# Patient Record
Sex: MALE | Race: WHITE
[De-identification: names, ages, dates, MRNs, and addresses within clinical notes are randomized per-mention and may not be internally consistent; named-entity substitution may affect disease eponyms.]

---

## 2017-07-30 NOTE — PD
Physical Exam


Exam Limitations:  Poor Historian


Narrative


GENERAL: Well-nourished, well-developed patient.


SKIN: Warm and dry.


HEAD: Normocephalic and atraumatic.


EYES: No injection or drainage. 


ENT: No nasal drainage noted. 


NECK: Supple, trachea midline.


CARDIOVASCULAR: Regular rate and rhythm 


RESPIRATORY: no increased effort. No accessory muscle use.


NEUROLOGICAL: Awake and alert. Motor and sensory grossly within normal limits. 

Normal speech.





Data


Data


Last Documented VS





Vital Signs








  Date Time  Temp Pulse Resp B/P Pulse Ox O2 Delivery O2 Flow Rate FiO2


 


7/30/17 15:50     98 Room Air  


 


7/30/17 14:38 98.1 75 20 159/78    








Orders





 Electrocardiogram (7/30/17 15:01)


Complete Blood Count With Diff (7/30/17 15:01)


Comprehensive Metabolic Panel (7/30/17 15:01)


Magnesium (Mg) (7/30/17 15:01)


Ckmb (Isoenzyme) Profile (7/30/17 15:01)


Troponin I (7/30/17 15:01)


Act Partial Throm Time (Ptt) (7/30/17 15:01)


Prothrombin Time / Inr (Pt) (7/30/17 15:01)


Urinalysis - C+S If Indicated (7/30/17 15:01)


Chest, Single Ap (7/30/17 15:01)


Ct Brain W/O Iv Contrast(Rout) (7/30/17 15:01)


Ecg Monitoring (7/30/17 15:01)


Iv Access Insert/Monitor (7/30/17 15:01)


Oximetry (7/30/17 15:01)


Sodium Chloride 0.9% Flush (Ns Flush) (7/30/17 15:15)





Labs





 Laboratory Tests








Test 7/30/17 7/30/17





 15:30 16:51


 


White Blood Count 7.0 TH/MM3 


 


Red Blood Count 4.30 MIL/MM3 


 


Hemoglobin 13.6 GM/DL 


 


Hematocrit 39.9 % 


 


Mean Corpuscular Volume 92.7 FL 


 


Mean Corpuscular Hemoglobin 31.6 PG 


 


Mean Corpuscular Hemoglobin 34.0 % 





Concent  


 


Red Cell Distribution Width 14.0 % 


 


Platelet Count 252 TH/MM3 


 


Mean Platelet Volume 8.4 FL 


 


Neutrophils (%) (Auto) 53.6 % 


 


Lymphocytes (%) (Auto) 35.1 % 


 


Monocytes (%) (Auto) 8.6 % 


 


Eosinophils (%) (Auto) 2.0 % 


 


Basophils (%) (Auto) 0.7 % 


 


Neutrophils # (Auto) 3.8 TH/MM3 


 


Lymphocytes # (Auto) 2.5 TH/MM3 


 


Monocytes # (Auto) 0.6 TH/MM3 


 


Eosinophils # (Auto) 0.1 TH/MM3 


 


Basophils # (Auto) 0.0 TH/MM3 


 


CBC Comment DIFF FINAL  


 


Differential Comment   


 


Prothrombin Time 10.8 SEC 


 


Prothromb Time International 1.0 RATIO 





Ratio  


 


Activated Partial 26.8 SEC 





Thromboplast Time  


 


Sodium Level 135 MEQ/L 


 


Potassium Level 4.2 MEQ/L 


 


Chloride Level 102 MEQ/L 


 


Carbon Dioxide Level 25.4 MEQ/L 


 


Anion Gap 8 MEQ/L 


 


Blood Urea Nitrogen 14 MG/DL 


 


Creatinine 1.56 MG/DL 


 


Estimat Glomerular Filtration 44 ML/MIN 





Rate  


 


Random Glucose 378 MG/DL 


 


Calcium Level 9.0 MG/DL 


 


Magnesium Level 1.8 MG/DL 


 


Total Bilirubin 0.8 MG/DL 


 


Aspartate Amino Transf 54 U/L 





(AST/SGOT)  


 


Alanine Aminotransferase 75 U/L 





(ALT/SGPT)  


 


Alkaline Phosphatase 110 U/L 


 


Total Creatine Kinase 95 U/L 


 


Troponin I LESS THAN 0.02 





 NG/ML 


 


Total Protein 6.9 GM/DL 


 


Albumin 3.2 GM/DL 


 


Urine Color  YELLOW 


 


Urine Turbidity  CLEAR 


 


Urine pH  5.5 


 


Urine Specific Gravity  1.031 


 


Urine Protein  NEG mg/dL


 


Urine Glucose (UA)  1000 mg/dL


 


Urine Ketones  NEG mg/dL


 


Urine Occult Blood  NEG 


 


Urine Nitrite  NEG 


 


Urine Bilirubin  NEG 


 


Urine Urobilinogen  LESS THAN 2.0





  MG/DL


 


Urine Leukocyte Esterase  NEG 


 


Urine RBC  1 /hpf


 


Urine WBC  LESS THAN 1





  /hpf


 


Microscopic Urinalysis Comment  CULT NOT





  INDICATED











[facility]


Supervised Visit with ROBYN:  Yes


Interpretation(s)


CBC & BMP Diagram


7/30/17 15:30











Last 24 hours Impressions








Head CT 7/30/17 1501 Signed





Impressions: 





 Service Date/Time:  Sunday, July 30, 2017 15:58 - CONCLUSION:  1. Mild 





 periventricular white matter small vessel ischemic changes bilaterally.  2. No 





 acute infarct, acute hemorrhage, mass effect or extra-axial fluid collections.

   





         Wilfrido Storey MD 


 


Chest X-Ray 7/30/17 1501 Signed





Impressions: 





 Service Date/Time:  Sunday, July 30, 2017 15:00 - CONCLUSION:   1.  Mild 





 cardiomegaly.  2.  No acute focal pulmonary infiltrate or pulmonary vascular 





 congestion.  3.  Degenerative changes throughout the thoracic spine.    Wilfrido Storey MD 








Narrative Course


I, Dr. jay, have reviewed the advance practice practitioner's documentation 

and am in agreement, met with the patient face to face, made the diagnosis, and 

the medical decision making was done by me.  





*My assessment and Findings: 72-year-old male with no onset diabetes had a near 

syncopal event about 3 weeks ago per his daughter who is now in town and wanted 

him to get checked out.  Patient states he never lost consciousness and had no 

chest pain or shortness of breath.  Labs show elevated glucose without DKA.  

Lengthy discussion with patient and daughter and offered observation but not 

wanting to stay.  His daughter states that she will help him with his diabetes 

education and set up close follow-up


Diagnosis





 Primary Impression:  


 Hyperglycemia


 Additional Impression:  


 Near syncope


Patient Instructions:  General Instructions





***Additional Instruction:


return as needed, follow with primary this week, limit sugar intake


***Med/Other Pt SpecificInfo:  No Change to Meds


Disposition:  01 DISCHARGE HOME


Condition:  Stable








Lexi Jay MD Jul 30, 2017 18:04

## 2017-07-30 NOTE — PD
HPI


Chief Complaint:  Diabetic


Time Seen by Provider:  15:00


Travel History


International Travel<30 days:  No


Contact w/Intl Traveler<30days:  No


Traveled to known affect area:  No





History of Present Illness


HPI


73 YO M with PMH of HTN, bipolar presents to the ED for evaluation 2 week 

history of intermittent nausea and vomiting. He can identify no exacerbating or 

alleviating factors. Asymptomatic on presentation. He states that he had a near 

syncopal episode 2 weeks ago in [facility].  EMS was called to the scene and 

rehydrated the patient with Gatorade. Patient refused transport to the 

hospital. He also complains of intermittent dizziness over the same 2 weeks. 

Denies fevers, chills, chest pain, palpitations, shortness of breath, abdominal 

pain, melena, hematochezia, hematemesis, dysuria, back pain, neuro deficits.  

His BGL was 434 at [facility] today. He is followed by Dr. Rocha, last visit~ 

6 months ago.





PFSH


Past Medical History


Hx Anticoagulant Therapy:  Yes (ASA 2X81)


Arthritis:  Yes (BOTH KNEE)


Asthma:  No


Bipolar Disorder:  Yes


Anxiety:  No


Depression:  Yes


Heart Rhythm Problems:  No


Cancer:  No


Cardiovascular Problems:  Yes (HTN)


High Cholesterol:  No


Chest Pain:  No


Congestive Heart Failure:  No


COPD:  No


Diminished Hearing:  No


Endocrine:  No


GERD:  Yes


Genitourinary:  Yes


Hiatal Hernia:  No


Hypertension:  Yes


Immune Disorder:  No


Kidney Stones:  No


Musculoskeletal:  Yes


Neurologic:  No


Psychiatric:  Yes


Respiratory:  Yes (ASTHMA)


Renal Failure:  No


Sleep Apnea:  No


Ulcer:  No





Past Surgical History


Abdominal Surgery:  No


Cardiac Surgery:  No


Ear Surgery:  No


Endocrine Surgery:  No


Eye Surgery:  No


Genitourinary Surgery:  No


Gynecologic Surgery:  No


Oral Surgery:  No


Pacemaker:  No


Thoracic Surgery:  No





Social History


Alcohol Use:  No


Tobacco Use:  No


Substance Use:  No





Allergies-Medications


(Allergen,Severity, Reaction):  


Coded Allergies:  


     Codeine (Verified  Allergy, Severe, MUSCULOSKELETAL ISSUES, 7/30/17)


Reported Meds & Prescriptions





Reported Meds & Active Scripts


Active


Reported


Carvedilol 25 Mg Tab 25 Mg PO HS


Flovent Hfa 12 GM Inh (Fluticasone Propionate) Unknown Strength Inh Unknown 

Dose INH DAILY PRN


Atorvastatin (Atorvastatin Calcium) 20 Mg Tab 20 Mg PO HS


Fluoxetine (Fluoxetine HCl) 40 Mg Cap 40 Cap PO HS


Pantoprazole (Pantoprazole Sodium) 40 Mg Tab 40 Mg PO HS


Flomax (Tamsulosin HCl) 0.4 Mg Cap 0.4 Mg PO HS


Lithium Carbonate 600 Mg Cap 600 Mg PO HS


Verapamil ER (Verapamil HCl) 120 Mg Tab 120 Mg PO HS


Aspir-81 (Aspirin) 81 Mg Tabdr 162 Mg PO HS








Review of Systems


Except as stated in HPI:  all other systems reviewed are Neg





Physical Exam


Narrative


GENERAL: Well-nourished, well-developed male in NAD.


SKIN: Focused skin assessment warm/dry.


HEAD: Normocephalic.


EYES: No scleral icterus. No injection or drainage. 


NECK: Supple, trachea midline. No JVD or lymphadenopathy.


CARDIOVASCULAR: Regular rate and rhythm without murmurs, gallops, or rubs. 


RESPIRATORY: Breath sounds clear and equal bilaterally. No accessory muscle use.


GASTROINTESTINAL: Abdomen protuberant, soft, non-tender, nondistended. Active 

bowel sounds.


MUSCULOSKELETAL: No cyanosis, or edema. 


NEUROLOGICAL: Awake and alert. Cranial nerves II through XII intact.  Motor and 

sensory grossly within normal limits. 5/5 muscle strength in all muscle groups.

  Normal speech.


BACK: Nontender without obvious deformity. No CVA tenderness.





Data


Data


Last Documented VS





Vital Signs








  Date Time  Temp Pulse Resp B/P Pulse Ox O2 Delivery O2 Flow Rate FiO2


 


7/30/17 15:50     98 Room Air  


 


7/30/17 14:38 98.1 75 20 159/78    








Orders





 Electrocardiogram (7/30/17 15:01)


Complete Blood Count With Diff (7/30/17 15:01)


Comprehensive Metabolic Panel (7/30/17 15:01)


Magnesium (Mg) (7/30/17 15:01)


Ckmb (Isoenzyme) Profile (7/30/17 15:01)


Troponin I (7/30/17 15:01)


Act Partial Throm Time (Ptt) (7/30/17 15:01)


Prothrombin Time / Inr (Pt) (7/30/17 15:01)


Urinalysis - C+S If Indicated (7/30/17 15:01)


Chest, Single Ap (7/30/17 15:01)


Ct Brain W/O Iv Contrast(Rout) (7/30/17 15:01)


Ecg Monitoring (7/30/17 15:01)


Iv Access Insert/Monitor (7/30/17 15:01)


Oximetry (7/30/17 15:01)


Sodium Chloride 0.9% Flush (Ns Flush) (7/30/17 15:15)





Labs





 Laboratory Tests








Test 7/30/17 7/30/17





 15:30 16:51


 


White Blood Count 7.0 TH/MM3 


 


Red Blood Count 4.30 MIL/MM3 


 


Hemoglobin 13.6 GM/DL 


 


Hematocrit 39.9 % 


 


Mean Corpuscular Volume 92.7 FL 


 


Mean Corpuscular Hemoglobin 31.6 PG 


 


Mean Corpuscular Hemoglobin 34.0 % 





Concent  


 


Red Cell Distribution Width 14.0 % 


 


Platelet Count 252 TH/MM3 


 


Mean Platelet Volume 8.4 FL 


 


Neutrophils (%) (Auto) 53.6 % 


 


Lymphocytes (%) (Auto) 35.1 % 


 


Monocytes (%) (Auto) 8.6 % 


 


Eosinophils (%) (Auto) 2.0 % 


 


Basophils (%) (Auto) 0.7 % 


 


Neutrophils # (Auto) 3.8 TH/MM3 


 


Lymphocytes # (Auto) 2.5 TH/MM3 


 


Monocytes # (Auto) 0.6 TH/MM3 


 


Eosinophils # (Auto) 0.1 TH/MM3 


 


Basophils # (Auto) 0.0 TH/MM3 


 


CBC Comment DIFF FINAL  


 


Differential Comment   


 


Prothrombin Time 10.8 SEC 


 


Prothromb Time International 1.0 RATIO 





Ratio  


 


Activated Partial 26.8 SEC 





Thromboplast Time  


 


Sodium Level 135 MEQ/L 


 


Potassium Level 4.2 MEQ/L 


 


Chloride Level 102 MEQ/L 


 


Carbon Dioxide Level 25.4 MEQ/L 


 


Anion Gap 8 MEQ/L 


 


Blood Urea Nitrogen 14 MG/DL 


 


Creatinine 1.56 MG/DL 


 


Estimat Glomerular Filtration 44 ML/MIN 





Rate  


 


Random Glucose 378 MG/DL 


 


Calcium Level 9.0 MG/DL 


 


Magnesium Level 1.8 MG/DL 


 


Total Bilirubin 0.8 MG/DL 


 


Aspartate Amino Transf 54 U/L 





(AST/SGOT)  


 


Alanine Aminotransferase 75 U/L 





(ALT/SGPT)  


 


Alkaline Phosphatase 110 U/L 


 


Total Creatine Kinase 95 U/L 


 


Troponin I LESS THAN 0.02 





 NG/ML 


 


Total Protein 6.9 GM/DL 


 


Albumin 3.2 GM/DL 


 


Urine Color  YELLOW 


 


Urine Turbidity  CLEAR 


 


Urine pH  5.5 


 


Urine Specific Gravity  1.031 


 


Urine Protein  NEG mg/dL


 


Urine Glucose (UA)  1000 mg/dL


 


Urine Ketones  NEG mg/dL


 


Urine Occult Blood  NEG 


 


Urine Nitrite  NEG 


 


Urine Bilirubin  NEG 


 


Urine Urobilinogen  LESS THAN 2.0





  MG/DL


 


Urine Leukocyte Esterase  NEG 


 


Urine RBC  1 /hpf


 


Urine WBC  LESS THAN 1





  /hpf


 


Microscopic Urinalysis Comment  CULT NOT





  INDICATED











MDM


Medical Decision Making


Medical Screen Exam Complete:  Yes


Emergency Medical Condition:  Yes


Differential Diagnosis


hyperglycemia versus DKA versus TIA versus CVA versus ACS versus anemia versus 

electrolyte abnormality versus other


Narrative Course


73 YO M with PMH of HTN, bipolar presents to the ED for evaluation 2 week 

history of intermittent nausea and vomiting. He also complains of intermittent 

dizziness over the same 2 weeks.  He can identify no exacerbating or 

alleviating factors. Asymptomatic on presentation. He states that he had a near 

syncopal episode 2 weeks ago in [facility].  EMS was called to the scene, refused 

transport to the hospital.  His BGL was 434 at [facility] today. He is followed 

by Dr. Rocha, last visit~ 6 months ago. Vitals reviewed.  Physical exam 

reveals a well-appearing male in no acute distress.  Chest CTAB, abdomen soft, 

nontender.  No CVA tenderness.  No lower extremity edema.  No focal neuro 

deficits.





EKG rate 67, sinus rhythm, first-degree AV block, [place] interval 231, , QTc 

460.  Left bundle branch block.  Changes.  Reviewed by Dr. Gerardo.


CXR: Mild cardiomegaly, no focal infiltrates, thoracic spinal changes.


Cardiac enzymes negative 1.


CMP: WBC 7.0, hemoglobin 13.6.


INR 1.0.


CMP: BUN 14, creatinine 1.56, glucose 378


UA: Glucose 1000


CT brain: No acute infarct, hemorrhage, mass effect or extra-axial fluid 

collections per radiology read.





I discussed the results of the workup at length with the patient.  He does not 

want to be admitted to the hospital.  Patient's daughter is at bedside, states 

that she is a nurse practitioner, will help her father to set up follow-up with 

the primary care for evaluation of hyperglycemia. He is stable and discharged 

home.





Diagnosis





 Primary Impression:  


 Near syncope


 Additional Impression:  


 Hyperglycemia


Referrals:  


Primary Care Physician


Patient Instructions:  Diabetic Hyperglycemia (ED), General Instructions, Near 

Syncope (ED)





***Additional Instructions:


Rest, hydrate.


Reduce your sweets intake.


Follow up with your primary care provider for further evaluation and Hemoglobin 

A1c check this week.


Return to the ED for any urgent or emergent medical condition.


Disposition:  01 DISCHARGE HOME


Condition:  Stable








Ny Bernal Jul 30, 2017 15:00

## 2017-07-30 NOTE — RADRPT
EXAM DATE/TIME:  07/30/2017 15:00 

 

[facility] COMPARISON:     

CHEST SINGLE AP, September 09, 2014, 17:20.

 

                     

INDICATIONS :     

Syncope. Lightheaded.

                     

 

MEDICAL HISTORY :     

Hypertension.       Hyperglycemic.   

 

SURGICAL HISTORY :     

None.   

 

ENCOUNTER:     

Initial                                        

 

ACUITY:     

2 weeks      

 

PAIN SCORE:     

0/10

 

LOCATION:     

Bilateral chest 

 

FINDINGS:     

The heart is mildly enlarged.  The pulmonary vascular pattern is normal.  The lungs are clear.  Degen
erative changes are noted throughout the thoracic spine. 

 

CONCLUSION:     

 

1.  Mild cardiomegaly. 

2.  No acute focal pulmonary infiltrate or pulmonary vascular congestion. 

3.  Degenerative changes throughout the thoracic spine. 

 

 Wilfrido Storey MD on July 30, 2017 at 15:50                

Board Certified Radiologist.

 This report was verified electronically.

## 2017-07-30 NOTE — RADRPT
EXAM DATE/TIME:  07/30/2017 15:58 

 

[facility] COMPARISON:     

No previous studies available for comparison.

 

 

INDICATIONS :     

Syncopal episode, dizziness, nausea vomiting

                      

 

RADIATION DOSE:     

41.68 CTDIvol (mGy) 

 

 

 

MEDICAL HISTORY :     

Cardiovascular disease. Hypertension. 

 

SURGICAL HISTORY :      

None. 

 

ENCOUNTER:     

Initial

 

ACUITY:     

3 days

 

PAIN SCALE:     

0/10

 

LOCATION:      

Cranial 

 

TECHNIQUE:     

Multiple contiguous axial images were obtained of the head.  Using automated exposure control and adj
ustment of the mA and/or kV according to patient size, radiation dose was kept as low as reasonably a
chievable to obtain optimal diagnostic quality images.   DICOM format image data is available electro
nically for review and comparison.  

 

FINDINGS:     

Mild periventricular white matter small vessel ischemic changes are noted bilaterally.

There is no acute infarct, acute hemorrhage, mass effect or extra-axial fluid collections.  The ventr
icles, sulci and

cisterns are normal for the patient's age. 

 

CONCLUSION:

1. Mild periventricular white matter small vessel ischemic changes bilaterally. 

2. No acute infarct, acute hemorrhage, mass effect or extra-axial fluid collections.       

 

 

 Wilfrido Storey MD on July 30, 2017 at 16:06                

Board Certified Radiologist.

 This report was verified electronically.

## 2017-07-31 NOTE — EKG
Date Performed: 07/30/2017       Time Performed: 16:22:43

 

PTAGE:      72 years

 

EKG:      Sinus rhythm 

 

 WITH FIRST DEGREE AV BLOCK LEFT BUNDLE BRANCH BLOCK ABNORMAL ECG Compared to prior tracing no signif
icant change 

 

 PREVIOUS TRACING            : 09/09/2014 17.08

 

DOCTOR:   Lb Greenwood  Interpretating Date/Time  07/31/2017 14:12:34

## 2018-01-18 ENCOUNTER — HOSPITAL ENCOUNTER (INPATIENT)
Dept: HOSPITAL 17 - NEPC | Age: 73
LOS: 1 days | Discharge: HOME | DRG: 312 | End: 2018-01-19
Attending: HOSPITALIST | Admitting: HOSPITALIST
Payer: COMMERCIAL

## 2018-01-18 VITALS
SYSTOLIC BLOOD PRESSURE: 127 MMHG | TEMPERATURE: 97.6 F | OXYGEN SATURATION: 92 % | HEART RATE: 76 BPM | RESPIRATION RATE: 18 BRPM | DIASTOLIC BLOOD PRESSURE: 80 MMHG

## 2018-01-18 VITALS — HEIGHT: 67 IN | BODY MASS INDEX: 34.22 KG/M2 | WEIGHT: 218.04 LBS

## 2018-01-18 VITALS
SYSTOLIC BLOOD PRESSURE: 133 MMHG | OXYGEN SATURATION: 94 % | DIASTOLIC BLOOD PRESSURE: 69 MMHG | HEART RATE: 70 BPM | TEMPERATURE: 97.8 F | RESPIRATION RATE: 20 BRPM

## 2018-01-18 VITALS
SYSTOLIC BLOOD PRESSURE: 116 MMHG | DIASTOLIC BLOOD PRESSURE: 72 MMHG | RESPIRATION RATE: 14 BRPM | OXYGEN SATURATION: 96 % | HEART RATE: 55 BPM

## 2018-01-18 VITALS
SYSTOLIC BLOOD PRESSURE: 133 MMHG | TEMPERATURE: 98.2 F | DIASTOLIC BLOOD PRESSURE: 81 MMHG | RESPIRATION RATE: 18 BRPM | OXYGEN SATURATION: 92 % | HEART RATE: 74 BPM

## 2018-01-18 VITALS
SYSTOLIC BLOOD PRESSURE: 109 MMHG | HEART RATE: 71 BPM | OXYGEN SATURATION: 91 % | TEMPERATURE: 97.8 F | RESPIRATION RATE: 18 BRPM | DIASTOLIC BLOOD PRESSURE: 58 MMHG

## 2018-01-18 VITALS
TEMPERATURE: 97 F | SYSTOLIC BLOOD PRESSURE: 135 MMHG | RESPIRATION RATE: 18 BRPM | DIASTOLIC BLOOD PRESSURE: 78 MMHG | OXYGEN SATURATION: 92 % | HEART RATE: 77 BPM

## 2018-01-18 VITALS
OXYGEN SATURATION: 91 % | HEART RATE: 76 BPM | SYSTOLIC BLOOD PRESSURE: 131 MMHG | RESPIRATION RATE: 18 BRPM | DIASTOLIC BLOOD PRESSURE: 69 MMHG | TEMPERATURE: 97.1 F

## 2018-01-18 VITALS — TEMPERATURE: 97.6 F

## 2018-01-18 VITALS — RESPIRATION RATE: 14 BRPM | OXYGEN SATURATION: 95 %

## 2018-01-18 DIAGNOSIS — I12.9: ICD-10-CM

## 2018-01-18 DIAGNOSIS — N18.9: ICD-10-CM

## 2018-01-18 DIAGNOSIS — E11.22: ICD-10-CM

## 2018-01-18 DIAGNOSIS — Z79.84: ICD-10-CM

## 2018-01-18 DIAGNOSIS — R00.1: ICD-10-CM

## 2018-01-18 DIAGNOSIS — J45.909: ICD-10-CM

## 2018-01-18 DIAGNOSIS — R55: Primary | ICD-10-CM

## 2018-01-18 DIAGNOSIS — N40.0: ICD-10-CM

## 2018-01-18 DIAGNOSIS — Z23: ICD-10-CM

## 2018-01-18 LAB
ALBUMIN SERPL-MCNC: 3.2 GM/DL (ref 3.4–5)
ALP SERPL-CCNC: 82 U/L (ref 45–117)
ALT SERPL-CCNC: 46 U/L (ref 12–78)
AST SERPL-CCNC: 38 U/L (ref 15–37)
BACTERIA #/AREA URNS HPF: (no result) /HPF
BASOPHILS # BLD AUTO: 0.1 TH/MM3 (ref 0–0.2)
BASOPHILS NFR BLD: 0.9 % (ref 0–2)
BILIRUB SERPL-MCNC: 0.7 MG/DL (ref 0.2–1)
BUN SERPL-MCNC: 19 MG/DL (ref 7–18)
CALCIUM SERPL-MCNC: 9.5 MG/DL (ref 8.5–10.1)
CHLORIDE SERPL-SCNC: 106 MEQ/L (ref 98–107)
COLOR UR: YELLOW
CREAT SERPL-MCNC: 1.51 MG/DL (ref 0.6–1.3)
EOSINOPHIL # BLD: 0.3 TH/MM3 (ref 0–0.4)
EOSINOPHIL NFR BLD: 2.9 % (ref 0–4)
ERYTHROCYTE [DISTWIDTH] IN BLOOD BY AUTOMATED COUNT: 13.8 % (ref 11.6–17.2)
GFR SERPLBLD BASED ON 1.73 SQ M-ARVRAT: 46 ML/MIN (ref 89–?)
GLUCOSE SERPL-MCNC: 218 MG/DL (ref 74–106)
GLUCOSE UR STRIP-MCNC: (no result) MG/DL
HCO3 BLD-SCNC: 24.4 MEQ/L (ref 21–32)
HCT VFR BLD CALC: 44.2 % (ref 39–51)
HGB BLD-MCNC: 15.1 GM/DL (ref 13–17)
HGB UR QL STRIP: (no result)
HYALINE CASTS #/AREA URNS LPF: 32 /LPF
INR PPP: 1.1 RATIO
KETONES UR STRIP-MCNC: (no result) MG/DL
LYMPHOCYTES # BLD AUTO: 3.8 TH/MM3 (ref 1–4.8)
LYMPHOCYTES NFR BLD AUTO: 33.6 % (ref 9–44)
MAGNESIUM SERPL-MCNC: 2.1 MG/DL (ref 1.5–2.5)
MCH RBC QN AUTO: 31.9 PG (ref 27–34)
MCHC RBC AUTO-ENTMCNC: 34.1 % (ref 32–36)
MCV RBC AUTO: 93.5 FL (ref 80–100)
MONOCYTE #: 1.2 TH/MM3 (ref 0–0.9)
MONOCYTES NFR BLD: 10.3 % (ref 0–8)
MUCOUS THREADS #/AREA URNS LPF: (no result) /LPF
NEUTROPHILS # BLD AUTO: 6 TH/MM3 (ref 1.8–7.7)
NEUTROPHILS NFR BLD AUTO: 52.3 % (ref 16–70)
NITRITE UR QL STRIP: (no result)
PLATELET # BLD: 356 TH/MM3 (ref 150–450)
PMV BLD AUTO: 8.2 FL (ref 7–11)
PROT SERPL-MCNC: 7.7 GM/DL (ref 6.4–8.2)
PROTHROMBIN TIME: 10.7 SEC (ref 9.8–11.6)
RBC # BLD AUTO: 4.73 MIL/MM3 (ref 4.5–5.9)
SODIUM SERPL-SCNC: 140 MEQ/L (ref 136–145)
SP GR UR STRIP: 1.03 (ref 1–1.03)
SQUAMOUS #/AREA URNS HPF: <1 /HPF (ref 0–5)
TROPONIN I SERPL-MCNC: 0.03 NG/ML (ref 0.02–0.05)
URINE LEUKOCYTE ESTERASE: (no result)
WBC # BLD AUTO: 11.4 TH/MM3 (ref 4–11)

## 2018-01-18 PROCEDURE — 82552 ASSAY OF CPK IN BLOOD: CPT

## 2018-01-18 PROCEDURE — 80053 COMPREHEN METABOLIC PANEL: CPT

## 2018-01-18 PROCEDURE — 96374 THER/PROPH/DIAG INJ IV PUSH: CPT

## 2018-01-18 PROCEDURE — 84484 ASSAY OF TROPONIN QUANT: CPT

## 2018-01-18 PROCEDURE — 85610 PROTHROMBIN TIME: CPT

## 2018-01-18 PROCEDURE — 82550 ASSAY OF CK (CPK): CPT

## 2018-01-18 PROCEDURE — 93880 EXTRACRANIAL BILAT STUDY: CPT

## 2018-01-18 PROCEDURE — 70450 CT HEAD/BRAIN W/O DYE: CPT

## 2018-01-18 PROCEDURE — 71045 X-RAY EXAM CHEST 1 VIEW: CPT

## 2018-01-18 PROCEDURE — 80048 BASIC METABOLIC PNL TOTAL CA: CPT

## 2018-01-18 PROCEDURE — 87086 URINE CULTURE/COLONY COUNT: CPT

## 2018-01-18 PROCEDURE — 85025 COMPLETE CBC W/AUTO DIFF WBC: CPT

## 2018-01-18 PROCEDURE — 93005 ELECTROCARDIOGRAM TRACING: CPT

## 2018-01-18 PROCEDURE — 93306 TTE W/DOPPLER COMPLETE: CPT

## 2018-01-18 PROCEDURE — 85730 THROMBOPLASTIN TIME PARTIAL: CPT

## 2018-01-18 PROCEDURE — 90732 PPSV23 VACC 2 YRS+ SUBQ/IM: CPT

## 2018-01-18 PROCEDURE — 81001 URINALYSIS AUTO W/SCOPE: CPT

## 2018-01-18 PROCEDURE — 83735 ASSAY OF MAGNESIUM: CPT

## 2018-01-18 RX ADMIN — Medication PRN ML: at 20:51

## 2018-01-18 RX ADMIN — LITHIUM CARBONATE SCH MG: 300 CAPSULE, GELATIN COATED ORAL at 21:00

## 2018-01-18 RX ADMIN — Medication PRN ML: at 08:21

## 2018-01-18 RX ADMIN — INSULIN ASPART SCH: 100 INJECTION, SOLUTION INTRAVENOUS; SUBCUTANEOUS at 17:00

## 2018-01-18 RX ADMIN — INSULIN ASPART SCH: 100 INJECTION, SOLUTION INTRAVENOUS; SUBCUTANEOUS at 20:53

## 2018-01-18 RX ADMIN — INSULIN ASPART SCH: 100 INJECTION, SOLUTION INTRAVENOUS; SUBCUTANEOUS at 12:20

## 2018-01-18 NOTE — RADRPT
EXAM DATE/TIME:  01/18/2018 08:13 

 

[facility] COMPARISON:     

No previous studies available for comparison.

 

                     

INDICATIONS :     

Syncope

                     

 

MEDICAL HISTORY :     

None.          

 

SURGICAL HISTORY :     

None.   

 

ENCOUNTER:     

Initial                                        

 

ACUITY:     

1 day      

 

PAIN SCORE:     

0/10

 

LOCATION:     

Bilateral chest 

 

FINDINGS:     

A single view of the chest demonstrates the lungs to be symmetrically aerated without evidence of mas
s, infiltrate or effusion.  The cardiomediastinal contours are unremarkable.  Osseous structures are 
intact.

 

CONCLUSION:     Normal examination.  

 

 

 

 Brendan Weldon MD on January 18, 2018 at 8:58           

Board Certified Radiologist.

 This report was verified electronically.

## 2018-01-18 NOTE — RADRPT
EXAM DATE/TIME:  01/18/2018 14:36 

 

[facility] COMPARISON:     

No previous studies available for comparison.

        

 

 

INDICATIONS :     

Syncope. 

                     

 

MEDICAL HISTORY :     

Benign prostatic hyperplasia, (BPH) Hypertension.   Dilopia. Blurred vision. Tinnitis. Diabetes.

 

SURGICAL HISTORY :     

None.     

 

ENCOUNTER:     

Initial

 

ACUITY:     

1 day

 

PAIN SCORE:     

0/10

 

LOCATION:     

Bilateral neck 

                     

PEAK SYSTOLIC VELOCITIES (cm/sec):

 

ICA/CCA RATIO:                    

Right: 0.9     Left: 0.9

 

ICA:                          

Right: 73.6     Left: 68.3

 

CCA:                          

Right: 84.0     Left: 74.9

 

ECA:                           

Right: 89.2     Left: 45.3

 

VERTEBRAL:           

Right: 43.5 antegrade     Left: 56.3 antegrade

             

Elevated flow velocities and ICA/CCA ratios have been found to correlate with increased degrees of

vessel stenosis, calculated as percentage of diameter relative to a normal segment of distal ICA/CCA

 

FINDINGS:     

 

RIGHT CAROTID:     

There is moderate calcified and noncalcified plaque within the mid common carotid artery and in the c
arotid bulb and proximal internal cord artery.  The waveforms are within normal limits.

 

LEFT CAROTID:     

There is a mild to moderate calcified and noncalcified plaque in the mid to distal common carotid art
kike and carotid bulb.  The waveforms are within normal limits.

 

VERTEBRAL ARTERIES:  

Antegrade flow is seen in both vertebral arteries.

 

MISCELLANEOUS:     

None.

 

CONCLUSION:     

1. Mild to moderate atherosclerotic disease within the common carotid arteries and internal carotid a
rteries bilaterally. However, velocity measurements indicate less than 50% stenosis.

2. There is antegrade flow within both vertebral arteries.

 

 

 

 Ministerio Garrison MD on January 18, 2018 at 15:00           

Board Certified Radiologist.

 This report was verified electronically.

## 2018-01-18 NOTE — PD
HPI


Chief Complaint:  Syncope/Near-Syncope


Time Seen by Provider:  07:58


Travel History


International Travel<30 days:  No


Contact w/Intl Traveler<30days:  No


Traveled to known affect area:  No





History of Present Illness


HPI


72-year-old male arrives by EMS.  He was reported to have fallen to the ground 

at about 7 this morning.  He was unresponsive in his daughter who is an NP 

initiated CPR.  The patient promptly woke up thereafter. EMS arrived on scene 

to a patient who was awaken alert with one run of bradycardia to the 30s.  The 

patient takes Coreg and Cardizem.  He reports this morning he woke up at 4 AM 

and took his carvedilol.  He states he does so in order to eat breakfast.  The 

patient states he actually remembers his daughter performing chest 

compressions.  In the ER he denies chest pain and shortness of breath.  No 

nausea or vomiting.  He denies any change in his baseline state of health over 

the past few days.





On license of [facility]


Social History


Tobacco Use:  No





Allergies-Medications


(Allergen,Severity, Reaction):  


Coded Allergies:  


     codeine (Verified  Allergy, Intermediate, 1/18/18)


Reported Meds & Prescriptions





Reported Meds & Active Scripts


Active


Reported


Tamsulosin (Tamsulosin HCl) 0.4 Mg Cap 0.4 Mg PO HS


Prozac (Fluoxetine HCl) 20 Mg Cap 20 Mg PO DAILY


Lithium Carbonate 300 Mg Cap 300 Mg PO BID


Verapamil (Verapamil HCl) 120 Mg Tab 120 Mg PO DAILY


Carvedilol 25 Mg Tab 25 Mg PO DAILY


Metformin (Metformin HCl) 500 Mg Tab 500 Mg PO BIDPC


Aspirin 81 Mg Chew 81 Mg CHEW DAILY





Review of Systems


Except as stated in HPI:  all other systems reviewed are Neg


General / Constitutional:  No: Fever





Physical Exam


Narrative


GENERAL: 72-year-old male well-nourished well-developed


SKIN: Warm and dry.


HEAD: Atraumatic. Normocephalic. 


EYES: Pupils equal and round. No scleral icterus. No injection or drainage. 


ENT: No nasal bleeding or discharge.  Mucous membranes pink and moist.


NECK: Trachea midline. No JVD. 


CARDIOVASCULAR: Heart rate is about 50-60.  The rhythm is regular.


RESPIRATORY: No accessory muscle use. Clear to auscultation. Breath sounds 

equal bilaterally. 


GASTROINTESTINAL: Soft.  No focus of tenderness.  No distention.


MUSCULOSKELETAL: Extremities without clubbing, cyanosis, or edema. No obvious 

deformities. 


NEUROLOGICAL: Awake and alert. No obvious cranial nerve deficits.  Motor 

grossly within normal limits. Five out of 5 muscle strength in the arms and 

legs.  Normal speech.


PSYCHIATRIC: Appropriate mood and affect; insight and judgment normal.





Data


Data


Last Documented VS





Vital Signs








  Date Time  Temp Pulse Resp B/P (MAP) Pulse Ox O2 Delivery O2 Flow Rate FiO2


 


1/18/18 08:02   14  95 Nasal Cannula 3.00 


 


1/18/18 07:56  55  116/72 (87)    











Vital Signs








  Date Time  Temp Pulse Resp B/P (MAP) Pulse Ox O2 Delivery O2 Flow Rate FiO2


 


1/18/18 08:02   14  95 Nasal Cannula 3.00 


 


1/18/18 07:56  55 14 116/72 (87) 96   








Orders





 Orders


Electrocardiogram (1/18/18 07:58)


Complete Blood Count With Diff (1/18/18 07:58)


Comprehensive Metabolic Panel (1/18/18 07:58)


Magnesium (Mg) (1/18/18 07:58)


Ckmb (Isoenzyme) Profile (1/18/18 07:58)


Troponin I (1/18/18 07:58)


Act Partial Throm Time (Ptt) (1/18/18 07:58)


Prothrombin Time / Inr (Pt) (1/18/18 07:58)


Urinalysis - C+S If Indicated (1/18/18 07:58)


Chest, Single Ap (1/18/18 07:58)


Ct Brain W/O Iv Contrast(Rout) (1/18/18 07:58)


Blood Glucose (1/18/18 07:58)


Ecg Monitoring (1/18/18 07:58)


Iv Access Insert/Monitor (1/18/18 07:58)


Oximetry (1/18/18 07:58)


Ondansetron Inj (Zofran Inj) (1/18/18 08:00)


Sodium Chloride 0.9% Flush (Ns Flush) (1/18/18 08:00)


CKMB (1/18/18 08:00)


CKMB% (1/18/18 08:00)


Vital Signs (Adult) ALBERTO.Q4H (1/18/18 10:23)


Blood Glucose Goal (Criteria) (1/18/18 10:23)


Hypoglycemia 70 Mg/Dl Or < (1/18/18 10:23)


Notify Dr: Other (1/18/18 10:23)


Dextrose 50% In Elliot (Vial) Inj (D50w (Vi (1/18/18 10:30)


Glucagon Inj (Glucagon Inj) (1/18/18 10:30)


Insulin Aspart Supplemtl Scale (Novolog (1/18/18 12:00)


Cardiac Monitor / Telemetry ALBERTO.Q8H (1/18/18 10:23)


Orthostatic Blood Pressure (1/18/18 10:23)


Basic Metabolic Panel (Bmp) (1/19/18 06:00)


Admit Order (Ed Use Only) (1/18/18 )


Cardiac Monitor / Telemetry ALBERTO.Q8H (1/18/18 10:26)


Vital Signs (Adult) Q4H (1/18/18 10:26)


Diet Npo (1/18/18 Lunch)


Activity Bed Rest (1/18/18 10:26)





Labs





Laboratory Tests








Test


  1/18/18


08:00


 


White Blood Count 11.4 TH/MM3 


 


Red Blood Count 4.73 MIL/MM3 


 


Hemoglobin 15.1 GM/DL 


 


Hematocrit 44.2 % 


 


Mean Corpuscular Volume 93.5 FL 


 


Mean Corpuscular Hemoglobin 31.9 PG 


 


Mean Corpuscular Hemoglobin


Concent 34.1 % 


 


 


Red Cell Distribution Width 13.8 % 


 


Platelet Count 356 TH/MM3 


 


Mean Platelet Volume 8.2 FL 


 


Neutrophils (%) (Auto) 52.3 % 


 


Lymphocytes (%) (Auto) 33.6 % 


 


Monocytes (%) (Auto) 10.3 % 


 


Eosinophils (%) (Auto) 2.9 % 


 


Basophils (%) (Auto) 0.9 % 


 


Neutrophils # (Auto) 6.0 TH/MM3 


 


Lymphocytes # (Auto) 3.8 TH/MM3 


 


Monocytes # (Auto) 1.2 TH/MM3 


 


Eosinophils # (Auto) 0.3 TH/MM3 


 


Basophils # (Auto) 0.1 TH/MM3 


 


CBC Comment DIFF FINAL 


 


Differential Comment  


 


Prothrombin Time 10.7 SEC 


 


Prothromb Time International


Ratio 1.1 RATIO 


 


 


Activated Partial


Thromboplast Time 25.2 SEC 


 


 


Blood Urea Nitrogen 19 MG/DL 


 


Creatinine 1.51 MG/DL 


 


Random Glucose 218 MG/DL 


 


Total Protein 7.7 GM/DL 


 


Albumin 3.2 GM/DL 


 


Calcium Level 9.5 MG/DL 


 


Magnesium Level 2.1 MG/DL 


 


Alkaline Phosphatase 82 U/L 


 


Aspartate Amino Transf


(AST/SGOT) 38 U/L 


 


 


Alanine Aminotransferase


(ALT/SGPT) 46 U/L 


 


 


Total Bilirubin 0.7 MG/DL 


 


Sodium Level 140 MEQ/L 


 


Potassium Level 4.1 MEQ/L 


 


Chloride Level 106 MEQ/L 


 


Carbon Dioxide Level 24.4 MEQ/L 


 


Anion Gap 10 MEQ/L 


 


Estimat Glomerular Filtration


Rate 46 ML/MIN 


 


 


Total Creatine Kinase 112 U/L 


 


Creatine Kinase MB 0.8 NG/ML 


 


Troponin I 0.03 NG/ML 











MDM


Medical Decision Making


Medical Screen Exam Complete:  Yes


Emergency Medical Condition:  Yes


Differential Diagnosis


CBC & BMP Diagram


1/18/18 08:00








Total Protein 7.7, Albumin 3.2 L, Calcium Level 9.5, Magnesium Level 2.1, 

Alkaline Phosphatase 82, Aspartate Amino Transf (AST/SGOT) 38 H, Alanine 

Aminotransferase (ALT/SGPT) 46, Total Bilirubin 0.7





Tn 0.03


Albumin 3.2


EKG: sinus, rate 57,LBBB pattern (old)





Last Impressions








Head CT 1/18/18 0758 Signed





Impressions: 





 Service Date/Time:  Thursday, January 18, 2018 08:35 - CONCLUSION:  Normal 





 examination.       Brendan Weldon MD 


 


Chest X-Ray 1/18/18 0758 Signed





Impressions: 





 Service Date/Time:  Thursday, January 18, 2018 08:13 - CONCLUSION: Normal 





 examination.       Brendan Weldon MD 





Patient had a syncope episode apparently with unconsciousness for long enough 

for his daughter who is an NP to do CPR.  EMS reports on scene the heart rate 

was in the 30s.  He has normalized here and at the time of reassessment just 

prior to admission, 11:05 AM the patient reports feeling "fine."  Heart rate is 

about 80 and the blood pressure was 140/70.  There is concern that potentially 

the Coreg with or without the diltiazem is causing the bradycardia.


Case d/w Dr Carrillo


Narrative Course


Please see above


DDX includes pharmacy, metabolic disarray, anemia, intracranial hemorrhage





Diagnosis





 Primary Impression:  


 Syncope and collapse


 Additional Impression:  


 Bradycardia





Admitting Information


Admitting Physician Requests:  Observation











Lalo Pringle MD Jan 18, 2018 10:09

## 2018-01-18 NOTE — RADRPT
EXAM DATE/TIME:  01/18/2018 08:35 

 

[facility] COMPARISON:     

No previous studies available for comparison.

 

 

INDICATIONS :     

Syncopal episode, dizziness.

                      

 

RADIATION DOSE:     

69.15 CTDIvol (mGy) 

 

 

 

MEDICAL HISTORY :     

None  

 

SURGICAL HISTORY :      

None. 

 

ENCOUNTER:      

Initial

 

ACUITY:      

1 day

 

PAIN SCALE:      

1/10

 

LOCATION:        

cranial 

 

TECHNIQUE:     

Multiple contiguous axial images were obtained of the head.  Using automated exposure control and adj
ustment of the mA and/or kV according to patient size, radiation dose was kept as low as reasonably a
chievable to obtain optimal diagnostic quality images.   DICOM format image data is available electro
nically for review and comparison.  

 

FINDINGS:     

 

CEREBRUM:     

The ventricles are normal for age.  No evidence of midline shift, mass lesion, hemorrhage or acute in
farction.  No extra-axial fluid collections are seen.

 

POSTERIOR FOSSA:     

The cerebellum and brainstem are intact.  The 4th ventricle is midline.  The cerebellopontine angle i
s unremarkable.

 

EXTRACRANIAL:     

The visualized portion of the orbits is intact.

 

SKULL:     

The calvaria is intact.  No evidence of skull fracture.

 

CONCLUSION:     

Normal examination.  

 

 

 

 Brendan Weldon MD on January 18, 2018 at 9:02           

Board Certified Radiologist.

 This report was verified electronically.

## 2018-01-18 NOTE — EKG
Date Performed: 01/18/2018       Time Performed: 07:56:48

 

PTAGE:      72 years

 

EKG:      SINUS BRADYCARDIA WITH FIRST DEGREE AV BLOCK MARKED LEFT AXIS DEVIATION LEFT BUNDLE BRANCH 
BLOCK ABNORMAL ECG 

 

NO PREVIOUS TRACING            

 

DOCTOR:   Rao Florence  Interpretating Date/Time  01/18/2018 21:52:03

## 2018-01-19 VITALS
SYSTOLIC BLOOD PRESSURE: 135 MMHG | TEMPERATURE: 97.5 F | DIASTOLIC BLOOD PRESSURE: 66 MMHG | HEART RATE: 74 BPM | OXYGEN SATURATION: 90 % | RESPIRATION RATE: 20 BRPM

## 2018-01-19 VITALS — SYSTOLIC BLOOD PRESSURE: 130 MMHG | DIASTOLIC BLOOD PRESSURE: 74 MMHG | HEART RATE: 89 BPM

## 2018-01-19 VITALS — DIASTOLIC BLOOD PRESSURE: 68 MMHG | HEART RATE: 82 BPM | SYSTOLIC BLOOD PRESSURE: 130 MMHG

## 2018-01-19 VITALS
SYSTOLIC BLOOD PRESSURE: 153 MMHG | DIASTOLIC BLOOD PRESSURE: 75 MMHG | OXYGEN SATURATION: 93 % | TEMPERATURE: 97.8 F | HEART RATE: 92 BPM | RESPIRATION RATE: 20 BRPM

## 2018-01-19 VITALS — HEART RATE: 71 BPM

## 2018-01-19 VITALS
OXYGEN SATURATION: 91 % | TEMPERATURE: 97.5 F | HEART RATE: 76 BPM | RESPIRATION RATE: 18 BRPM | DIASTOLIC BLOOD PRESSURE: 77 MMHG | SYSTOLIC BLOOD PRESSURE: 115 MMHG

## 2018-01-19 VITALS — HEART RATE: 88 BPM

## 2018-01-19 VITALS — HEART RATE: 68 BPM | SYSTOLIC BLOOD PRESSURE: 157 MMHG | DIASTOLIC BLOOD PRESSURE: 74 MMHG

## 2018-01-19 LAB
BUN SERPL-MCNC: 25 MG/DL (ref 7–18)
CALCIUM SERPL-MCNC: 9.5 MG/DL (ref 8.5–10.1)
CHLORIDE SERPL-SCNC: 106 MEQ/L (ref 98–107)
CREAT SERPL-MCNC: 1.47 MG/DL (ref 0.6–1.3)
GFR SERPLBLD BASED ON 1.73 SQ M-ARVRAT: 47 ML/MIN (ref 89–?)
GLUCOSE SERPL-MCNC: 140 MG/DL (ref 74–106)
HCO3 BLD-SCNC: 26.2 MEQ/L (ref 21–32)
SODIUM SERPL-SCNC: 140 MEQ/L (ref 136–145)

## 2018-01-19 RX ADMIN — LITHIUM CARBONATE SCH MG: 300 CAPSULE, GELATIN COATED ORAL at 10:30

## 2018-01-19 RX ADMIN — INSULIN ASPART SCH: 100 INJECTION, SOLUTION INTRAVENOUS; SUBCUTANEOUS at 11:28

## 2018-01-19 NOTE — HHI.PR
Addendum To [facility] Progress Not


Reason for addendum:  Additonal documentation (the patient wants to go home and 

have a f/u on his echo as outpatient. patient will be discharged home with f/u 

by his pcp.)











Tasha Carrillo MD Jan 19, 2018 16:46

## 2018-01-19 NOTE — ECHRPT
Indication:   SOB

 

 CONCLUSIONS

 Normal left ventricular size. Mild concentric left ventricular hypertrophy. 

 The left ventricular systolic function is normal with an estimated ejection fraction in the range of
 55-60%.  

 No definite regional wall motion abnormalities.

 

 Trace aortic valve regurgitation. 

 

 BP:  116   / 72      HR: 55                       Rhythm:           Other

 

 MEASUREMENTS  (Male / Female) Normal Values       Technical Quality:Good

 2D ECHO

 LV Diastolic Diameter PLAX        4.8 cm                4.2 - 5.9 / 3.9 - 5.3 cm

 LV Systolic Diameter PLAX         3.9 cm                

 IVS Diastolic Thickness           1.4 cm                0.6 - 1.0 / 0.6 - 0.9 cm

 LVPW Diastolic Thickness          1.1 cm                0.6 - 1.0 / 0.6 - 0.9 cm

 LV Relative Wall Thickness        0.5                   

 LA Systolic Diameter LX           4.1 cm                3.0 - 4.0 / 2.7 - 3.8 cm

 

 DOPPLER

 Mitral E Point Velocity           85.9 cm/s             

 Mitral A Point Velocity           117.0 cm/s            

 Mitral E to A Ratio               0.7                   

 TR Peak Velocity                  233.0 cm/s            

 TR Peak Gradient                  21.7 mmHg             

 Right Atrial Pressure             10.0 mmHg             

 Pulmonary Artery Systolic Pressu  31.7 mmHg             

 Right Ventricular Systolic Press  31.7 mmHg             

 

 

 FINDINGS

 

 LEFT VENTRICLE

 Normal left ventricular size. Mild concentric left ventricular hypertrophy. 

 The left ventricular systolic function is normal with an estimated ejection fraction in the range of
 55-60%.  

 No definite regional wall motion abnormalities.

 

 

 RIGHT VENTRICLE

 Normal right ventricular size and systolic function.  

 

 LEFT ATRIUM

 The left atrial size is normal.  

 

 RIGHT ATRIUM

 The right atrial size is normal.  

 

 ATRIAL SEPTUM

 Normal atrial septal thickness without atrial level shunting by limited color doppler interrogation.
  

 

 AORTA

 The aortic root and proximal ascending aorta are normal in size on limited imaging.  

 

 MITRAL VALVE

 Structurally normal mitral valve. No mitral valve stenosis or regurgitation.  

 

 AORTIC VALVE

 Trace aortic valve regurgitation. 

 

 TRICUSPID VALVE

 Structurally normal tricuspid valve. No tricuspid valve stenosis or regurgitation.  

 

 PULMONARY VALVE

 No pulmonary valve regurgitation or stenosis.  

 

 VESSELS

 The inferior vena cava is normal in size.  

 

 PERICARDIUM

 No pericardial effusion.  

 

 

 

 

  Daniel Ellis MD

  (Electronically Signed)

  Final Date:19 January 2018 19:53

## 2018-01-19 NOTE — HHI.PR
Subjective


Remarks


in no acute distress.


no chest pain or sob.


no dizziness today.





Objective


Vitals





Vital Signs








  Date Time  Temp Pulse Resp B/P (MAP) Pulse Ox O2 Delivery O2 Flow Rate FiO2


 


1/19/18 04:12 97.5 76 18 115/77 (90) 91   


 


1/19/18 04:00  88      


 


1/19/18 00:00  71      


 


1/18/18 23:42 97.8 71 18 109/58 (75) 91   


 


1/18/18 22:22 97.0 77 18 135/78 (97) 92   


 


1/18/18 21:22 97.6 76 18 127/80 (96) 92   


 


1/18/18 20:22 97.1 76 18 131/69 (89) 91   


 


1/18/18 19:22 98.2 74 18 133/81 (98) 92   


 


1/18/18 17:00 97.8 70 20 133/69 (90) 94   


 


1/18/18 11:05 97.6       














I/O      


 


 1/18/18 1/18/18 1/18/18 1/19/18 1/19/18 1/19/18





 07:00 15:00 23:00 07:00 15:00 23:00


 


Intake Total    0 ml  


 


Output Total    0 ml  


 


Balance    0 ml  


 


      


 


Intake Oral    0 ml  


 


Output Urine Total    0 ml  


 


# Voids    3  


 


# Bowel Movements    1  








Result Diagram:  


1/18/18 0800                                                                   

             1/18/18 0800





Imaging





Last Impressions








Head CT 1/18/18 0758 Signed





Impressions: 





 Service Date/Time:  Thursday, January 18, 2018 08:35 - CONCLUSION:  Normal 





 examination.       Brendan Weldon MD 


 


Chest X-Ray 1/18/18 0758 Signed





Impressions: 





 Service Date/Time:  Thursday, January 18, 2018 08:13 - CONCLUSION: Normal 





 examination.       Brendan Weldon MD 


 


Carotid Artery Ultrasound 1/18/18 0000 Signed





Impressions: 





 Service Date/Time:  Thursday, January 18, 2018 14:36 - CONCLUSION:  1. Mild to 





 moderate atherosclerotic disease within the common carotid arteries and 

internal 





 carotid arteries bilaterally. However, velocity measurements indicate less 

than 





 50%% stenosis. 2. There is antegrade flow within both vertebral arteries.     





 Ministerio Garrison MD 








Objective Remarks


GENERAL: This is a well-nourished, well-developed patient, in no apparent 

distress.


CARDIOVASCULAR: Regular rate and regular rhythm without murmurs, gallops, or 

rubs. 


RESPIRATORY: Clear to auscultation. Breath sounds equal bilaterally. No wheezes

, rales, or rhonchi.  


GASTROINTESTINAL: Abdomen soft, non-tender, nondistended. 


Normal, active bowel sounds


MUSCULOSKELETAL: Extremities without clubbing, cyanosis, or edema.


NEURO:  Alert & Oriented x4 to person, place, time, situation.  Moves all ext x4


Medications and IVs





Inpatient Medications


Aspirin (Aspirin Chew) 81 mg DAILY CHEW ;  Start 1/19/18 at 09:00


Dextrose (D50w (Vial) Inj) 50 ml UNSCH  PRN IV PUSH HYPOGLYCEMIA-SEE COMMENTS;  

Start 1/18/18 at 10:30


Fluoxetine HCl (PROzac) 20 mg DAILY PO ;  Start 1/19/18 at 09:00


Glucagon (Glucagon Inj) 1 mg UNSCH  PRN OTHER HYPOGLYCEMIA-SEE COMMENTS;  Start 

1/18/18 at 10:30


Insulin Aspart (NovoLOG SUPPLEMENTAL SCALE) 1 ACHS SLIDING  SCALE SQ  Last 

administered on 1/18/18at 17:00;  Start 1/18/18 at 12:00


Lithium Carbonate (Lithium Carbonate) 300 mg BID PO ;  Start 1/18/18 at 21:00


Ondansetron HCl (Zofran Inj) 4 mg ONCE  ONCE IVP  Last administered on 1/18/ 18at 08:21;  Start 1/18/18 at 08:00;  Stop 1/18/18 at 08:02;  Status DC


Pneumococcal Polyvalent Vaccine (Pneumovax-23 Inj) 25 mcg ONCE ONCE IM ;  Start 

1/19/18 at 10:00;  Stop 1/19/18 at 10:01


Sodium Chloride 500 ml @  50 mls/hr Q10H ONCE IV  Last administered on 1/18/ 18at 14:00;  Start 1/18/18 at 14:00;  Stop 1/18/18 at 23:59;  Status DC


Sodium Chloride (NS Flush) 2 ml UNSCH  PRN IVF FLUSH AFTER USING IV ACCESS Last 

administered on 1/18/18at 20:51;  Start 1/18/18 at 08:00


Tamsulosin HCl (Flomax) 0.4 mg HS PO  Last administered on 1/18/18at 20:51;  

Start 1/18/18 at 21:00





A/P


Problem List:  


(1) Syncope


ICD Code:  R55 - Syncope and collapse


Assessment and Plan


- syncope with reported bradycardia


 CT head with no acute abnormality- carotid doppler with less than 50% stenosis

- echo is pending.


 remained on sinus rhythm on telemetry-- hold Verapamil -


 start on coreg at a lower dose upon discharge.


-hypertension- check orthostatic BP- hold home meds as noted above- vasotec prn.


-diabetes mellitus; accu-check with SSI


-chronic renal insufficiency; seems to be at his baseline- will monitor


-DVT prophylaxis with subq Lovenox


-PT consulted.


Discharge Planning


dc home later today when echo is done.





Problem Qualifiers





(1) Syncope:  


Qualified Codes:  R55 - Syncope and collapse








Tasha Carrillo MD Jan 19, 2018 08:58

## 2018-02-23 ENCOUNTER — HOSPITAL ENCOUNTER (OUTPATIENT)
Dept: HOSPITAL 17 - NEPE | Age: 73
Setting detail: OBSERVATION
LOS: 3 days | Discharge: HOME | End: 2018-02-26
Attending: HOSPITALIST | Admitting: HOSPITALIST
Payer: MEDICARE

## 2018-02-23 VITALS — WEIGHT: 212.75 LBS | BODY MASS INDEX: 33.39 KG/M2 | HEIGHT: 67 IN

## 2018-02-23 DIAGNOSIS — N40.0: ICD-10-CM

## 2018-02-23 DIAGNOSIS — M25.569: ICD-10-CM

## 2018-02-23 DIAGNOSIS — N28.9: ICD-10-CM

## 2018-02-23 DIAGNOSIS — F31.9: ICD-10-CM

## 2018-02-23 DIAGNOSIS — D72.829: ICD-10-CM

## 2018-02-23 DIAGNOSIS — Z79.4: ICD-10-CM

## 2018-02-23 DIAGNOSIS — G89.29: ICD-10-CM

## 2018-02-23 DIAGNOSIS — E11.9: ICD-10-CM

## 2018-02-23 DIAGNOSIS — D64.9: ICD-10-CM

## 2018-02-23 DIAGNOSIS — K25.4: Primary | ICD-10-CM

## 2018-02-23 DIAGNOSIS — J45.909: ICD-10-CM

## 2018-02-23 DIAGNOSIS — Z79.899: ICD-10-CM

## 2018-02-23 DIAGNOSIS — I10: ICD-10-CM

## 2018-02-23 PROCEDURE — G0378 HOSPITAL OBSERVATION PER HR: HCPCS

## 2018-02-23 PROCEDURE — 80053 COMPREHEN METABOLIC PANEL: CPT

## 2018-02-23 PROCEDURE — 85025 COMPLETE CBC W/AUTO DIFF WBC: CPT

## 2018-02-23 PROCEDURE — 85610 PROTHROMBIN TIME: CPT

## 2018-02-23 PROCEDURE — 99285 EMERGENCY DEPT VISIT HI MDM: CPT

## 2018-02-23 PROCEDURE — 86901 BLOOD TYPING SEROLOGIC RH(D): CPT

## 2018-02-23 PROCEDURE — 00731 ANES UPR GI NDSC PX NOS: CPT

## 2018-02-23 PROCEDURE — 96366 THER/PROPH/DIAG IV INF ADDON: CPT

## 2018-02-23 PROCEDURE — 71045 X-RAY EXAM CHEST 1 VIEW: CPT

## 2018-02-23 PROCEDURE — 82948 REAGENT STRIP/BLOOD GLUCOSE: CPT

## 2018-02-23 PROCEDURE — 88305 TISSUE EXAM BY PATHOLOGIST: CPT

## 2018-02-23 PROCEDURE — 96361 HYDRATE IV INFUSION ADD-ON: CPT

## 2018-02-23 PROCEDURE — 96375 TX/PRO/DX INJ NEW DRUG ADDON: CPT

## 2018-02-23 PROCEDURE — 96365 THER/PROPH/DIAG IV INF INIT: CPT

## 2018-02-23 PROCEDURE — 88312 SPECIAL STAINS GROUP 1: CPT

## 2018-02-23 PROCEDURE — 85018 HEMOGLOBIN: CPT

## 2018-02-23 PROCEDURE — 83690 ASSAY OF LIPASE: CPT

## 2018-02-23 PROCEDURE — 96376 TX/PRO/DX INJ SAME DRUG ADON: CPT

## 2018-02-23 PROCEDURE — 74019 RADEX ABDOMEN 2 VIEWS: CPT

## 2018-02-23 PROCEDURE — 43239 EGD BIOPSY SINGLE/MULTIPLE: CPT

## 2018-02-23 PROCEDURE — 86900 BLOOD TYPING SEROLOGIC ABO: CPT

## 2018-02-23 PROCEDURE — 85014 HEMATOCRIT: CPT

## 2018-02-23 PROCEDURE — C9113 INJ PANTOPRAZOLE SODIUM, VIA: HCPCS

## 2018-02-23 PROCEDURE — 86850 RBC ANTIBODY SCREEN: CPT

## 2018-02-24 VITALS
HEART RATE: 101 BPM | OXYGEN SATURATION: 97 % | RESPIRATION RATE: 18 BRPM | DIASTOLIC BLOOD PRESSURE: 90 MMHG | SYSTOLIC BLOOD PRESSURE: 149 MMHG | TEMPERATURE: 97.5 F

## 2018-02-24 VITALS
OXYGEN SATURATION: 98 % | HEART RATE: 104 BPM | SYSTOLIC BLOOD PRESSURE: 152 MMHG | DIASTOLIC BLOOD PRESSURE: 101 MMHG | TEMPERATURE: 97.4 F | RESPIRATION RATE: 16 BRPM

## 2018-02-24 VITALS
DIASTOLIC BLOOD PRESSURE: 78 MMHG | OXYGEN SATURATION: 96 % | HEART RATE: 99 BPM | RESPIRATION RATE: 20 BRPM | SYSTOLIC BLOOD PRESSURE: 153 MMHG | TEMPERATURE: 98.2 F

## 2018-02-24 VITALS
OXYGEN SATURATION: 95 % | DIASTOLIC BLOOD PRESSURE: 88 MMHG | RESPIRATION RATE: 20 BRPM | SYSTOLIC BLOOD PRESSURE: 149 MMHG | TEMPERATURE: 97.6 F | HEART RATE: 82 BPM

## 2018-02-24 VITALS — SYSTOLIC BLOOD PRESSURE: 163 MMHG | DIASTOLIC BLOOD PRESSURE: 69 MMHG

## 2018-02-24 VITALS
RESPIRATION RATE: 20 BRPM | HEART RATE: 103 BPM | SYSTOLIC BLOOD PRESSURE: 146 MMHG | DIASTOLIC BLOOD PRESSURE: 80 MMHG | TEMPERATURE: 98.4 F | OXYGEN SATURATION: 97 %

## 2018-02-24 VITALS
DIASTOLIC BLOOD PRESSURE: 87 MMHG | OXYGEN SATURATION: 96 % | HEART RATE: 97 BPM | RESPIRATION RATE: 18 BRPM | TEMPERATURE: 98.2 F | SYSTOLIC BLOOD PRESSURE: 151 MMHG

## 2018-02-24 LAB
ALBUMIN SERPL-MCNC: 2.9 GM/DL (ref 3.4–5)
ALP SERPL-CCNC: 63 U/L (ref 45–117)
ALT SERPL-CCNC: 31 U/L (ref 12–78)
AST SERPL-CCNC: 25 U/L (ref 15–37)
BASOPHILS # BLD AUTO: 0.1 TH/MM3 (ref 0–0.2)
BASOPHILS NFR BLD: 0.7 % (ref 0–2)
BILIRUB SERPL-MCNC: 1.1 MG/DL (ref 0.2–1)
BUN SERPL-MCNC: 25 MG/DL (ref 7–18)
CALCIUM SERPL-MCNC: 9.3 MG/DL (ref 8.5–10.1)
CHLORIDE SERPL-SCNC: 106 MEQ/L (ref 98–107)
CREAT SERPL-MCNC: 1.31 MG/DL (ref 0.6–1.3)
EOSINOPHIL # BLD: 0.2 TH/MM3 (ref 0–0.4)
EOSINOPHIL NFR BLD: 1.2 % (ref 0–4)
ERYTHROCYTE [DISTWIDTH] IN BLOOD BY AUTOMATED COUNT: 13.8 % (ref 11.6–17.2)
GFR SERPLBLD BASED ON 1.73 SQ M-ARVRAT: 54 ML/MIN (ref 89–?)
GLUCOSE SERPL-MCNC: 182 MG/DL (ref 74–106)
HCO3 BLD-SCNC: 27.7 MEQ/L (ref 21–32)
HCT VFR BLD CALC: 30.4 % (ref 39–51)
HCT VFR BLD CALC: 37.4 % (ref 39–51)
HGB BLD-MCNC: 10.6 GM/DL (ref 13–17)
HGB BLD-MCNC: 12.9 GM/DL (ref 13–17)
INR PPP: 1.1 RATIO
LYMPHOCYTES # BLD AUTO: 3.2 TH/MM3 (ref 1–4.8)
LYMPHOCYTES NFR BLD AUTO: 20.7 % (ref 9–44)
MCH RBC QN AUTO: 31.9 PG (ref 27–34)
MCHC RBC AUTO-ENTMCNC: 34.4 % (ref 32–36)
MCV RBC AUTO: 92.7 FL (ref 80–100)
MONOCYTE #: 0.9 TH/MM3 (ref 0–0.9)
MONOCYTES NFR BLD: 5.5 % (ref 0–8)
NEUTROPHILS # BLD AUTO: 11.2 TH/MM3 (ref 1.8–7.7)
NEUTROPHILS NFR BLD AUTO: 71.9 % (ref 16–70)
PLATELET # BLD: 364 TH/MM3 (ref 150–450)
PMV BLD AUTO: 7.9 FL (ref 7–11)
PROT SERPL-MCNC: 6.9 GM/DL (ref 6.4–8.2)
PROTHROMBIN TIME: 10.9 SEC (ref 9.8–11.6)
RBC # BLD AUTO: 4.03 MIL/MM3 (ref 4.5–5.9)
SODIUM SERPL-SCNC: 140 MEQ/L (ref 136–145)
WBC # BLD AUTO: 15.6 TH/MM3 (ref 4–11)

## 2018-02-24 RX ADMIN — INSULIN ASPART SCH: 100 INJECTION, SOLUTION INTRAVENOUS; SUBCUTANEOUS at 12:00

## 2018-02-24 RX ADMIN — INSULIN ASPART SCH: 100 INJECTION, SOLUTION INTRAVENOUS; SUBCUTANEOUS at 21:00

## 2018-02-24 RX ADMIN — STANDARDIZED SENNA CONCENTRATE AND DOCUSATE SODIUM SCH TAB: 8.6; 5 TABLET, FILM COATED ORAL at 09:19

## 2018-02-24 RX ADMIN — STANDARDIZED SENNA CONCENTRATE AND DOCUSATE SODIUM SCH TAB: 8.6; 5 TABLET, FILM COATED ORAL at 21:49

## 2018-02-24 RX ADMIN — INSULIN ASPART SCH: 100 INJECTION, SOLUTION INTRAVENOUS; SUBCUTANEOUS at 17:00

## 2018-02-24 RX ADMIN — PHENYTOIN SODIUM SCH MLS/HR: 50 INJECTION INTRAMUSCULAR; INTRAVENOUS at 03:31

## 2018-02-24 RX ADMIN — PHENYTOIN SODIUM SCH MLS/HR: 50 INJECTION INTRAMUSCULAR; INTRAVENOUS at 22:51

## 2018-02-24 RX ADMIN — PANTOPRAZOLE SCH MG: 40 TABLET, DELAYED RELEASE ORAL at 21:49

## 2018-02-24 RX ADMIN — LITHIUM CARBONATE SCH MG: 300 CAPSULE, GELATIN COATED ORAL at 21:49

## 2018-02-24 RX ADMIN — Medication SCH ML: at 09:00

## 2018-02-24 RX ADMIN — PHENYTOIN SODIUM SCH MLS/HR: 50 INJECTION INTRAMUSCULAR; INTRAVENOUS at 14:08

## 2018-02-24 RX ADMIN — CARVEDILOL SCH MG: 12.5 TABLET, FILM COATED ORAL at 09:19

## 2018-02-24 RX ADMIN — Medication SCH ML: at 21:48

## 2018-02-24 RX ADMIN — TAMSULOSIN HYDROCHLORIDE SCH MG: 0.4 CAPSULE ORAL at 21:49

## 2018-02-24 RX ADMIN — INSULIN ASPART SCH: 100 INJECTION, SOLUTION INTRAVENOUS; SUBCUTANEOUS at 08:00

## 2018-02-24 NOTE — RADRPT
EXAM DATE/TIME:  02/24/2018 01:02 

 

[facility] COMPARISON:     

CHEST SINGLE AP, January 18, 2018, 8:13.

 

                     

INDICATIONS :     

Cough and hematemesis

                     

 

MEDICAL HISTORY :     

Diabetes mellitus type II.  Hypertension        

 

SURGICAL HISTORY :     

None.   

 

ENCOUNTER:     

Initial                                        

 

ACUITY:     

1 day      

 

PAIN SCORE:     

0/10

 

LOCATION:     

Bilateral chest 

 

FINDINGS:     

A single view of the chest demonstrates the lungs to be symmetrically aerated without evidence of mas
s, infiltrate or effusion.  The cardiomediastinal contours are unremarkable.  Osseous structures are 
intact.

 

CONCLUSION:     

No acute disease demonstrated.

 

 

 

 Ministerio Martin MD on February 24, 2018 at 1:34           

Board Certified Radiologist.

 This report was verified electronically.

## 2018-02-24 NOTE — PD
HPI


Chief Complaint:  GI Complaint


Time Seen by Provider:  23:54


Travel History


International Travel<30 days:  No


Contact w/Intl Traveler<30days:  No


Traveled to known affect area:  No





History of Present Illness


HPI


Patient is a 72-year-old male who has a history of GI bleed from a bleeding 

ulcer year ago.  He had endoscopy and treatment.  That was from too much NSAIDs 

back then.  Now he said a year with no problems however tonight he reports 

going out eating a cheese steak and then afterwards feeling nauseous and 

vomiting up but she steak and then bright red blood.  He denies epigastric pain 

denies any trauma denies any NSAID use since a year ago he is in the ER he has 

obvious red blood on his beard from vomiting he is not actively bleeding here 

in the ER.  His vitals are within normal limits he is mildly hypertensive not 

hypotensive and he will be treated with Protonix IV and drip and admitted for 

GI consult in the a.m. with endoscopy.  Patient denies black stool.  Denies 

abdominal pain





PFSH


Past Medical History


Hx Anticoagulant Therapy:  Yes (ASA 2X81)


Arthritis:  Yes


Asthma:  Yes (stress induced)


Bipolar Disorder:  Yes


Anxiety:  No


Depression:  Yes


Heart Rhythm Problems:  No


Cancer:  No


Cardiovascular Problems:  Yes


High Cholesterol:  No


Chest Pain:  No


Congestive Heart Failure:  No


COPD:  No


Diabetes:  Yes


Patient Takes Glucophage:  Yes


Diminished Hearing:  No


Endocrine:  Yes


Gastrointestinal Disorders:  Yes


GERD:  No


Genitourinary:  No


Hiatal Hernia:  No


Hypertension:  Yes


Immune Disorder:  No


Implanted Vascular Access Dvce:  No


Kidney Stones:  No


Musculoskeletal:  Yes


Neurologic:  No


Psychiatric:  No


Reproductive:  No


Respiratory:  Yes


Renal Failure:  No


Sleep Apnea:  No


Thyroid Disease:  No


Ulcer:  Yes


Influenza Vaccination:  Yes





Past Surgical History


Abdominal Surgery:  No


Cardiac Surgery:  No


Ear Surgery:  No


Endocrine Surgery:  No


Eye Surgery:  No


Genitourinary Surgery:  No


Gynecologic Surgery:  No


Neurologic Surgery:  No


Oral Surgery:  No


Pacemaker:  No


Thoracic Surgery:  No


Other Surgery:  Yes





Social History


Alcohol Use:  No


Tobacco Use:  No


Substance Use:  No





Allergies-Medications


(Allergen,Severity, Reaction):  


Coded Allergies:  


     codeine (Unverified  Allergy, Severe, MUSCULOSKELETAL ISSUES, 2/23/18)


Reported Meds & Prescriptions





Reported Meds & Active Scripts


Active


Reported


Coreg (Carvedilol) 12.5 Mg Tab 12.5 Mg PO DAILY


Metformin (Metformin HCl) 500 Mg Tab 500 Mg PO BIDPC


Flovent Hfa 12 GM Inh (Fluticasone Propionate) Unknown Strength Inh Unknown 

Dose INH DAILY PRN


Fluoxetine (Fluoxetine HCl) 40 Mg Cap 40 Cap PO HS


Flomax (Tamsulosin HCl) 0.4 Mg Cap 0.4 Mg PO HS


Lithium Carbonate 600 Mg Cap 600 Mg PO HS








Review of Systems


Except as stated in HPI:  all other systems reviewed are Neg


Gastrointestinal:  Positive: Hematemesis





Physical Exam


Narrative


GENERAL: Patient has obvious red blood down his chin and lower lip


SKIN: Warm and dry.


HEAD: Atraumatic. Normocephalic. 


EYES: Pupils equal and round. No scleral icterus. No injection or drainage. 


ENT: No nasal bleeding or discharge.  Mucous membranes pink and moist.  Patient 

has bright red blood on his lower lip and chin from vomiting blood


NECK: Trachea midline. No JVD. 


CARDIOVASCULAR: Regular rate and rhythm.  


RESPIRATORY: No accessory muscle use. Clear to auscultation. Breath sounds 

equal bilaterally. 


GASTROINTESTINAL: Abdomen patient denies abdominal pain with percussion and 

palpation of all quadrants , non-tender, nondistended. Hepatic and splenic 

margins not palpable. 


MUSCULOSKELETAL: Extremities without clubbing, cyanosis, or edema. No obvious 

deformities. 


NEUROLOGICAL: Awake and alert. No obvious cranial nerve deficits.  Motor 

grossly within normal limits. Five out of 5 muscle strength in the arms and 

legs.  Normal speech.


PSYCHIATRIC: Appropriate mood and affect; insight and judgment normal.





Data


Data


Last Documented VS





Vital Signs








  Date Time  Temp Pulse Resp B/P (MAP) Pulse Ox O2 Delivery O2 Flow Rate FiO2


 


2/24/18 00:00 97.4 104 16 152/101 (118) 98 Room Air  








Orders





 Orders


Pantoprazole Inj (Protonix Inj) (2/24/18 00:15)


Pantoprazole Inj (Protonix Inj) (2/24/18 00:15)


Complete Blood Count With Diff (2/24/18 00:16)


Type And Screen (2/24/18 00:16)


Comprehensive Metabolic Panel (2/24/18 00:16)


Prothrombin Time / Inr (Pt) (2/24/18 00:16)


Lipase (2/24/18 00:16)


Sodium Chlor 0.9% 1000 Ml Inj (Ns 1000 M (2/24/18 00:30)


Abdomen, Flat & Upright (2/24/18 )


Chest, Single Ap (2/24/18 )


Consult Gastroenterology (2/24/18 )


Bedside Glucose ALBERTO.CSUGAR (2/24/18 02:51)


Blood Glucose Goal (Criteria) (2/24/18 02:51)


Hypoglycemia 70 Mg/Dl Or < (2/24/18 02:51)


Notify Dr: Other (2/24/18 02:51)


Dextrose 50% In Elliot (Vial) Inj (D50w (Vi (2/24/18 03:00)


Place In Observation (2/24/18 )


Vital Signs (Adult) Q4H (2/24/18 02:51)


Activity Oob Ad Aimee (2/24/18 02:51)


Intake + Output ALBERTO.QSHIFT (2/24/18 02:51)


Diet Clear Liquid (2/24/18 Breakfast)


Sodium Chlor 0.9% 1000 Ml Inj (Ns 1000 M (2/24/18 02:51)


Sodium Chloride 0.9% Flush (Ns Flush) (2/24/18 03:00)


Sodium Chloride 0.9% Flush (Ns Flush) (2/24/18 09:00)


Ondansetron Inj (Zofran Inj) (2/24/18 03:00)


Comprehensive Metabolic Panel (2/25/18 06:00)


Complete Blood Count With Diff (2/25/18 06:00)


Pharmacologic Contraindication (2/24/18 02:51)


Acetaminophen (Tylenol) (2/24/18 03:00)


Morphine Inj (Morphine Inj) (2/24/18 03:00)


Docusate Sodium-Senna (Sherrie-Colace) (2/24/18 09:00)


Magnesium Hydroxide Liq (Milk Of Magnesi (2/24/18 03:00)


Sennosides (Senokot) (2/24/18 03:00)


Bisacodyl Supp (Dulcolax Supp) (2/24/18 03:00)


Lactulose Liq (Lactulose Liq) (2/24/18 03:00)


Hgb & Hct (2/24/18 10:00)


Carvedilol (Coreg) (2/24/18 09:00)


Fluoxetine (Prozac) (2/24/18 21:00)


Lithium Carbonate (Lithium Carbonate) (2/24/18 21:00)


Tamsulosin (Flomax) (2/24/18 21:00)


Glucagon Inj (Glucagon Inj) (2/24/18 03:00)


Insulin Aspart Supplemtl Scale (Novolog (2/24/18 08:00)


Admit Order (Ed Use Only) (2/24/18 02:57)





Labs





Laboratory Tests








Test


  2/24/18


00:25


 


White Blood Count 15.6 TH/MM3 


 


Red Blood Count 4.03 MIL/MM3 


 


Hemoglobin 12.9 GM/DL 


 


Hematocrit 37.4 % 


 


Mean Corpuscular Volume 92.7 FL 


 


Mean Corpuscular Hemoglobin 31.9 PG 


 


Mean Corpuscular Hemoglobin


Concent 34.4 % 


 


 


Red Cell Distribution Width 13.8 % 


 


Platelet Count 364 TH/MM3 


 


Mean Platelet Volume 7.9 FL 


 


Neutrophils (%) (Auto) 71.9 % 


 


Lymphocytes (%) (Auto) 20.7 % 


 


Monocytes (%) (Auto) 5.5 % 


 


Eosinophils (%) (Auto) 1.2 % 


 


Basophils (%) (Auto) 0.7 % 


 


Neutrophils # (Auto) 11.2 TH/MM3 


 


Lymphocytes # (Auto) 3.2 TH/MM3 


 


Monocytes # (Auto) 0.9 TH/MM3 


 


Eosinophils # (Auto) 0.2 TH/MM3 


 


Basophils # (Auto) 0.1 TH/MM3 


 


CBC Comment DIFF FINAL 


 


Differential Comment  


 


Prothrombin Time 10.9 SEC 


 


Prothromb Time International


Ratio 1.1 RATIO 


 


 


Blood Urea Nitrogen 25 MG/DL 


 


Creatinine 1.31 MG/DL 


 


Random Glucose 182 MG/DL 


 


Total Protein 6.9 GM/DL 


 


Albumin 2.9 GM/DL 


 


Calcium Level 9.3 MG/DL 


 


Alkaline Phosphatase 63 U/L 


 


Aspartate Amino Transf


(AST/SGOT) 25 U/L 


 


 


Alanine Aminotransferase


(ALT/SGPT) 31 U/L 


 


 


Total Bilirubin 1.1 MG/DL 


 


Sodium Level 140 MEQ/L 


 


Potassium Level 4.0 MEQ/L 


 


Chloride Level 106 MEQ/L 


 


Carbon Dioxide Level 27.7 MEQ/L 


 


Anion Gap 6 MEQ/L 


 


Estimat Glomerular Filtration


Rate 54 ML/MIN 


 


 


Lipase 114 U/L 











[facility]


Medical Decision Making


Medical Screen Exam Complete:  Yes


Emergency Medical Condition:  Yes


Differential Diagnosis


Differential diagnosis is bleeding ulcer versus vomiting up red colored food 

versus esophageal bleed with varices versus portal hypertension and his varices


Narrative Course


Patient has Protonix drip 8 mg an hour and Protonix push 80 mg pushed and he is 

admitted to liters of fluid type and screen ordered and GI consult admitted to 

[facility]





Diagnosis





 Primary Impression:  


 GI hemorrhage


 Qualified Codes:  K92.2 - Gastrointestinal hemorrhage, unspecified





Admitting Information


Admitting Physician Requests:  Admit


Scripts


Pantoprazole (Protonix) 40 Mg Tab


40 MG PO DAILY for Ulcer Prevention, #90 TAB 3 Refills


   Prov: Dustin Solis DO         2/26/18











Carlos Enrique Magallanes MD Feb 24, 2018 02:32

## 2018-02-24 NOTE — PD.CONS
HPI


History of Present Illness


This is a 72 year old obese male who was in his usual state of health up to 02/ 23/18.  He was sitting on his couch eating dinner which consisted of cristal 

cheesesteak sandwich and had sudden onset of nausea vomiting and hematemesis.  

He states he had a similar episode back in 2016 but has never had any upper 

endoscopy done.  Last colonoscopy was 5 years ago in [place] with Dr. Noriega 

which patient states was normal.  He's currently on IV fluids at 100 cc an hour

, initially had Protonix drip in the ER which has been DC'd.  Patient is alert 

oriented in a fairly good historian.  He currently denies any further nausea 

vomiting, diarrhea or constipation.  He denies any bloody stools states his 

last BM was brown and normal consistency.  She denies any fevers, no dysphasia.

  According to the record patient has been taken ibuprofen for chronic knee 

pain.


 (Beti Null)





PFSH


Past Medical History


Bipolar Disorder, 


HTN, 


DM 


h/o GI Bleed 2016


Past Surgical History


Colonoscopy 5 years ago in [place], Patient  states normal exam without 

complications


 (Beti Null)


Coded Allergies:  


     codeine (Unverified  Allergy, Severe, MUSCULOSKELETAL ISSUES, 2/23/18)


Medications





Last Impressions








Chest X-Ray 2/24/18 0000 Signed





Impressions: 





 Service Date/Time:  Saturday, February 24, 2018 01:02 - CONCLUSION:  No acute 





 disease demonstrated.     Ministerio Martin MD 


 


Abdomen X-Ray 2/24/18 0000 Signed





Impressions: 





 Service Date/Time:  Saturday, February 24, 2018 01:04 - CONCLUSION:  





 Benign-appearing abdomen.     Ministerio Martin MD 








Family History


PAST FAMILY HISTORY:  Reviewed.  No h/o DM or CAD


Social History


PAST SOCIAL HISTORY:  Negative for alcohol, tobacco or drugs.  Quit drinking 

and smoking in 1964


 since 2012 and lives alone


 (Beti Null)





Review of Systems


Gastrointestinal:  COMPLAINS OF: Vomiting, Hematemesis (on admission now 

resolved) (Beti Null)





GI Exam


Vitals I&O





Vital Signs








  Date Time  Temp Pulse Resp B/P (MAP) Pulse Ox O2 Delivery O2 Flow Rate FiO2


 


2/24/18 08:45      Room Air  


 


2/24/18 08:00 98.2 99 20 153/78 (103) 96   


 


2/24/18 04:00 97.5 101 18 149/90 (109) 97   


 


2/24/18 03:38  100 18 163/69 (100) 97   


 


2/24/18 00:00 97.4 104 16 152/101 (118) 98 Room Air  














I/O      


 


 2/23/18 2/23/18 2/23/18 2/24/18 2/24/18 2/24/18





 07:00 15:00 23:00 07:00 15:00 23:00


 


Intake Total    200 ml  


 


Output Total    0 ml  


 


Balance    200 ml  


 


      


 


Intake Oral    200 ml  


 


Output Urine Total    0 ml  








Imaging





Last Impressions








Chest X-Ray 2/24/18 0000 Signed





Impressions: 





 Service Date/Time:  Saturday, February 24, 2018 01:02 - CONCLUSION:  No acute 





 disease demonstrated.     Ministerio Martin MD 


 


Abdomen X-Ray 2/24/18 0000 Signed





Impressions: 





 Service Date/Time:  Saturday, February 24, 2018 01:04 - CONCLUSION:  





 Benign-appearing abdomen.     Ministerio Martin MD 








Laboratory











Test


  2/24/18


00:25


 


White Blood Count 15.6 TH/MM3 


 


Red Blood Count 4.03 MIL/MM3 


 


Hemoglobin 12.9 GM/DL 


 


Hematocrit 37.4 % 


 


Mean Corpuscular Volume 92.7 FL 


 


Mean Corpuscular Hemoglobin 31.9 PG 


 


Mean Corpuscular Hemoglobin


Concent 34.4 % 


 


 


Red Cell Distribution Width 13.8 % 


 


Platelet Count 364 TH/MM3 


 


Mean Platelet Volume 7.9 FL 


 


Neutrophils (%) (Auto) 71.9 % 


 


Lymphocytes (%) (Auto) 20.7 % 


 


Monocytes (%) (Auto) 5.5 % 


 


Eosinophils (%) (Auto) 1.2 % 


 


Basophils (%) (Auto) 0.7 % 


 


Neutrophils # (Auto) 11.2 TH/MM3 


 


Lymphocytes # (Auto) 3.2 TH/MM3 


 


Monocytes # (Auto) 0.9 TH/MM3 


 


Eosinophils # (Auto) 0.2 TH/MM3 


 


Basophils # (Auto) 0.1 TH/MM3 


 


CBC Comment DIFF FINAL 


 


Differential Comment  


 


Prothrombin Time 10.9 SEC 


 


Prothromb Time International


Ratio 1.1 RATIO 


 


 


Blood Urea Nitrogen 25 MG/DL 


 


Creatinine 1.31 MG/DL 


 


Random Glucose 182 MG/DL 


 


Total Protein 6.9 GM/DL 


 


Albumin 2.9 GM/DL 


 


Calcium Level 9.3 MG/DL 


 


Alkaline Phosphatase 63 U/L 


 


Aspartate Amino Transf


(AST/SGOT) 25 U/L 


 


 


Alanine Aminotransferase


(ALT/SGPT) 31 U/L 


 


 


Total Bilirubin 1.1 MG/DL 


 


Sodium Level 140 MEQ/L 


 


Potassium Level 4.0 MEQ/L 


 


Chloride Level 106 MEQ/L 


 


Carbon Dioxide Level 27.7 MEQ/L 


 


Anion Gap 6 MEQ/L 


 


Estimat Glomerular Filtration


Rate 54 ML/MIN 


 


 


Lipase 114 U/L 








Physical Examination


HEENT: Pupils round and reactive to light; normocephalic; atraumatic; no 

jaundice, oral cavity clean


NECK: Neck is supple, obese, no JVD, no lymphadenopathy.


CHEST:  Chest is clear to auscultation and percussion.


CARDIAC:  Regular rate and rhythm


ABDOMEN: Large, round, Soft, nondistended, nontender; no hepatosplenomegaly; 

bowel sounds are present in all four quadrants.


EXTREMITIES: No clubbing, cyanosis, or edema.


SKIN:  Normal; no rash; no jaundice.


CNS:  No focal deficits; alert and oriented times three.


 (Beti Null)





Assessment and Plan


Plan


Hematemesis, one episode before admission on 02/23/18 after eating Cristal 

cheese steak sandwich.  Current hemoglobin is stable at 12.9.  Had one similar 

episode in 2016, but has never had EGD


Last colonoscopy 5 years ago in [place] with Dr. Noriega.  States his test 

results were normal.  She denies any diarrhea or constipation.  No obvious 

rectal bleeding


Ginger patient denies any nausea or abdominal pain





Anemia, current hemoglobin 12.9.  No further obvious bleeding noted


Medical comorbidities such as bipolar, hypertension, managed as per attending





Plan


EGD Monday a.m., consent


Nothing by mouth at midnight Sunday night 02/26/18


Procedure explained to patient, he is nervous over sedation, supportive care 

and explanation of procedure given.


Monitor labs


PPI by mouth twice a day for now


Bowel regimen as needed


All for any acute bleeding episodes


Supportive care





This patient was seen by myself and , written on his behalf


 (Beti Null)


Plan


patient was seen and examined, agree with above-noted, plan on doing upper 

endoscopy tomorrow, meanwhile continue supportive care and monitoring H&H with 

packed RBC if needed


 (Arti Sloan MD)











Beti Null Feb 24, 2018 11:12


Arti Sloan MD Feb 24, 2018 15:34

## 2018-02-24 NOTE — RADRPT
EXAM DATE/TIME:  02/24/2018 01:04 

 

[facility] COMPARISON:     

No previous studies available for comparison.

 

                     

INDICATIONS :     

Hematemesis and blood loss.

                     

 

MEDICAL HISTORY :     

Diabetes mellitus type II.  Hypertension        

 

SURGICAL HISTORY :     

None.   

 

ENCOUNTER:     

Initial                                        

 

ACUITY:     

1 day      

 

PAIN SCORE:     

0/10

 

LOCATION:       

abdomen

 

FINDINGS:     

Supine and upright views of the abdomen were performed.  The abdominal bowel gas pattern is normal.  
No air fluid levels are seen.  No abnormal masses, calcifications, or organomegaly is seen.  The visu
alized lower lungs are clear.  No evidence of free intraperitoneal gas.  The osseous structures are u
nremarkable.

 

CONCLUSION:     

Benign-appearing abdomen.

 

 

 

 Ministerio Martin MD on February 24, 2018 at 1:35           

Board Certified Radiologist.

 This report was verified electronically.

## 2018-02-24 NOTE — HHI.PR
Subjective


Remarks


Follow-up for GI bleed.  Patient is currently doing well.  No acute concerns.  

No fever or chills.  GI evaluated him today.





Objective


Vitals





Vital Signs








  Date Time  Temp Pulse Resp B/P (MAP) Pulse Ox O2 Delivery O2 Flow Rate FiO2


 


2/24/18 08:00 98.2 99 20 153/78 (103) 96   


 


2/24/18 04:00 97.5 101 18 149/90 (109) 97   


 


2/24/18 03:38  100 18 163/69 (100) 97   


 


2/24/18 00:00 97.4 104 16 152/101 (118) 98 Room Air  














I/O      


 


 2/23/18 2/23/18 2/23/18 2/24/18 2/24/18 2/24/18





 07:00 15:00 23:00 07:00 15:00 23:00


 


Intake Total    200 ml  


 


Output Total    0 ml  


 


Balance    200 ml  


 


      


 


Intake Oral    200 ml  


 


Output Urine Total    0 ml  








Result Diagram:  


2/24/18 0025                                                                   

             2/24/18 0025





Imaging





Last Impressions








Chest X-Ray 2/24/18 0000 Signed





Impressions: 





 Service Date/Time:  Saturday, February 24, 2018 01:02 - CONCLUSION:  No acute 





 disease demonstrated.     Ministerio Martin MD 


 


Abdomen X-Ray 2/24/18 0000 Signed





Impressions: 





 Service Date/Time:  Saturday, February 24, 2018 01:04 - CONCLUSION:  





 Benign-appearing abdomen.     Ministerio Martin MD 








Objective Remarks


GENERAL: Alert, oriented 3, NAD.


SKIN: Warm and dry.


HEAD: Normocephalic.


EYES: No scleral icterus. No injection or drainage. 


NECK: Supple, trachea midline. No JVD or lymphadenopathy.


CARDIOVASCULAR: Regular rate and rhythm without murmurs, gallops, or rubs. 


RESPIRATORY: Breath sounds equal bilaterally. No accessory muscle use.


GASTROINTESTINAL: Abdomen soft, non-tender, nondistended. 


MUSCULOSKELETAL: No cyanosis, or edema. 


BACK: Nontender without obvious deformity. No CVA tenderness.





Procedures


None





A/P


Problem List:  


(1) GI bleed


ICD Code:  K92.2 - Gastrointestinal hemorrhage, unspecified


(2) Renal insufficiency


ICD Code:  N28.9 - Disorder of kidney and ureter, unspecified


(3) Leukocytosis


ICD Code:  D72.829 - Elevated white blood cell count, unspecified


(4) DM (diabetes mellitus)


ICD Code:  E11.9 - Type 2 diabetes mellitus without complications


Assessment and Plan


This is a 72-year-old male with a PMH of Bipolar Disorder, HTN, DM and h/o GI 

Bleed who presented to the ER w/ episode of hematemesis.  On arrival, /101

, , O2 sat 98% on RA, Afebrile.  WBC 15.6.  Hemoglobin 12.9, previously 

15.1 on 1/18/18.  Creatinine 1.31, previously 1.4 [id number].  INR 1.1.  CXR with 

no acute findings.  Abdominal X-ray benign.  Started on Protonix gtt in ER, no 

further episodes of hematemesis.





- Acute GI bleed 


   - Acute onset of nausea and hematemesis. No further episodes.


   - History of NSAID induced GI bleed in the past. 


   - Hgb 12.9 on admission --> 10.6 today. Previously, Hgb 15.6. 


   - GI to evaluate patient. He will likely need endoscopic studies. 


   - will continue to monitor H&H. 


   - Continue Protonix 40 mg by mouth every 12 hours.





- Diabetes mellitus


   - Takes metformin at home.  We'll hold metformin for now.  Continue sliding 

scale insulin.  Blood glucose is well controlled in the hospital.





- Bipolar disorder


- Depression


   - Continue lithium carbonate 600 mg by mouth daily at bedtime, fluoxetine 40 

mg by mouth daily at bedtime.





- BPH - continue tamsulosin 0.4 mg by mouth daily at bedtime.





Full code. SCDs.  Pharmacological DVT prophylaxis not given due to GI bleed.











Dustin Solis DO Feb 24, 2018 10:38 am

## 2018-02-24 NOTE — HHI.HP
__________________________________________________





HPI


Service


[facility]


Primary Care Physician


Ministerio Rocha MD


Admission Diagnosis





GI  hemorrhage


Diagnoses:  


(1) GI bleed


Diagnosis:  Principal





(2) Renal insufficiency


Diagnosis:  Principal





(3) Leukocytosis


Diagnosis:  Principal





(4) DM (diabetes mellitus)


Diagnosis:  Principal





Travel History


International Travel<30 Days:  No


Contact w/Intl Traveler <30 Da:  No


Traveled to Known Affected Are:  No


History of Present Illness


This is a 72-year-old male with a PMH of Bipolar Disorder, HTN, DM and h/o GI 

Bleed who presented to the ER w/ episode of hematemesis.  States he had a 

cheese steak sandwich for dinner, was watching TV and had sudden onset of 

nausea followed by episode of hematemesis.  Denies abdominal pain, fever, 

chills or diarrhea.  Reports similar episode in 2016 however states he was told 

he had an ulcer from taking too much Ibuprofen for knee pain, no EGD or 

intervention at that time per pt.  On arrival, /101, , O2 sat 98% 

on RA, Afebrile.  WBC 15.6.  Hemoglobin 12.9, previously 15.1 on 18.  

Creatinine 1.31, previously 1.4 701-1918.  INR 1.1.  CXR with no acute 

findings.  Abdominal X-ray benign.  Started on Protonix gtt in ER, no further 

episodes of hematemesis.





Review of Systems


Except as stated in HPI:  all other systems reviewed are Neg


ROS: 14 point review of systems otherwise negative.





Past Family Social History


Past Medical History


PMH:  Bipolar Disorder, HTN, DM and h/o GI Bleed


Past Surgical History


PAST SURGICAL HISTORY:  None


Allergies:  


Coded Allergies:  


     codeine (Unverified  Allergy, Severe, MUSCULOSKELETAL ISSUES, 18)


Family History


PAST FAMILY HISTORY:  Reviewed.  No h/o DM or CAD


Social History


PAST SOCIAL HISTORY:  Negative for alcohol, tobacco or drugs.





Physical Exam


Vital Signs





Vital Signs








  Date Time  Temp Pulse Resp B/P (MAP) Pulse Ox O2 Delivery O2 Flow Rate FiO2


 


18 00:00 97.4 104 16 152/101 (118) 98 Room Air  








Physical Exam


PE:


GENERAL: Elderly male in no acute distress.


HEENT: PERRLA, EOMI. No scleral icterus or conjunctival pallor. No lid lag or 

facial droop. Blood stained beard


CARDIOVASCULAR: Regular rate and rhythm.  No obvious murmurs to auscultation. 

No chest tenderness to palpation. 


RESPIRATORY: No obvious rhonchi or wheezing. Clear to auscultation. Breath 

sounds equal bilaterally. 


GASTROINTESTINAL: Abdomen soft, non-tender, nondistended. BS normal. 


MUSCULOSKELETAL: Extremities without clubbing, cyanosis, or edema. No obvious 

deformities. 


NEUROLOGICAL: Awake, alert and oriented x4. No focal neurologic deficits. 

Moving both upper and lower extremities spontaneously.


Laboratory





Laboratory Tests








Test


  18


00:25


 


White Blood Count 15.6 


 


Red Blood Count 4.03 


 


Hemoglobin 12.9 


 


Hematocrit 37.4 


 


Mean Corpuscular Volume 92.7 


 


Mean Corpuscular Hemoglobin 31.9 


 


Mean Corpuscular Hemoglobin


Concent 34.4 


 


 


Red Cell Distribution Width 13.8 


 


Platelet Count 364 


 


Mean Platelet Volume 7.9 


 


Neutrophils (%) (Auto) 71.9 


 


Lymphocytes (%) (Auto) 20.7 


 


Monocytes (%) (Auto) 5.5 


 


Eosinophils (%) (Auto) 1.2 


 


Basophils (%) (Auto) 0.7 


 


Neutrophils # (Auto) 11.2 


 


Lymphocytes # (Auto) 3.2 


 


Monocytes # (Auto) 0.9 


 


Eosinophils # (Auto) 0.2 


 


Basophils # (Auto) 0.1 


 


CBC Comment DIFF FINAL 


 


Differential Comment  


 


Prothrombin Time 10.9 


 


Prothromb Time International


Ratio 1.1 


 


 


Blood Urea Nitrogen 25 


 


Creatinine 1.31 


 


Random Glucose 182 


 


Total Protein 6.9 


 


Albumin 2.9 


 


Calcium Level 9.3 


 


Alkaline Phosphatase 63 


 


Aspartate Amino Transf


(AST/SGOT) 25 


 


 


Alanine Aminotransferase


(ALT/SGPT) 31 


 


 


Total Bilirubin 1.1 


 


Sodium Level 140 


 


Potassium Level 4.0 


 


Chloride Level 106 


 


Carbon Dioxide Level 27.7 


 


Anion Gap 6 


 


Estimat Glomerular Filtration


Rate 54 


 


 


Lipase 114 








Result Diagram:  


18








Caprini VTE Risk Assessment


Caprini VTE Risk Assessment:  No/Low Risk (score <= 1)


Caprini Risk Assessment Model











 Point Value = 1          Point Value = 2  Point Value = 3  Point Value = 5


 


Age 41-60


Minor surgery


BMI > 25 kg/m2


Swollen legs


Varicose veins


Pregnancy or postpartum


History of unexplained or recurrent


   spontaneous 


Oral contraceptives or hormone


   replacement


Sepsis (< 1 month)


Serious lung disease, including


   pneumonia (< 1 month)


Abnormal pulmonary function


Acute myocardial infarction


Congestive heart failure (< 1 month)


History of inflammatory bowel disease


Medical patient at bed rest Age 61-74


Arthroscopic surgery


Major open surgery (> 45 min)


Laparoscopic surgery (> 45 min)


Malignancy


Confined to bed (> 72 hours)


Immobilizing plaster cast


Central venous access Age >= 75


History of VTE


Family history of VTE


Factor V Leiden


Prothrombin 19187K


Lupus anticoagulant


Anticardiolipin antibodies


Elevated serum homocysteine


Heparin-induced thrombocytopenia


Other congenital or acquired


   thrombophilia Stroke (< 1 month)


Elective arthroplasty


Hip, pelvis, or leg fracture


Acute spinal cord injury (< 1 month)








Prophylaxis Regimen











   Total Risk


Factor Score Risk Level Prophylaxis Regimen


 


0-1      Low Early ambulation


 


2 Moderate Order ONE of the following:


*Sequential Compression Device (SCD)


*Heparin 5000 units SQ BID


 


3-4 Higher Order ONE of the following medications:


*Heparin 5000 units SQ TID


*Enoxaparin/Lovenox 40 mg SQ daily (WT < 150 kg, CrCl > 30 mL/min)


*Enoxaparin/Lovenox 30 mg SQ daily (WT < 150 kg, CrCl > 10-29 mL/min)


*Enoxaparin/Lovenox 30 mg SQ BID (WT < 150 kg, CrCl > 30 mL/min)


AND/OR


*Sequential Compression Device (SCD)


 


5 or more Highest Order ONE of the following medications:


*Heparin 5000 units SQ TID (Preferred with Epidurals)


*Enoxaparin/Lovenox 40 mg SQ daily (WT < 150 kg, CrCl > 30 mL/min)


*Enoxaparin/Lovenox 30 mg SQ daily (WT < 150 kg, CrCl > 10-29 mL/min)


*Enoxaparin/Lovenox 30 mg SQ BID (WT < 150 kg, CrCl > 30 mL/min)


AND


*Sequential Compression Device (SCD)











Assessment and Plan


Problem List:  


(1) GI bleed


ICD Code:  K92.2 - Gastrointestinal hemorrhage, unspecified


(2) Renal insufficiency


ICD Code:  N28.9 - Disorder of kidney and ureter, unspecified


(3) Leukocytosis


ICD Code:  D72.829 - Elevated white blood cell count, unspecified


(4) DM (diabetes mellitus)


ICD Code:  E11.9 - Type 2 diabetes mellitus without complications


Assessment and Plan


A/P:


1.  GI Bleed:  acute onset of nausea w/ hematemesis, h/o NSAID-Induced GI Bleed 

in the past, denies NSAID use at this time.  Vitals stable.  Hgb 12.9, 

previously 15.1 on 18.  Monitor Hgb/Hct closely, will repeat labs in am 

for trend.  Type & Screen, transfuse as needed for Hgb <8 or if hemodynamically 

unstable.  Consult GI for further eval/intervention.  Hold NSAIDs.  Continue 

Protonix gtt


2.  Leukocytosis:  WBC 15.6, likely reactive.  CXR w/ no acute findings, Abd X-

ray benign, images reviewed by me.  Repeat labs in am for trend. 


3.  Renal Insufficiency:  Creatinine 1.31, previously 1.47 on 18.  IVF for 

hydration, repeat labs in a.m.


4.  DM:  Sliding scale with Accu-Cheks.  Hold Metformin for now.


5.  DVT Prophylaxis:  Pharmacologic contraindications secondary to acute GI 

bleed.


6.  Social work for DC planning as needed. 


7.  Discussed at length with ER physician, lap/records/imaging reviewed by me.











Shirley Zurita MD 2018 03:28

## 2018-02-25 VITALS
HEART RATE: 84 BPM | DIASTOLIC BLOOD PRESSURE: 77 MMHG | SYSTOLIC BLOOD PRESSURE: 159 MMHG | OXYGEN SATURATION: 95 % | RESPIRATION RATE: 19 BRPM | TEMPERATURE: 98.2 F

## 2018-02-25 VITALS
SYSTOLIC BLOOD PRESSURE: 140 MMHG | OXYGEN SATURATION: 95 % | TEMPERATURE: 97.6 F | RESPIRATION RATE: 20 BRPM | DIASTOLIC BLOOD PRESSURE: 92 MMHG | HEART RATE: 84 BPM

## 2018-02-25 VITALS
TEMPERATURE: 97.4 F | HEART RATE: 98 BPM | RESPIRATION RATE: 18 BRPM | DIASTOLIC BLOOD PRESSURE: 91 MMHG | SYSTOLIC BLOOD PRESSURE: 126 MMHG | OXYGEN SATURATION: 97 %

## 2018-02-25 VITALS
HEART RATE: 90 BPM | OXYGEN SATURATION: 98 % | DIASTOLIC BLOOD PRESSURE: 93 MMHG | TEMPERATURE: 97.7 F | RESPIRATION RATE: 16 BRPM | SYSTOLIC BLOOD PRESSURE: 159 MMHG

## 2018-02-25 VITALS
OXYGEN SATURATION: 97 % | DIASTOLIC BLOOD PRESSURE: 72 MMHG | HEART RATE: 77 BPM | SYSTOLIC BLOOD PRESSURE: 117 MMHG | TEMPERATURE: 98.2 F | RESPIRATION RATE: 20 BRPM

## 2018-02-25 VITALS
OXYGEN SATURATION: 98 % | RESPIRATION RATE: 19 BRPM | DIASTOLIC BLOOD PRESSURE: 84 MMHG | HEART RATE: 77 BPM | TEMPERATURE: 97.7 F | SYSTOLIC BLOOD PRESSURE: 139 MMHG

## 2018-02-25 LAB
ALBUMIN SERPL-MCNC: 2.8 GM/DL (ref 3.4–5)
ALP SERPL-CCNC: 53 U/L (ref 45–117)
ALT SERPL-CCNC: 23 U/L (ref 12–78)
AST SERPL-CCNC: 17 U/L (ref 15–37)
BASOPHILS # BLD AUTO: 0.1 TH/MM3 (ref 0–0.2)
BASOPHILS NFR BLD: 0.6 % (ref 0–2)
BILIRUB SERPL-MCNC: 1 MG/DL (ref 0.2–1)
BUN SERPL-MCNC: 17 MG/DL (ref 7–18)
CALCIUM SERPL-MCNC: 9.3 MG/DL (ref 8.5–10.1)
CHLORIDE SERPL-SCNC: 110 MEQ/L (ref 98–107)
CREAT SERPL-MCNC: 1.31 MG/DL (ref 0.6–1.3)
EOSINOPHIL # BLD: 0.2 TH/MM3 (ref 0–0.4)
EOSINOPHIL NFR BLD: 2.3 % (ref 0–4)
ERYTHROCYTE [DISTWIDTH] IN BLOOD BY AUTOMATED COUNT: 13.8 % (ref 11.6–17.2)
GFR SERPLBLD BASED ON 1.73 SQ M-ARVRAT: 54 ML/MIN (ref 89–?)
GLUCOSE SERPL-MCNC: 104 MG/DL (ref 74–106)
HCO3 BLD-SCNC: 28.4 MEQ/L (ref 21–32)
HCT VFR BLD CALC: 28.3 % (ref 39–51)
HGB BLD-MCNC: 9.8 GM/DL (ref 13–17)
LYMPHOCYTES # BLD AUTO: 3.4 TH/MM3 (ref 1–4.8)
LYMPHOCYTES NFR BLD AUTO: 36.8 % (ref 9–44)
MCH RBC QN AUTO: 32.6 PG (ref 27–34)
MCHC RBC AUTO-ENTMCNC: 34.8 % (ref 32–36)
MCV RBC AUTO: 93.5 FL (ref 80–100)
MONOCYTE #: 0.8 TH/MM3 (ref 0–0.9)
MONOCYTES NFR BLD: 8.3 % (ref 0–8)
NEUTROPHILS # BLD AUTO: 4.8 TH/MM3 (ref 1.8–7.7)
NEUTROPHILS NFR BLD AUTO: 52 % (ref 16–70)
PLATELET # BLD: 287 TH/MM3 (ref 150–450)
PMV BLD AUTO: 7.5 FL (ref 7–11)
PROT SERPL-MCNC: 5.9 GM/DL (ref 6.4–8.2)
RBC # BLD AUTO: 3.02 MIL/MM3 (ref 4.5–5.9)
SODIUM SERPL-SCNC: 143 MEQ/L (ref 136–145)
WBC # BLD AUTO: 9.2 TH/MM3 (ref 4–11)

## 2018-02-25 RX ADMIN — Medication SCH ML: at 08:58

## 2018-02-25 RX ADMIN — LITHIUM CARBONATE SCH MG: 300 CAPSULE, GELATIN COATED ORAL at 20:56

## 2018-02-25 RX ADMIN — STANDARDIZED SENNA CONCENTRATE AND DOCUSATE SODIUM SCH TAB: 8.6; 5 TABLET, FILM COATED ORAL at 20:57

## 2018-02-25 RX ADMIN — Medication SCH ML: at 20:55

## 2018-02-25 RX ADMIN — PANTOPRAZOLE SCH MG: 40 TABLET, DELAYED RELEASE ORAL at 08:59

## 2018-02-25 RX ADMIN — STANDARDIZED SENNA CONCENTRATE AND DOCUSATE SODIUM SCH TAB: 8.6; 5 TABLET, FILM COATED ORAL at 08:58

## 2018-02-25 RX ADMIN — INSULIN ASPART SCH: 100 INJECTION, SOLUTION INTRAVENOUS; SUBCUTANEOUS at 07:58

## 2018-02-25 RX ADMIN — PANTOPRAZOLE SCH MG: 40 TABLET, DELAYED RELEASE ORAL at 20:56

## 2018-02-25 RX ADMIN — INSULIN ASPART SCH: 100 INJECTION, SOLUTION INTRAVENOUS; SUBCUTANEOUS at 16:44

## 2018-02-25 RX ADMIN — CARVEDILOL SCH MG: 12.5 TABLET, FILM COATED ORAL at 08:59

## 2018-02-25 RX ADMIN — TAMSULOSIN HYDROCHLORIDE SCH MG: 0.4 CAPSULE ORAL at 20:56

## 2018-02-25 RX ADMIN — INSULIN ASPART SCH: 100 INJECTION, SOLUTION INTRAVENOUS; SUBCUTANEOUS at 12:00

## 2018-02-25 RX ADMIN — ONDANSETRON PRN MG: 2 INJECTION, SOLUTION INTRAMUSCULAR; INTRAVENOUS at 22:22

## 2018-02-25 RX ADMIN — INSULIN ASPART SCH: 100 INJECTION, SOLUTION INTRAVENOUS; SUBCUTANEOUS at 20:57

## 2018-02-25 NOTE — HHI.GIFU
Subjective


Remarks


Patient was sitting in bed, no complain, no more active bleeding apparently he 

ate today so we'll not able to have an endoscopy





Objective


Vitals I&O





Vital Signs








  Date Time  Temp Pulse Resp B/P (MAP) Pulse Ox O2 Delivery O2 Flow Rate FiO2


 


2/25/18 16:00 97.7 77 19 139/84 (102) 98   


 


2/25/18 08:00 97.6 84 20 140/92 (108) 95   


 


2/25/18 04:00 97.4 98 18 126/91 (103) 97   


 


2/25/18 04:00      Room Air  


 


2/25/18 00:17      Room Air  


 


2/25/18 00:00 97.7 90 16 159/93 (115) 98   


 


2/24/18 20:00 98.2 97 18 151/87 (108) 96   


 


2/24/18 20:00      Room Air  














I/O      


 


 2/24/18 2/24/18 2/24/18 2/25/18 2/25/18 2/25/18





 07:00 15:00 23:00 07:00 15:00 23:00


 


Intake Total 200 ml 1100 ml 840 ml 1181 ml  


 


Output Total 0 ml  575 ml   


 


Balance 200 ml 1100 ml 265 ml 1181 ml  


 


      


 


Intake Oral 200 ml  840 ml 480 ml  


 


IV Total  1100 ml  701 ml  


 


Output Urine Total 0 ml  575 ml   


 


# Voids    1  


 


# Bowel Movements   0 0  








Laboratory





Laboratory Tests








Test


  2/25/18


06:35


 


White Blood Count 9.2 


 


Red Blood Count 3.02 


 


Hemoglobin 9.8 


 


Hematocrit 28.3 


 


Mean Corpuscular Volume 93.5 


 


Mean Corpuscular Hemoglobin 32.6 


 


Mean Corpuscular Hemoglobin


Concent 34.8 


 


 


Red Cell Distribution Width 13.8 


 


Platelet Count 287 


 


Mean Platelet Volume 7.5 


 


Neutrophils (%) (Auto) 52.0 


 


Lymphocytes (%) (Auto) 36.8 


 


Monocytes (%) (Auto) 8.3 


 


Eosinophils (%) (Auto) 2.3 


 


Basophils (%) (Auto) 0.6 


 


Neutrophils # (Auto) 4.8 


 


Lymphocytes # (Auto) 3.4 


 


Monocytes # (Auto) 0.8 


 


Eosinophils # (Auto) 0.2 


 


Basophils # (Auto) 0.1 


 


CBC Comment DIFF FINAL 


 


Differential Comment  


 


Blood Urea Nitrogen 17 


 


Creatinine 1.31 


 


Random Glucose 104 


 


Total Protein 5.9 


 


Albumin 2.8 


 


Calcium Level 9.3 


 


Alkaline Phosphatase 53 


 


Aspartate Amino Transf


(AST/SGOT) 17 


 


 


Alanine Aminotransferase


(ALT/SGPT) 23 


 


 


Total Bilirubin 1.0 


 


Sodium Level 143 


 


Potassium Level 4.0 


 


Chloride Level 110 


 


Carbon Dioxide Level 28.4 


 


Anion Gap 5 


 


Estimat Glomerular Filtration


Rate 54 


 








Physical Exam


HEENT: Pupils round and reactive to light; normocephalic; atraumatic; no 

jaundice.  Throat is clear.


NECK: Neck is supple, no JVD, no lymphadenopathy.


CHEST:  Chest is clear to auscultation and percussion.


CARDIAC:  Regular rate and rhythm with no murmur gallop or rubs.


ABDOMEN:  Soft, nondistended, nontender; no hepatosplenomegaly; bowel sounds 

are present in all four quadrants.


EXTREMITIES: No clubbing, cyanosis, or edema.


SKIN:  Normal; no rash; no jaundice.


CNS:  No focal deficits; alert and oriented times three.





Assessment and Plan


Plan


Patient presented with upper GI bleed, he will need upper endoscopy we'll plan 

on doing that tomorrow


Hemoglobin stable at this time we'll continue monitoring











Arti Sloan MD Feb 25, 2018 17:55

## 2018-02-25 NOTE — HHI.PR
Subjective


Remarks


Follow-up for GI bleed. Patient is doing well. no acute concerns. EGD in the AM.





Objective


Vitals





Vital Signs








  Date Time  Temp Pulse Resp B/P (MAP) Pulse Ox O2 Delivery O2 Flow Rate FiO2


 


2/25/18 08:00 97.6 84 20 140/92 (108) 95   


 


2/25/18 04:00 97.4 98 18 126/91 (103) 97   


 


2/25/18 04:00      Room Air  


 


2/25/18 00:17      Room Air  


 


2/25/18 00:00 97.7 90 16 159/93 (115) 98   


 


2/24/18 20:00 98.2 97 18 151/87 (108) 96   


 


2/24/18 20:00      Room Air  


 


2/24/18 16:00 98.4 103 20 146/80 (102) 97   














I/O      


 


 2/24/18 2/24/18 2/24/18 2/25/18 2/25/18 2/25/18





 07:00 15:00 23:00 07:00 15:00 23:00


 


Intake Total 200 ml 1100 ml 840 ml 1181 ml  


 


Output Total 0 ml  575 ml   


 


Balance 200 ml 1100 ml 265 ml 1181 ml  


 


      


 


Intake Oral 200 ml  840 ml 480 ml  


 


IV Total  1100 ml  701 ml  


 


Output Urine Total 0 ml  575 ml   


 


# Voids    1  


 


# Bowel Movements   0 0  








Result Diagram:  


2/25/18 0635                                                                   

             2/25/18 0635





Objective Remarks


GENERAL: Alert, oriented 3, NAD.


SKIN: Warm and dry.


HEAD: Normocephalic.


EYES: No scleral icterus. No injection or drainage. 


NECK: Supple, trachea midline. No JVD or lymphadenopathy.


CARDIOVASCULAR: Regular rate and rhythm without murmurs, gallops, or rubs. 


RESPIRATORY: Breath sounds equal bilaterally. No accessory muscle use.


GASTROINTESTINAL: Abdomen soft, non-tender, nondistended. 


MUSCULOSKELETAL: No cyanosis, or edema. 


BACK: Nontender without obvious deformity. No CVA tenderness.





Procedures


None





A/P


Problem List:  


(1) GI bleed


ICD Code:  K92.2 - Gastrointestinal hemorrhage, unspecified


(2) Renal insufficiency


ICD Code:  N28.9 - Disorder of kidney and ureter, unspecified


(3) Leukocytosis


ICD Code:  D72.829 - Elevated white blood cell count, unspecified


(4) DM (diabetes mellitus)


ICD Code:  E11.9 - Type 2 diabetes mellitus without complications


Assessment and Plan


This is a 72-year-old male with a PMH of Bipolar Disorder, HTN, DM and h/o GI 

Bleed who presented to the ER w/ episode of hematemesis.  On arrival, /101

, , O2 sat 98% on RA, Afebrile.  WBC 15.6.  Hemoglobin 12.9, previously 

15.1 on 1/18/18.  Creatinine 1.31, previously 1.4 701-1918.  INR 1.1.  CXR with 

no acute findings.  Abdominal X-ray benign.  Started on Protonix gtt in ER, no 

further episodes of hematemesis.





- Acute GI bleed 


   - Acute onset of nausea and hematemesis. No further episodes.


   - History of NSAID induced GI bleed in the past. 


   - Hgb 12.9 on admission --> 10.6 --> 9.8 today. Previously, Hgb 15.6. 


   - GI to evaluate patient. He will likely need endoscopic studies. EGD is 

planned for tomorrow AM. 


   - NPO midnight. 


   - Continue Protonix 40 mg by mouth every 12 hours.





- Diabetes mellitus


   - Takes metformin at home.  We'll hold metformin for now.  Continue sliding 

scale insulin.  Blood glucose is well controlled in the hospital.





- Bipolar disorder


- Depression


   - Continue lithium carbonate 600 mg by mouth daily at bedtime, fluoxetine 40 

mg by mouth daily at bedtime.





- BPH - continue tamsulosin 0.4 mg by mouth daily at bedtime.





Full code. SCDs.  Pharmacological DVT prophylaxis not given due to GI bleed.











Dustin Solis DO Feb 25, 2018 14:16

## 2018-02-26 VITALS
DIASTOLIC BLOOD PRESSURE: 65 MMHG | SYSTOLIC BLOOD PRESSURE: 114 MMHG | HEART RATE: 84 BPM | OXYGEN SATURATION: 94 % | RESPIRATION RATE: 17 BRPM | TEMPERATURE: 98.1 F

## 2018-02-26 VITALS
DIASTOLIC BLOOD PRESSURE: 74 MMHG | HEART RATE: 72 BPM | RESPIRATION RATE: 16 BRPM | TEMPERATURE: 98.7 F | SYSTOLIC BLOOD PRESSURE: 136 MMHG | OXYGEN SATURATION: 98 %

## 2018-02-26 VITALS
HEART RATE: 80 BPM | RESPIRATION RATE: 16 BRPM | OXYGEN SATURATION: 95 % | SYSTOLIC BLOOD PRESSURE: 144 MMHG | TEMPERATURE: 98 F | DIASTOLIC BLOOD PRESSURE: 76 MMHG

## 2018-02-26 VITALS
OXYGEN SATURATION: 96 % | HEART RATE: 83 BPM | TEMPERATURE: 98.5 F | RESPIRATION RATE: 19 BRPM | SYSTOLIC BLOOD PRESSURE: 133 MMHG | DIASTOLIC BLOOD PRESSURE: 66 MMHG

## 2018-02-26 LAB
HCT VFR BLD CALC: 28.2 % (ref 39–51)
HGB BLD-MCNC: 9.8 GM/DL (ref 13–17)

## 2018-02-26 RX ADMIN — INSULIN ASPART SCH: 100 INJECTION, SOLUTION INTRAVENOUS; SUBCUTANEOUS at 08:00

## 2018-02-26 RX ADMIN — ONDANSETRON PRN MG: 2 INJECTION, SOLUTION INTRAMUSCULAR; INTRAVENOUS at 06:24

## 2018-02-26 RX ADMIN — PANTOPRAZOLE SCH MG: 40 TABLET, DELAYED RELEASE ORAL at 08:07

## 2018-02-26 RX ADMIN — INSULIN ASPART SCH: 100 INJECTION, SOLUTION INTRAVENOUS; SUBCUTANEOUS at 11:50

## 2018-02-26 RX ADMIN — Medication SCH ML: at 08:10

## 2018-02-26 RX ADMIN — PHENYTOIN SODIUM SCH MLS/HR: 50 INJECTION INTRAMUSCULAR; INTRAVENOUS at 06:05

## 2018-02-26 RX ADMIN — CARVEDILOL SCH MG: 12.5 TABLET, FILM COATED ORAL at 08:08

## 2018-02-26 RX ADMIN — STANDARDIZED SENNA CONCENTRATE AND DOCUSATE SODIUM SCH TAB: 8.6; 5 TABLET, FILM COATED ORAL at 08:08

## 2018-02-26 NOTE — HHI.GIFU
Subjective


Remarks


Patient laying in bed comfortable, no sign of active bleeding, slight drop of 

his hemoglobin had upper endoscopy today





Objective


Vitals I&O





Vital Signs








  Date Time  Temp Pulse Resp B/P (MAP) Pulse Ox O2 Delivery O2 Flow Rate FiO2


 


2/26/18 10:21 97.7 63 16 137/82 (100) 100 Room Air  


 


2/26/18 10:08 97.7 69 16 136/79 (98) 100 Room Air  


 


2/26/18 08:07 98.7 72 16 136/74 (94) 98   


 


2/26/18 08:00      Room Air  


 


2/26/18 04:00 98.1 84 17 114/65 (81) 94   


 


2/26/18 00:08 98.5 83 19 133/66 (88) 96   


 


2/25/18 20:00      Room Air  


 


2/25/18 20:00 98.2 84 19 159/77 (104) 95   


 


2/25/18 16:00 97.7 77 19 139/84 (102) 98   


 


2/25/18 12:00 98.2 77 20 117/72 (87) 97   














I/O      


 


 2/25/18 2/25/18 2/25/18 2/26/18 2/26/18 2/26/18





 07:00 15:00 23:00 07:00 15:00 23:00


 


Intake Total 1181 ml  900 ml 300 ml 100 ml 


 


Output Total     150 ml 


 


Balance 1181 ml  900 ml 300 ml -50 ml 


 


      


 


Intake Oral 480 ml  900 ml 300 ml  


 


IV Total 701 ml    0 ml 


 


Other     100 ml 


 


Output Urine Total     150 ml 


 


# Voids 1  4 5  


 


# Bowel Movements 0     








Physical Exam


HEENT: Pupils round and reactive to light; normocephalic; atraumatic; no 

jaundice.  Throat is clear.  Poor inpatient


NECK: Neck is supple, no JVD, no lymphadenopathy.


CHEST:  Chest is clear to auscultation and percussion.


CARDIAC:  Regular rate and rhythm with no murmur gallop or rubs.


ABDOMEN:  Soft, nondistended, nontender; no hepatosplenomegaly; bowel sounds 

are present in all four quadrants.


EXTREMITIES: No clubbing, cyanosis, or edema.


SKIN:  Normal; no rash; no jaundice.


CNS:  No focal deficits; alert and oriented times three.





Assessment and Plan


Plan


Patient presented with upper GI bleed, upper endoscopy was done today





IMPRESSION:


Gastric ulcer in the antrum most likely the source of bleeding biopsy was done





PLAN:


No NSAIDs


Protonix 40 mg daily


Follow-up biopsy


Okay to feed patient diabetic diet


If hemoglobin stable patient can go home with follow-up in 2-3 weeks


EGD in two-month


If H. pylori positive patient will need treatment











Arti Sloan MD Feb 26, 2018 10:54

## 2018-02-26 NOTE — PD.PROCEDR
GI Procedure





PROCEDURE PERFORMED


Upper endoscopy with biopsy





INDICATION FOR PROCEDURE


Anemia coffee-ground emesis





PROCEDURE:


The procedure, risks and benefits were discussed with Mr. Luevano and informed


consent was obtained.  Anesthesia sedated him with Diprivan.  He was placed in 

the left lateral decubitus position.





EGD:


The Pentax videoscope was introduced through the oropharynx and advanced to the 

second portion of the duodenum under direct visualization. Retroflexion was 

performed in the stomach.  Biopsy from the antrum from a large ulcer








ESTIMATED BLOOD LOSS:


None





SPECIMENS REMOVED:


Antrum from an ulcer





COMPLICATIONS:


None





IMPRESSION:


Gastric ulcer in the antrum most likely the source of bleeding biopsy was done





PLAN:


No NSAIDs


Protonix 40 mg daily


Follow-up biopsy


Okay to feed patient diabetic diet


If hemoglobin stable patient can go home with follow-up in 2-3 weeks


EGD in two-month


If H. pylori positive patient will need treatment











Arti Sloan MD Feb 26, 2018 10:53

## 2018-03-05 ENCOUNTER — HOSPITAL ENCOUNTER (EMERGENCY)
Dept: HOSPITAL 17 - NEPD | Age: 73
Discharge: HOME | End: 2018-03-05
Payer: COMMERCIAL

## 2018-03-05 VITALS
DIASTOLIC BLOOD PRESSURE: 85 MMHG | TEMPERATURE: 97.3 F | OXYGEN SATURATION: 98 % | RESPIRATION RATE: 17 BRPM | HEART RATE: 94 BPM | SYSTOLIC BLOOD PRESSURE: 143 MMHG

## 2018-03-05 VITALS — BODY MASS INDEX: 33.91 KG/M2 | WEIGHT: 216.05 LBS | HEIGHT: 67 IN

## 2018-03-05 DIAGNOSIS — J45.909: ICD-10-CM
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
## 2018-03-05 DIAGNOSIS — Z79.899: ICD-10-CM

## 2018-03-05 DIAGNOSIS — Z79.84: ICD-10-CM

## 2018-03-05 DIAGNOSIS — Z23: ICD-10-CM

## 2018-03-05 DIAGNOSIS — I10: ICD-10-CM

## 2018-03-05 DIAGNOSIS — S80.212A: Primary | ICD-10-CM

## 2018-03-05 DIAGNOSIS — M25.512: ICD-10-CM
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
## 2018-03-05 DIAGNOSIS — Z88.5: ICD-10-CM

## 2018-03-05 DIAGNOSIS — E11.9: ICD-10-CM
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
## 2018-03-05 DIAGNOSIS — F31.9: ICD-10-CM

## 2018-03-05 PROCEDURE — 96372 THER/PROPH/DIAG INJ SC/IM: CPT

## 2018-03-05 PROCEDURE — 90714 TD VACC NO PRESV 7 YRS+ IM: CPT

## 2018-03-05 PROCEDURE — 73564 X-RAY EXAM KNEE 4 OR MORE: CPT

## 2018-03-05 PROCEDURE — 90471 IMMUNIZATION ADMIN: CPT

## 2018-03-05 NOTE — RADRPT
EXAM DATE/TIME:  03/05/2018 08:37 

 

[facility] COMPARISON:     

No previous studies available for comparison.

 

                     

INDICATIONS :     

Patient states  fell this morning, left knee abrasion.

                     

 

MEDICAL HISTORY :     

None.          

 

SURGICAL HISTORY :     

None.   

 

ENCOUNTER:     

Initial                                        

 

ACUITY:     

1 day      

 

PAIN SCORE:     

7/10

 

LOCATION:     

Left  Knee

 

FINDINGS:     

Four view examination of the left knee demonstrates no evidence of fracture or dislocation.  Degenera
tive changes are evident with loss of articular cartilage in the medial compartment.  There is no roldan
nt effusion.

 

Moderate vascular calcifications are evident..  

CONCLUSION:     

Degenerative changes, negative for fracture

 

 

 

 Rodrigo Monique MD FACR on March 05, 2018 at 9:08           

Board Certified Radiologist.

 This report was verified electronically.

## 2018-03-05 NOTE — PD
HPI


Chief Complaint:  Fall


Time Seen by Provider:  08:04


Travel History


International Travel<30 days:  No


Contact w/Intl Traveler<30days:  No


Traveled to known affect area:  No





History of Present Illness


HPI


72-year-old male presents to the emergency department with complaint of left 

knee pain and abrasion and left shoulder pain after his dog pulled him down 

today.  He reports "smacking the left side of his face" into the dirt.  Denies 

loss of consciousness, neck pain, back pain.  Has been ambulatory since after 

the fall.  Denies lightheadedness, dizziness, headache, nausea, vomiting.  

Denies chest pain, shortness of breath, abdominal pain.  Denies paresthesias, 

loss of sensation, decreased range of motion, decreased strength all 

extremities.  Denies anticoagulant therapy.  Does take baby aspirin daily.  

Unknown tetanus status.  Has not taken any medications or try any treatments to 

alleviate his symptoms.  Rates pain knee 3/10.  Describes it as an aching 

sensation.  Pain is aggravated with ambulation.  Denies shoulder pain at this 

time.  No known aggravating or relieving factors of the shoulder pain.  Primary 

care provider is Dr. Rocha.  Allergies to codeine.  History of hypertension, 

diabetes mellitus, gastric ulcer.  Has no other medical complaints.  No other 

modifying factors or associated signs and symptoms.





PFSH


Past Medical History


Hx Anticoagulant Therapy:  Yes (ASA 2X81)


Arthritis:  Yes


Asthma:  Yes (stress induced)


Bipolar Disorder:  Yes


Anxiety:  No


Depression:  Yes


Heart Rhythm Problems:  No


Cancer:  No


Cardiovascular Problems:  Yes


High Cholesterol:  No


Chest Pain:  No


Congestive Heart Failure:  No


COPD:  No


Diabetes:  Yes


Diminished Hearing:  No


Endocrine:  Yes (DM II)


Gastrointestinal Disorders:  Yes


GERD:  Yes


Genitourinary:  No


Hiatal Hernia:  No


Hypertension:  Yes


Immune Disorder:  No


Implanted Vascular Access Dvce:  No


Kidney Stones:  No


Musculoskeletal:  Yes


Neurologic:  No


Psychiatric:  Yes (BIPOLAR)


Reproductive:  No


Respiratory:  Yes


Renal Failure:  No


Sleep Apnea:  No


Thyroid Disease:  No


Ulcer:  Yes





Past Surgical History


Abdominal Surgery:  No


Cardiac Surgery:  No


Ear Surgery:  No


Endocrine Surgery:  No


Eye Surgery:  No


Genitourinary Surgery:  No


Gynecologic Surgery:  No


Neurologic Surgery:  No


Oral Surgery:  No


Pacemaker:  No


Thoracic Surgery:  No


Other Surgery:  Yes





Social History


Alcohol Use:  No


Tobacco Use:  No


Substance Use:  No





Allergies-Medications


(Allergen,Severity, Reaction):  


Coded Allergies:  


     codeine (Unverified  Allergy, Severe, MUSCULOSKELETAL ISSUES, 3/5/18)


Reported Meds & Prescriptions





Reported Meds & Active Scripts


Active


Protonix (Pantoprazole Sodium) 40 Mg Tab 40 Mg PO DAILY


Reported


Coreg (Carvedilol) 12.5 Mg Tab 12.5 Mg PO DAILY


Metformin (Metformin HCl) 500 Mg Tab 500 Mg PO BIDPC


Fluoxetine (Fluoxetine HCl) 40 Mg Cap 40 Cap PO HS


Flomax (Tamsulosin HCl) 0.4 Mg Cap 0.4 Mg PO HS


Lithium Carbonate 600 Mg Cap 600 Mg PO HS








Review of Systems


Except as stated in HPI:  all other systems reviewed are Neg





Physical Exam


Narrative


GENERAL: Well-nourished, well-developed patient, in no acute distress


SKIN: Warm and dry.  Abrasion noted to left knee just over the patellar; 

without erythema, edema, drainage.  No signs of infection.


HEAD: Atraumatic. Normocephalic.  No facial droop noted.  Tongue midline.  

Shoulder shrug equal.  Finger to nose test normal.


EYES: Pupils equal and round at 3 mm with brisk reaction. No scleral icterus. 

No injection or drainage.  PERRLA.  EOMI.


ENT: Mucosa pink and moist.  Airway patent.


NECK: No midline tenderness on palpation of the cervical spine.  Active 

rotation greater than 45 of the neck to the left and right.  Trachea midline.  

No lymphadenopathy.  


CARDIOVASCULAR: Regular rate and rhythm.  No murmur appreciated. 


RESPIRATORY: No accessory muscle use. Clear to auscultation. Breath sounds 

equal bilaterally. 


GASTROINTESTINAL: Abdomen soft, non-tender, nondistended. Hepatic and splenic 

margins not palpable.  Bowel sounds are active 4 quadrants.  


MUSCULOSKELETAL: Left knee with full range of motion and greater than 90 

flexion; without erythema, edema, ecchymosis; abrasion noted to the patellar 

aspect; with tenderness on palpation to the patellar aspect; joint stable with 

negative drawer test.  Left shoulder with full range of motion and greater than 

90 abduction; without erythema, edema, ecchymosis; without tenderness on 

palpation; joint stable.  Left upper and lower extremity with 2+ pulses and 

sensory intact without erythema or edema.  No obvious deformities. No clubbing.

  No cyanosis.  No edema. 


NEUROLOGICAL: Awake and alert.  Oriented 4.  No obvious cranial nerve 

deficits.  Motor grossly within normal limits. Normal speech. No ataxia.  No mid

-line drift.  No upper or lower extremity drift.  Moves all extremities.  5/5 

strength to all extremities.


PSYCHIATRIC: Appropriate mood and affect; insight and judgment normal.





Data


Data


Last Documented VS





Vital Signs








  Date Time  Temp Pulse Resp B/P (MAP) Pulse Ox O2 Delivery O2 Flow Rate FiO2


 


3/5/18 07:47 97.3 94 17 143/85 (104) 98   








Orders





 Orders


Knee, Complete (4vws) (3/5/18 08:24)


Acetaminophen (Tylenol) (3/5/18 08:30)


Tetanus/Diphtheria Tox Adult (Tetanus/Di (3/5/18 08:30)


Ed Discharge Order (3/5/18 09:28)








[facility]


Medical Decision Making


Medical Screen Exam Complete:  Yes


Emergency Medical Condition:  Yes


Medical Record Reviewed:  Yes


Differential Diagnosis


Fall, patellar fracture, knee abrasion, shoulder strain, shoulder pain, 

shoulder injury


Narrative Course


72-year-old male with left shoulder pain and left knee pain after mechanical 

fall.  He says he hit the side of his left face in door.  Denies loss of 

consciousness.  Denies anticoagulant therapy.  The patient admits to hitting 

their head, but denies loss of consciousness.  Denies nausea, vomiting.  On 

physical exam the patient is without raccoon eyes, hammond signs, rhinorrhea, or 

hemotympanum.  I do not suspect open or depressed skull fracture, and the 

patient has no signs of basilar skull fracture.  Industry CT Head Injury Rule 

suggests a head CT is not necessary for this patient and clears the patient for 

head injury without imaging.  Denies neck pain.  Industry C-Spine Rule suggests 

the C-Spine can be cleared clinically of fracture, and imaging is not required.

  There is no midline point tenderness on palpation of the cervical spine.  The 

patient is able to actively rotate the neck 45 left and right.  The patient is 

sitting up in bed at 90.  The patient is ambulatory.    Patient denies 

shoulder pain at this time and his shoulder exam is unremarkable.  I do not 

suspect fracture, dislocation, joint separation and feel that imaging is not 

necessary at this time and the patient agrees.  Tetanus updated in the ER.  

Left knee x-ray and Tylenol ordered.


Left knee x-ray concludes:  











Knee X-Ray 3/5/18 0824 Signed





Impressions: 





 Service Date/Time:  Monday, March 5, 2018 08:37 - CONCLUSION:  Degenerative 





 changes, negative for fracture     Rodrigo Monique MD  FACR





X-ray findings discussed with the patient.  I offered the patient crutches/

walker for support and he declined.    He says he can walk just fine and has no 

difficulty ambulating.  Patient ambulated out of the emergency department on 

his own.  Instructed patient to follow up with primary care provider.  Patient 

verbalizes understanding and agreement with treatment plan.  Patient is 

medically cleared and stable for discharge.  Discussed reasons to return to the 

emergency department.  Patient agrees with treatment plan.  The patients vital 

signs are stable and the patient is stable for outpatient follow-up and 

treatment.  Patient discharged home, stable and in no acute distress.





Diagnosis





 Primary Impression:  


 Fall


 Qualified Codes:  W19.XXXA - Unspecified fall, initial encounter


 Additional Impressions:  


 Abrasion of left knee


 Qualified Codes:  S80.212A - Abrasion, left knee, initial encounter


 Left shoulder pain


 Qualified Codes:  M25.512 - Pain in left shoulder


Referrals:  


Primary Care Physician


Patient Instructions:  Abrasion (ED), Contusion in Adults (ED), Fall Prevention 

(ED), General Instructions, Knee Pain (ED), Shoulder Sprain (ED)





***Additional Instructions:  


Tylenol as directed and as needed for pain


Ice to affected areas to decrease pain inflammation


Avoid aggravating activity; increase activity as tolerated


Walker's need for support


Over-the-counter antibiotic ointment to abrasion as directed and as needed for 

wound care


Follow up with primary care provider


Return to the emergency department immediately with worsening of symptoms


***Med/Other Pt SpecificInfo:  No Change to Meds, No Meds Exist/No RX given


Disposition:  01 DISCHARGE HOME


Condition:  Stable











Ansley Cameron Mar 5, 2018 08:24

## 2018-03-07 ENCOUNTER — HOSPITAL ENCOUNTER (OUTPATIENT)
Dept: HOSPITAL 17 - NEPC | Age: 73
Setting detail: OBSERVATION
LOS: 5 days | Discharge: NURSING, REHAB OR LONG-TERM CARE | End: 2018-03-12
Attending: HOSPITALIST | Admitting: HOSPITALIST
Payer: COMMERCIAL

## 2018-03-07 VITALS — DIASTOLIC BLOOD PRESSURE: 96 MMHG | SYSTOLIC BLOOD PRESSURE: 172 MMHG

## 2018-03-07 VITALS
HEART RATE: 78 BPM | OXYGEN SATURATION: 98 % | RESPIRATION RATE: 16 BRPM | DIASTOLIC BLOOD PRESSURE: 102 MMHG | SYSTOLIC BLOOD PRESSURE: 159 MMHG

## 2018-03-07 VITALS
RESPIRATION RATE: 16 BRPM | SYSTOLIC BLOOD PRESSURE: 176 MMHG | OXYGEN SATURATION: 97 % | DIASTOLIC BLOOD PRESSURE: 110 MMHG | HEART RATE: 82 BPM

## 2018-03-07 VITALS — BODY MASS INDEX: 33.74 KG/M2 | WEIGHT: 214.95 LBS | HEIGHT: 67 IN

## 2018-03-07 VITALS
HEART RATE: 100 BPM | RESPIRATION RATE: 22 BRPM | OXYGEN SATURATION: 96 % | TEMPERATURE: 98.8 F | SYSTOLIC BLOOD PRESSURE: 208 MMHG | DIASTOLIC BLOOD PRESSURE: 76 MMHG

## 2018-03-07 VITALS — OXYGEN SATURATION: 94 %

## 2018-03-07 VITALS
RESPIRATION RATE: 18 BRPM | DIASTOLIC BLOOD PRESSURE: 123 MMHG | SYSTOLIC BLOOD PRESSURE: 201 MMHG | TEMPERATURE: 97.9 F | HEART RATE: 85 BPM | OXYGEN SATURATION: 97 %

## 2018-03-07 VITALS
SYSTOLIC BLOOD PRESSURE: 179 MMHG | HEART RATE: 95 BPM | DIASTOLIC BLOOD PRESSURE: 87 MMHG | TEMPERATURE: 98 F | RESPIRATION RATE: 20 BRPM | OXYGEN SATURATION: 95 %

## 2018-03-07 VITALS — RESPIRATION RATE: 16 BRPM | OXYGEN SATURATION: 99 %

## 2018-03-07 DIAGNOSIS — D64.9: ICD-10-CM

## 2018-03-07 DIAGNOSIS — I63.521: Primary | ICD-10-CM

## 2018-03-07 DIAGNOSIS — R32: ICD-10-CM

## 2018-03-07 DIAGNOSIS — Z79.02: ICD-10-CM

## 2018-03-07 DIAGNOSIS — I10: ICD-10-CM

## 2018-03-07 DIAGNOSIS — E11.9: ICD-10-CM

## 2018-03-07 DIAGNOSIS — Z79.899: ICD-10-CM

## 2018-03-07 DIAGNOSIS — J45.909: ICD-10-CM

## 2018-03-07 DIAGNOSIS — M19.90: ICD-10-CM

## 2018-03-07 DIAGNOSIS — F31.9: ICD-10-CM

## 2018-03-07 DIAGNOSIS — R06.2: ICD-10-CM

## 2018-03-07 DIAGNOSIS — R06.02: ICD-10-CM

## 2018-03-07 DIAGNOSIS — R94.31: ICD-10-CM

## 2018-03-07 DIAGNOSIS — K21.9: ICD-10-CM

## 2018-03-07 DIAGNOSIS — I44.7: ICD-10-CM

## 2018-03-07 DIAGNOSIS — Z79.84: ICD-10-CM

## 2018-03-07 LAB
ALBUMIN SERPL-MCNC: 3.5 GM/DL (ref 3.4–5)
ALP SERPL-CCNC: 73 U/L (ref 45–117)
ALT SERPL-CCNC: 28 U/L (ref 12–78)
AST SERPL-CCNC: 21 U/L (ref 15–37)
BASOPHILS # BLD AUTO: 0.1 TH/MM3 (ref 0–0.2)
BASOPHILS NFR BLD: 0.6 % (ref 0–2)
BILIRUB SERPL-MCNC: 0.6 MG/DL (ref 0.2–1)
BUN SERPL-MCNC: 8 MG/DL (ref 7–18)
CALCIUM SERPL-MCNC: 9.1 MG/DL (ref 8.5–10.1)
CHLORIDE SERPL-SCNC: 108 MEQ/L (ref 98–107)
COLOR UR: YELLOW
CREAT SERPL-MCNC: 1.41 MG/DL (ref 0.6–1.3)
EOSINOPHIL # BLD: 0.2 TH/MM3 (ref 0–0.4)
EOSINOPHIL NFR BLD: 2.1 % (ref 0–4)
ERYTHROCYTE [DISTWIDTH] IN BLOOD BY AUTOMATED COUNT: 14.4 % (ref 11.6–17.2)
GFR SERPLBLD BASED ON 1.73 SQ M-ARVRAT: 49 ML/MIN (ref 89–?)
GLUCOSE SERPL-MCNC: 145 MG/DL (ref 74–106)
GLUCOSE UR STRIP-MCNC: (no result) MG/DL
HCO3 BLD-SCNC: 27.2 MEQ/L (ref 21–32)
HCT VFR BLD CALC: 31.3 % (ref 39–51)
HGB BLD-MCNC: 10.8 GM/DL (ref 13–17)
HGB UR QL STRIP: (no result)
HYALINE CASTS #/AREA URNS LPF: 6 /LPF
INR PPP: 1 RATIO
KETONES UR STRIP-MCNC: (no result) MG/DL
LYMPHOCYTES # BLD AUTO: 3 TH/MM3 (ref 1–4.8)
LYMPHOCYTES NFR BLD AUTO: 32.2 % (ref 9–44)
MAGNESIUM SERPL-MCNC: 2 MG/DL (ref 1.5–2.5)
MCH RBC QN AUTO: 32.3 PG (ref 27–34)
MCHC RBC AUTO-ENTMCNC: 34.5 % (ref 32–36)
MCV RBC AUTO: 93.7 FL (ref 80–100)
MONOCYTE #: 0.7 TH/MM3 (ref 0–0.9)
MONOCYTES NFR BLD: 7.4 % (ref 0–8)
MUCOUS THREADS #/AREA URNS LPF: (no result) /LPF
NEUTROPHILS # BLD AUTO: 5.4 TH/MM3 (ref 1.8–7.7)
NEUTROPHILS NFR BLD AUTO: 57.7 % (ref 16–70)
NITRITE UR QL STRIP: (no result)
PLATELET # BLD: 441 TH/MM3 (ref 150–450)
PMV BLD AUTO: 6.7 FL (ref 7–11)
PROT SERPL-MCNC: 7.6 GM/DL (ref 6.4–8.2)
PROTHROMBIN TIME: 10.2 SEC (ref 9.8–11.6)
RBC # BLD AUTO: 3.35 MIL/MM3 (ref 4.5–5.9)
SODIUM SERPL-SCNC: 142 MEQ/L (ref 136–145)
SP GR UR STRIP: 1.02 (ref 1–1.03)
TROPONIN I SERPL-MCNC: (no result) NG/ML (ref 0.02–0.05)
URINE LEUKOCYTE ESTERASE: (no result)
WBC # BLD AUTO: 9.4 TH/MM3 (ref 4–11)

## 2018-03-07 PROCEDURE — 83036 HEMOGLOBIN GLYCOSYLATED A1C: CPT

## 2018-03-07 PROCEDURE — 83735 ASSAY OF MAGNESIUM: CPT

## 2018-03-07 PROCEDURE — 80061 LIPID PANEL: CPT

## 2018-03-07 PROCEDURE — 85730 THROMBOPLASTIN TIME PARTIAL: CPT

## 2018-03-07 PROCEDURE — 93005 ELECTROCARDIOGRAM TRACING: CPT

## 2018-03-07 PROCEDURE — 97162 PT EVAL MOD COMPLEX 30 MIN: CPT

## 2018-03-07 PROCEDURE — 80048 BASIC METABOLIC PNL TOTAL CA: CPT

## 2018-03-07 PROCEDURE — 70551 MRI BRAIN STEM W/O DYE: CPT

## 2018-03-07 PROCEDURE — 82140 ASSAY OF AMMONIA: CPT

## 2018-03-07 PROCEDURE — 80053 COMPREHEN METABOLIC PANEL: CPT

## 2018-03-07 PROCEDURE — 99285 EMERGENCY DEPT VISIT HI MDM: CPT

## 2018-03-07 PROCEDURE — 85025 COMPLETE CBC W/AUTO DIFF WBC: CPT

## 2018-03-07 PROCEDURE — G0378 HOSPITAL OBSERVATION PER HR: HCPCS

## 2018-03-07 PROCEDURE — 82550 ASSAY OF CK (CPK): CPT

## 2018-03-07 PROCEDURE — 80178 ASSAY OF LITHIUM: CPT

## 2018-03-07 PROCEDURE — 70450 CT HEAD/BRAIN W/O DYE: CPT

## 2018-03-07 PROCEDURE — 71045 X-RAY EXAM CHEST 1 VIEW: CPT

## 2018-03-07 PROCEDURE — 82948 REAGENT STRIP/BLOOD GLUCOSE: CPT

## 2018-03-07 PROCEDURE — 85610 PROTHROMBIN TIME: CPT

## 2018-03-07 PROCEDURE — 94664 DEMO&/EVAL PT USE INHALER: CPT

## 2018-03-07 PROCEDURE — 81001 URINALYSIS AUTO W/SCOPE: CPT

## 2018-03-07 PROCEDURE — 84443 ASSAY THYROID STIM HORMONE: CPT

## 2018-03-07 PROCEDURE — 93308 TTE F-UP OR LMTD: CPT

## 2018-03-07 PROCEDURE — 84484 ASSAY OF TROPONIN QUANT: CPT

## 2018-03-07 RX ADMIN — ATORVASTATIN CALCIUM SCH MG: 40 TABLET, FILM COATED ORAL at 20:56

## 2018-03-07 RX ADMIN — LITHIUM CARBONATE SCH MG: 300 CAPSULE, GELATIN COATED ORAL at 22:23

## 2018-03-07 RX ADMIN — TAMSULOSIN HYDROCHLORIDE SCH MG: 0.4 CAPSULE ORAL at 20:56

## 2018-03-07 RX ADMIN — INSULIN ASPART SCH: 100 INJECTION, SOLUTION INTRAVENOUS; SUBCUTANEOUS at 17:00

## 2018-03-07 RX ADMIN — Medication SCH ML: at 20:55

## 2018-03-07 RX ADMIN — INSULIN ASPART SCH: 100 INJECTION, SOLUTION INTRAVENOUS; SUBCUTANEOUS at 21:00

## 2018-03-07 NOTE — PD
HPI


Chief Complaint:  Altered Mental Status


Time Seen by Provider:  11:31


Travel History


International Travel<30 days:  No


Contact w/Intl Traveler<30days:  No


Traveled to known affect area:  No





History of Present Illness


HPI


72 year old male presents to the emergency department with his daughter at 

bedside for evaluation of increased confusion, weakness, incontinence.  Patient 

has a PMH of Bipolar Disorder, HTN, DM and h/o GI Bleed.  Patient was admitted 

December 24, 2018 for GI bleed.  His daughter bedside states his stools are 

back to normal, no hematemesis.  Daughter states he has had multiple close 

falls the past 2 days, either catching himself on a walker she is able to catch 

him.  He did have a fall on Monday where he did have a head injury.  Patient is 

alert and oriented to person, place, time, has difficulty other questions like 

his past medical history which daughter states is unusual for him.  Patient 

denies any headache.  He denies any chest pain.  He states that he has been 

wheezing recently complaints of shortness of breath.  He denies any abdominal 

pain.  No nausea, vomiting, diarrhea.  He has no pain at this time.


Past exacerbating or alleviating factors.  Moderate severity.





PFSH


Past Medical History


Hx Anticoagulant Therapy:  Yes (ASA 2X81)


Arthritis:  Yes


Asthma:  Yes (stress induced)


Bipolar Disorder:  Yes


Anxiety:  No


Depression:  Yes


Heart Rhythm Problems:  No


Cancer:  No


Cardiovascular Problems:  Yes


High Cholesterol:  No


Chest Pain:  No


Congestive Heart Failure:  No


COPD:  No


Diabetes:  Yes


Patient Takes Glucophage:  No


Diminished Hearing:  No


Endocrine:  Yes (DM II)


Gastrointestinal Disorders:  Yes


GERD:  Yes


Genitourinary:  No


Hiatal Hernia:  No


Hypertension:  Yes


Immune Disorder:  No


Implanted Vascular Access Dvce:  No


Kidney Stones:  No


Musculoskeletal:  Yes


Neurologic:  No


Psychiatric:  Yes (BIPOLAR)


Reproductive:  No


Respiratory:  Yes


Renal Failure:  No


Sleep Apnea:  No


Thyroid Disease:  No


Ulcer:  Yes


Tetanus Vaccination:  < 5 Years


Influenza Vaccination:  Yes





Past Surgical History


Abdominal Surgery:  No


Cardiac Surgery:  No


Ear Surgery:  No


Endocrine Surgery:  No


Eye Surgery:  No


Genitourinary Surgery:  No


Gynecologic Surgery:  No


Neurologic Surgery:  No


Oral Surgery:  No


Pacemaker:  No


Thoracic Surgery:  No


Other Surgery:  Yes





Social History


Alcohol Use:  No


Tobacco Use:  No


Substance Use:  No





Allergies-Medications


(Allergen,Severity, Reaction):  


Coded Allergies:  


     codeine (Unverified  Allergy, Severe, MUSCULOSKELETAL ISSUES, 3/7/18)


Reported Meds & Prescriptions





Reported Meds & Active Scripts


Active


Protonix (Pantoprazole Sodium) 40 Mg Tab 40 Mg PO DAILY


Reported


Proair Hfa 8.5 GM Inh (Albuterol Sulfate) 90 Mcg/Act Aer 2 Puff INH Q4-6H PRN


     108 mcg/actuation


Carvedilol 3.125 Mg Tab 3.125 Mg PO DAILY


Metformin (Metformin HCl) 500 Mg Tab 500 Mg PO BIDPC


Fluoxetine (Fluoxetine HCl) 40 Mg Cap 40 Cap PO HS


Flomax (Tamsulosin HCl) 0.4 Mg Cap 0.4 Mg PO HS


Lithium Carbonate 600 Mg Cap 600 Mg PO HS








Review of Systems


Except as stated in HPI:  all other systems reviewed are Neg





Physical Exam


Narrative


GENERAL: Well-nourished, well-developed male patient, afebrile.


SKIN: Focused skin assessment warm/dry.


HEAD: Normocephalic.  Atraumatic.


EYES: No scleral icterus. No injection or drainage. 


ENT: Mucosa pink and moist. No erythema or exudates. No uvular edema. No uvular

, palatal, or tonsillar deviation. Airway patent. Nasal turbinates appear 

normal without nasal blood, purulent drainage or septal hematoma.  Bilateral 

tympanic membranes are clear without erythema or perforation.


NECK: Supple, trachea midline. No JVD or lymphadenopathy.


CARDIOVASCULAR: Regular rate and rhythm without murmurs, gallops, or rubs. 


RESPIRATORY: Breath sounds equal bilaterally. No accessory muscle use.  Lungs 

sounds are clear to auscultation.


GASTROINTESTINAL: Abdomen soft, non-tender, nondistended. 


MUSCULOSKELETAL: No cyanosis, or edema.  Bilateral upper and lower extremity 

strength 5/5.  All extremities are neurovascularly intact.


BACK: Nontender without obvious deformity. No CVA tenderness. 


NEUROLOGICAL: Awake and alert. Cranial nerves II through XII intact. Motor and 

sensory grossly within normal limits. Five out of 5 muscle strength in all 

muscle groups. Normal speech.  Finger to nose is normal bilaterally.  Heel-to-

shin is normal bilaterally.





Data


Data


Last Documented VS





Vital Signs








  Date Time  Temp Pulse Resp B/P (MAP) Pulse Ox O2 Delivery O2 Flow Rate FiO2


 


3/7/18 13:00  78 16 159/102 (121) 98 Room Air  


 


3/7/18 08:50 98.8       








Orders





 Orders


Blood Glucose (3/7/18 08:56)


Oximetry (3/7/18 08:56)


Iv Access Insert/Monitor (3/7/18 08:56)


Ecg Monitoring (3/7/18 08:56)


Oxygen Administration (3/7/18 08:56)


Complete Blood Count With Diff (3/7/18 08:59)


Comprehensive Metabolic Panel (3/7/18 08:59)


Urinalysis - C+S If Indicated (3/7/18 08:59)


Coag Profile (3/7/18 08:59)


Ammonia (3/7/18 11:44)


Creatine Kinase (Cpk) (3/7/18 11:44)


Troponin I (3/7/18 11:44)


Thyroid Stimulating Hormone (3/7/18 11:44)


Chest, Single Ap (3/7/18 11:44)


Ct Brain W/O Iv Contrast(Rout) (3/7/18 11:44)


Sodium Chloride 0.9% Flush (Ns Flush) (3/7/18 11:45)


Lithium (Li) (3/7/18 11:44)


Magnesium (Mg) (3/7/18 11:44)


Mri Brain W/O Contrast (3/7/18 )





Labs





Laboratory Tests








Test


  3/7/18


09:10 3/7/18


10:57 3/7/18


12:10


 


White Blood Count 9.4 TH/MM3   


 


Red Blood Count 3.35 MIL/MM3   


 


Hemoglobin 10.8 GM/DL   


 


Hematocrit 31.3 %   


 


Mean Corpuscular Volume 93.7 FL   


 


Mean Corpuscular Hemoglobin 32.3 PG   


 


Mean Corpuscular Hemoglobin


Concent 34.5 % 


  


  


 


 


Red Cell Distribution Width 14.4 %   


 


Platelet Count 441 TH/MM3   


 


Mean Platelet Volume 6.7 FL   


 


Neutrophils (%) (Auto) 57.7 %   


 


Lymphocytes (%) (Auto) 32.2 %   


 


Monocytes (%) (Auto) 7.4 %   


 


Eosinophils (%) (Auto) 2.1 %   


 


Basophils (%) (Auto) 0.6 %   


 


Neutrophils # (Auto) 5.4 TH/MM3   


 


Lymphocytes # (Auto) 3.0 TH/MM3   


 


Monocytes # (Auto) 0.7 TH/MM3   


 


Eosinophils # (Auto) 0.2 TH/MM3   


 


Basophils # (Auto) 0.1 TH/MM3   


 


CBC Comment DIFF FINAL   


 


Differential Comment    


 


Prothrombin Time 10.2 SEC   


 


Prothromb Time International


Ratio 1.0 RATIO 


  


  


 


 


Activated Partial


Thromboplast Time 24.8 SEC 


  


  


 


 


Blood Urea Nitrogen 8 MG/DL   


 


Creatinine 1.41 MG/DL   


 


Random Glucose 145 MG/DL   


 


Total Protein 7.6 GM/DL   


 


Albumin 3.5 GM/DL   


 


Calcium Level 9.1 MG/DL   


 


Alkaline Phosphatase 73 U/L   


 


Aspartate Amino Transf


(AST/SGOT) 21 U/L 


  


  


 


 


Alanine Aminotransferase


(ALT/SGPT) 28 U/L 


  


  


 


 


Total Bilirubin 0.6 MG/DL   


 


Sodium Level 142 MEQ/L   


 


Potassium Level 3.9 MEQ/L   


 


Chloride Level 108 MEQ/L   


 


Carbon Dioxide Level 27.2 MEQ/L   


 


Anion Gap 7 MEQ/L   


 


Estimat Glomerular Filtration


Rate 49 ML/MIN 


  


  


 


 


Urine Color  YELLOW  


 


Urine Turbidity  CLEAR  


 


Urine pH  6.0  


 


Urine Specific Gravity  1.021  


 


Urine Protein  30 mg/dL  


 


Urine Glucose (UA)  NEG mg/dL  


 


Urine Ketones  NEG mg/dL  


 


Urine Occult Blood  NEG  


 


Urine Nitrite  NEG  


 


Urine Bilirubin  NEG  


 


Urine Urobilinogen


  


  LESS THAN 2.0


MG/DL 


 


 


Urine Leukocyte Esterase  TRACE  


 


Urine RBC


  


  LESS THAN 1


/hpf 


 


 


Urine WBC  2 /hpf  


 


Urine Hyaline Casts  6 /lpf  


 


Urine Mucus  FEW /lpf  


 


Microscopic Urinalysis Comment


  


  CULT NOT


INDICATED 


 


 


Magnesium Level   2.0 MG/DL 


 


Ammonia


  


  


  LESS THAN 10


MCMOL/L


 


Total Creatine Kinase   53 U/L 


 


Troponin I


  


  


  LESS THAN 0.02


NG/ML


 


Thyroid Stimulating Hormone


3rd Gen 


  


  1.470 uIU/ML 


 


 


Lithium Level   0.5 MEQ/L 











[facility]


Medical Decision Making


Medical Screen Exam Complete:  Yes


Emergency Medical Condition:  Yes


Medical Record Reviewed:  Yes


Interpretation(s)


chest x-ray - CONCLUSION:     


1. No acute cardiopulmonary findings.





CT brain - CONCLUSION:     


Right-sided lacunar infarcts. MRI may be warranted to evaluate acuity.


Differential Diagnosis


Electrolyte abnormality versus ACS versus thyroid disorder versus UTI versus 

dehydration versus intracranial abnormality versus pneumonia


Narrative Course


78-year-old male presents to the emergency department his daughter bedside for 

evaluation of increased weakness, confusion, incontinence of urine for the past 

2 days.  Patient is alert and oriented to person, place, time, but has 

difficulty with other questions including his past medical history.  EKG shows 

sinus rhythm, heart rate 86, left bundle branch block.  CBC, CMP, magnesium 

level, CK, troponin, TSH, ammonia level, PTT, PT/INR, UA are ordered and 

pending.  Chest x-ray and CT of the brain are ordered and pending.





CBC shows slight anemia hemoglobin 10.8, hematocrit 31.3.  CMP shows creatinine 

of 1.41.  Glucose is 145.  CK 63.  Troponin is less than 0.02.  Magnesium is 

2.0.  TSH is 1.470.  Ammonia is less than 10.  Coags are unremarkable.  UA is 

negative for acute infection.  Chest x-ray shows no acute cardiopulmonary 

findings.  CT of the brain shows right-sided lacunar infarcts. MRI may be 

warranted to evaluate acuity. 





Hospitalist was paged for admission.  Patient denies any history of CVA.





Dr. Solis accepted admission.





Diagnosis





 Primary Impression:  


 Altered mental status, unspecified


 Qualified Codes:  R41.82 - Altered mental status, unspecified


 Additional Impression:  


 Generalized weakness





Admitting Information


Admitting Physician Requests:  Observation











Paris Fontaine Mar 7, 2018 11:52

## 2018-03-07 NOTE — RADRPT
EXAM DATE/TIME:  03/07/2018 14:51 

 

[facility] COMPARISON:     

No previous studies available for comparison.

       

 

 

INDICATIONS :     

Confusion. Weakness.  CVA.  

                     

 

MEDICAL HISTORY :     

Hypertension. Diabetes mellitus type 2. Gastroesophageal reflux disease. 

 

SURGICAL HISTORY :           

Hand surgery.

 

ENCOUNTER:     

Subsequent

 

ACUITY:     

1 day

 

PAIN SCORE:     

0/10

 

LOCATION:         

head.

 

TECHNIQUE:     

Multiplanar, multisequence MRI of the brain was performed without contrast.

 

FINDINGS:     

The diffusion restriction images demonstrate a band of abnormal signal extending along the brain pare
nchyma just superior to the right lateral ventricle suggesting an anterior cerebral artery infarct on
 the right. There is involvement of both the anterior and posterior horn of the corpus callosum. Ther
e is no evidence of hemorrhage within this.

 

The ventricles are normal in size and configuration. No abnormal extra-axial fluid collections are se
en. No mass lesion is identified.

 

There are scattered areas of increased T2 signal in the white matter most consistent with moderate mi
crovascular ischemic demyelinative change.

 

The appearance of the posterior fossa is unremarkable.

 

The visualized portion of orbit and sinus are intact.

 

CONCLUSION:     

1. Abnormal restricted diffusion signal involving the right anterior cerebral distribution. This is s
een involving both the anterior and posterior horn of the corpus callosum as well as the pericallosal
 white matter. Findings would be consistent with acute cortical infarction.

2. Scattered areas of increased T2 signal in the white matter most consistent with moderate microvasc
ular ischemic demyelinative change.

 

 

 

 Lalo Monique MD on March 07, 2018 at 15:31           

Board Certified Radiologist.

 This report was verified electronically.

## 2018-03-07 NOTE — RADRPT
EXAM DATE/TIME:  03/07/2018 11:55 

 

[facility] COMPARISON:     

KNEE LEFT COMPLETE (4VWS), March 05, 2018, 8:37.

 

                     

INDICATIONS :     

Shortness of breath.

                     

 

MEDICAL HISTORY :            

Diabetes mellitus type II. Hypertension   

 

SURGICAL HISTORY :     

None.   

 

ENCOUNTER:     

Initial                                        

 

ACUITY:     

1 day      

 

PAIN SCORE:     

0/10

 

LOCATION:     

Bilateral chest 

 

FINDINGS:     

A single view of the chest demonstrates the lungs to be symmetrically aerated without evidence of mas
s, infiltrate or effusion.  The cardiomediastinal contours are unremarkable.  Osseous structures are 
intact.

 

CONCLUSION:     

1. No acute cardiopulmonary findings.

 

 

 

 Lalo Monique MD on March 07, 2018 at 12:00           

Board Certified Radiologist.

 This report was verified electronically.

## 2018-03-07 NOTE — HHI.HP
__________________________________________________





HPI


Service


[facility]


Primary Care Physician


Ministerio Rocha MD


Admission Diagnosis





generalized weakness, confusion


Diagnoses:  


Chief Complaint:  


Increased confusion, weakness.


Travel History


International Travel<30 Days:  No


Contact w/Intl Traveler <30 Da:  No


Traveled to Known Affected Are:  No


History of Present Illness


Mr. Luevano is a 72-year-old male with a history of bipolar disorder, 

hypertension, diabetes mellitus and recent GI bleed who presents to the 

emergency department today 3/7/2018 due to increased confusion, weakness and 

incontinence.  Patient was recently admitted to the hospital for GI bleed.  He 

underwent EGD study during previous hospitalization.  At the time of this 

interview, patient is resting well in bed.  He reports that he has been 

experiencing balance issues for the last 2 weeks especially when he gets out of 

chair.  He fell or almost fell frequently.  He denies any chest pain, shortness 

of breath, fever or chills.  He denies any speech difficulty or unilateral 

upper or lower extremity weakness.  Denies any changes in bowel or bladder 

habits.  ED workup indicated CT brain evidence of lacunar infarcts on the right 

side.





Review of Systems


Except as stated in HPI:  all other systems reviewed are Neg





Past Family Social History


Past Medical History


Bipolar disorder, diabetes mellitus, hypertension


Past Surgical History


Left hand crush injury related surgery.


Reported Medications


Protonix (Pantoprazole Sodium) 40 Mg Tab 40 Mg PO DAILY


Reported


Proair Hfa 8.5 GM Inh (Albuterol Sulfate) 90 Mcg/Act Aer 2 Puff INH Q4-6H PRN


     108 mcg/actuation


Carvedilol 3.125 Mg Tab 3.125 Mg PO DAILY


Metformin (Metformin HCl) 500 Mg Tab 500 Mg PO BIDPC


Fluoxetine (Fluoxetine HCl) 40 Mg Cap 40 Cap PO HS


Flomax (Tamsulosin HCl) 0.4 Mg Cap 0.4 Mg PO HS


Lithium Carbonate 600 Mg Cap 600 Mg PO HS


Allergies:  


Coded Allergies:  


     codeine (Unverified  Allergy, Severe, MUSCULOSKELETAL ISSUES, 3/7/18)


Family History


No family history of Alzheimer's or Parkinson's. Mother had esophageal cancer.


Social History


Denies using tobacco, alcohol, illicit drugs.





Physical Exam


Vital Signs





Vital Signs








  Date Time  Temp Pulse Resp B/P (MAP) Pulse Ox O2 Delivery O2 Flow Rate FiO2


 


3/7/18 13:00  78 16 159/102 (121) 98 Room Air  


 


3/7/18 12:00  82 16 176/110 (132) 97 Room Air  


 


3/7/18 11:40   16  99 Room Air  


 


3/7/18 08:50 98.8 100 22 208/76 (120) 96   








Physical Exam


GENERAL: This is a well-nourished, well-developed patient, in no apparent 

distress.  Right-handed.


SKIN: No rashes, ecchymoses or lesions. Warm and dry.


HEAD: Atraumatic. Normocephalic. No temporal or scalp tenderness.


EYES: Pupils equal round and reactive. No injection or drainage. 


ENT: Nose without bleeding, purulent drainage or septal hematoma. Airway patent.


NECK: Trachea midline. No lymphadenopathy. Supple, nontender, no meningeal 

signs.


CARDIOVASCULAR: Regular rate and rhythm without murmurs, gallops, or rubs. No 

JVD. 


RESPIRATORY: Clear to auscultation. Breath sounds equal bilaterally. No wheezes

, rales, or rhonchi.  


GASTROINTESTINAL: Abdomen soft, non-tender, nondistended. No guarding.


MUSCULOSKELETAL: Extremities without clubbing, cyanosis, or edema. 


NEUROLOGICAL: Awake and alert. Cranial nerves II through XII intact. No focal 

neurological deficits.  I could not appreciate any strength asymmetry.  Normal 

speech similar to his speech pattern in the previous hospitalization.


Laboratory





Laboratory Tests








Test


  3/7/18


09:10 3/7/18


10:57 3/7/18


12:10


 


White Blood Count 9.4   


 


Red Blood Count 3.35   


 


Hemoglobin 10.8   


 


Hematocrit 31.3   


 


Mean Corpuscular Volume 93.7   


 


Mean Corpuscular Hemoglobin 32.3   


 


Mean Corpuscular Hemoglobin


Concent 34.5 


  


  


 


 


Red Cell Distribution Width 14.4   


 


Platelet Count 441   


 


Mean Platelet Volume 6.7   


 


Neutrophils (%) (Auto) 57.7   


 


Lymphocytes (%) (Auto) 32.2   


 


Monocytes (%) (Auto) 7.4   


 


Eosinophils (%) (Auto) 2.1   


 


Basophils (%) (Auto) 0.6   


 


Neutrophils # (Auto) 5.4   


 


Lymphocytes # (Auto) 3.0   


 


Monocytes # (Auto) 0.7   


 


Eosinophils # (Auto) 0.2   


 


Basophils # (Auto) 0.1   


 


CBC Comment DIFF FINAL   


 


Differential Comment    


 


Prothrombin Time 10.2   


 


Prothromb Time International


Ratio 1.0 


  


  


 


 


Activated Partial


Thromboplast Time 24.8 


  


  


 


 


Blood Urea Nitrogen 8   


 


Creatinine 1.41   


 


Random Glucose 145   


 


Total Protein 7.6   


 


Albumin 3.5   


 


Calcium Level 9.1   


 


Alkaline Phosphatase 73   


 


Aspartate Amino Transf


(AST/SGOT) 21 


  


  


 


 


Alanine Aminotransferase


(ALT/SGPT) 28 


  


  


 


 


Total Bilirubin 0.6   


 


Sodium Level 142   


 


Potassium Level 3.9   


 


Chloride Level 108   


 


Carbon Dioxide Level 27.2   


 


Anion Gap 7   


 


Estimat Glomerular Filtration


Rate 49 


  


  


 


 


Urine Color  YELLOW  


 


Urine Turbidity  CLEAR  


 


Urine pH  6.0  


 


Urine Specific Gravity  1.021  


 


Urine Protein  30  


 


Urine Glucose (UA)  NEG  


 


Urine Ketones  NEG  


 


Urine Occult Blood  NEG  


 


Urine Nitrite  NEG  


 


Urine Bilirubin  NEG  


 


Urine Urobilinogen  LESS THAN 2.0  


 


Urine Leukocyte Esterase  TRACE  


 


Urine RBC  LESS THAN 1  


 


Urine WBC  2  


 


Urine Hyaline Casts  6  


 


Urine Mucus  FEW  


 


Microscopic Urinalysis Comment


  


  CULT NOT


INDICATED 


 


 


Magnesium Level   2.0 


 


Ammonia   LESS THAN 10 


 


Total Creatine Kinase   53 


 


Troponin I   LESS THAN 0.02 


 


Thyroid Stimulating Hormone


3rd Gen 


  


  1.470 


 


 


Lithium Level   0.5 








Result Diagram:  


3/7/18 0910                                                                    

            3/7/18 0910





Imaging





Last Impressions








Head CT 3/7/18 1144 Signed





Impressions: 





 Service Date/Time:  2018 12:00 - CONCLUSION:  Right-sided 





 lacunar infarcts. MRI may be warranted to evaluate acuity.     Jose Angel Cano MD 


 


Chest X-Ray 3/7/18 1144 Signed





Impressions: 





 Service Date/Time:  2018 11:55 - CONCLUSION:  1. No acute 





 cardiopulmonary findings.     Lalo Monique MD 











Caprini VTE Risk Assessment


Caprini VTE Risk Assessment:  Mod/High Risk (score >= 2)


Caprini Risk Assessment Model











 Point Value = 1          Point Value = 2  Point Value = 3  Point Value = 5


 


Age 41-60


Minor surgery


BMI > 25 kg/m2


Swollen legs


Varicose veins


Pregnancy or postpartum


History of unexplained or recurrent


   spontaneous 


Oral contraceptives or hormone


   replacement


Sepsis (< 1 month)


Serious lung disease, including


   pneumonia (< 1 month)


Abnormal pulmonary function


Acute myocardial infarction


Congestive heart failure (< 1 month)


History of inflammatory bowel disease


Medical patient at bed rest Age 61-74


Arthroscopic surgery


Major open surgery (> 45 min)


Laparoscopic surgery (> 45 min)


Malignancy


Confined to bed (> 72 hours)


Immobilizing plaster cast


Central venous access Age >= 75


History of VTE


Family history of VTE


Factor V Leiden


Prothrombin 88287Y


Lupus anticoagulant


Anticardiolipin antibodies


Elevated serum homocysteine


Heparin-induced thrombocytopenia


Other congenital or acquired


   thrombophilia Stroke (< 1 month)


Elective arthroplasty


Hip, pelvis, or leg fracture


Acute spinal cord injury (< 1 month)








Prophylaxis Regimen











   Total Risk


Factor Score Risk Level Prophylaxis Regimen


 


0-1      Low Early ambulation


 


2 Moderate Order ONE of the following:


*Sequential Compression Device (SCD)


*Heparin 5000 units SQ BID


 


3-4 Higher Order ONE of the following medications:


*Heparin 5000 units SQ TID


*Enoxaparin/Lovenox 40 mg SQ daily (WT < 150 kg, CrCl > 30 mL/min)


*Enoxaparin/Lovenox 30 mg SQ daily (WT < 150 kg, CrCl > 10-29 mL/min)


*Enoxaparin/Lovenox 30 mg SQ BID (WT < 150 kg, CrCl > 30 mL/min)


AND/OR


*Sequential Compression Device (SCD)


 


5 or more Highest Order ONE of the following medications:


*Heparin 5000 units SQ TID (Preferred with Epidurals)


*Enoxaparin/Lovenox 40 mg SQ daily (WT < 150 kg, CrCl > 30 mL/min)


*Enoxaparin/Lovenox 30 mg SQ daily (WT < 150 kg, CrCl > 10-29 mL/min)


*Enoxaparin/Lovenox 30 mg SQ BID (WT < 150 kg, CrCl > 30 mL/min)


AND


*Sequential Compression Device (SCD)











Assessment and Plan


Problem List:  


(1) Lacunar infarct, acute


ICD Code:  I63.9 - Cerebral infarction, unspecified


(2) DM (diabetes mellitus)


ICD Code:  E11.9 - Type 2 diabetes mellitus without complications


Assessment and Plan


Mr. Luevano is a 72-year-old male with a history of hypertension, diabetes 

mellitus, bipolar disorder who presents to the emergency department due to 

increased confusion and weakness.  CT head indicates lacunar infarction.  

Patient's symptoms started 2 days prior to this admission apparently her family 

members.  However patient reports 2 week duration of balance issues.





-Acute lacunar infarction


   -CT indicates acute lacunar infarcts.  Images reviewed by me.  No hemorrhage.


   -We will obtain MRI of the brain.


   -Patient underwent carotid ultrasound on 2018 -shows no hemodynamically 

significant carotid artery disease.


   -Start aspirin 81 mg daily as well as Lipitor 40mg QHS. 


   -We will request a neurology evaluation.


   


- Hypertension


   - Patient's symptoms started two days ago.  Since his symptoms started at 

least 2 days ago possibly 2 weeks ago, we will initiate blood pressure control 

to keep blood pressure below 160/90.


   - Start nifedipine 30 mg daily.





- Diabetes mellitus


   - Takes metformin at home. Hold for now


   - Sliding scale insulin. If needed, we will consider Levemir. 





-Gastroesophageal ulcer -H pylori negative on biopsy.  Continue Protonix.





Full code. SCDs.











Dustin Solis DO Mar 7, 2018 1:33 pm

## 2018-03-07 NOTE — MB
cc:

Syed Jarrell MD, PhD

****

 

 

DATE OF CONSULT:

 

REASON FOR CONSULTATION:

Balance difficulty.

 

HISTORY OF PRESENT ILLNESS:

Mr. Luevano is a 72-year-old man who has developed over the past 

several days, increasing confusion and difficulty with his balance.  

His significant other states he has done inappropriate things as well.

 Denies any focal weakness or headaches.

 

PAST MEDICAL HISTORY:

History of GI bleed.  He had a recent GI bleed a week ago due to 

peptic ulcer disease but no recent GI bleed.  History of bipolar 

disorder, hypertension, diabetes.

 

MEDICATIONS:

Currently are aspirin 81 mg daily, Protonix 40 mg daily, Coreg 3.125 

mg daily, nifedipine XL 30 mg daily, Prozac 40 mg daily, lithium 600 

mg daily, Flomax 0.4 mg at bedtime, Lipitor 40 mg daily.

 

NEUROLOGICAL EXAMINATION:

VITAL SIGNS:  Blood pressure 179/87, pulse 95, respiratory rate is 20,

temperature 98 degrees.

HIGH ____ FUNCTION:  Alert oriented x 3, speech is slightly 

dysarthric.  He follows commands well.  Cranial nerves:  Pupils equal 

and reactive.  Extraocular movements intact.  There is a mild left 

facial weakness.  On motor exam he has 5/5 strength in all major 

groups in the upper extremities.  He does appear weak in the left leg 

at 4/5. He has a pronator drift left hand and mild decreased fine 

skills left hand.  Reflexes are symmetric.

 

MRI of the brain shows restricted diffusion abnormality in the right 

anterior cerebral artery distribution.  No hemorrhage is identified.  

CT brain old lacunar infarctions on the right.

 

LABORATORY DATA:

White count 9400, hemoglobin 10.8, hematocrit 31%, platelet count 

441,000.  PT 10.6, INR 1, APPT 24.8.  Sodium is 142, potassium 3.9, 

chloride 108, CO2 27.2, BUN is 8, creatinine 1.41, GFR is 49, glucose 

145.  AST 21, ALT is 28.  Tox screen lithium level 0.5.  Urinalysis 

the pH is 6, specific gravity 1.021, protein 30.

 

EKG sinus rhythm, left bundle branch block.

 

IMPRESSION:

Right anterior cerebral artery distribution stroke.

 

RECOMMENDATIONS:

Continue low dose aspirin.  Will obtain an echocardiogram, carotid 

ultrasound, physical therapy consult.  Also recommend lipid panel.  

Monitor cardiac telemetry, rule out atrial fibrillation.

 

 

__________________________________

Syed Jarrell MD, PhD

 

 

KEVEN/

D: 03/07/2018, 09:36 PM

T: 03/07/2018, 10:48 PM

Visit #: [id number]

Job #: [id number]

## 2018-03-07 NOTE — RADRPT
EXAM DATE/TIME:  03/07/2018 12:00 

 

[facility] COMPARISON:     

CT BRAIN W/O CONTRAST, January 18, 2018, 8:35.

 

 

INDICATIONS :     

Weakness, altered mental status

                      

 

RADIATION DOSE:     

56.35 CTDIvol (mGy) 

 

 

 

MEDICAL HISTORY :     

Cardiovascular disease. Hypertension. Diabetes mellitus type 2.

 

SURGICAL HISTORY :      

None. 

 

ENCOUNTER:      

Initial

 

ACUITY:      

1 day

 

PAIN SCALE:      

0/10

 

LOCATION:        

cranial 

 

TECHNIQUE:     

Multiple contiguous axial images were obtained of the head.  Using automated exposure control and adj
ustment of the mA and/or kV according to patient size, radiation dose was kept as low as reasonably a
chievable to obtain optimal diagnostic quality images.   DICOM format image data is available electro
nically for review and comparison.  

 

FINDINGS:     

 

CEREBRUM:     

Lacunar infarct right basal ganglia. There is low-density in the periventricular white matter frontal
 lobe on the right. There is low-density throughout the white matter. The ventricles are normal for a
ge.  No evidence of midline shift, mass lesion, or hemorrhage.  No extra-axial fluid collections are 
seen.

 

POSTERIOR FOSSA:     

The cerebellum and brainstem are intact.  The 4th ventricle is midline.  The cerebellopontine angle i
s unremarkable.

 

EXTRACRANIAL:     

The visualized portion of the orbits is intact.

 

SKULL:     

The calvaria is intact.  No evidence of skull fracture.

 

CONCLUSION:     

Right-sided lacunar infarcts. MRI may be warranted to evaluate acuity. 

 

 

 Jose Angel Cano MD on March 07, 2018 at 12:12           

Board Certified Radiologist.

 This report was verified electronically.

## 2018-03-08 VITALS
SYSTOLIC BLOOD PRESSURE: 128 MMHG | TEMPERATURE: 97.5 F | DIASTOLIC BLOOD PRESSURE: 81 MMHG | OXYGEN SATURATION: 99 % | RESPIRATION RATE: 18 BRPM | HEART RATE: 84 BPM

## 2018-03-08 VITALS
TEMPERATURE: 97.6 F | OXYGEN SATURATION: 96 % | SYSTOLIC BLOOD PRESSURE: 138 MMHG | HEART RATE: 98 BPM | RESPIRATION RATE: 16 BRPM | DIASTOLIC BLOOD PRESSURE: 84 MMHG

## 2018-03-08 VITALS
DIASTOLIC BLOOD PRESSURE: 88 MMHG | RESPIRATION RATE: 18 BRPM | HEART RATE: 72 BPM | OXYGEN SATURATION: 97 % | SYSTOLIC BLOOD PRESSURE: 182 MMHG | TEMPERATURE: 97.8 F

## 2018-03-08 VITALS
DIASTOLIC BLOOD PRESSURE: 79 MMHG | RESPIRATION RATE: 16 BRPM | SYSTOLIC BLOOD PRESSURE: 165 MMHG | OXYGEN SATURATION: 96 % | TEMPERATURE: 98 F | HEART RATE: 74 BPM

## 2018-03-08 VITALS
DIASTOLIC BLOOD PRESSURE: 77 MMHG | HEART RATE: 92 BPM | RESPIRATION RATE: 18 BRPM | TEMPERATURE: 98.2 F | SYSTOLIC BLOOD PRESSURE: 137 MMHG | OXYGEN SATURATION: 96 %

## 2018-03-08 VITALS
TEMPERATURE: 97.4 F | DIASTOLIC BLOOD PRESSURE: 80 MMHG | SYSTOLIC BLOOD PRESSURE: 148 MMHG | OXYGEN SATURATION: 95 % | RESPIRATION RATE: 20 BRPM | HEART RATE: 71 BPM

## 2018-03-08 VITALS — OXYGEN SATURATION: 96 %

## 2018-03-08 VITALS — HEART RATE: 82 BPM

## 2018-03-08 VITALS — HEART RATE: 80 BPM

## 2018-03-08 VITALS — HEART RATE: 71 BPM

## 2018-03-08 LAB
BASOPHILS # BLD AUTO: 0.1 TH/MM3 (ref 0–0.2)
BASOPHILS NFR BLD: 0.7 % (ref 0–2)
BUN SERPL-MCNC: 9 MG/DL (ref 7–18)
CALCIUM SERPL-MCNC: 9.4 MG/DL (ref 8.5–10.1)
CHLORIDE SERPL-SCNC: 106 MEQ/L (ref 98–107)
CHOLEST SERPL-MCNC: 164 MG/DL (ref 120–200)
CHOLESTEROL/ HDL RATIO: 4.38 RATIO
CREAT SERPL-MCNC: 1.29 MG/DL (ref 0.6–1.3)
EOSINOPHIL # BLD: 0.2 TH/MM3 (ref 0–0.4)
EOSINOPHIL NFR BLD: 2.3 % (ref 0–4)
ERYTHROCYTE [DISTWIDTH] IN BLOOD BY AUTOMATED COUNT: 14.8 % (ref 11.6–17.2)
GFR SERPLBLD BASED ON 1.73 SQ M-ARVRAT: 55 ML/MIN (ref 89–?)
GLUCOSE SERPL-MCNC: 108 MG/DL (ref 74–106)
HBA1C MFR BLD: 6 % (ref 4.3–6)
HCO3 BLD-SCNC: 27.9 MEQ/L (ref 21–32)
HCT VFR BLD CALC: 29.7 % (ref 39–51)
HDLC SERPL-MCNC: 37.4 MG/DL (ref 40–60)
HGB BLD-MCNC: 10.2 GM/DL (ref 13–17)
LDLC SERPL-MCNC: 102 MG/DL (ref 0–99)
LYMPHOCYTES # BLD AUTO: 2.8 TH/MM3 (ref 1–4.8)
LYMPHOCYTES NFR BLD AUTO: 29.1 % (ref 9–44)
MCH RBC QN AUTO: 32 PG (ref 27–34)
MCHC RBC AUTO-ENTMCNC: 34.3 % (ref 32–36)
MCV RBC AUTO: 93.5 FL (ref 80–100)
MONOCYTE #: 0.8 TH/MM3 (ref 0–0.9)
MONOCYTES NFR BLD: 8.3 % (ref 0–8)
NEUTROPHILS # BLD AUTO: 5.7 TH/MM3 (ref 1.8–7.7)
NEUTROPHILS NFR BLD AUTO: 59.6 % (ref 16–70)
PLATELET # BLD: 390 TH/MM3 (ref 150–450)
PMV BLD AUTO: 7.1 FL (ref 7–11)
RBC # BLD AUTO: 3.17 MIL/MM3 (ref 4.5–5.9)
SODIUM SERPL-SCNC: 141 MEQ/L (ref 136–145)
TRIGL SERPL-MCNC: 122 MG/DL (ref 42–150)
WBC # BLD AUTO: 9.6 TH/MM3 (ref 4–11)

## 2018-03-08 RX ADMIN — INSULIN ASPART SCH: 100 INJECTION, SOLUTION INTRAVENOUS; SUBCUTANEOUS at 21:00

## 2018-03-08 RX ADMIN — CARVEDILOL SCH MG: 3.12 TABLET, FILM COATED ORAL at 08:50

## 2018-03-08 RX ADMIN — Medication SCH ML: at 21:20

## 2018-03-08 RX ADMIN — LITHIUM CARBONATE SCH MG: 300 CAPSULE, GELATIN COATED ORAL at 21:20

## 2018-03-08 RX ADMIN — INSULIN ASPART SCH: 100 INJECTION, SOLUTION INTRAVENOUS; SUBCUTANEOUS at 17:00

## 2018-03-08 RX ADMIN — PANTOPRAZOLE SCH MG: 40 TABLET, DELAYED RELEASE ORAL at 08:49

## 2018-03-08 RX ADMIN — ATORVASTATIN CALCIUM SCH MG: 40 TABLET, FILM COATED ORAL at 21:21

## 2018-03-08 RX ADMIN — NIFEDIPINE SCH MG: 30 TABLET, FILM COATED, EXTENDED RELEASE ORAL at 08:49

## 2018-03-08 RX ADMIN — TAMSULOSIN HYDROCHLORIDE SCH MG: 0.4 CAPSULE ORAL at 21:21

## 2018-03-08 RX ADMIN — INSULIN ASPART SCH: 100 INJECTION, SOLUTION INTRAVENOUS; SUBCUTANEOUS at 08:00

## 2018-03-08 RX ADMIN — INSULIN ASPART SCH: 100 INJECTION, SOLUTION INTRAVENOUS; SUBCUTANEOUS at 12:00

## 2018-03-08 RX ADMIN — Medication SCH ML: at 08:52

## 2018-03-08 NOTE — HHI.PR
Review/Management


Diagnosis


right DENNY stroke


Plan


continue plavix


cardiology consult regarding abnormal echo---? need for anticoagulation if is 

risk of cardioembolic source. Consider long term cardiac monitor after 

discharge to r/o afib.


Diagnosis/Plan:  





Subjective


Subjective Comments


No acute events reported


Left hand clumsiness and left leg weakness stable


Active Medications





Current Medications








 Medications


  (Trade)  Dose


 Ordered  Sig/Carolin


 Route  Start Time


 Stop Time Status Last Admin


 


  (NS Flush)  2 ml  UNSCH  PRN


 IV FLUSH  3/7/18 13:30


     


 


 


  (NS Flush)  2 ml  BID


 IV FLUSH  3/7/18 21:00


    3/8/18 08:52


 


 


  (Tylenol)  650 mg  Q4H  PRN


 PO  3/7/18 13:30


     


 


 


  (Zofran Inj)  4 mg  Q6H  PRN


 IVP  3/7/18 13:30


     


 


 


  (Narcan Inj)  0.4 mg  UNSCH  PRN


 IV PUSH  3/7/18 13:30


     


 


 


  (Milk Of


 Magnesia Liq)  30 ml  Q12H  PRN


 PO  3/7/18 13:30


     


 


 


  (Senokot)  17.2 mg  Q12H  PRN


 PO  3/7/18 13:30


     


 


 


  (Dulcolax Supp)  10 mg  DAILY  PRN


 RECTAL  3/7/18 13:30


     


 


 


  (Lactulose Liq)  30 ml  DAILY  PRN


 PO  3/7/18 13:30


     


 


 


  (Coreg)  3.125 mg  DAILY


 PO  3/8/18 09:00


    3/8/18 08:50


 


 


  (PROzac)  40 mg  HS


 PO  3/7/18 21:00


    3/7/18 20:56


 


 


  (Lithium


 Carbonate)  600 mg  HS


 PO  3/7/18 21:00


    3/7/18 22:23


 


 


  (Protonix)  40 mg  DAILY


 PO  3/8/18 09:00


    3/8/18 08:49


 


 


  (Flomax)  0.4 mg  HS


 PO  3/7/18 21:00


    3/7/18 20:56


 


 


  (Procardia Xl)  30 mg  DAILY


 PO  3/8/18 09:00


    3/8/18 08:49


 


 


  (Lipitor)  40 mg  HS


 PO  3/7/18 21:00


    3/7/18 20:56


 


 


  (NovoLOG


 SUPPLEMENTAL


 SCALE)  1  ACHS


 SQ  3/8/18 08:00


     


 


 


  (D50w (Vial) Inj)  50 ml  UNSCH  PRN


 IV PUSH  3/7/18 21:45


     


 


 


  (Glucagon Inj)  1 mg  UNSCH  PRN


 OTHER  3/7/18 21:45


     


 


 


  (Albuterol Neb)  2.5 mg  Q4HR NEB  PRN


 NEB  3/7/18 21:45


    3/7/18 22:25


 


 


  (Plavix)  75 mg  DAILY


 PO  3/9/18 09:00


     


 








Allergies





Allergies


Coded Allergies


  codeine (Unverified Allergy, Severe, MUSCULOSKELETAL ISSUES, 3/7/18)


  insulin,pork (Verified Allergy, Severe, Anaphylaxis, 3/8/18)





Exam


I&O / VS











 3/8/18 3/8/18 3/9/18





 15:00 23:00 07:00


 


Intake Total 275 ml 825 ml 


 


Balance 275 ml 825 ml 


 


   


 


Intake Oral 275 ml 825 ml 


 


# Voids 2 6 








Vital Signs








  Date Time  Temp Pulse Resp B/P (MAP) Pulse Ox O2 Delivery O2 Flow Rate FiO2


 


3/8/18 15:14 97.5 84 18 128/81 (97) 99   


 


3/8/18 12:01 97.4 71 20 148/80 (102) 95   


 


3/8/18 08:00  71      


 


3/8/18 07:43 97.8 72 18 182/88 (119) 97   


 


3/8/18 03:56 98.0 74 16 165/79 (107) 96   


 


3/8/18 00:12 97.6 98 16 138/84 (102) 96   


 


3/7/18 22:28     94   21


 


3/7/18 20:47 98.0 95 20 179/87 (117) 95   





    Manual Cuff/Auscultation    








Exam Comments


alert, speech normal


CN intact


 MOTOR  5/5 RUE , 4+/5 LUE


       5/5 RLE, 4/5 LLE





Objective


Micro and Labs





Laboratory Tests








Test


  3/8/18


06:05 3/8/18


06:25


 


White Blood Count 9.6  


 


Red Blood Count 3.17  


 


Hemoglobin 10.2  


 


Hematocrit 29.7  


 


Mean Corpuscular Volume 93.5  


 


Mean Corpuscular Hemoglobin 32.0  


 


Mean Corpuscular Hemoglobin


Concent 34.3 


  


 


 


Red Cell Distribution Width 14.8  


 


Platelet Count 390  


 


Mean Platelet Volume 7.1  


 


Neutrophils (%) (Auto) 59.6  


 


Lymphocytes (%) (Auto) 29.1  


 


Monocytes (%) (Auto) 8.3  


 


Eosinophils (%) (Auto) 2.3  


 


Basophils (%) (Auto) 0.7  


 


Neutrophils # (Auto) 5.7  


 


Lymphocytes # (Auto) 2.8  


 


Monocytes # (Auto) 0.8  


 


Eosinophils # (Auto) 0.2  


 


Basophils # (Auto) 0.1  


 


CBC Comment DIFF FINAL  


 


Differential Comment   


 


Blood Urea Nitrogen  9 


 


Creatinine  1.29 


 


Random Glucose  108 


 


Calcium Level  9.4 


 


Sodium Level  141 


 


Potassium Level  4.2 


 


Chloride Level  106 


 


Carbon Dioxide Level  27.9 


 


Anion Gap  7 


 


Estimat Glomerular Filtration


Rate 


  55 


 


 


Hemoglobin A1c  6.0 


 


Triglycerides Level  122 


 


Cholesterol Level  164 


 


LDL Cholesterol  102 


 


HDL Cholesterol  37.4 


 


Cholesterol/HDL Ratio  4.38 








Diagnostic Tests


ECHO--dilated LV. EF 35-40%











Syed Jarrell MD PhD Mar 8, 2018 20:37

## 2018-03-08 NOTE — ECHRPT
Indication:   ef assessment

 

 CONCLUSIONS

 Moderately dilated left ventricle. 

 Mild concentric left ventricular hypertrophy. 

 The left ventricular systolic function is moderate-to-severly reduced with an estimated ejection fra
ction in 

 the range of 35-40%. 

 There is global left ventricular dysfunction. 

 There is abnormal septal motion consistent with an intraventricular conduction delay. 

 

 The left atrial size is mild-to-moderately dilated. 

 The pulmonary valve is not well visualized.  

 

 BP:  182   / 88      HR: 72                       Rhythm:           Sinus

 

 MEASUREMENTS  (Male / Female) Normal Values       Technical Quality:Fair

 2D ECHO

 LV Diastolic Diameter PLAX        5.6 cm                4.2 - 5.9 / 3.9 - 5.3 cm

 LV Systolic Diameter PLAX         4.0 cm                

 IVS Diastolic Thickness           1.2 cm                0.6 - 1.0 / 0.6 - 0.9 cm

 LVPW Diastolic Thickness          1.2 cm                0.6 - 1.0 / 0.6 - 0.9 cm

 LV Relative Wall Thickness        0.4                   

 LA Systolic Diameter LX           4.0 cm                3.0 - 4.0 / 2.7 - 3.8 cm

 LV Ejection Fraction MOD 4C       61.9 %                

 LV Cardiac Index MOD 4C           4728.4 cm/minm     

 LV Ejection Fraction 4C AL        62.9 %                

 LV Cardiac Index 4C AL            5073.6 cm/minm     

 

 M-MODE

 LV Diastolic Diameter MM          5.4 cm                4.2 - 5.9 / 3.9 - 5.3 cm

 LV Systolic Diameter MM           3.9 cm                

 LV Ejection Fraction MM Teich     55.0 %                

 LV Cardiac Index MM Teich         2613.2 cm/minm     

 IVS Diastolic Thickness MM        1.2 cm                0.6 - 1.0 / 0.6 - 0.9 cm

 LVPW Diastolic Thickness MM       1.2 cm                0.6 - 1.0 / 0.6 - 0.9 cm

 LV Relative Wall Thickness MM     0.4                   0.24 - 0.42 / 0.22 - 0.42

 LV Mass Index MM                  126.4 g/m            49 - 115 / 43 - 95 g/m

 

 

 FINDINGS

 

 LEFT VENTRICLE

 Moderately dilated left ventricle. 

 Mild concentric left ventricular hypertrophy. 

 The left ventricular systolic function is moderate-to-severly reduced with an estimated ejection fra
ction in 

 the range of 35-40%. 

 There is global left ventricular dysfunction. 

 There is abnormal septal motion consistent with an intraventricular conduction delay. 

 

 

 RIGHT VENTRICLE

 Normal right ventricular size and systolic function.  

 

 LEFT ATRIUM

 The left atrial size is mild-to-moderately dilated. 

 

 RIGHT ATRIUM

 The right atrial size is normal.  

 

 ATRIAL SEPTUM

 Normal atrial septal thickness without atrial level shunting by limited color doppler interrogation.
  

 

 AORTA

 The aortic root and proximal ascending aorta are normal in size on limited imaging.  

 

 MITRAL VALVE

 Structurally normal mitral valve. No mitral valve stenosis or regurgitation.  

 

 AORTIC VALVE

 Trileaflet aortic valve. No aortic valve stenosis or regurgitation.  

 

 TRICUSPID VALVE

 Structurally normal tricuspid valve. No tricuspid valve stenosis or regurgitation.  

 

 PULMONARY VALVE

 The pulmonary valve is not well visualized.  

 

 VESSELS

 The inferior vena cava is normal in size.  

 

 PERICARDIUM

 No pericardial effusion.  

 

 

 

 

  Brendan Hooks MD, FACC

  (Electronically Signed)

  Final Date:08 March 2018 12:05

## 2018-03-08 NOTE — HHI.PR
Subjective


Remarks


Patient was seen around 10:00 this morning.  Follow-up for acute stroke.  

Patient is currently doing well.  No acute concerns.  No fever or chills.





Objective


Vitals





Vital Signs








  Date Time  Temp Pulse Resp B/P (MAP) Pulse Ox O2 Delivery O2 Flow Rate FiO2


 


3/8/18 22:13     96   


 


3/8/18 20:56 98.2 92 18 137/77 (97) 96   


 


3/8/18 15:14 97.5 84 18 128/81 (97) 99   


 


3/8/18 12:01 97.4 71 20 148/80 (102) 95   


 


3/8/18 08:00  71      


 


3/8/18 07:43 97.8 72 18 182/88 (119) 97   


 


3/8/18 03:56 98.0 74 16 165/79 (107) 96   


 


3/8/18 00:12 97.6 98 16 138/84 (102) 96   














I/O      


 


 3/7/18 3/7/18 3/7/18 3/8/18 3/8/18 3/8/18





 07:00 15:00 23:00 07:00 15:00 23:00


 


Intake Total     275 ml 825 ml


 


Balance     275 ml 825 ml


 


      


 


Intake Oral     275 ml 825 ml


 


# Voids   2  2 6








Result Diagram:  


3/8/18 0605                                                                    

            3/8/18 0625





Imaging





Last Impressions








Head CT 3/7/18 1144 Signed





Impressions: 





 Service Date/Time:  Wednesday, March 7, 2018 12:00 - CONCLUSION:  Right-sided 





 lacunar infarcts. MRI may be warranted to evaluate acuity.     Jose Angel Cano MD 


 


Chest X-Ray 3/7/18 1144 Signed





Impressions: 





 Service Date/Time:  Wednesday, March 7, 2018 11:55 - CONCLUSION:  1. No acute 





 cardiopulmonary findings.     Lalo Monique MD 


 


Brain MRI 3/7/18 0000 Signed





Impressions: 





 Service Date/Time:  Wednesday, March 7, 2018 14:51 - CONCLUSION:  1. Abnormal 





 restricted diffusion signal involving the right anterior cerebral 

distribution. 





 This is seen involving both the anterior and posterior horn of the corpus 





 callosum as well as the pericallosal white matter. Findings would be 

consistent 





 with acute cortical infarction. 2. Scattered areas of increased T2 signal in 

the 





 white matter most consistent with moderate microvascular ischemic 

demyelinative 





 change.     Lalo Monique MD 








Objective Remarks


GENERAL: Alert, oriented 3, NAD.


SKIN: Warm and dry.


HEAD: Normocephalic.


EYES: No scleral icterus. No injection or drainage. 


NECK: Supple, trachea midline. No JVD or lymphadenopathy.


CARDIOVASCULAR: Regular rate and rhythm without murmurs, gallops, or rubs. 


RESPIRATORY: Breath sounds equal bilaterally. No accessory muscle use.


GASTROINTESTINAL: Abdomen soft, non-tender, nondistended. 


MUSCULOSKELETAL: No cyanosis, or edema. 


BACK: Nontender without obvious deformity. No CVA tenderness.





Procedures


Echo 3/8/2018


 Moderately dilated left ventricle. 


 Mild concentric left ventricular hypertrophy. 


 The left ventricular systolic function is moderate-to-severly reduced with an 

estimated ejection fraction in 


 the range of 35-40%. 


 There is global left ventricular dysfunction. 


 There is abnormal septal motion consistent with an intraventricular conduction 

delay. 


 


 The left atrial size is mild-to-moderately dilated. 


 The pulmonary valve is not well visualized.





A/P


Problem List:  


(1) Lacunar infarct, acute


ICD Code:  I63.9 - Cerebral infarction, unspecified


(2) DM (diabetes mellitus)


ICD Code:  E11.9 - Type 2 diabetes mellitus without complications


Assessment and Plan


Mr. Luevano is a 72-year-old male with a history of hypertension, diabetes 

mellitus, bipolar disorder who presents to the emergency department due to 

increased confusion and weakness.  CT head indicates lacunar infarction.  

Patient's symptoms started 2 days prior to this admission apparently her family 

members.  However patient reports 2 week duration of balance issues.





-Acute lacunar infarction


   -CT indicates acute lacunar infarcts.  Images reviewed by me.  No hemorrhage.


   -MRI of the brain confirms acute stroke.


   -Patient underwent carotid ultrasound on 1/18/2018 -shows no hemodynamically 

significant carotid artery disease.


   -After discussing with neurology, we stopped aspirin and started patient on 

Plavix 75 mg daily.


   -Continue Lipitor 40 mg nightly


   -May need a loop recorder to detect atrial fibrillation





-Cardiomyopathy


   -Echo from 3/8/2018 shows ejection fraction 35-40%


   -Given this episode of acute stroke, patient may benefit from 

anticoagulation.


   -Neurology requested a cardiology consult.  Will wait for further cardiology 

input


   -IF we start apixaban, will DC Plavix


   


- Hypertension


   -Continue nifedipine 30 mg daily.





- Diabetes mellitus


   - Takes metformin at home. Hold for now


   - Sliding scale insulin. If needed, we will consider Levemir. 





-Gastroesophageal ulcer -H pylori negative on biopsy.  Continue Protonix.





Full code. SCDs.











Dustin Solis DO Mar 8, 2018 22:38

## 2018-03-08 NOTE — EKG
Date Performed: 03/07/2018       Time Performed: 10:07:54

 

PTAGE:      72 years

 

EKG:      Sinus rhythm 

 

 LEFT BUNDLE BRANCH BLOCK ABNORMAL ECG 

 

NO PREVIOUS TRACING            

 

DOCTOR:   Daniel Ellis  Interpretating Date/Time  03/08/2018 21:51:45

## 2018-03-09 VITALS
DIASTOLIC BLOOD PRESSURE: 70 MMHG | SYSTOLIC BLOOD PRESSURE: 152 MMHG | TEMPERATURE: 98.2 F | HEART RATE: 68 BPM | RESPIRATION RATE: 20 BRPM | OXYGEN SATURATION: 96 %

## 2018-03-09 VITALS
DIASTOLIC BLOOD PRESSURE: 84 MMHG | HEART RATE: 84 BPM | SYSTOLIC BLOOD PRESSURE: 157 MMHG | OXYGEN SATURATION: 97 % | TEMPERATURE: 98.7 F | RESPIRATION RATE: 18 BRPM

## 2018-03-09 VITALS
OXYGEN SATURATION: 98 % | TEMPERATURE: 97.9 F | DIASTOLIC BLOOD PRESSURE: 86 MMHG | RESPIRATION RATE: 18 BRPM | HEART RATE: 70 BPM | SYSTOLIC BLOOD PRESSURE: 175 MMHG

## 2018-03-09 VITALS — HEART RATE: 70 BPM

## 2018-03-09 VITALS
HEART RATE: 92 BPM | SYSTOLIC BLOOD PRESSURE: 136 MMHG | OXYGEN SATURATION: 96 % | RESPIRATION RATE: 18 BRPM | TEMPERATURE: 98 F | DIASTOLIC BLOOD PRESSURE: 80 MMHG

## 2018-03-09 VITALS
OXYGEN SATURATION: 95 % | SYSTOLIC BLOOD PRESSURE: 141 MMHG | DIASTOLIC BLOOD PRESSURE: 82 MMHG | RESPIRATION RATE: 18 BRPM | HEART RATE: 97 BPM | TEMPERATURE: 99 F

## 2018-03-09 VITALS — HEART RATE: 66 BPM

## 2018-03-09 VITALS — HEART RATE: 76 BPM

## 2018-03-09 VITALS — HEART RATE: 91 BPM

## 2018-03-09 VITALS — HEART RATE: 90 BPM

## 2018-03-09 VITALS
OXYGEN SATURATION: 98 % | TEMPERATURE: 98.3 F | DIASTOLIC BLOOD PRESSURE: 76 MMHG | SYSTOLIC BLOOD PRESSURE: 141 MMHG | RESPIRATION RATE: 18 BRPM | HEART RATE: 74 BPM

## 2018-03-09 RX ADMIN — CLOPIDOGREL BISULFATE SCH MG: 75 TABLET, FILM COATED ORAL at 08:13

## 2018-03-09 RX ADMIN — INSULIN ASPART SCH: 100 INJECTION, SOLUTION INTRAVENOUS; SUBCUTANEOUS at 21:00

## 2018-03-09 RX ADMIN — ATORVASTATIN CALCIUM SCH MG: 40 TABLET, FILM COATED ORAL at 21:36

## 2018-03-09 RX ADMIN — Medication SCH ML: at 21:37

## 2018-03-09 RX ADMIN — CARVEDILOL SCH MG: 3.12 TABLET, FILM COATED ORAL at 08:13

## 2018-03-09 RX ADMIN — Medication SCH ML: at 08:13

## 2018-03-09 RX ADMIN — NIFEDIPINE SCH MG: 30 TABLET, FILM COATED, EXTENDED RELEASE ORAL at 08:13

## 2018-03-09 RX ADMIN — TAMSULOSIN HYDROCHLORIDE SCH MG: 0.4 CAPSULE ORAL at 21:36

## 2018-03-09 RX ADMIN — INSULIN ASPART SCH: 100 INJECTION, SOLUTION INTRAVENOUS; SUBCUTANEOUS at 08:00

## 2018-03-09 RX ADMIN — PANTOPRAZOLE SCH MG: 40 TABLET, DELAYED RELEASE ORAL at 08:13

## 2018-03-09 RX ADMIN — LITHIUM CARBONATE SCH MG: 300 CAPSULE, GELATIN COATED ORAL at 21:37

## 2018-03-09 RX ADMIN — INSULIN ASPART SCH: 100 INJECTION, SOLUTION INTRAVENOUS; SUBCUTANEOUS at 17:09

## 2018-03-09 NOTE — HHI.PR
Review/Management


Diagnosis


right DENNY stroke


Plan


continue plavix


I agree with Dr Ramirez recommendation


He had carotid US recently and it showed no significant stenosis


Ok to dc from neuro standpoint and follow up with me in office in 3 weeks


Diagnosis/Plan:  





Subjective


Subjective Comments


No acute events reported


I reviewed notes and discussion and recommendation of Dr Ramirez to continue 

plavix at this time and arrange outpatient follow up to consider loop recorder


Active Medications





Current Medications








 Medications


  (Trade)  Dose


 Ordered  Sig/Carolin


 Route  Start Time


 Stop Time Status Last Admin


 


  (NS Flush)  2 ml  UNSCH  PRN


 IV FLUSH  3/7/18 13:30


     


 


 


  (NS Flush)  2 ml  BID


 IV FLUSH  3/7/18 21:00


    3/9/18 08:13


 


 


  (Tylenol)  650 mg  Q4H  PRN


 PO  3/7/18 13:30


     


 


 


  (Zofran Inj)  4 mg  Q6H  PRN


 IVP  3/7/18 13:30


     


 


 


  (Narcan Inj)  0.4 mg  UNSCH  PRN


 IV PUSH  3/7/18 13:30


     


 


 


  (Milk Of


 Magnesia Liq)  30 ml  Q12H  PRN


 PO  3/7/18 13:30


     


 


 


  (Senokot)  17.2 mg  Q12H  PRN


 PO  3/7/18 13:30


     


 


 


  (Dulcolax Supp)  10 mg  DAILY  PRN


 RECTAL  3/7/18 13:30


     


 


 


  (Lactulose Liq)  30 ml  DAILY  PRN


 PO  3/7/18 13:30


     


 


 


  (Coreg)  3.125 mg  DAILY


 PO  3/8/18 09:00


    3/9/18 08:13


 


 


  (PROzac)  40 mg  HS


 PO  3/7/18 21:00


    3/8/18 21:24


 


 


  (Lithium


 Carbonate)  600 mg  HS


 PO  3/7/18 21:00


    3/8/18 21:20


 


 


  (Protonix)  40 mg  DAILY


 PO  3/8/18 09:00


    3/9/18 08:13


 


 


  (Flomax)  0.4 mg  HS


 PO  3/7/18 21:00


    3/8/18 21:21


 


 


  (Lipitor)  40 mg  HS


 PO  3/7/18 21:00


    3/8/18 21:21


 


 


  (NovoLOG


 SUPPLEMENTAL


 SCALE)  1  ACHS


 SQ  3/8/18 08:00


     


 


 


  (D50w (Vial) Inj)  50 ml  UNSCH  PRN


 IV PUSH  3/7/18 21:45


     


 


 


  (Glucagon Inj)  1 mg  UNSCH  PRN


 OTHER  3/7/18 21:45


     


 


 


  (Albuterol Neb)  2.5 mg  Q4HR NEB  PRN


 NEB  3/7/18 21:45


    3/7/18 22:25


 


 


  (Plavix)  75 mg  DAILY


 PO  3/9/18 09:00


    3/9/18 08:13


 


 


  (Procardia Xl)  60 mg  DAILY


 PO  3/9/18 09:00


     


 


 


  (Prinivil)  10 mg  DAILY


 PO  3/9/18 09:00


     


 








Allergies





Allergies


Coded Allergies


  codeine (Unverified Allergy, Severe, MUSCULOSKELETAL ISSUES, 3/7/18)


  insulin,pork (Verified Allergy, Severe, Anaphylaxis, 3/8/18)





Exam


I&O / VS





Vital Signs








  Date Time  Temp Pulse Resp B/P (MAP) Pulse Ox O2 Delivery O2 Flow Rate FiO2


 


3/9/18 08:17 97.9 70 18 175/86 (115) 98   


 


3/9/18 06:05 98.3 74 18 141/76 (97) 98   


 


3/9/18 05:10  66      


 


3/9/18 00:41 98.7 84 18 157/84 (108) 97   


 


3/9/18 00:30  90      


 


3/8/18 22:13     96   


 


3/8/18 20:56 98.2 92 18 137/77 (97) 96   


 


3/8/18 18:35  80      


 


3/8/18 15:14 97.5 84 18 128/81 (97) 99   








Exam Comments


alert, speech normal


CN intact


 MOTOR  5/5 RUE , 4+/5 LUE


       5/5 RLE, 4/5 LLE











Syed Jarrell MD PhD Mar 9, 2018 13:07

## 2018-03-09 NOTE — HHI.PR
Subjective


Remarks


 Follow-up for acute stroke.  Patient is currently doing well. No acute 

concerns. No chest pain, SOB, fever, chills.





Objective


Vitals





Vital Signs








  Date Time  Temp Pulse Resp B/P (MAP) Pulse Ox O2 Delivery O2 Flow Rate FiO2


 


3/9/18 08:17 97.9 70 18 175/86 (115) 98   


 


3/9/18 06:05 98.3 74 18 141/76 (97) 98   


 


3/9/18 05:10  66      


 


3/9/18 00:41 98.7 84 18 157/84 (108) 97   


 


3/9/18 00:30  90      


 


3/8/18 22:13     96   


 


3/8/18 20:56 98.2 92 18 137/77 (97) 96   


 


3/8/18 18:35  80      


 


3/8/18 15:14 97.5 84 18 128/81 (97) 99   


 


3/8/18 12:01 97.4 71 20 148/80 (102) 95   














I/O      


 


 3/8/18 3/8/18 3/8/18 3/9/18 3/9/18 3/9/18





 07:00 15:00 23:00 07:00 15:00 23:00


 


Intake Total  275 ml 825 ml   


 


Balance  275 ml 825 ml   


 


      


 


Intake Oral  275 ml 825 ml   


 


# Voids  2 6   








Result Diagram:  


3/8/18 0605                                                                    

            3/8/18 0625





Imaging





Last Impressions








Head CT 3/7/18 1144 Signed





Impressions: 





 Service Date/Time:  Wednesday, March 7, 2018 12:00 - CONCLUSION:  Right-sided 





 lacunar infarcts. MRI may be warranted to evaluate acuity.     Jose Angel Cano MD 


 


Chest X-Ray 3/7/18 1144 Signed





Impressions: 





 Service Date/Time:  Wednesday, March 7, 2018 11:55 - CONCLUSION:  1. No acute 





 cardiopulmonary findings.     Lalo Monique MD 


 


Brain MRI 3/7/18 0000 Signed





Impressions: 





 Service Date/Time:  Wednesday, March 7, 2018 14:51 - CONCLUSION:  1. Abnormal 





 restricted diffusion signal involving the right anterior cerebral 

distribution. 





 This is seen involving both the anterior and posterior horn of the corpus 





 callosum as well as the pericallosal white matter. Findings would be 

consistent 





 with acute cortical infarction. 2. Scattered areas of increased T2 signal in 

the 





 white matter most consistent with moderate microvascular ischemic 

demyelinative 





 change.     Lalo Monique MD 








Objective Remarks


GENERAL: Alert, oriented 3, NAD.


SKIN: Warm and dry.


HEAD: Normocephalic.


EYES: No scleral icterus. No injection or drainage. 


NECK: Supple, trachea midline. No JVD or lymphadenopathy.


CARDIOVASCULAR: Regular rate and rhythm without murmurs, gallops, or rubs. 


RESPIRATORY: Breath sounds equal bilaterally. No accessory muscle use.


GASTROINTESTINAL: Abdomen soft, non-tender, nondistended. 


MUSCULOSKELETAL: No cyanosis, or edema. 


BACK: Nontender without obvious deformity. No CVA tenderness.





Procedures


Echo 3/8/2018


 Moderately dilated left ventricle. 


 Mild concentric left ventricular hypertrophy. 


 The left ventricular systolic function is moderate-to-severly reduced with an 

estimated ejection fraction in 


 the range of 35-40%. 


 There is global left ventricular dysfunction. 


 There is abnormal septal motion consistent with an intraventricular conduction 

delay. 


 


 The left atrial size is mild-to-moderately dilated. 


 The pulmonary valve is not well visualized.





A/P


Problem List:  


(1) Lacunar infarct, acute


ICD Code:  I63.9 - Cerebral infarction, unspecified


(2) DM (diabetes mellitus)


ICD Code:  E11.9 - Type 2 diabetes mellitus without complications


Assessment and Plan


Mr. Luevano is a 72-year-old male with a history of hypertension, diabetes 

mellitus, bipolar disorder who presents to the emergency department due to 

increased confusion and weakness.  CT head indicates lacunar infarction.  

Patient's symptoms started 2 days prior to this admission apparently her family 

members.  However patient reports 2 week duration of balance issues.





-Acute lacunar infarction


   -CT indicates acute lacunar infarcts.  Images reviewed by me.  No hemorrhage.


   -MRI of the brain confirms acute stroke.


   -Patient underwent carotid ultrasound on 1/18/2018 -shows no hemodynamically 

significant carotid artery disease.


   -After discussing with neurology, we stopped aspirin and started patient on 

Plavix 75 mg daily.


   -Continue Lipitor 40 mg nightly


   -May need a loop recorder to detect atrial fibrillation





-Cardiomyopathy


   -Echo from 3/8/2018 shows ejection fraction 35-40%


   -Neurology requested a Cardiology consult. I discussed with Dr. Ramirez (

Cardiology). Dr. Ramirez recommended that we continue Plavix for now. 


   -Dr. Ramirez will follow this patient in the office for future need for anti-

coagulation and also for loop recorder.


   


- Hypertension


   -Continue nifedipine 30 mg daily.





- Diabetes mellitus


   - Takes metformin at home. Hold for now


   - Sliding scale insulin. If needed, we will consider Levemir. 





-Gastroesophageal ulcer -H pylori negative on biopsy.  Continue Protonix.





Full code. SCDs.





Will discharge patient today.











Dustin Solis DO Mar 9, 2018 9:53 am

## 2018-03-10 VITALS
SYSTOLIC BLOOD PRESSURE: 124 MMHG | OXYGEN SATURATION: 97 % | RESPIRATION RATE: 18 BRPM | TEMPERATURE: 97.8 F | DIASTOLIC BLOOD PRESSURE: 84 MMHG | HEART RATE: 103 BPM

## 2018-03-10 VITALS — HEART RATE: 84 BPM

## 2018-03-10 VITALS
OXYGEN SATURATION: 93 % | TEMPERATURE: 98.1 F | HEART RATE: 87 BPM | DIASTOLIC BLOOD PRESSURE: 71 MMHG | SYSTOLIC BLOOD PRESSURE: 154 MMHG | RESPIRATION RATE: 18 BRPM

## 2018-03-10 VITALS
OXYGEN SATURATION: 91 % | TEMPERATURE: 97.3 F | DIASTOLIC BLOOD PRESSURE: 66 MMHG | RESPIRATION RATE: 20 BRPM | HEART RATE: 89 BPM | SYSTOLIC BLOOD PRESSURE: 128 MMHG

## 2018-03-10 VITALS — HEART RATE: 96 BPM

## 2018-03-10 VITALS
SYSTOLIC BLOOD PRESSURE: 139 MMHG | TEMPERATURE: 98.6 F | OXYGEN SATURATION: 98 % | RESPIRATION RATE: 18 BRPM | DIASTOLIC BLOOD PRESSURE: 82 MMHG | HEART RATE: 72 BPM

## 2018-03-10 VITALS
RESPIRATION RATE: 18 BRPM | DIASTOLIC BLOOD PRESSURE: 64 MMHG | SYSTOLIC BLOOD PRESSURE: 138 MMHG | TEMPERATURE: 98 F | OXYGEN SATURATION: 90 % | HEART RATE: 85 BPM

## 2018-03-10 VITALS — HEART RATE: 90 BPM

## 2018-03-10 RX ADMIN — Medication SCH ML: at 08:56

## 2018-03-10 RX ADMIN — LISINOPRIL SCH MG: 10 TABLET ORAL at 08:56

## 2018-03-10 RX ADMIN — NIFEDIPINE SCH MG: 30 TABLET, FILM COATED, EXTENDED RELEASE ORAL at 09:00

## 2018-03-10 RX ADMIN — CLOPIDOGREL BISULFATE SCH MG: 75 TABLET, FILM COATED ORAL at 08:56

## 2018-03-10 RX ADMIN — INSULIN ASPART SCH: 100 INJECTION, SOLUTION INTRAVENOUS; SUBCUTANEOUS at 12:00

## 2018-03-10 RX ADMIN — INSULIN ASPART SCH: 100 INJECTION, SOLUTION INTRAVENOUS; SUBCUTANEOUS at 21:00

## 2018-03-10 RX ADMIN — ATORVASTATIN CALCIUM SCH MG: 40 TABLET, FILM COATED ORAL at 21:17

## 2018-03-10 RX ADMIN — TAMSULOSIN HYDROCHLORIDE SCH MG: 0.4 CAPSULE ORAL at 21:17

## 2018-03-10 RX ADMIN — LITHIUM CARBONATE SCH MG: 300 CAPSULE, GELATIN COATED ORAL at 21:18

## 2018-03-10 RX ADMIN — INSULIN ASPART SCH: 100 INJECTION, SOLUTION INTRAVENOUS; SUBCUTANEOUS at 08:00

## 2018-03-10 RX ADMIN — Medication SCH ML: at 21:19

## 2018-03-10 RX ADMIN — PANTOPRAZOLE SCH MG: 40 TABLET, DELAYED RELEASE ORAL at 08:56

## 2018-03-10 RX ADMIN — CARVEDILOL SCH MG: 3.12 TABLET, FILM COATED ORAL at 08:56

## 2018-03-10 RX ADMIN — INSULIN ASPART SCH: 100 INJECTION, SOLUTION INTRAVENOUS; SUBCUTANEOUS at 17:00

## 2018-03-10 RX ADMIN — NIFEDIPINE SCH MG: 30 TABLET, FILM COATED, EXTENDED RELEASE ORAL at 08:56

## 2018-03-10 RX ADMIN — LISINOPRIL SCH MG: 10 TABLET ORAL at 09:00

## 2018-03-10 NOTE — HHI.PR
Subjective


Remarks


F/U CVA and cardiomyopathy.  Patient has no complaints awaiting placement 

discussed with nursing and case management.





Objective


Vitals





Vital Signs








  Date Time  Temp Pulse Resp B/P (MAP) Pulse Ox O2 Delivery O2 Flow Rate FiO2


 


3/10/18 03:01 98.4 87 18 135/82 (99) 94   


 


3/10/18 00:00  84      


 


3/9/18 23:55 98.0 92 18 136/80 (98) 96   


 


3/9/18 20:44 99.0 97 18 141/82 (101) 95   


 


3/9/18 16:56  91      


 


3/9/18 16:30 98.2 68 20 152/70 (97) 96   


 


3/9/18 12:00  76      


 


3/9/18 08:17 97.9 70 18 175/86 (115) 98   


 


3/9/18 08:15  70      














I/O      


 


 3/9/18 3/9/18 3/9/18 3/10/18 3/10/18 3/10/18





 07:00 15:00 23:00 07:00 15:00 23:00


 


Intake Total    960 ml  


 


Output Total    300 ml  


 


Balance    660 ml  


 


      


 


Intake Oral    960 ml  


 


Output Urine Total    300 ml  


 


# Voids    2  








Result Diagram:  


3/8/18 0605                                                                    

            3/8/18 0625





Imaging





Last Impressions








Head CT 3/7/18 1144 Signed





Impressions: 





 Service Date/Time:  Wednesday, March 7, 2018 12:00 - CONCLUSION:  Right-sided 





 lacunar infarcts. MRI may be warranted to evaluate acuity.     Jose Angel Cano MD 


 


Chest X-Ray 3/7/18 1144 Signed





Impressions: 





 Service Date/Time:  Wednesday, March 7, 2018 11:55 - CONCLUSION:  1. No acute 





 cardiopulmonary findings.     Lalo Monique MD 


 


Brain MRI 3/7/18 0000 Signed





Impressions: 





 Service Date/Time:  Wednesday, March 7, 2018 14:51 - CONCLUSION:  1. Abnormal 





 restricted diffusion signal involving the right anterior cerebral 

distribution. 





 This is seen involving both the anterior and posterior horn of the corpus 





 callosum as well as the pericallosal white matter. Findings would be 

consistent 





 with acute cortical infarction. 2. Scattered areas of increased T2 signal in 

the 





 white matter most consistent with moderate microvascular ischemic 

demyelinative 





 change.     Lalo Monique MD 








Objective Remarks


GENERAL: Alert, oriented 3, NAD.


SKIN: Warm and dry.


CARDIOVASCULAR: Regular rate and rhythm without murmurs, gallops, or rubs. 


RESPIRATORY: Breath sounds equal bilaterally. No accessory muscle use.


GASTROINTESTINAL: Abdomen soft, non-tender, nondistended. 


MUSCULOSKELETAL: No cyanosis, or edema. 


BACK: Nontender without obvious deformity. No CVA tenderness.


Procedures


Echo 3/8/2018


 Moderately dilated left ventricle. 


 Mild concentric left ventricular hypertrophy. 


 The left ventricular systolic function is moderate-to-severly reduced with an 

estimated ejection fraction in 


 the range of 35-40%. 


 There is global left ventricular dysfunction. 


 There is abnormal septal motion consistent with an intraventricular conduction 

delay. 


 


 The left atrial size is mild-to-moderately dilated. 


 The pulmonary valve is not well visualized.





A/P


Problem List:  


(1) Lacunar infarct, acute


ICD Code:  I63.9 - Cerebral infarction, unspecified


(2) DM (diabetes mellitus)


ICD Code:  E11.9 - Type 2 diabetes mellitus without complications


Assessment and Plan


Mr. Luevano is a 72-year-old male with a history of hypertension, diabetes 

mellitus, bipolar disorder who presents to the emergency department due to 

increased confusion and weakness.  CT head indicates lacunar infarction.  

Patient's symptoms started 2 days prior to this admission apparently her family 

members.  However patient reports 2 week duration of balance issues.





-Acute lacunar infarction


   -CT indicates acute lacunar infarcts.  Images reviewed by me.  No hemorrhage.


   -MRI of the brain confirms acute stroke.


   -Patient underwent carotid ultrasound on 1/18/2018 -shows no hemodynamically 

significant carotid artery disease.


   -After discussing with neurology, we stopped aspirin and started patient on 

Plavix 75 mg daily.


   -Continue Lipitor 40 mg nightly


   -May need a loop recorder to detect atrial fibrillation





-Cardiomyopathy


   -Echo from 3/8/2018 shows ejection fraction 35-40%. Uptitrate ACE and BB if 

tolerated


   -Neurology requested a Cardiology consult. I discussed with Dr. Ramirez (

Cardiology). Dr. Ramirez recommended that we continue Plavix for now. 


   -Dr. Ramirez will follow this patient in the office for future need for anti-

coagulation and also for loop recorder. May need AICD


   


- Hypertension


   -Continue nifedipine 60 mg daily.





- Diabetes mellitus


   - Takes metformin at home. Hold for now


   - Sliding scale insulin. If needed, we will consider Levemir. 





- Gastroesophageal ulcer -H pylori negative on biopsy.  Continue Protonix.





- Chronic anemia. Op F/u





Full code. SCDs.


Discharge Planning


Discharge when SNF arranged











Abdoul Polk MD Mar 10, 2018 07:54

## 2018-03-11 VITALS
DIASTOLIC BLOOD PRESSURE: 69 MMHG | OXYGEN SATURATION: 91 % | SYSTOLIC BLOOD PRESSURE: 124 MMHG | RESPIRATION RATE: 18 BRPM | TEMPERATURE: 97.7 F | HEART RATE: 79 BPM

## 2018-03-11 VITALS
HEART RATE: 87 BPM | DIASTOLIC BLOOD PRESSURE: 95 MMHG | SYSTOLIC BLOOD PRESSURE: 123 MMHG | TEMPERATURE: 97.3 F | RESPIRATION RATE: 18 BRPM | OXYGEN SATURATION: 92 %

## 2018-03-11 VITALS
HEART RATE: 101 BPM | RESPIRATION RATE: 18 BRPM | DIASTOLIC BLOOD PRESSURE: 65 MMHG | OXYGEN SATURATION: 95 % | TEMPERATURE: 96.9 F | SYSTOLIC BLOOD PRESSURE: 90 MMHG

## 2018-03-11 VITALS
RESPIRATION RATE: 16 BRPM | OXYGEN SATURATION: 93 % | TEMPERATURE: 97.8 F | HEART RATE: 102 BPM | SYSTOLIC BLOOD PRESSURE: 134 MMHG | DIASTOLIC BLOOD PRESSURE: 74 MMHG

## 2018-03-11 VITALS
SYSTOLIC BLOOD PRESSURE: 138 MMHG | TEMPERATURE: 98 F | HEART RATE: 95 BPM | DIASTOLIC BLOOD PRESSURE: 73 MMHG | RESPIRATION RATE: 18 BRPM | OXYGEN SATURATION: 97 %

## 2018-03-11 VITALS
RESPIRATION RATE: 18 BRPM | TEMPERATURE: 96.1 F | DIASTOLIC BLOOD PRESSURE: 69 MMHG | OXYGEN SATURATION: 92 % | SYSTOLIC BLOOD PRESSURE: 115 MMHG | HEART RATE: 78 BPM

## 2018-03-11 VITALS — HEART RATE: 86 BPM

## 2018-03-11 RX ADMIN — Medication SCH ML: at 08:29

## 2018-03-11 RX ADMIN — TAMSULOSIN HYDROCHLORIDE SCH MG: 0.4 CAPSULE ORAL at 20:37

## 2018-03-11 RX ADMIN — INSULIN ASPART SCH: 100 INJECTION, SOLUTION INTRAVENOUS; SUBCUTANEOUS at 07:55

## 2018-03-11 RX ADMIN — INSULIN ASPART SCH: 100 INJECTION, SOLUTION INTRAVENOUS; SUBCUTANEOUS at 21:29

## 2018-03-11 RX ADMIN — CLOPIDOGREL BISULFATE SCH MG: 75 TABLET, FILM COATED ORAL at 09:00

## 2018-03-11 RX ADMIN — ATORVASTATIN CALCIUM SCH MG: 40 TABLET, FILM COATED ORAL at 20:37

## 2018-03-11 RX ADMIN — Medication SCH ML: at 20:38

## 2018-03-11 RX ADMIN — INSULIN ASPART SCH: 100 INJECTION, SOLUTION INTRAVENOUS; SUBCUTANEOUS at 11:48

## 2018-03-11 RX ADMIN — LITHIUM CARBONATE SCH MG: 300 CAPSULE, GELATIN COATED ORAL at 20:37

## 2018-03-11 RX ADMIN — PANTOPRAZOLE SCH MG: 40 TABLET, DELAYED RELEASE ORAL at 09:00

## 2018-03-11 RX ADMIN — LISINOPRIL SCH MG: 10 TABLET ORAL at 09:00

## 2018-03-11 RX ADMIN — CARVEDILOL SCH MG: 3.12 TABLET, FILM COATED ORAL at 09:00

## 2018-03-11 RX ADMIN — INSULIN ASPART SCH: 100 INJECTION, SOLUTION INTRAVENOUS; SUBCUTANEOUS at 17:00

## 2018-03-11 NOTE — HHI.PR
Subjective


Remarks


Follow-up CVA and cardiomyopathy.  Patient has no complaints.  Single episode 

of low BP asymptomatic.  Discussed with nursing





Objective


Vitals





Vital Signs








  Date Time  Temp Pulse Resp B/P (MAP) Pulse Ox O2 Delivery O2 Flow Rate FiO2


 


3/11/18 04:17 96.1 78 18 115/69 (84) 92   


 


3/11/18 04:00  86      


 


3/11/18 00:13 96.9 101 18 90/65 (73) 95   


 


3/10/18 20:01 97.8 103 18 124/84 (97) 97   


 


3/10/18 16:30 97.3 89 20 128/66 (86) 91   


 


3/10/18 16:16  90      


 


3/10/18 11:00 98.0 85 18 138/64 (88) 90   


 


3/10/18 08:31 98.1 87 18 154/71 (98) 93   














I/O      


 


 3/10/18 3/10/18 3/10/18 3/11/18 3/11/18 3/11/18





 07:00 15:00 23:00 07:00 15:00 23:00


 


Intake Total 960 ml     


 


Output Total 300 ml   800 ml  


 


Balance 660 ml   -800 ml  


 


      


 


Intake Oral 960 ml     


 


Output Urine Total 300 ml   800 ml  


 


# Voids 2     


 


# Bowel Movements    0  








Result Diagram:  


3/8/18 0605                                                                    

            3/8/18 0625





Imaging





Last Impressions








Head CT 3/7/18 1144 Signed





Impressions: 





 Service Date/Time:  Wednesday, March 7, 2018 12:00 - CONCLUSION:  Right-sided 





 lacunar infarcts. MRI may be warranted to evaluate acuity.     Jose Angel Cano MD 


 


Chest X-Ray 3/7/18 1144 Signed





Impressions: 





 Service Date/Time:  Wednesday, March 7, 2018 11:55 - CONCLUSION:  1. No acute 





 cardiopulmonary findings.     Lalo Monique MD 


 


Brain MRI 3/7/18 0000 Signed





Impressions: 





 Service Date/Time:  Wednesday, March 7, 2018 14:51 - CONCLUSION:  1. Abnormal 





 restricted diffusion signal involving the right anterior cerebral 

distribution. 





 This is seen involving both the anterior and posterior horn of the corpus 





 callosum as well as the pericallosal white matter. Findings would be 

consistent 





 with acute cortical infarction. 2. Scattered areas of increased T2 signal in 

the 





 white matter most consistent with moderate microvascular ischemic 

demyelinative 





 change.     Lalo Monique MD 








Objective Remarks


GENERAL: Alert, oriented 3, NAD.


SKIN: Warm and dry.


CARDIOVASCULAR: Regular rate and rhythm without murmurs, gallops, or rubs. 


RESPIRATORY: Breath sounds equal bilaterally. No accessory muscle use.


GASTROINTESTINAL: Abdomen soft, non-tender, nondistended. 


MUSCULOSKELETAL: No cyanosis, or edema. 


BACK: Nontender without obvious deformity. No CVA tenderness.


Procedures


Echo 3/8/2018


 Moderately dilated left ventricle. 


 Mild concentric left ventricular hypertrophy. 


 The left ventricular systolic function is moderate-to-severly reduced with an 

estimated ejection fraction in 


 the range of 35-40%. 


 There is global left ventricular dysfunction. 


 There is abnormal septal motion consistent with an intraventricular conduction 

delay. 


 


 The left atrial size is mild-to-moderately dilated. 


 The pulmonary valve is not well visualized.





A/P


Problem List:  


(1) Lacunar infarct, acute


ICD Code:  I63.9 - Cerebral infarction, unspecified


(2) DM (diabetes mellitus)


ICD Code:  E11.9 - Type 2 diabetes mellitus without complications


Assessment and Plan


Mr. Luevano is a 72-year-old male with a history of hypertension, diabetes 

mellitus, bipolar disorder who presents to the emergency department due to 

increased confusion and weakness.  CT head indicates lacunar infarction.  

Patient's symptoms started 2 days prior to this admission apparently her family 

members.  However patient reports 2 week duration of balance issues.





-Acute lacunar infarction


   -CT indicates acute lacunar infarcts.  Images reviewed by me.  No hemorrhage.


   -MRI of the brain confirms acute stroke.


   -Patient underwent carotid ultrasound on 1/18/2018 -shows no hemodynamically 

significant carotid artery disease.


   -After discussing with neurology, we stopped aspirin and started patient on 

Plavix 75 mg daily.


   -Continue Lipitor 40 mg nightly


   -May need a loop recorder to detect atrial fibrillation





-Cardiomyopathy


   -Echo from 3/8/2018 shows ejection fraction 35-40%. Uptitrate ACE and BB if 

tolerated with hold parameter


   -Neurology requested a Cardiology consult. I discussed with Dr. Ramirez (

Cardiology). Dr. Ramriez recommended that we continue Plavix for now. 


   -Dr. Ramirez will follow this patient in the office for future need for anti-

coagulation and also for loop recorder. May need AICD


   


- Hypertension


   -BP was low currently improved discontinue nifedipine.  Patient started on 

ACE and beta-blocker





- Diabetes mellitus


   - Takes metformin at home. Hold for now.  A1c 6


   - Sliding scale insulin. 





- Gastroesophageal ulcer -H pylori negative on biopsy.  Continue Protonix.





- Chronic anemia. Op F/u





Full code. SCDs.


Discharge Planning


Discharge when SNF arranged.  Several SNF declined secondary to psychiatry 

history











Abdoul Polk MD Mar 11, 2018 07:22

## 2018-03-12 VITALS
OXYGEN SATURATION: 94 % | SYSTOLIC BLOOD PRESSURE: 120 MMHG | HEART RATE: 81 BPM | TEMPERATURE: 97.9 F | RESPIRATION RATE: 15 BRPM | DIASTOLIC BLOOD PRESSURE: 73 MMHG

## 2018-03-12 VITALS
RESPIRATION RATE: 16 BRPM | OXYGEN SATURATION: 95 % | HEART RATE: 85 BPM | SYSTOLIC BLOOD PRESSURE: 127 MMHG | TEMPERATURE: 97.7 F | DIASTOLIC BLOOD PRESSURE: 69 MMHG

## 2018-03-12 VITALS
RESPIRATION RATE: 17 BRPM | OXYGEN SATURATION: 93 % | HEART RATE: 93 BPM | SYSTOLIC BLOOD PRESSURE: 108 MMHG | DIASTOLIC BLOOD PRESSURE: 61 MMHG | TEMPERATURE: 98.1 F

## 2018-03-12 VITALS
SYSTOLIC BLOOD PRESSURE: 137 MMHG | DIASTOLIC BLOOD PRESSURE: 83 MMHG | TEMPERATURE: 98.5 F | RESPIRATION RATE: 16 BRPM | HEART RATE: 90 BPM | OXYGEN SATURATION: 94 %

## 2018-03-12 VITALS
TEMPERATURE: 98.1 F | SYSTOLIC BLOOD PRESSURE: 125 MMHG | OXYGEN SATURATION: 95 % | DIASTOLIC BLOOD PRESSURE: 67 MMHG | RESPIRATION RATE: 24 BRPM | HEART RATE: 85 BPM

## 2018-03-12 VITALS — HEART RATE: 77 BPM

## 2018-03-12 RX ADMIN — INSULIN ASPART SCH: 100 INJECTION, SOLUTION INTRAVENOUS; SUBCUTANEOUS at 12:36

## 2018-03-12 RX ADMIN — Medication SCH ML: at 09:16

## 2018-03-12 RX ADMIN — CLOPIDOGREL BISULFATE SCH MG: 75 TABLET, FILM COATED ORAL at 09:16

## 2018-03-12 RX ADMIN — CARVEDILOL SCH MG: 3.12 TABLET, FILM COATED ORAL at 09:16

## 2018-03-12 RX ADMIN — PANTOPRAZOLE SCH MG: 40 TABLET, DELAYED RELEASE ORAL at 09:16

## 2018-03-12 RX ADMIN — LISINOPRIL SCH MG: 10 TABLET ORAL at 09:17

## 2018-03-12 RX ADMIN — INSULIN ASPART SCH: 100 INJECTION, SOLUTION INTRAVENOUS; SUBCUTANEOUS at 08:00

## 2018-03-12 NOTE — HHI.DS
__________________________________________________





Discharge Summary


Admission Date


Mar 7, 2018 at 13:21


Discharge Date:  Mar 12, 2018


Admitting Diagnosis





generalized weakness, confusion





(1) Lacunar infarct, acute


ICD Code:  I63.9 - Cerebral infarction, unspecified


(2) DM (diabetes mellitus)


ICD Code:  E11.9 - Type 2 diabetes mellitus without complications


Procedures


Echo 3/8/2018


 Moderately dilated left ventricle. 


 Mild concentric left ventricular hypertrophy. 


 The left ventricular systolic function is moderate-to-severly reduced with an 

estimated ejection fraction in 


 the range of 35-40%. 


 There is global left ventricular dysfunction. 


 There is abnormal septal motion consistent with an intraventricular conduction 

delay. 


 


 The left atrial size is mild-to-moderately dilated. 


 The pulmonary valve is not well visualized.


Brief History - From Admission


Mr. Luevano is a 72-year-old male with a history of bipolar disorder, 

hypertension, diabetes mellitus and recent GI bleed who presents to the 

emergency department today 3/7/2018 due to increased confusion, weakness and 

incontinence.  Patient was recently admitted to the hospital for GI bleed.  He 

underwent EGD study during previous hospitalization.  At the time of this 

interview, patient is resting well in bed.  He reports that he has been 

experiencing balance issues for the last 2 weeks especially when he gets out of 

chair.  He fell or almost fell frequently.  He denies any chest pain, shortness 

of breath, fever or chills.  He denies any speech difficulty or unilateral 

upper or lower extremity weakness.  Denies any changes in bowel or bladder 

habits.  ED workup indicated CT brain evidence of lacunar infarcts on the right 

side.


CBC/BMP:  


3/8/18 0605                                                                    

            3/8/18 0625





Imaging





Last Impressions








Head CT 3/7/18 1144 Signed





Impressions: 





 Service Date/Time:  Wednesday, March 7, 2018 12:00 - CONCLUSION:  Right-sided 





 lacunar infarcts. MRI may be warranted to evaluate acuity.     Jose Angel Cano MD 


 


Chest X-Ray 3/7/18 1144 Signed





Impressions: 





 Service Date/Time:  Wednesday, March 7, 2018 11:55 - CONCLUSION:  1. No acute 





 cardiopulmonary findings.     Lalo Monique MD 


 


Brain MRI 3/7/18 0000 Signed





Impressions: 





 Service Date/Time:  Wednesday, March 7, 2018 14:51 - CONCLUSION:  1. Abnormal 





 restricted diffusion signal involving the right anterior cerebral 

distribution. 





 This is seen involving both the anterior and posterior horn of the corpus 





 callosum as well as the pericallosal white matter. Findings would be 

consistent 





 with acute cortical infarction. 2. Scattered areas of increased T2 signal in 

the 





 white matter most consistent with moderate microvascular ischemic 

demyelinative 





 change.     Lalo Monique MD 








PE at Discharge


GENERAL: Alert, oriented 3, NAD.


SKIN: Warm and dry.


CARDIOVASCULAR: Regular rate and rhythm without murmurs, gallops, or rubs. 


RESPIRATORY: Breath sounds equal bilaterally. No accessory muscle use.


GASTROINTESTINAL: Abdomen soft, non-tender, nondistended. 


MUSCULOSKELETAL: No cyanosis, or edema. 


BACK: Nontender without obvious deformity. No CVA tenderness.


Pt update on day of discharge


In bed.  Does not appear in distress.  No new motor deficit.  No chest pain or 

shortness of breath nausea or vomiting.  No complaints at this time.


Hospital Course


Mr. Luevano is a 72-year-old male with a history of hypertension, diabetes 

mellitus, bipolar disorder who presents to the emergency department due to 

increased confusion and weakness.  CT head indicates lacunar infarction.  

Patient's symptoms started 2 days prior to this admission apparently her family 

members.  However patient reports 2 week duration of balance issues.





-Acute lacunar infarction


   -CT indicates acute lacunar infarcts.  Images reviewed by me.  No hemorrhage.


   -MRI of the brain confirms acute stroke.


   -Patient underwent carotid ultrasound on 1/18/2018 -shows no hemodynamically 

significant carotid artery disease.


   -After discussing with neurology, we stopped aspirin and started patient on 

Plavix 75 mg daily.


   -Continue Lipitor 40 mg nightly


   -May need a loop recorder to detect atrial fibrillation





-Cardiomyopathy


   -Echo from 3/8/2018 shows ejection fraction 35-40%. Uptitrate ACE and BB if 

tolerated with hold parameter


   -Neurology requested a Cardiology consult. I discussed with Dr. Ramirez (

Cardiology). Dr. Ramirez recommended that we continue Plavix for now. 


   -Dr. Ramirez will follow this patient in the office for future need for anti-

coagulation and also for loop recorder. May need AICD


   


- Hypertension


   -BP was low currently improved discontinue nifedipine.  Patient started on 

ACE and beta-blocker





- Diabetes mellitus


   - Takes metformin at home. Hold for now.  A1c 6


   - Sliding scale insulin. 





- Gastroesophageal ulcer -H pylori negative on biopsy.  Continue Protonix.





- Chronic anemia. Op F/u





Full code. SCDs.


Discharge Planning


Discharge when SNF arranged.  Several SNF declined secondary to psychiatry 

history


Pt Condition on Discharge:  Good


Discharge Disposition:  Discharge to SNF


Discharge Time:  > 30 minutes


Discharge Instructions


DIET: Follow Instructions for:  Diabetic Diet


Activities you can perform:  Regular-No Restrictions


Follow up Referrals:  


Cardiology - 1 Week with Estephanie Ramirez MD


Neurology - 3 Weeks with Syed Jarrell MD PhD


PCP Follow-up - 2 Weeks





New Medications:  


Clopidogrel (Plavix) 75 Mg Tab


75 MG PO DAILY for Blood Clot Prevention, #30 TAB 11 Refills





Atorvastatin (Atorvastatin) 40 Mg Tab


40 MG PO HS for Cholesterol Management, #90 TAB 3 Refills





 


Continued Medications:  


Albuterol 8.5 GM Inh (Proair Hfa 8.5 GM Inh) 90 Mcg/Act Aer


2 PUFF INH Q4-6H PRN for SHORTNESS OF BREATH, #1 INHALER 0 Refills


108 mcg/actuation


Carvedilol (Carvedilol) 3.125 Mg Tab


3.125 MG PO DAILY, #60 TAB 0 Refills





Fluoxetine (Fluoxetine) 40 Mg Cap


40 CAP PO HS, #30 CAP 0 Refills





Lithium Carbonate (Lithium Carbonate) 600 Mg Cap


600 MG PO HS, CAP 0 Refills





Metformin (Metformin) 500 Mg Tab


500 MG PO BIDPC for Blood Sugar Management, #60 TAB 0 Refills





Pantoprazole (Protonix) 40 Mg Tab


40 MG PO DAILY for Ulcer Prevention, #90 TAB 3 Refills





Tamsulosin (Flomax) 0.4 Mg Cap


0.4 MG PO HS for Manage Prostate Problems, #30 CAP 0 Refills

















Celsa Pulliam MD Mar 12, 2018 11:17

## 2018-03-16 ENCOUNTER — HOSPITAL ENCOUNTER (INPATIENT)
Dept: HOSPITAL 17 - NEPC | Age: 73
LOS: 9 days | Discharge: NURSING, REHAB OR LONG-TERM CARE | DRG: 871 | End: 2018-03-25
Attending: FAMILY MEDICINE | Admitting: FAMILY MEDICINE
Payer: COMMERCIAL

## 2018-03-16 VITALS
TEMPERATURE: 97.7 F | DIASTOLIC BLOOD PRESSURE: 79 MMHG | HEART RATE: 106 BPM | OXYGEN SATURATION: 91 % | RESPIRATION RATE: 18 BRPM | SYSTOLIC BLOOD PRESSURE: 121 MMHG

## 2018-03-16 VITALS
OXYGEN SATURATION: 99 % | RESPIRATION RATE: 18 BRPM | DIASTOLIC BLOOD PRESSURE: 80 MMHG | SYSTOLIC BLOOD PRESSURE: 144 MMHG | HEART RATE: 100 BPM

## 2018-03-16 VITALS
OXYGEN SATURATION: 95 % | DIASTOLIC BLOOD PRESSURE: 79 MMHG | TEMPERATURE: 99.6 F | SYSTOLIC BLOOD PRESSURE: 157 MMHG | HEART RATE: 79 BPM | RESPIRATION RATE: 16 BRPM

## 2018-03-16 VITALS
OXYGEN SATURATION: 99 % | SYSTOLIC BLOOD PRESSURE: 174 MMHG | DIASTOLIC BLOOD PRESSURE: 91 MMHG | HEART RATE: 110 BPM | RESPIRATION RATE: 16 BRPM

## 2018-03-16 VITALS — OXYGEN SATURATION: 98 %

## 2018-03-16 VITALS
HEART RATE: 103 BPM | DIASTOLIC BLOOD PRESSURE: 91 MMHG | OXYGEN SATURATION: 98 % | RESPIRATION RATE: 16 BRPM | SYSTOLIC BLOOD PRESSURE: 181 MMHG

## 2018-03-16 VITALS — BODY MASS INDEX: 27.17 KG/M2 | WEIGHT: 200.62 LBS | HEIGHT: 72 IN

## 2018-03-16 VITALS — OXYGEN SATURATION: 99 %

## 2018-03-16 DIAGNOSIS — E11.9: ICD-10-CM

## 2018-03-16 DIAGNOSIS — K76.0: ICD-10-CM

## 2018-03-16 DIAGNOSIS — K80.20: ICD-10-CM

## 2018-03-16 DIAGNOSIS — R65.20: ICD-10-CM

## 2018-03-16 DIAGNOSIS — N17.9: ICD-10-CM

## 2018-03-16 DIAGNOSIS — K92.0: ICD-10-CM

## 2018-03-16 DIAGNOSIS — G93.41: ICD-10-CM

## 2018-03-16 DIAGNOSIS — Z79.02: ICD-10-CM

## 2018-03-16 DIAGNOSIS — I69.354: ICD-10-CM

## 2018-03-16 DIAGNOSIS — M19.90: ICD-10-CM

## 2018-03-16 DIAGNOSIS — I10: ICD-10-CM

## 2018-03-16 DIAGNOSIS — K25.4: ICD-10-CM

## 2018-03-16 DIAGNOSIS — K21.9: ICD-10-CM

## 2018-03-16 DIAGNOSIS — I69.320: ICD-10-CM

## 2018-03-16 DIAGNOSIS — N30.00: ICD-10-CM

## 2018-03-16 DIAGNOSIS — K44.9: ICD-10-CM

## 2018-03-16 DIAGNOSIS — B96.20: ICD-10-CM

## 2018-03-16 DIAGNOSIS — R00.0: ICD-10-CM

## 2018-03-16 DIAGNOSIS — J45.909: ICD-10-CM

## 2018-03-16 DIAGNOSIS — N40.0: ICD-10-CM

## 2018-03-16 DIAGNOSIS — K92.1: ICD-10-CM

## 2018-03-16 DIAGNOSIS — E86.0: ICD-10-CM

## 2018-03-16 DIAGNOSIS — D62: ICD-10-CM

## 2018-03-16 DIAGNOSIS — Z87.11: ICD-10-CM

## 2018-03-16 DIAGNOSIS — Z79.84: ICD-10-CM

## 2018-03-16 DIAGNOSIS — A41.51: Primary | ICD-10-CM

## 2018-03-16 DIAGNOSIS — F31.9: ICD-10-CM

## 2018-03-16 LAB
ALBUMIN SERPL-MCNC: 3.6 GM/DL (ref 3.4–5)
ALP SERPL-CCNC: 77 U/L (ref 45–117)
ALT SERPL-CCNC: 34 U/L (ref 12–78)
AST SERPL-CCNC: 31 U/L (ref 15–37)
BACTERIA #/AREA URNS HPF: (no result) /HPF
BASOPHILS # BLD AUTO: 0.1 TH/MM3 (ref 0–0.2)
BASOPHILS NFR BLD: 0.3 % (ref 0–2)
BILIRUB SERPL-MCNC: 2 MG/DL (ref 0.2–1)
BUN SERPL-MCNC: 29 MG/DL (ref 7–18)
CALCIUM SERPL-MCNC: 11.1 MG/DL (ref 8.5–10.1)
CHLORIDE SERPL-SCNC: 106 MEQ/L (ref 98–107)
COLOR UR: YELLOW
CREAT SERPL-MCNC: 1.81 MG/DL (ref 0.6–1.3)
EOSINOPHIL # BLD: 0 TH/MM3 (ref 0–0.4)
EOSINOPHIL NFR BLD: 0 % (ref 0–4)
ERYTHROCYTE [DISTWIDTH] IN BLOOD BY AUTOMATED COUNT: 14.4 % (ref 11.6–17.2)
GFR SERPLBLD BASED ON 1.73 SQ M-ARVRAT: 37 ML/MIN (ref 89–?)
GLUCOSE SERPL-MCNC: 151 MG/DL (ref 74–106)
GLUCOSE UR STRIP-MCNC: (no result) MG/DL
HCO3 BLD-SCNC: 26.5 MEQ/L (ref 21–32)
HCT VFR BLD CALC: 37.9 % (ref 39–51)
HGB BLD-MCNC: 12.5 GM/DL (ref 13–17)
HGB UR QL STRIP: (no result)
HYALINE CASTS #/AREA URNS LPF: 3 /LPF
INR PPP: 1.1 RATIO
KETONES UR STRIP-MCNC: (no result) MG/DL
LYMPHOCYTES # BLD AUTO: 2.3 TH/MM3 (ref 1–4.8)
LYMPHOCYTES NFR BLD AUTO: 9.2 % (ref 9–44)
MCH RBC QN AUTO: 30.9 PG (ref 27–34)
MCHC RBC AUTO-ENTMCNC: 32.9 % (ref 32–36)
MCV RBC AUTO: 93.9 FL (ref 80–100)
MONOCYTE #: 2 TH/MM3 (ref 0–0.9)
MONOCYTES NFR BLD: 7.9 % (ref 0–8)
MUCOUS THREADS #/AREA URNS LPF: (no result) /LPF
NEUTROPHILS # BLD AUTO: 20.5 TH/MM3 (ref 1.8–7.7)
NEUTROPHILS NFR BLD AUTO: 82.6 % (ref 16–70)
NITRITE UR QL STRIP: (no result)
PLATELET # BLD: 411 TH/MM3 (ref 150–450)
PMV BLD AUTO: 7.4 FL (ref 7–11)
PROT SERPL-MCNC: 8.2 GM/DL (ref 6.4–8.2)
PROTHROMBIN TIME: 10.8 SEC (ref 9.8–11.6)
RBC # BLD AUTO: 4.03 MIL/MM3 (ref 4.5–5.9)
SODIUM SERPL-SCNC: 138 MEQ/L (ref 136–145)
SP GR UR STRIP: 1.02 (ref 1–1.03)
SQUAMOUS #/AREA URNS HPF: 1 /HPF (ref 0–5)
TRANS CELLS #/AREA URNS HPF: 1 /HPF
TROPONIN I SERPL-MCNC: (no result) NG/ML (ref 0.02–0.05)
URINE LEUKOCYTE ESTERASE: (no result)
WBC # BLD AUTO: 24.9 TH/MM3 (ref 4–11)
WHITE BLOOD CELL CLUMPS: (no result)

## 2018-03-16 PROCEDURE — 83605 ASSAY OF LACTIC ACID: CPT

## 2018-03-16 PROCEDURE — 86850 RBC ANTIBODY SCREEN: CPT

## 2018-03-16 PROCEDURE — 96375 TX/PRO/DX INJ NEW DRUG ADDON: CPT

## 2018-03-16 PROCEDURE — 87186 SC STD MICRODIL/AGAR DIL: CPT

## 2018-03-16 PROCEDURE — 70450 CT HEAD/BRAIN W/O DYE: CPT

## 2018-03-16 PROCEDURE — 83735 ASSAY OF MAGNESIUM: CPT

## 2018-03-16 PROCEDURE — 86900 BLOOD TYPING SEROLOGIC ABO: CPT

## 2018-03-16 PROCEDURE — 87040 BLOOD CULTURE FOR BACTERIA: CPT

## 2018-03-16 PROCEDURE — 84484 ASSAY OF TROPONIN QUANT: CPT

## 2018-03-16 PROCEDURE — 36430 TRANSFUSION BLD/BLD COMPNT: CPT

## 2018-03-16 PROCEDURE — 80048 BASIC METABOLIC PNL TOTAL CA: CPT

## 2018-03-16 PROCEDURE — 82550 ASSAY OF CK (CPK): CPT

## 2018-03-16 PROCEDURE — 93005 ELECTROCARDIOGRAM TRACING: CPT

## 2018-03-16 PROCEDURE — 86901 BLOOD TYPING SEROLOGIC RH(D): CPT

## 2018-03-16 PROCEDURE — 71045 X-RAY EXAM CHEST 1 VIEW: CPT

## 2018-03-16 PROCEDURE — 85014 HEMATOCRIT: CPT

## 2018-03-16 PROCEDURE — 76705 ECHO EXAM OF ABDOMEN: CPT

## 2018-03-16 PROCEDURE — 80053 COMPREHEN METABOLIC PANEL: CPT

## 2018-03-16 PROCEDURE — 85610 PROTHROMBIN TIME: CPT

## 2018-03-16 PROCEDURE — 85025 COMPLETE CBC W/AUTO DIFF WBC: CPT

## 2018-03-16 PROCEDURE — 82948 REAGENT STRIP/BLOOD GLUCOSE: CPT

## 2018-03-16 PROCEDURE — 85018 HEMOGLOBIN: CPT

## 2018-03-16 PROCEDURE — 85730 THROMBOPLASTIN TIME PARTIAL: CPT

## 2018-03-16 PROCEDURE — C9113 INJ PANTOPRAZOLE SODIUM, VIA: HCPCS

## 2018-03-16 PROCEDURE — 96365 THER/PROPH/DIAG IV INF INIT: CPT

## 2018-03-16 PROCEDURE — 86920 COMPATIBILITY TEST SPIN: CPT

## 2018-03-16 PROCEDURE — 87086 URINE CULTURE/COLONY COUNT: CPT

## 2018-03-16 PROCEDURE — 87077 CULTURE AEROBIC IDENTIFY: CPT

## 2018-03-16 PROCEDURE — P9016 RBC LEUKOCYTES REDUCED: HCPCS

## 2018-03-16 PROCEDURE — 81001 URINALYSIS AUTO W/SCOPE: CPT

## 2018-03-16 PROCEDURE — 87641 MR-STAPH DNA AMP PROBE: CPT

## 2018-03-16 RX ADMIN — HEPARIN SODIUM SCH UNITS: 10000 INJECTION, SOLUTION INTRAVENOUS; SUBCUTANEOUS at 23:11

## 2018-03-16 RX ADMIN — PHENYTOIN SODIUM SCH MLS/HR: 50 INJECTION INTRAMUSCULAR; INTRAVENOUS at 22:00

## 2018-03-16 NOTE — PD
HPI


Chief Complaint:  Altered Mental Status


Time Seen by Provider:  19:21


Travel History


International Travel<30 days:  No


Contact w/Intl Traveler<30days:  No


Traveled to known affect area:  No





History of Present Illness


HPI


Patient is a 72-year-old male sent from altered mental status.  Per paperwork, 

patient usually has a GCS of 14, however now he is not able to answer any 

questions.  He was here in the past for CVA.  He is unable to provide any 

history.





PFSH


Past Medical History


Hx Anticoagulant Therapy:  Yes (ASA 2X81)


Arthritis:  Yes


Asthma:  Yes (stress induced)


Bipolar Disorder:  Yes


Anxiety:  No


Depression:  Yes


Heart Rhythm Problems:  No


Cancer:  No


Cardiovascular Problems:  Yes


High Cholesterol:  No


Chest Pain:  No


Congestive Heart Failure:  No


COPD:  No


Cerebrovascular Accident:  Yes


Diabetes:  Yes


Patient Takes Glucophage:  No


Diminished Hearing:  No


Endocrine:  Yes (DM II)


Gastrointestinal Disorders:  Yes (GERD)


GERD:  Yes


Genitourinary:  Yes (BPH)


Hiatal Hernia:  No


Hypertension:  Yes


Immune Disorder:  No


Implanted Vascular Access Dvce:  No


Kidney Stones:  No


Musculoskeletal:  Yes (BALANCE IS OFF)


Neurologic:  Yes (CURRENT ADMISSION WITH CONFUSION POSSIBLE CVA)


Psychiatric:  Yes (BIPOLAR)


Reproductive:  No


Respiratory:  Yes


Renal Failure:  No


Sleep Apnea:  No


Thyroid Disease:  No


Ulcer:  Yes


Tetanus Vaccination:  < 5 Years





Past Surgical History


Abdominal Surgery:  No


Cardiac Surgery:  No


Ear Surgery:  No


Endocrine Surgery:  No


Eye Surgery:  No


Genitourinary Surgery:  No


Gynecologic Surgery:  No


Neurologic Surgery:  No


Oral Surgery:  No


Pacemaker:  No


Thoracic Surgery:  No





Social History


Alcohol Use:  No


Tobacco Use:  No


Substance Use:  No





Allergies-Medications


(Allergen,Severity, Reaction):  


Coded Allergies:  


     codeine (Unverified  Allergy, Severe, MUSCULOSKELETAL ISSUES, 3/7/18)


     insulin,pork (Verified  Allergy, Severe, Anaphylaxis, 3/8/18)


Reported Meds & Prescriptions





Reported Meds & Active Scripts


Active


Atorvastatin (Atorvastatin Calcium) 40 Mg Tab 40 Mg PO HS


Plavix (Clopidogrel Bisulfate) 75 Mg Tab 75 Mg PO DAILY


Protonix (Pantoprazole Sodium) 40 Mg Tab 40 Mg PO DAILY


Reported


Proair Hfa 8.5 GM Inh (Albuterol Sulfate) 90 Mcg/Act Aer 2 Puff INH Q4-6H PRN


     108 mcg/actuation


Carvedilol 3.125 Mg Tab 3.125 Mg PO DAILY


Metformin (Metformin HCl) 500 Mg Tab 500 Mg PO BIDPC


Fluoxetine (Fluoxetine HCl) 40 Mg Cap 40 Cap PO HS


Flomax (Tamsulosin HCl) 0.4 Mg Cap 0.4 Mg PO HS


Lithium Carbonate 600 Mg Cap 600 Mg PO HS








Review of Systems


ROS Limitations:  Altered Mental Status





Physical Exam


Narrative


GENERAL: Awake, but confused.  He does respond to verbal stimuli, but does not 

converse.


SKIN: Focused skin assessment warm/dry.  No wounds or signs of infection.


HEAD: Atraumatic. Normocephalic. 


EYES: Pupils equal and round and reactive. No scleral icterus.  Extraocular 

movements intact.


ENT: Mucous membranes pink and moist.


NECK: Trachea midline. No JVD. 


CARDIOVASCULAR: Regular rate and rhythm.  No murmur appreciated.


RESPIRATORY: No accessory muscle use. Clear to auscultation. Breath sounds 

equal bilaterally. 


GASTROINTESTINAL: Abdomen soft, non-tender, nondistended. 


MUSCULOSKELETAL: No obvious deformities. No clubbing.  No cyanosis.  No edema. 


NEUROLOGICAL: Awake and alert, but confused. No obvious cranial nerve deficits.

  Does not seem to want to move his left arm or leg.  It is unclear if this is 

new or old.





Data


Data


Last Documented VS





Vital Signs








  Date Time  Temp Pulse Resp B/P (MAP) Pulse Ox O2 Delivery O2 Flow Rate FiO2


 


3/16/18 20:22  103 16 181/91 (121) 98 Room Air  


 


3/16/18 18:45       2.00 


 


3/16/18 18:26 99.6       








Orders





 Orders


Sepsis Workup Initiated (3/16/18 )


Complete Blood Count With Diff (3/16/18 19:16)


Comprehensive Metabolic Panel (3/16/18 19:16)


Urinalysis - C+S If Indicated (3/16/18 19:16)


Lactic Acid Sepsis Protocol (3/16/18 19:16)


Blood Culture (3/16/18 19:16)


Iv Access Insert/Monitor (3/16/18 19:16)


Oxygen Administration (3/16/18 19:16)


Oximetry (3/16/18 19:16)


Blood Glucose (3/16/18 19:16)


Electrocardiogram (3/16/18 )


Ct Brain W/O Iv Contrast(Rout) (3/16/18 )


Urinary Catheter Insert/Apply (3/16/18 19:22)


Chest, Single Ap (3/16/18 )


Prothrombin Time / Inr (Pt) (3/16/18 19:42)


Act Partial Throm Time (Ptt) (3/16/18 19:42)


Troponin I (3/16/18 19:35)


Sodium Chlor 0.9% 1000 Ml Inj (Ns 1000 M (3/16/18 20:00)


Piperacil-Tazo 3.375 Gm Premix (Zosyn 3. (3/16/18 20:00)


Vancomycin Inj (Vancomycin Inj) (3/16/18 20:00)


Urine Culture (3/16/18 19:15)


Us Abdomen Gallbladder (3/16/18 )





Labs





Laboratory Tests








Test


  3/16/18


19:15 3/16/18


19:35


 


Urine Color YELLOW  


 


Urine Turbidity HAZY  


 


Urine pH 5.5  


 


Urine Specific Gravity 1.025  


 


Urine Protein 30 mg/dL  


 


Urine Glucose (UA) NEG mg/dL  


 


Urine Ketones NEG mg/dL  


 


Urine Occult Blood MOD  


 


Urine Nitrite NEG  


 


Urine Bilirubin NEG  


 


Urine Urobilinogen


  LESS THAN 2.0


MG/DL 


 


 


Urine Leukocyte Esterase LARGE  


 


Urine RBC 11 /hpf  


 


Urine WBC 45 /hpf  


 


Urine WBC Clumps OCC  


 


Urine Squamous Epithelial


Cells 1 /hpf 


  


 


 


Urine Transitional Epithelial


Cells 1 /hpf 


  


 


 


Urine Bacteria MOD /hpf  


 


Urine Hyaline Casts 3 /lpf  


 


Urine Mucus FEW /lpf  


 


Microscopic Urinalysis Comment


  CATH-CULTURE


IND 


 


 


White Blood Count  24.9 TH/MM3 


 


Red Blood Count  4.03 MIL/MM3 


 


Hemoglobin  12.5 GM/DL 


 


Hematocrit  37.9 % 


 


Mean Corpuscular Volume  93.9 FL 


 


Mean Corpuscular Hemoglobin  30.9 PG 


 


Mean Corpuscular Hemoglobin


Concent 


  32.9 % 


 


 


Red Cell Distribution Width  14.4 % 


 


Platelet Count  411 TH/MM3 


 


Mean Platelet Volume  7.4 FL 


 


Neutrophils (%) (Auto)  82.6 % 


 


Lymphocytes (%) (Auto)  9.2 % 


 


Monocytes (%) (Auto)  7.9 % 


 


Eosinophils (%) (Auto)  0.0 % 


 


Basophils (%) (Auto)  0.3 % 


 


Neutrophils # (Auto)  20.5 TH/MM3 


 


Lymphocytes # (Auto)  2.3 TH/MM3 


 


Monocytes # (Auto)  2.0 TH/MM3 


 


Eosinophils # (Auto)  0.0 TH/MM3 


 


Basophils # (Auto)  0.1 TH/MM3 


 


CBC Comment  DIFF FINAL 


 


Differential Comment   


 


Prothrombin Time  10.8 SEC 


 


Prothromb Time International


Ratio 


  1.1 RATIO 


 


 


Activated Partial


Thromboplast Time 


  26.3 SEC 


 


 


Blood Urea Nitrogen  29 MG/DL 


 


Creatinine  1.81 MG/DL 


 


Random Glucose  151 MG/DL 


 


Total Protein  8.2 GM/DL 


 


Albumin  3.6 GM/DL 


 


Calcium Level  11.1 MG/DL 


 


Alkaline Phosphatase  77 U/L 


 


Aspartate Amino Transf


(AST/SGOT) 


  31 U/L 


 


 


Alanine Aminotransferase


(ALT/SGPT) 


  34 U/L 


 


 


Total Bilirubin  2.0 MG/DL 


 


Sodium Level  138 MEQ/L 


 


Potassium Level  4.7 MEQ/L 


 


Chloride Level  106 MEQ/L 


 


Carbon Dioxide Level  26.5 MEQ/L 


 


Anion Gap  6 MEQ/L 


 


Estimat Glomerular Filtration


Rate 


  37 ML/MIN 


 


 


Lactic Acid Level  1.7 mmol/L 


 


Troponin I


  


  LESS THAN 0.02


NG/ML











MDM


Medical Decision Making


Medical Screen Exam Complete:  Yes


Emergency Medical Condition:  Yes


Medical Record Reviewed:  Yes


Interpretation(s)


ECG shows sinus tachycardia with a left bundle branch block.  Left bundle 

branch block is old.


Differential Diagnosis


Sepsis versus dehydration versus ICH versus electrolyte abnormality versus 

pneumonia versus UTI


Narrative Course


Patient is a 72-year-old male who comes in from the nursing home due to altered 

mental status.  Patient is awake, but is unable to answer questions or provide 

any history.  IV established, labs sent.  Labs concerning for a white blood 

cell count of 25.  BUN and creatinine are elevated, likely dehydration.  

Urinalysis is positive for UTI.





CT head performed shows no acute abnormalities.  Chest x-ray shows no acute 

abnormalities.


Last 24 hours Impressions








Head CT 3/16/18 0000 Signed





Impressions: 





 Service Date/Time:  Friday, March 16, 2018 19:56 - CONCLUSION:  No acute 





 abnormality demonstrated. Atrophy and chronic white matter changes are again 





 seen. Evolving right pericallosal subacute infarct. No evidence of a large 





 ischemic event.     Ministerio Martin MD 





Patient given IV fluids, vancomycin and Zosyn.  He will be admitted for further 

management.





Diagnosis





 Primary Impression:  


 Renal insufficiency


 Additional Impressions:  


 Sepsis


 Qualified Codes:  A41.9 - Sepsis, unspecified organism


 UTI (urinary tract infection)


 Qualified Codes:  N30.00 - Acute cystitis without hematuria





Admitting Information


Admitting Physician Requests:  Admit











Irena Arteaga MD Mar 16, 2018 20:45

## 2018-03-16 NOTE — RADRPT
EXAM DATE/TIME:  03/16/2018 19:56 

 

[facility] COMPARISON:     

MRI BRAIN W/O CONTRAST, March 07, 2018, 14:51.  CT BRAIN W/O CONTRAST, March 07, 2018, 12:00.

 

 

INDICATIONS :     

Altered mental status.

                      

 

RADIATION DOSE:     

39.93 CTDIvol (mGy) 

 

 

 

MEDICAL HISTORY :     

Stroke. Cardiovascular disease Hypertension.

 

SURGICAL HISTORY :      

None. 

 

ENCOUNTER:      

Initial

 

ACUITY:      

1 day

 

PAIN SCALE:      

Non-responsive

 

LOCATION:        

cranial 

 

TECHNIQUE:     

Multiple contiguous axial images were obtained of the head.  Using automated exposure control and adj
ustment of the mA and/or kV according to patient size, radiation dose was kept as low as reasonably a
chievable to obtain optimal diagnostic quality images.   DICOM format image data is available electro
nically for review and comparison.  

 

FINDINGS:     

 

CEREBRUM:     

The ventricles are normal for age.  No evidence of midline shift, mass lesion, hemorrhage or acute in
farction.  No extra-axial fluid collections are seen. Chronic low attenuation seen in the periventric
ular white matter. There are old lacunar infarcts of the right basal ganglia again seen. There is an 
evolving pericallosal subacute infarct on the right.

 

POSTERIOR FOSSA:     

The cerebellum and brainstem are intact.  The 4th ventricle is midline.  The cerebellopontine angle i
s unremarkable.

 

EXTRACRANIAL:     

The visualized portion of the orbits is intact.

 

SKULL:     

The calvaria is intact.  No evidence of skull fracture.

 

CONCLUSION:     

No acute abnormality demonstrated. Atrophy and chronic white matter changes are again seen. Evolving 
right pericallosal subacute infarct. No evidence of a large ischemic event.

 

 

 

 Ministerio Martin MD on March 16, 2018 at 20:24           

Board Certified Radiologist.

 This report was verified electronically.

## 2018-03-16 NOTE — RADRPT
EXAM DATE/TIME:  03/16/2018 20:36 

 

[facility] COMPARISON:     

No previous studies available for comparison.

        

 

 

INDICATIONS :     

Right upper quadrant pain. 

                     

 

MEDICAL HISTORY :           

Benign prostatic hyperplasia, (BPH) Hypertension. Dilopia. Blurred vision. Tinnitis. Diabetes. 

 

SURGICAL HISTORY :     

None.     

 

ENCOUNTER:     

Initial

 

ACUITY:     

1 day

 

PAIN SCORE:     

3/10

 

LOCATION:     

Right upper quadrant 

                     

MEASUREMENTS:     

 

LIVER:     

16.7 cm length 

 

COMMON DUCT:     

7 mm

 

RIGHT KIDNEY:     

11.4 x 4.5 x 4.1  cm

 

FINDINGS:     

 

LIVER:     

Diffuse increased echotexture without focal lesion or ductal dilatation. Normal flow velocity and dir
ection in the main portal vein.  

 

COMMON DUCT:     

No intraluminal mass or stone visualized.  

 

GALLBLADDER:          

Contains no stones, demonstrates no wall thickening or pericholecystic fluid.  

 

PANCREAS:          

The visualized portions are within normal limits.  

 

RIGHT KIDNEY:          

4 mm mid zone stone without hydronephrosis.

 

CONCLUSION:     

Fatty liver and a tiny nonobstructing right renal stone. Otherwise negative.

 

 

 

 Ministerio Martin MD on March 16, 2018 at 20:57           

Board Certified Radiologist.

 This report was verified electronically.

## 2018-03-16 NOTE — HHI.HP
__________________________________________________





HPI


Service


[facility]


Primary Care Physician


Unknown


Admission Diagnosis





UTI, sepsis, dehydration, DANE


Diagnoses:  


Travel History


International Travel<30 Days:  No


Contact w/Intl Traveler <30 Da:  No


Traveled to Known Affected Are:  No


History of Present Illness


72-year-old male with a past medical history significant for bipolar disorder, 

hypertension, diabetes mellitus, history of GI bleed and recent CVA presents to 

the emergency department from his rehabilitation facility for altered mental 

status.  Per ED documentation, the patient usually has a GCS of 14.  At the 

time of her interview the patient is able to answer some questions with yes or 

no however he is unable to provide any detail.  The daughter is bedside and 

states that usually her father is able to speak in complete sentences.  She 

states his speech is slower than prior to his stroke.  The patient denies being 

in any pain.  He states that he needs to urinate.  He denies fever/chills.  Has 

residual left-sided weakness although moves both upper and lower left 

extremities spontaneously.  Denies chest pain/shortness of breath.  No nausea/

vomiting/diarrhea.





Review of Systems


ROS Limitations:  Clinical Condition


Except as stated in HPI:  all other systems reviewed are Neg





Past Family Social History


Past Medical History


Bipolar disorder


Hypertension


Diabetes mellitus


History of GI bleed


History of recent CVA


Past Surgical History


Left hand crush injury related surgery


Reported Medications





Reported Meds & Active Scripts


Active


Atorvastatin (Atorvastatin Calcium) 40 Mg Tab 40 Mg PO HS


Plavix (Clopidogrel Bisulfate) 75 Mg Tab 75 Mg PO DAILY


Protonix (Pantoprazole Sodium) 40 Mg Tab 40 Mg PO DAILY


Reported


Proair Hfa 8.5 GM Inh (Albuterol Sulfate) 90 Mcg/Act Aer 2 Puff INH Q4-6H PRN


     108 mcg/actuation


Carvedilol 3.125 Mg Tab 3.125 Mg PO DAILY


Metformin (Metformin HCl) 500 Mg Tab 500 Mg PO BIDPC


Fluoxetine (Fluoxetine HCl) 40 Mg Cap 40 Cap PO HS


Flomax (Tamsulosin HCl) 0.4 Mg Cap 0.4 Mg PO HS


Lithium Carbonate 600 Mg Cap 600 Mg PO HS


Allergies:  


Coded Allergies:  


     codeine (Unverified  Allergy, Severe, MUSCULOSKELETAL ISSUES, 3/7/18)


     insulin,pork (Verified  Allergy, Severe, Anaphylaxis, 3/8/18)


Family History


No family history of CAD/DM.  Mother with esophageal cancer


Social History


Denies alcohol, tobacco and illicit drugs





Physical Exam


Vital Signs





Vital Signs








  Date Time  Temp Pulse Resp B/P (MAP) Pulse Ox O2 Delivery O2 Flow Rate FiO2


 


3/16/18 20:40  110 16 174/91 (118) 99 Room Air  


 


3/16/18 20:22  103 16 181/91 (121) 98 Room Air  


 


3/16/18 19:53     98 Room Air  


 


3/16/18 18:45  100 18  99 Nasal Cannula 2.00 


 


3/16/18 18:45  100 18 144/80 (101) 99 Nasal Cannula 2.00 


 


3/16/18 18:26 99.6 79 16 157/79 (105) 95   








Physical Exam


GENERAL:   male lying in bed


SKIN: No rashes, ecchymoses or lesions. Cool and dry.


HEAD: Atraumatic. Normocephalic. No temporal or scalp tenderness.


EYES: Pupils equal round and reactive. Extraocular motions intact. No scleral 

icterus. No injection or drainage. 


ENT: Nose without bleeding, purulent drainage or septal hematoma. Throat 

without erythema, tonsillar hypertrophy or exudate. Uvula midline. Airway 

patent.


NECK: Trachea midline. No JVD or lymphadenopathy. Supple, nontender, no 

meningeal signs.


CARDIOVASCULAR: Regular rate and rhythm without murmurs, gallops, or rubs. 


RESPIRATORY: Clear to auscultation. Breath sounds equal bilaterally. No wheezes

, rales, or rhonchi.  


GASTROINTESTINAL: Abdomen soft, non-tender, nondistended. No hepato-splenomegaly

, or palpable masses. No guarding.


MUSCULOSKELETAL: Extremities without clubbing, cyanosis, or edema. No joint 

tenderness, effusion, or edema noted. No calf tenderness. Negative Homans sign 

bilaterally.


NEUROLOGICAL: Awake and alert. Cranial nerves II through XII intact.  Moves all 

4 extremities spontaneously.  Dysarthric.  3/5 hand  and left upper 

extremity strength.  3/5 lower extremity strength.


Laboratory





Laboratory Tests








Test


  3/16/18


19:15 3/16/18


19:35


 


Urine Color YELLOW  


 


Urine Turbidity HAZY  


 


Urine pH 5.5  


 


Urine Specific Gravity 1.025  


 


Urine Protein 30  


 


Urine Glucose (UA) NEG  


 


Urine Ketones NEG  


 


Urine Occult Blood MOD  


 


Urine Nitrite NEG  


 


Urine Bilirubin NEG  


 


Urine Urobilinogen LESS THAN 2.0  


 


Urine Leukocyte Esterase LARGE  


 


Urine RBC 11  


 


Urine WBC 45  


 


Urine WBC Clumps OCC  


 


Urine Squamous Epithelial


Cells 1 


  


 


 


Urine Transitional Epithelial


Cells 1 


  


 


 


Urine Bacteria MOD  


 


Urine Hyaline Casts 3  


 


Urine Mucus FEW  


 


Microscopic Urinalysis Comment


  CATH-CULTURE


IND 


 


 


White Blood Count  24.9 


 


Red Blood Count  4.03 


 


Hemoglobin  12.5 


 


Hematocrit  37.9 


 


Mean Corpuscular Volume  93.9 


 


Mean Corpuscular Hemoglobin  30.9 


 


Mean Corpuscular Hemoglobin


Concent 


  32.9 


 


 


Red Cell Distribution Width  14.4 


 


Platelet Count  411 


 


Mean Platelet Volume  7.4 


 


Neutrophils (%) (Auto)  82.6 


 


Lymphocytes (%) (Auto)  9.2 


 


Monocytes (%) (Auto)  7.9 


 


Eosinophils (%) (Auto)  0.0 


 


Basophils (%) (Auto)  0.3 


 


Neutrophils # (Auto)  20.5 


 


Lymphocytes # (Auto)  2.3 


 


Monocytes # (Auto)  2.0 


 


Eosinophils # (Auto)  0.0 


 


Basophils # (Auto)  0.1 


 


CBC Comment  DIFF FINAL 


 


Differential Comment   


 


Prothrombin Time  10.8 


 


Prothromb Time International


Ratio 


  1.1 


 


 


Activated Partial


Thromboplast Time 


  26.3 


 


 


Blood Urea Nitrogen  29 


 


Creatinine  1.81 


 


Random Glucose  151 


 


Total Protein  8.2 


 


Albumin  3.6 


 


Calcium Level  11.1 


 


Alkaline Phosphatase  77 


 


Aspartate Amino Transf


(AST/SGOT) 


  31 


 


 


Alanine Aminotransferase


(ALT/SGPT) 


  34 


 


 


Total Bilirubin  2.0 


 


Sodium Level  138 


 


Potassium Level  4.7 


 


Chloride Level  106 


 


Carbon Dioxide Level  26.5 


 


Anion Gap  6 


 


Estimat Glomerular Filtration


Rate 


  37 


 


 


Lactic Acid Level  1.7 


 


Troponin I  LESS THAN 0.02 














 Date/Time


Source Procedure


Growth Status


 


 


 3/16/18 19:35


Blood Peripheral Aerobic Blood Culture


Pending Received


 


 3/16/18 19:35


Blood Peripheral Anaerobic Blood Culture


Pending Received


 


 3/16/18 19:15


Urine Catheterized Urine Urine Culture


Pending Received








Result Diagram:  


3/16/18 1935                                                                   

             3/16/18 1935








Caprini VTE Risk Assessment


Caprini VTE Risk Assessment:  Mod/High Risk (score >= 2)


Caprini Risk Assessment Model











 Point Value = 1          Point Value = 2  Point Value = 3  Point Value = 5


 


Age 41-60


Minor surgery


BMI > 25 kg/m2


Swollen legs


Varicose veins


Pregnancy or postpartum


History of unexplained or recurrent


   spontaneous 


Oral contraceptives or hormone


   replacement


Sepsis (< 1 month)


Serious lung disease, including


   pneumonia (< 1 month)


Abnormal pulmonary function


Acute myocardial infarction


Congestive heart failure (< 1 month)


History of inflammatory bowel disease


Medical patient at bed rest Age 61-74


Arthroscopic surgery


Major open surgery (> 45 min)


Laparoscopic surgery (> 45 min)


Malignancy


Confined to bed (> 72 hours)


Immobilizing plaster cast


Central venous access Age >= 75


History of VTE


Family history of VTE


Factor V Leiden


Prothrombin 70618S


Lupus anticoagulant


Anticardiolipin antibodies


Elevated serum homocysteine


Heparin-induced thrombocytopenia


Other congenital or acquired


   thrombophilia Stroke (< 1 month)


Elective arthroplasty


Hip, pelvis, or leg fracture


Acute spinal cord injury (< 1 month)








Prophylaxis Regimen











   Total Risk


Factor Score Risk Level Prophylaxis Regimen


 


0-1      Low Early ambulation


 


2 Moderate Order ONE of the following:


*Sequential Compression Device (SCD)


*Heparin 5000 units SQ BID


 


3-4 Higher Order ONE of the following medications:


*Heparin 5000 units SQ TID


*Enoxaparin/Lovenox 40 mg SQ daily (WT < 150 kg, CrCl > 30 mL/min)


*Enoxaparin/Lovenox 30 mg SQ daily (WT < 150 kg, CrCl > 10-29 mL/min)


*Enoxaparin/Lovenox 30 mg SQ BID (WT < 150 kg, CrCl > 30 mL/min)


AND/OR


*Sequential Compression Device (SCD)


 


5 or more Highest Order ONE of the following medications:


*Heparin 5000 units SQ TID (Preferred with Epidurals)


*Enoxaparin/Lovenox 40 mg SQ daily (WT < 150 kg, CrCl > 30 mL/min)


*Enoxaparin/Lovenox 30 mg SQ daily (WT < 150 kg, CrCl > 10-29 mL/min)


*Enoxaparin/Lovenox 30 mg SQ BID (WT < 150 kg, CrCl > 30 mL/min)


AND


*Sequential Compression Device (SCD)











Assessment and Plan


Assessment and Plan


Assessment/plan:





1.  Urosepsis


Leukocytosis, tachycardia


UA consistent with UTI


Urine culture pending


Blood culture pending


Zosyn





2.  Altered mental status


Likely secondary to above


Head CT negative for acute abnormality with evolving right pericallosal 

subacute infarct


Monitor


If mental status does not improve with treatment, consider repeat MRI and 

neurology consult





2.  Elevated bilirubin


Gallbladder ultrasound significant for fatty liver


No obvious source of elevated bilirubin


Continue to monitor





3.  History of recent CVA


Head CT as above


Continue Plavix/statin





4.  Hypertension/bipolar/GERD


Continue home medications





5.  Diabetes mellitus


Sliding scale insulin


Holding home metformin


Monitor blood glucose





6.  DANE


Creatinine 1.81, baseline 1.2


IV fluid hydration


Monitor renal function





FEN


NPO pending swallow eval - speech therapy consulted


Electrolytes: Monitor during sleep when necessary


NS at 100 cc/hr


Heparin





Physician Certification


2 Midnight Certification Type:  Admission for Inpatient Services


Order for Inpatient Services


The services are ordered in accordance with Medicare regulations or non-

Medicare payer requirements, as applicable.  In the case of services not 

specified as inpatient-only, they are appropriately provided as inpatient 

services in accordance with the 2-midnight benchmark.


Estimated LOS (days):  2


2 days is the estimated time the patient will need to remain in the hospital, 

assuming treatment plan goals are met and no additional complications.


Post-Hospital Plan:  Not yet determined











Yamila Kelly MD Mar 16, 2018 22:34

## 2018-03-16 NOTE — RADRPT
EXAM DATE/TIME:  03/16/2018 20:02 

 

[facility] COMPARISON:     

CHEST SINGLE AP, March 07, 2018, 11:55.

 

                     

INDICATIONS :     

Cough and shortness of breath.

                     

 

MEDICAL HISTORY :            

Diabetes mellitus type II. Hypertension   

 

SURGICAL HISTORY :     

None.   

 

ENCOUNTER:     

Initial                                        

 

ACUITY:     

1 day      

 

PAIN SCORE:     

Non-responsive.

 

LOCATION:     

Bilateral chest 

 

FINDINGS:     

Trace left base atelectasis. No large effusion seen. No pneumothorax.

 

Heart size stable, upper limits of normal.

 

CONCLUSION:     

Mild left base atelectasis.

 

 

 

 Ministerio Martin MD on March 16, 2018 at 20:32           

Board Certified Radiologist.

 This report was verified electronically.

## 2018-03-17 VITALS
DIASTOLIC BLOOD PRESSURE: 80 MMHG | TEMPERATURE: 96.6 F | SYSTOLIC BLOOD PRESSURE: 136 MMHG | RESPIRATION RATE: 18 BRPM | HEART RATE: 102 BPM | OXYGEN SATURATION: 94 %

## 2018-03-17 VITALS
RESPIRATION RATE: 17 BRPM | HEART RATE: 80 BPM | SYSTOLIC BLOOD PRESSURE: 158 MMHG | OXYGEN SATURATION: 98 % | DIASTOLIC BLOOD PRESSURE: 73 MMHG | TEMPERATURE: 97.4 F

## 2018-03-17 VITALS
SYSTOLIC BLOOD PRESSURE: 148 MMHG | HEART RATE: 72 BPM | RESPIRATION RATE: 16 BRPM | DIASTOLIC BLOOD PRESSURE: 77 MMHG | OXYGEN SATURATION: 95 % | TEMPERATURE: 96.6 F

## 2018-03-17 VITALS
DIASTOLIC BLOOD PRESSURE: 77 MMHG | HEART RATE: 94 BPM | TEMPERATURE: 97 F | RESPIRATION RATE: 20 BRPM | OXYGEN SATURATION: 97 % | SYSTOLIC BLOOD PRESSURE: 137 MMHG

## 2018-03-17 VITALS
TEMPERATURE: 97.8 F | SYSTOLIC BLOOD PRESSURE: 152 MMHG | DIASTOLIC BLOOD PRESSURE: 88 MMHG | OXYGEN SATURATION: 95 % | RESPIRATION RATE: 18 BRPM | HEART RATE: 106 BPM

## 2018-03-17 VITALS
OXYGEN SATURATION: 96 % | RESPIRATION RATE: 17 BRPM | TEMPERATURE: 97.5 F | HEART RATE: 90 BPM | SYSTOLIC BLOOD PRESSURE: 176 MMHG | DIASTOLIC BLOOD PRESSURE: 91 MMHG

## 2018-03-17 VITALS — HEART RATE: 67 BPM

## 2018-03-17 VITALS — HEART RATE: 105 BPM

## 2018-03-17 VITALS — HEART RATE: 90 BPM

## 2018-03-17 LAB
ALBUMIN SERPL-MCNC: 2.9 GM/DL (ref 3.4–5)
ALP SERPL-CCNC: 83 U/L (ref 45–117)
ALT SERPL-CCNC: 24 U/L (ref 12–78)
AST SERPL-CCNC: 16 U/L (ref 15–37)
BASOPHILS # BLD AUTO: 0 TH/MM3 (ref 0–0.2)
BASOPHILS NFR BLD: 0.2 % (ref 0–2)
BILIRUB SERPL-MCNC: 1.6 MG/DL (ref 0.2–1)
BUN SERPL-MCNC: 23 MG/DL (ref 7–18)
CALCIUM SERPL-MCNC: 10.3 MG/DL (ref 8.5–10.1)
CHLORIDE SERPL-SCNC: 109 MEQ/L (ref 98–107)
CREAT SERPL-MCNC: 1.58 MG/DL (ref 0.6–1.3)
EOSINOPHIL # BLD: 0 TH/MM3 (ref 0–0.4)
EOSINOPHIL NFR BLD: 0.1 % (ref 0–4)
ERYTHROCYTE [DISTWIDTH] IN BLOOD BY AUTOMATED COUNT: 14 % (ref 11.6–17.2)
GFR SERPLBLD BASED ON 1.73 SQ M-ARVRAT: 43 ML/MIN (ref 89–?)
GLUCOSE SERPL-MCNC: 128 MG/DL (ref 74–106)
HCO3 BLD-SCNC: 26.5 MEQ/L (ref 21–32)
HCT VFR BLD CALC: 33.2 % (ref 39–51)
HGB BLD-MCNC: 11 GM/DL (ref 13–17)
LYMPHOCYTES # BLD AUTO: 2 TH/MM3 (ref 1–4.8)
LYMPHOCYTES NFR BLD AUTO: 8.9 % (ref 9–44)
MCH RBC QN AUTO: 31.4 PG (ref 27–34)
MCHC RBC AUTO-ENTMCNC: 33.1 % (ref 32–36)
MCV RBC AUTO: 94.9 FL (ref 80–100)
MONOCYTE #: 1.5 TH/MM3 (ref 0–0.9)
MONOCYTES NFR BLD: 6.8 % (ref 0–8)
NEUTROPHILS # BLD AUTO: 18.6 TH/MM3 (ref 1.8–7.7)
NEUTROPHILS NFR BLD AUTO: 84 % (ref 16–70)
PLATELET # BLD: 334 TH/MM3 (ref 150–450)
PMV BLD AUTO: 7.7 FL (ref 7–11)
PROT SERPL-MCNC: 7 GM/DL (ref 6.4–8.2)
RBC # BLD AUTO: 3.5 MIL/MM3 (ref 4.5–5.9)
SODIUM SERPL-SCNC: 142 MEQ/L (ref 136–145)
WBC # BLD AUTO: 22.1 TH/MM3 (ref 4–11)

## 2018-03-17 RX ADMIN — CLOPIDOGREL BISULFATE SCH MG: 75 TABLET, FILM COATED ORAL at 10:06

## 2018-03-17 RX ADMIN — Medication SCH ML: at 08:22

## 2018-03-17 RX ADMIN — PHENYTOIN SODIUM SCH MLS/HR: 50 INJECTION INTRAMUSCULAR; INTRAVENOUS at 20:25

## 2018-03-17 RX ADMIN — CARVEDILOL SCH MG: 3.12 TABLET, FILM COATED ORAL at 10:06

## 2018-03-17 RX ADMIN — TAZOBACTAM SODIUM AND PIPERACILLIN SODIUM SCH MLS/HR: 375; 3 INJECTION, SOLUTION INTRAVENOUS at 13:31

## 2018-03-17 RX ADMIN — TAZOBACTAM SODIUM AND PIPERACILLIN SODIUM SCH MLS/HR: 375; 3 INJECTION, SOLUTION INTRAVENOUS at 03:30

## 2018-03-17 RX ADMIN — PANTOPRAZOLE SCH MG: 40 TABLET, DELAYED RELEASE ORAL at 08:23

## 2018-03-17 RX ADMIN — PANTOPRAZOLE SCH MG: 40 TABLET, DELAYED RELEASE ORAL at 10:07

## 2018-03-17 RX ADMIN — ATORVASTATIN CALCIUM SCH MG: 40 TABLET, FILM COATED ORAL at 20:15

## 2018-03-17 RX ADMIN — CARVEDILOL SCH MG: 3.12 TABLET, FILM COATED ORAL at 08:22

## 2018-03-17 RX ADMIN — HEPARIN SODIUM SCH UNITS: 10000 INJECTION, SOLUTION INTRAVENOUS; SUBCUTANEOUS at 05:33

## 2018-03-17 RX ADMIN — PHENYTOIN SODIUM SCH MLS/HR: 50 INJECTION INTRAMUSCULAR; INTRAVENOUS at 17:32

## 2018-03-17 RX ADMIN — PHENYTOIN SODIUM SCH MLS/HR: 50 INJECTION INTRAMUSCULAR; INTRAVENOUS at 08:22

## 2018-03-17 RX ADMIN — TAZOBACTAM SODIUM AND PIPERACILLIN SODIUM SCH MLS/HR: 375; 3 INJECTION, SOLUTION INTRAVENOUS at 08:22

## 2018-03-17 RX ADMIN — LITHIUM CARBONATE SCH MG: 300 CAPSULE, GELATIN COATED ORAL at 20:14

## 2018-03-17 RX ADMIN — HEPARIN SODIUM SCH UNITS: 10000 INJECTION, SOLUTION INTRAVENOUS; SUBCUTANEOUS at 13:30

## 2018-03-17 RX ADMIN — Medication SCH ML: at 20:29

## 2018-03-17 RX ADMIN — HEPARIN SODIUM SCH UNITS: 10000 INJECTION, SOLUTION INTRAVENOUS; SUBCUTANEOUS at 20:25

## 2018-03-17 RX ADMIN — CLOPIDOGREL BISULFATE SCH MG: 75 TABLET, FILM COATED ORAL at 08:23

## 2018-03-17 RX ADMIN — TAZOBACTAM SODIUM AND PIPERACILLIN SODIUM SCH MLS/HR: 375; 3 INJECTION, SOLUTION INTRAVENOUS at 20:14

## 2018-03-17 RX ADMIN — TAMSULOSIN HYDROCHLORIDE SCH MG: 0.4 CAPSULE ORAL at 20:15

## 2018-03-17 NOTE — HHI.PR
Subjective


Remarks


Follow-up sepsis and UTI.  Improving mental status uncertain questions and 

following commands.  Denies abdominal pain seen with nursing





Objective


Vitals





Vital Signs








  Date Time  Temp Pulse Resp B/P (MAP) Pulse Ox O2 Delivery O2 Flow Rate FiO2


 


3/17/18 12:00 96.6 72 16 148/77 (100) 95   


 


3/17/18 08:00 97.5 90 17 176/91 (119) 96   


 


3/17/18 05:00 96.6 102 18 136/80 (98) 94   


 


3/17/18 04:07  90      


 


3/17/18 00:14  105      


 


3/17/18 00:00 97.8 106 18 152/88 (109) 95   


 


3/16/18 22:31 97.7 106 18 121/79 (93) 91   


 


3/16/18 22:30     99   


 


3/16/18 20:40  110 16 174/91 (118) 99 Room Air  


 


3/16/18 20:22  103 16 181/91 (121) 98 Room Air  


 


3/16/18 19:53     98 Room Air  


 


3/16/18 18:45  100 18  99 Nasal Cannula 2.00 


 


3/16/18 18:45  100 18 144/80 (101) 99 Nasal Cannula 2.00 


 


3/16/18 18:26 99.6 79 16 157/79 (105) 95   














I/O      


 


 3/16/18 3/16/18 3/16/18 3/17/18 3/17/18 3/17/18





 07:00 15:00 23:00 07:00 15:00 23:00


 


Intake Total   1312.5 ml 290 ml 1050 ml 


 


Balance   1312.5 ml 290 ml 1050 ml 


 


      


 


Intake Oral    240 ml  


 


IV Total   1312.5 ml 50 ml 1050 ml 


 


# Voids    3  








Result Diagram:  


3/17/18 0608                                                                   

             3/17/18 0608





Imaging





Last Impressions








Head CT 3/16/18 0000 Signed





Impressions: 





 Service Date/Time:  Friday, March 16, 2018 19:56 - CONCLUSION:  No acute 





 abnormality demonstrated. Atrophy and chronic white matter changes are again 





 seen. Evolving right pericallosal subacute infarct. No evidence of a large 





 ischemic event.     Ministerio Martin MD 


 


Gall Bladder Ultrasound 3/16/18 0000 Signed





Impressions: 





 Service Date/Time:  Friday, March 16, 2018 20:36 - CONCLUSION:  Fatty liver 

and 





 a tiny nonobstructing right renal stone. Otherwise negative.     Ministerio Martin MD 


 


Chest X-Ray 3/16/18 0000 Signed





Impressions: 





 Service Date/Time:  Friday, March 16, 2018 20:02 - CONCLUSION:  Mild left base 





 atelectasis.     Ministerio Martin MD 








Objective Remarks


GENERAL:   male lying in bed


SKIN: No rashes, ecchymoses or lesions. Cool and dry.


CARDIOVASCULAR: Regular rate and rhythm without murmurs, gallops, or rubs. 


RESPIRATORY: Clear to auscultation. Breath sounds equal bilaterally. No wheezes

, rales, or rhonchi.  


GASTROINTESTINAL: Abdomen soft, non-tender, nondistended. No guarding.


MUSCULOSKELETAL: Extremities without clubbing, cyanosis, or edema. No joint 

tenderness, effusion, or edema noted. No calf tenderness. Negative Homans sign 

bilaterally.


NEUROLOGICAL: Awake and alert.  Left facial droop.  Dysarthric.  Generalized 

weakness weaker on the left side


Procedures


none





A/P


Problem List:  


(1) Sepsis


ICD Code:  A41.9 - Sepsis, unspecified organism


Status:  Acute


(2) UTI (urinary tract infection)


ICD Code:  N39.0 - Urinary tract infection, site not specified


Status:  Acute


Assessment and Plan


1.  Sepsis secondary to UTI


Leukocytosis, tachycardia


UA consistent with UTI


Urine culture pending


Blood culture pending


Zosyn





2.  Encephalopathy


Likely secondary to above


Head CT negative for acute abnormality with evolving right pericallosal 

subacute infarct


Monitor


If mental status does not improve with treatment, consider repeat MRI and 

neurology consult





2.  Elevated bilirubin


Gallbladder ultrasound significant for fatty liver


No obvious source of elevated bilirubin


Continue to monitor





3.  History of recent CVA


Head CT as above


Continue Plavix/statin





4.  Hypertension/bipolar/GERD


Continue home medications





5.  Diabetes mellitus


Sliding scale insulin


Holding home metformin


Monitor blood glucose





6.  DANE.  CK within normal limits.  Improving.


Creatinine 1.81, baseline 1.2


IV fluid hydration


Monitor renal function avoid nephrotoxins





FEN


Heart healthy


Electrolytes: Monitor during sleep when necessary


NS at 100 cc/hr


Heparin





Problem Qualifiers





(1) Sepsis:  


Qualified Codes:  A41.9 - Sepsis, unspecified organism


(2) UTI (urinary tract infection):  


Qualified Codes:  N30.00 - Acute cystitis without hematuria








Abdoul Polk MD Mar 17, 2018 15:01

## 2018-03-17 NOTE — EKG
Date Performed: 2018       Time Performed: 19:13:22

 

PTAGE:      72 years

 

EKG:      SINUS TACHYCARDIA MARKED LEFT AXIS DEVIATION LEFT BUNDLE BRANCH BLOCK ABNORMAL ECG Since 

 

 PREVIOUS TRACING            , no significant change noted PREVIOUS TRACIN2018 10.07

 

DOCTOR:   Trace Curran  Interpretating Date/Time  2018 16:42:58

## 2018-03-18 VITALS
DIASTOLIC BLOOD PRESSURE: 76 MMHG | SYSTOLIC BLOOD PRESSURE: 136 MMHG | HEART RATE: 105 BPM | OXYGEN SATURATION: 100 % | TEMPERATURE: 98.2 F | RESPIRATION RATE: 21 BRPM

## 2018-03-18 VITALS
RESPIRATION RATE: 17 BRPM | SYSTOLIC BLOOD PRESSURE: 152 MMHG | TEMPERATURE: 97 F | DIASTOLIC BLOOD PRESSURE: 71 MMHG | HEART RATE: 70 BPM | OXYGEN SATURATION: 97 %

## 2018-03-18 VITALS — SYSTOLIC BLOOD PRESSURE: 176 MMHG | DIASTOLIC BLOOD PRESSURE: 83 MMHG

## 2018-03-18 VITALS
SYSTOLIC BLOOD PRESSURE: 181 MMHG | RESPIRATION RATE: 19 BRPM | DIASTOLIC BLOOD PRESSURE: 89 MMHG | OXYGEN SATURATION: 98 % | TEMPERATURE: 97.9 F | HEART RATE: 83 BPM

## 2018-03-18 VITALS
SYSTOLIC BLOOD PRESSURE: 140 MMHG | HEART RATE: 79 BPM | OXYGEN SATURATION: 96 % | TEMPERATURE: 98.7 F | DIASTOLIC BLOOD PRESSURE: 68 MMHG | RESPIRATION RATE: 20 BRPM

## 2018-03-18 VITALS
RESPIRATION RATE: 19 BRPM | TEMPERATURE: 99.1 F | DIASTOLIC BLOOD PRESSURE: 89 MMHG | OXYGEN SATURATION: 96 % | SYSTOLIC BLOOD PRESSURE: 168 MMHG | HEART RATE: 88 BPM

## 2018-03-18 VITALS
SYSTOLIC BLOOD PRESSURE: 186 MMHG | DIASTOLIC BLOOD PRESSURE: 90 MMHG | RESPIRATION RATE: 19 BRPM | HEART RATE: 72 BPM | OXYGEN SATURATION: 97 % | TEMPERATURE: 96.6 F

## 2018-03-18 VITALS
OXYGEN SATURATION: 96 % | HEART RATE: 68 BPM | TEMPERATURE: 96.8 F | DIASTOLIC BLOOD PRESSURE: 64 MMHG | RESPIRATION RATE: 17 BRPM | SYSTOLIC BLOOD PRESSURE: 64 MMHG

## 2018-03-18 VITALS
TEMPERATURE: 96.2 F | SYSTOLIC BLOOD PRESSURE: 109 MMHG | RESPIRATION RATE: 16 BRPM | HEART RATE: 111 BPM | DIASTOLIC BLOOD PRESSURE: 73 MMHG | OXYGEN SATURATION: 100 %

## 2018-03-18 VITALS
OXYGEN SATURATION: 100 % | TEMPERATURE: 95.3 F | RESPIRATION RATE: 15 BRPM | DIASTOLIC BLOOD PRESSURE: 74 MMHG | HEART RATE: 117 BPM | SYSTOLIC BLOOD PRESSURE: 109 MMHG

## 2018-03-18 VITALS — OXYGEN SATURATION: 100 %

## 2018-03-18 VITALS
TEMPERATURE: 98.2 F | DIASTOLIC BLOOD PRESSURE: 58 MMHG | HEART RATE: 116 BPM | OXYGEN SATURATION: 100 % | RESPIRATION RATE: 19 BRPM | SYSTOLIC BLOOD PRESSURE: 98 MMHG

## 2018-03-18 VITALS — HEART RATE: 62 BPM

## 2018-03-18 LAB
BASOPHILS # BLD AUTO: 0.1 TH/MM3 (ref 0–0.2)
BASOPHILS # BLD AUTO: 0.1 TH/MM3 (ref 0–0.2)
BASOPHILS NFR BLD: 0.4 % (ref 0–2)
BASOPHILS NFR BLD: 0.5 % (ref 0–2)
BUN SERPL-MCNC: 18 MG/DL (ref 7–18)
CALCIUM SERPL-MCNC: 10.2 MG/DL (ref 8.5–10.1)
CHLORIDE SERPL-SCNC: 112 MEQ/L (ref 98–107)
CREAT SERPL-MCNC: 1.28 MG/DL (ref 0.6–1.3)
EOSINOPHIL # BLD: 0.1 TH/MM3 (ref 0–0.4)
EOSINOPHIL # BLD: 0.2 TH/MM3 (ref 0–0.4)
EOSINOPHIL NFR BLD: 0.8 % (ref 0–4)
EOSINOPHIL NFR BLD: 1.4 % (ref 0–4)
ERYTHROCYTE [DISTWIDTH] IN BLOOD BY AUTOMATED COUNT: 14.6 % (ref 11.6–17.2)
ERYTHROCYTE [DISTWIDTH] IN BLOOD BY AUTOMATED COUNT: 14.6 % (ref 11.6–17.2)
GFR SERPLBLD BASED ON 1.73 SQ M-ARVRAT: 55 ML/MIN (ref 89–?)
GLUCOSE SERPL-MCNC: 107 MG/DL (ref 74–106)
HCO3 BLD-SCNC: 24.2 MEQ/L (ref 21–32)
HCT VFR BLD CALC: 25 % (ref 39–51)
HCT VFR BLD CALC: 30.1 % (ref 39–51)
HCT VFR BLD CALC: 30.3 % (ref 39–51)
HCT VFR BLD CALC: 30.9 % (ref 39–51)
HGB BLD-MCNC: 10 GM/DL (ref 13–17)
HGB BLD-MCNC: 10.3 GM/DL (ref 13–17)
HGB BLD-MCNC: 8.1 GM/DL (ref 13–17)
HGB BLD-MCNC: 9.9 GM/DL (ref 13–17)
LYMPHOCYTES # BLD AUTO: 1.8 TH/MM3 (ref 1–4.8)
LYMPHOCYTES # BLD AUTO: 2.4 TH/MM3 (ref 1–4.8)
LYMPHOCYTES NFR BLD AUTO: 13 % (ref 9–44)
LYMPHOCYTES NFR BLD AUTO: 17.6 % (ref 9–44)
MAGNESIUM SERPL-MCNC: 2.1 MG/DL (ref 1.5–2.5)
MCH RBC QN AUTO: 31.3 PG (ref 27–34)
MCH RBC QN AUTO: 31.6 PG (ref 27–34)
MCHC RBC AUTO-ENTMCNC: 32.9 % (ref 32–36)
MCHC RBC AUTO-ENTMCNC: 33.3 % (ref 32–36)
MCV RBC AUTO: 95 FL (ref 80–100)
MCV RBC AUTO: 95.1 FL (ref 80–100)
MONOCYTE #: 1 TH/MM3 (ref 0–0.9)
MONOCYTE #: 1 TH/MM3 (ref 0–0.9)
MONOCYTES NFR BLD: 6.8 % (ref 0–8)
MONOCYTES NFR BLD: 7.1 % (ref 0–8)
NEUTROPHILS # BLD AUTO: 10 TH/MM3 (ref 1.8–7.7)
NEUTROPHILS # BLD AUTO: 11.1 TH/MM3 (ref 1.8–7.7)
NEUTROPHILS NFR BLD AUTO: 73.4 % (ref 16–70)
NEUTROPHILS NFR BLD AUTO: 79 % (ref 16–70)
PLATELET # BLD: 331 TH/MM3 (ref 150–450)
PLATELET # BLD: 415 TH/MM3 (ref 150–450)
PMV BLD AUTO: 7.8 FL (ref 7–11)
PMV BLD AUTO: 8 FL (ref 7–11)
RBC # BLD AUTO: 3.17 MIL/MM3 (ref 4.5–5.9)
RBC # BLD AUTO: 3.25 MIL/MM3 (ref 4.5–5.9)
SODIUM SERPL-SCNC: 144 MEQ/L (ref 136–145)
WBC # BLD AUTO: 13.7 TH/MM3 (ref 4–11)
WBC # BLD AUTO: 14 TH/MM3 (ref 4–11)

## 2018-03-18 PROCEDURE — 30233N1 TRANSFUSION OF NONAUTOLOGOUS RED BLOOD CELLS INTO PERIPHERAL VEIN, PERCUTANEOUS APPROACH: ICD-10-PCS | Performed by: HOSPITALIST

## 2018-03-18 RX ADMIN — TAZOBACTAM SODIUM AND PIPERACILLIN SODIUM SCH MLS/HR: 375; 3 INJECTION, SOLUTION INTRAVENOUS at 03:04

## 2018-03-18 RX ADMIN — PANTOPRAZOLE SCH MG: 40 TABLET, DELAYED RELEASE ORAL at 07:46

## 2018-03-18 RX ADMIN — CEFUROXIME AXETIL SCH MG: 250 TABLET ORAL at 21:00

## 2018-03-18 RX ADMIN — PANTOPRAZOLE SODIUM SCH MLS/HR: 40 INJECTION, POWDER, FOR SOLUTION INTRAVENOUS at 18:40

## 2018-03-18 RX ADMIN — HEPARIN SODIUM SCH UNITS: 10000 INJECTION, SOLUTION INTRAVENOUS; SUBCUTANEOUS at 14:15

## 2018-03-18 RX ADMIN — Medication SCH ML: at 22:27

## 2018-03-18 RX ADMIN — Medication SCH ML: at 07:46

## 2018-03-18 RX ADMIN — TAMSULOSIN HYDROCHLORIDE SCH MG: 0.4 CAPSULE ORAL at 21:00

## 2018-03-18 RX ADMIN — TAZOBACTAM SODIUM AND PIPERACILLIN SODIUM SCH MLS/HR: 375; 3 INJECTION, SOLUTION INTRAVENOUS at 14:15

## 2018-03-18 RX ADMIN — PHENYTOIN SODIUM SCH MLS/HR: 50 INJECTION INTRAMUSCULAR; INTRAVENOUS at 06:39

## 2018-03-18 RX ADMIN — PHENYTOIN SODIUM SCH MLS/HR: 50 INJECTION INTRAMUSCULAR; INTRAVENOUS at 17:45

## 2018-03-18 RX ADMIN — CLOPIDOGREL BISULFATE SCH MG: 75 TABLET, FILM COATED ORAL at 07:46

## 2018-03-18 RX ADMIN — LITHIUM CARBONATE SCH MG: 300 CAPSULE, GELATIN COATED ORAL at 21:00

## 2018-03-18 RX ADMIN — TAZOBACTAM SODIUM AND PIPERACILLIN SODIUM SCH MLS/HR: 375; 3 INJECTION, SOLUTION INTRAVENOUS at 07:46

## 2018-03-18 RX ADMIN — CARVEDILOL SCH MG: 3.12 TABLET, FILM COATED ORAL at 07:46

## 2018-03-18 RX ADMIN — HEPARIN SODIUM SCH UNITS: 10000 INJECTION, SOLUTION INTRAVENOUS; SUBCUTANEOUS at 05:02

## 2018-03-18 RX ADMIN — ATORVASTATIN CALCIUM SCH MG: 40 TABLET, FILM COATED ORAL at 21:00

## 2018-03-18 NOTE — HHI.PR
Addendum to Inpatient Note


Addendum Reason:  Additional Documentation


Additional Information


S: Residents paged to bedside for [facility].  Patient with change in mental 

status after two episodes of hematemesis of bright red blood and clots. Patient 

denies any pain, shortness of breath, light-headedness or dizziness.  He 

endorses significant fatigue.  





O:


Vitals: BP: 100/66, HR: 122, O2 sat 97% on 4L NC


GENERAL: Well-nourished, well-developed  male patient who appears 

fatigued and pale. Responds to questions but is otherwise sleeping.


SKIN: Warm and dry.  Pallor. 


ENT: Dried blood around mouth. 


GI: About 200mL of bright red blood and several bright red clots present on 

clothing.


CARDIOVASCULAR: Tachycardic rate and regular rhythm without murmurs, gallops, 

or rubs.


CHEST: Symmetric chest expansion with respiration. 


RESPIRATORY: Breath sounds clear to auscultation bilaterally. No accessory 

muscle use. No wheezes, rhonchi or rales.





A/P:


Acute blood loss secondary to hematemesis with acute change in mental status.


GI already consulted and recommends Protonix drip and transfer to critical 

care. Will continue to monitor.


Will obtain stat CBC and transfuse if indicated.


Will give 1L NS bolus. 


Currently protecting airway, however concern that his altered mental status 

could worsen and he may require further respiratory assistance.


Case discussed with intensivist.











Rosetta Soto MD, R3 Mar 18, 2018 19:44

## 2018-03-18 NOTE — HHI.DCPOC
Discharge Care Plan


Diagnosis:  


(1) Sepsis


(2) UTI (urinary tract infection)








Your Health Problems Are: Difficulty with ADL





 Exercise Tolerance








Goals to Promote Your Health


* To prevent worsening of your condition and complications


* To maintain your health at the optimal level


Directions to Meet Your Goals


*** Take your medications as prescribed


*** Follow your dietary instruction


*** Follow activity as directed








*** Keep your appointments as scheduled


*** Take your immunizations and boosters as scheduled


*** If your symptoms worsen call your PCP, if no PCP go to Urgent Care Center 

or Emergency Room***


*** Smoking is Dangerous to Your Health. Avoid second hand smoke***


***Call the 24-hour hour crisis hotline for domestic abuse at 1-528.113.9028***











Abdoul Polk MD Mar 18, 2018 15:08

## 2018-03-18 NOTE — HHI.PR
Subjective


Remarks


F/U UTI and encephalopathy. MS back to baseline no complaints dw RN , stable 

for dc





Objective


Vitals





Vital Signs








  Date Time  Temp Pulse Resp B/P (MAP) Pulse Ox O2 Delivery O2 Flow Rate FiO2


 


3/18/18 12:00 97.0 70 17 152/71 (98) 97   


 


3/18/18 08:00 97.9 83 19 181/89 (119) 98   


 


3/18/18 04:56 96.8 68 17 132/64 (86) 96   


 


3/18/18 04:30  62      


 


3/18/18 00:00 98.7 79 20 140/68 (92) 96   


 


3/18/18 00:00  75      


 


3/17/18 20:00 97.4 80 17 158/73 (101) 98   


 


3/17/18 19:20  67      


 


3/17/18 16:00 97.0 94 20 137/77 (97) 97   














I/O      


 


 3/17/18 3/17/18 3/17/18 3/18/18 3/18/18 3/18/18





 07:00 15:00 23:00 07:00 15:00 23:00


 


Intake Total 290 ml 1050 ml 50 ml 380 ml  


 


Balance 290 ml 1050 ml 50 ml 380 ml  


 


      


 


Intake Oral 240 ml   380 ml  


 


IV Total 50 ml 1050 ml 50 ml   


 


# Voids 3  4 8  


 


# Bowel Movements    4  








Result Diagram:  


3/18/18 0830                                                                   

             3/18/18 0830





Imaging





Last Impressions








Head CT 3/16/18 0000 Signed





Impressions: 





 Service Date/Time:  Friday, March 16, 2018 19:56 - CONCLUSION:  No acute 





 abnormality demonstrated. Atrophy and chronic white matter changes are again 





 seen. Evolving right pericallosal subacute infarct. No evidence of a large 





 ischemic event.     Ministerio Martin MD 


 


Gall Bladder Ultrasound 3/16/18 0000 Signed





Impressions: 





 Service Date/Time:  Friday, March 16, 2018 20:36 - CONCLUSION:  Fatty liver 

and 





 a tiny nonobstructing right renal stone. Otherwise negative.     Ministerio Martin MD 


 


Chest X-Ray 3/16/18 0000 Signed





Impressions: 





 Service Date/Time:  Friday, March 16, 2018 20:02 - CONCLUSION:  Mild left base 





 atelectasis.     Ministerio Martin MD 








Objective Remarks


GENERAL:   male lying in bed


SKIN: No rashes, ecchymoses or lesions. Cool and dry.


CARDIOVASCULAR: Regular rate and rhythm without murmurs, gallops, or rubs. 


RESPIRATORY: Clear to auscultation. Breath sounds equal bilaterally. No wheezes

, rales, or rhonchi.  


GASTROINTESTINAL: Abdomen soft, non-tender, nondistended. No guarding.


MUSCULOSKELETAL: Extremities without clubbing, cyanosis, or edema. No joint 

tenderness, effusion, or edema noted. No calf tenderness. Negative Homans sign 

bilaterally.


NEUROLOGICAL: Awake and alert.  Left facial droop.  Dysarthric.  Generalized 

weakness weaker on the left side


Procedures


none





A/P


Problem List:  


(1) Sepsis


ICD Code:  A41.9 - Sepsis, unspecified organism


Status:  Acute


(2) UTI (urinary tract infection)


ICD Code:  N39.0 - Urinary tract infection, site not specified


Status:  Acute


Assessment and Plan


1.  Sepsis secondary to UTI Cx E coli. Improved blood cultures negative to date





2.  Encephalopathy secondary to sepsis.  Resolved.





2.  Elevated bilirubin. Gallbladder ultrasound significant for fatty liver. 

Continue to monitor





3.  History of recent CVA.  Stable continue Plavix/statin





4.  Hypertension/bipolar/GERD. Continue home medications





5.  Diabetes mellitus.  Stable





6.  DANE.  CK within normal limits.  Improved discontinue IV fluids





FEN


Heart healthy


Electrolytes: Monitor during sleep when necessary


Heparin


Discharge Planning


Discharge patient to rehab


Condition on discharge: Improved


Regular Diet as tolerated


Ad Aimee activity no driving


Rx written: Ceftin


Follow-up with primary care physician





Problem Qualifiers





(1) Sepsis:  


Qualified Codes:  A41.9 - Sepsis, unspecified organism


(2) UTI (urinary tract infection):  


Qualified Codes:  N30.00 - Acute cystitis without hematuria








Abdoul Polk MD Mar 18, 2018 15:01

## 2018-03-19 VITALS
RESPIRATION RATE: 19 BRPM | OXYGEN SATURATION: 100 % | HEART RATE: 93 BPM | DIASTOLIC BLOOD PRESSURE: 81 MMHG | SYSTOLIC BLOOD PRESSURE: 142 MMHG | TEMPERATURE: 98 F

## 2018-03-19 VITALS
TEMPERATURE: 98.6 F | OXYGEN SATURATION: 100 % | SYSTOLIC BLOOD PRESSURE: 179 MMHG | RESPIRATION RATE: 19 BRPM | DIASTOLIC BLOOD PRESSURE: 83 MMHG | HEART RATE: 88 BPM

## 2018-03-19 VITALS
HEART RATE: 105 BPM | DIASTOLIC BLOOD PRESSURE: 84 MMHG | SYSTOLIC BLOOD PRESSURE: 149 MMHG | OXYGEN SATURATION: 100 % | TEMPERATURE: 98.7 F | RESPIRATION RATE: 21 BRPM

## 2018-03-19 VITALS
TEMPERATURE: 98 F | DIASTOLIC BLOOD PRESSURE: 80 MMHG | HEART RATE: 82 BPM | RESPIRATION RATE: 19 BRPM | SYSTOLIC BLOOD PRESSURE: 148 MMHG | OXYGEN SATURATION: 100 %

## 2018-03-19 VITALS
HEART RATE: 80 BPM | RESPIRATION RATE: 17 BRPM | SYSTOLIC BLOOD PRESSURE: 178 MMHG | OXYGEN SATURATION: 98 % | DIASTOLIC BLOOD PRESSURE: 80 MMHG | TEMPERATURE: 98.6 F

## 2018-03-19 VITALS
RESPIRATION RATE: 18 BRPM | HEART RATE: 78 BPM | OXYGEN SATURATION: 100 % | DIASTOLIC BLOOD PRESSURE: 68 MMHG | SYSTOLIC BLOOD PRESSURE: 150 MMHG | TEMPERATURE: 98.8 F

## 2018-03-19 LAB
BASOPHILS # BLD AUTO: 0.1 TH/MM3 (ref 0–0.2)
BASOPHILS NFR BLD: 0.5 % (ref 0–2)
BUN SERPL-MCNC: 25 MG/DL (ref 7–18)
CALCIUM SERPL-MCNC: 9.4 MG/DL (ref 8.5–10.1)
CHLORIDE SERPL-SCNC: 118 MEQ/L (ref 98–107)
CREAT SERPL-MCNC: 1.24 MG/DL (ref 0.6–1.3)
EOSINOPHIL # BLD: 0 TH/MM3 (ref 0–0.4)
EOSINOPHIL NFR BLD: 0.3 % (ref 0–4)
ERYTHROCYTE [DISTWIDTH] IN BLOOD BY AUTOMATED COUNT: 16.4 % (ref 11.6–17.2)
GFR SERPLBLD BASED ON 1.73 SQ M-ARVRAT: 57 ML/MIN (ref 89–?)
GLUCOSE SERPL-MCNC: 139 MG/DL (ref 74–106)
HCO3 BLD-SCNC: 23 MEQ/L (ref 21–32)
HCT VFR BLD CALC: 25.3 % (ref 39–51)
HCT VFR BLD CALC: 26.5 % (ref 39–51)
HGB BLD-MCNC: 8.4 GM/DL (ref 13–17)
HGB BLD-MCNC: 9 GM/DL (ref 13–17)
LYMPHOCYTES # BLD AUTO: 2.1 TH/MM3 (ref 1–4.8)
LYMPHOCYTES NFR BLD AUTO: 16.1 % (ref 9–44)
MAGNESIUM SERPL-MCNC: 1.9 MG/DL (ref 1.5–2.5)
MCH RBC QN AUTO: 30.9 PG (ref 27–34)
MCHC RBC AUTO-ENTMCNC: 33.7 % (ref 32–36)
MCV RBC AUTO: 91.7 FL (ref 80–100)
MONOCYTE #: 1.1 TH/MM3 (ref 0–0.9)
MONOCYTES NFR BLD: 8.4 % (ref 0–8)
NEUTROPHILS # BLD AUTO: 9.5 TH/MM3 (ref 1.8–7.7)
NEUTROPHILS NFR BLD AUTO: 74.7 % (ref 16–70)
PLATELET # BLD: 329 TH/MM3 (ref 150–450)
PMV BLD AUTO: 7.5 FL (ref 7–11)
RBC # BLD AUTO: 2.89 MIL/MM3 (ref 4.5–5.9)
SODIUM SERPL-SCNC: 149 MEQ/L (ref 136–145)
WBC # BLD AUTO: 12.8 TH/MM3 (ref 4–11)

## 2018-03-19 PROCEDURE — 3E0G8GC INTRODUCTION OF OTHER THERAPEUTIC SUBSTANCE INTO UPPER GI, VIA NATURAL OR ARTIFICIAL OPENING ENDOSCOPIC: ICD-10-PCS | Performed by: INTERNAL MEDICINE

## 2018-03-19 PROCEDURE — 0W3P8ZZ CONTROL BLEEDING IN GASTROINTESTINAL TRACT, VIA NATURAL OR ARTIFICIAL OPENING ENDOSCOPIC: ICD-10-PCS | Performed by: INTERNAL MEDICINE

## 2018-03-19 RX ADMIN — CARVEDILOL SCH MG: 3.12 TABLET, FILM COATED ORAL at 09:02

## 2018-03-19 RX ADMIN — PANTOPRAZOLE SODIUM SCH MLS/HR: 40 INJECTION, POWDER, FOR SOLUTION INTRAVENOUS at 14:19

## 2018-03-19 RX ADMIN — ATORVASTATIN CALCIUM SCH MG: 40 TABLET, FILM COATED ORAL at 21:10

## 2018-03-19 RX ADMIN — Medication SCH ML: at 09:00

## 2018-03-19 RX ADMIN — PHENYTOIN SODIUM SCH MLS/HR: 50 INJECTION INTRAMUSCULAR; INTRAVENOUS at 03:45

## 2018-03-19 RX ADMIN — LITHIUM CARBONATE SCH MG: 300 CAPSULE, GELATIN COATED ORAL at 21:09

## 2018-03-19 RX ADMIN — CEFUROXIME AXETIL SCH MG: 250 TABLET ORAL at 09:02

## 2018-03-19 RX ADMIN — CEFUROXIME AXETIL SCH MG: 250 TABLET ORAL at 21:10

## 2018-03-19 RX ADMIN — ENALAPRILAT PRN MG: 1.25 INJECTION INTRAVENOUS at 20:31

## 2018-03-19 RX ADMIN — Medication SCH ML: at 21:09

## 2018-03-19 RX ADMIN — PANTOPRAZOLE SODIUM SCH MLS/HR: 40 INJECTION, POWDER, FOR SOLUTION INTRAVENOUS at 04:57

## 2018-03-19 RX ADMIN — PHENYTOIN SODIUM SCH MLS/HR: 50 INJECTION INTRAMUSCULAR; INTRAVENOUS at 09:03

## 2018-03-19 RX ADMIN — TAMSULOSIN HYDROCHLORIDE SCH MG: 0.4 CAPSULE ORAL at 21:00

## 2018-03-19 NOTE — EKG
Date Performed: 03/18/2018       Time Performed: 21:38:42

 

PTAGE:      72 years

 

EKG:      Sinus tachycardia with PVC(s). Left axis deviation IV conduction defect Extensive infarct -
 age undetermined Since previous tracing, no significant change noted Abnormal ECG

 

PREVIOUS TRACING       : 03/16/2018 19.13

 

DOCTOR:   Vonda Ayala  Interpretating Date/Time  03/19/2018 21:23:24

## 2018-03-19 NOTE — HHI.PR
Subjective


Remarks


Follow-up GI bleed.  No further bleeding awaiting endoscopy.  Patient was 

transferred to ICU secondary to altered mental status.  At this time he is 

awake and oriented with baseline left facial, upper and lower extremity 

weakness.  States he is doing better.  He wants to be a DNR, discussed with 

daughter who will talk to the patient to clarify CODE STATUS.





Objective


Vitals





Vital Signs








  Date Time  Temp Pulse Resp B/P (MAP) Pulse Ox O2 Delivery O2 Flow Rate FiO2


 


3/19/18 04:00 98.0 93 19 142/81 (101) 100   


 


3/19/18 00:00 98.7 105 21 149/84 (105) 100   


 


3/18/18 22:53 98.2 105 21 136/76 100   


 


3/18/18 21:15 98.2 116 19 98/58 (71) 100   


 


3/18/18 20:48 95.3 117 15 109/74 (86) 100   


 


3/18/18 20:00 96.2 111 16 109/73 (85) 100   


 


3/18/18 19:08     100  4.00 


 


3/18/18 17:45 99.1 88 19 168/89 (115) 96   


 


3/18/18 16:30    176/83 (114)    


 


3/18/18 16:00 96.6 72 19 186/90 (122) 97   


 


3/18/18 12:00 97.0 70 17 152/71 (98) 97   














I/O      


 


 3/18/18 3/18/18 3/18/18 3/19/18 3/19/18 3/19/18





 07:00 15:00 23:00 07:00 15:00 23:00


 


Intake Total 380 ml  2310 ml 1000 ml  


 


Output Total   600 ml 100 ml  


 


Balance 380 ml  1710 ml 900 ml  


 


      


 


Intake Oral 380 ml  300 ml   


 


IV Total   2000 ml 250 ml  


 


Packed Cells    250 ml  


 


Blood Product IV Normal Saline Flush   10 ml 500 ml  


 


Output Urine Total   600 ml 100 ml  


 


# Voids 8  1 3  


 


# Bowel Movements 4  2 2  








Result Diagram:  


3/19/18 0418                                                                   

             3/19/18 0418





Imaging





Last Impressions








Head CT 3/16/18 0000 Signed





Impressions: 





 Service Date/Time:  Friday, March 16, 2018 19:56 - CONCLUSION:  No acute 





 abnormality demonstrated. Atrophy and chronic white matter changes are again 





 seen. Evolving right pericallosal subacute infarct. No evidence of a large 





 ischemic event.     Ministerio Martin MD 


 


Gall Bladder Ultrasound 3/16/18 0000 Signed





Impressions: 





 Service Date/Time:  Friday, March 16, 2018 20:36 - CONCLUSION:  Fatty liver 

and 





 a tiny nonobstructing right renal stone. Otherwise negative.     Ministerio Martin MD 


 


Chest X-Ray 3/16/18 0000 Signed





Impressions: 





 Service Date/Time:  Friday, March 16, 2018 20:02 - CONCLUSION:  Mild left base 





 atelectasis.     Ministerio Martin MD 








Objective Remarks


GENERAL:   male lying in bed


SKIN: No rashes, ecchymoses or lesions. Cool and dry.


Pupils reactive to light right slightly larger than left 2 mm versus 1 mm which 

according to patient is chronic


CARDIOVASCULAR: Regular rate and rhythm without murmurs, gallops, or rubs. 


RESPIRATORY: Clear to auscultation. Breath sounds equal bilaterally. No wheezes

, rales, or rhonchi.  


GASTROINTESTINAL: Abdomen soft, non-tender, nondistended. No guarding.


MUSCULOSKELETAL: Extremities without clubbing, cyanosis, or edema. No joint 

tenderness, effusion, or edema noted. No calf tenderness. Negative Homans sign 

bilaterally.


NEUROLOGICAL: Awake and alert.  Left facial droop.  Dysarthric.  Generalized 

weakness weak on the left side


Procedures


none





A/P


Problem List:  


(1) Sepsis


ICD Code:  A41.9 - Sepsis, unspecified organism


Status:  Acute


(2) UTI (urinary tract infection)


ICD Code:  N39.0 - Urinary tract infection, site not specified


Status:  Acute


Assessment and Plan


1.  Sepsis secondary to UTI Cx E coli. Improved blood cultures negative to date





2.  Encephalopathy secondary to sepsis.  Improved





2.  Elevated bilirubin. Gallbladder ultrasound significant for fatty liver. 

Continue to monitor





3.  History of recent CVA.  Stable continue statin.  Restart Plavix when 

cleared by GI





4.  Hypertension/bipolar/GERD. Continue home medications with hold parameters





5.  Diabetes mellitus.  Stable





6.  DANE.  CK within normal limits.  Improved on IV fluids





7.  UGI bleed.   Hemodynamically stable.  History of gastric ulcer in February 2018.  N.p.o., IV hydration, Protonix drip and blood counts to keep hemoglobin 

at least 8.  GI for endoscopy 





8.  Anemia secondary to acute blood loss status post blood transfusion.  Keep 

hemoglobin at least 8 





FEN


Heart healthy but currently n.p.o.


Electrolytes: Monitor during sleep when necessary


Heparin has been discontinued


Discharge Planning


Not ready for discharge secondary to GI bleed





Problem Qualifiers





(1) Sepsis:  


Qualified Codes:  A41.9 - Sepsis, unspecified organism


(2) UTI (urinary tract infection):  


Qualified Codes:  N30.00 - Acute cystitis without hematuria








Abdoul Polk MD Mar 19, 2018 10:12

## 2018-03-19 NOTE — PD.CONS
HPI


History of Present Illness


This is a 72 year old male with recent hx CVA and gastric ulcer who presented 

with AMS from rehab facility.  GI has been consulted for GIB.  Yesterday 

josue was called after he had multiple episodes bright red hematemesis, per 

EMR.  Today he has had multiple large melanotic stools.  No vomiting today.  He 

was on plavix, which is held and he last had yesterday.  He denies abd pain.  

He had EGD by Dr Estrella 2/26/18 with finding of gastric ulcer that was 

likely source of bleeding at that time.  Pt relatively noncontributory as he is 

lethargic.  Hx obtained from EMR.


 (Mary Carmen Hart)





PFSH


Past Medical History


Bipolar disorder


Hypertension


Diabetes mellitus


History of GI bleed


History of recent CVA


Past Surgical History


Left hand crush injury related surgery


 (Mary Carmen Hart)


Coded Allergies:  


     codeine (Unverified  Allergy, Severe, MUSCULOSKELETAL ISSUES, 3/7/18)


     insulin,pork (Verified  Allergy, Severe, Anaphylaxis, 3/8/18)


Family History


No family history of CAD/DM.  Mother with esophageal cancer


Social History


Denies alcohol, tobacco and illicit drugs


 (Mary Carmen Hart)





Review of Systems


Gastrointestinal:  DENIES: Abdominal pain


otherwise noncontributory


 (Mary Carmen Hart)





GI Exam


Vitals I&O





Vital Signs








  Date Time  Temp Pulse Resp B/P (MAP) Pulse Ox O2 Delivery O2 Flow Rate FiO2


 


3/19/18 04:00 98.0 93 19 142/81 (101) 100   


 


3/19/18 00:00 98.7 105 21 149/84 (105) 100   


 


3/18/18 22:53 98.2 105 21 136/76 100   


 


3/18/18 21:15 98.2 116 19 98/58 (71) 100   


 


3/18/18 20:48 95.3 117 15 109/74 (86) 100   


 


3/18/18 20:00 96.2 111 16 109/73 (85) 100   


 


3/18/18 19:08     100  4.00 


 


3/18/18 17:45 99.1 88 19 168/89 (115) 96   


 


3/18/18 16:30    176/83 (114)    


 


3/18/18 16:00 96.6 72 19 186/90 (122) 97   


 


3/18/18 12:00 97.0 70 17 152/71 (98) 97   














I/O      


 


 3/18/18 3/18/18 3/18/18 3/19/18 3/19/18 3/19/18





 07:00 15:00 23:00 07:00 15:00 23:00


 


Intake Total 380 ml  2310 ml 1000 ml  


 


Output Total   600 ml 100 ml  


 


Balance 380 ml  1710 ml 900 ml  


 


      


 


Intake Oral 380 ml  300 ml   


 


IV Total   2000 ml 250 ml  


 


Packed Cells    250 ml  


 


Blood Product IV Normal Saline Flush   10 ml 500 ml  


 


Output Urine Total   600 ml 100 ml  


 


# Voids 8  1 3  


 


# Bowel Movements 4  2 2  








Imaging





Last Impressions








Head CT 3/16/18 0000 Signed





Impressions: 





 Service Date/Time:  Friday, March 16, 2018 19:56 - CONCLUSION:  No acute 





 abnormality demonstrated. Atrophy and chronic white matter changes are again 





 seen. Evolving right pericallosal subacute infarct. No evidence of a large 





 ischemic event.     Ministerio Martin MD 


 


Gall Bladder Ultrasound 3/16/18 0000 Signed





Impressions: 





 Service Date/Time:  Friday, March 16, 2018 20:36 - CONCLUSION:  Fatty liver 

and 





 a tiny nonobstructing right renal stone. Otherwise negative.     Ministerio Martin MD 


 


Chest X-Ray 3/16/18 0000 Signed





Impressions: 





 Service Date/Time:  Friday, March 16, 2018 20:02 - CONCLUSION:  Mild left base 





 atelectasis.     Ministerio Martin MD 








Laboratory











Test


  3/18/18


18:23 3/18/18


21:30 3/19/18


04:18


 


White Blood Count 13.7 TH/MM3   12.8 TH/MM3 


 


Red Blood Count 3.17 MIL/MM3   2.89 MIL/MM3 


 


Hemoglobin 9.9 GM/DL  8.1 GM/DL  9.0 GM/DL 


 


Hematocrit 30.1 %  25.0 %  26.5 % 


 


Mean Corpuscular Volume 95.1 FL   91.7 FL 


 


Mean Corpuscular Hemoglobin 31.3 PG   30.9 PG 


 


Mean Corpuscular Hemoglobin


Concent 32.9 % 


  


  33.7 % 


 


 


Red Cell Distribution Width 14.6 %   16.4 % 


 


Platelet Count 415 TH/MM3   329 TH/MM3 


 


Mean Platelet Volume 8.0 FL   7.5 FL 


 


Neutrophils (%) (Auto) 73.4 %   74.7 % 


 


Lymphocytes (%) (Auto) 17.6 %   16.1 % 


 


Monocytes (%) (Auto) 7.1 %   8.4 % 


 


Eosinophils (%) (Auto) 1.4 %   0.3 % 


 


Basophils (%) (Auto) 0.5 %   0.5 % 


 


Neutrophils # (Auto) 10.0 TH/MM3   9.5 TH/MM3 


 


Lymphocytes # (Auto) 2.4 TH/MM3   2.1 TH/MM3 


 


Monocytes # (Auto) 1.0 TH/MM3   1.1 TH/MM3 


 


Eosinophils # (Auto) 0.2 TH/MM3   0.0 TH/MM3 


 


Basophils # (Auto) 0.1 TH/MM3   0.1 TH/MM3 


 


CBC Comment DIFF FINAL   DIFF FINAL 


 


Differential Comment     


 


Nasal Screen MRSA (PCR)


  


  MRSA NOT


DETECTED 


 


 


Blood Urea Nitrogen   25 MG/DL 


 


Creatinine   1.24 MG/DL 


 


Random Glucose   139 MG/DL 


 


Calcium Level   9.4 MG/DL 


 


Magnesium Level   1.9 MG/DL 


 


Sodium Level   149 MEQ/L 


 


Potassium Level   4.5 MEQ/L 


 


Chloride Level   118 MEQ/L 


 


Carbon Dioxide Level   23.0 MEQ/L 


 


Anion Gap   8 MEQ/L 


 


Estimat Glomerular Filtration


Rate 


  


  57 ML/MIN 


 














 Date/Time


Source Procedure


Growth Status


 


 


 3/16/18 19:35


Blood Peripheral Aerobic Blood Culture - Preliminary


NO GROWTH IN 2 DAYS Resulted


 


 3/16/18 19:35


Blood Peripheral Anaerobic Blood Culture - Preliminary


NO GROWTH IN 2 DAYS Resulted


 


 3/16/18 19:15


Urine Catheterized Urine Urine Culture - Final


Escherichia Coli Complete








Physical Examination


HEENT:   normocephalic; atraumatic; no jaundice. 


CHEST:  CTA


CARDIAC:  RRR


ABDOMEN:  Soft, nondistended, nontender; no hepatosplenomegaly; bowel sounds 

are present in all four quadrants.


EXTREMITIES: No clubbing, cyanosis, or edema.


SKIN:  Normal; no rash; no jaundice.


CNS: lethargic


 (Mary Carmen Hart)





Assessment and Plan


Plan


ASSESSMENT


- hematemesis, melena, anemia with drop in HH - UGIB.  has prior hx gastric 

ulcer seen on EGD 2/26/18.  was on plavix, this is held.  


    denies abd pain.  





PLAN


- EGD today


- obtain consent


- NPO


- PPI


- further recs to follow


seen by myself and dr Matta and this note is on her behalf


 (Mary Carmen Hart)


Physician Comments


seen, examined


agree with above 


 (Ciara Matta MD)











Mary Carmen Hart Mar 19, 2018 08:56


Ciara Matta MD Mar 19, 2018 12:46

## 2018-03-19 NOTE — GIPROC
[facility]

[address] [facility]. [facility], [address]

 

 

EGD PROCEDURE REPORT     EXAM DATE: 03/19/2018

 

PATIENT NAME:      Matt Luevano           MR #:      [id number]

YOB: 1945      VISIT #:     [id number]

ATTENDING:     Ciara Matta MD     ORDER #:     [id number]

ASSISTANT:      Sonia Monteiro and Sincere Gan     STATUS:     inpatient

 

 

INDICATIONS:  The patient is a 72 yr old male here for an EGD due to gi

bleeding, history of gastric ulcer

PROCEDURE PERFORMED:     EGD w/ directed submucosal injection(s), any substance

EGD w/ ablation

egd with clips

MEDICATIONS:     None and Per Anesthesia.

TOPICAL ANESTHETIC:     none

 

CONSENT: The patient understands the risks and benefits of the procedure and

understands that these risks include, but are not limited to: sedation,

allergic reaction, infection, perforation and/or bleeding. Alternative means of

evaluation and treatment include, among others: physical exam, x-rays, and/or

surgical intervention. The patient elects to proceed with this endoscopic

procedure.

 



medical equipment was checked for proper function. Hand hygiene and appropriate

measures for infection prevention was taken. After the risks, benefits and

alternatives of the procedure were thoroughly explained, Informed consent was

verified, confirmed and timeout was successfully executed by the treatment

team. The patient was anesthetized with topical anesthesia and the Pentax

[id number] endoscope was introduced through the mouth and advanced to the second

portion of the duodenum.  Retroflexed views revealed a hiatal hernia  The

gastroscope was then slowly withdrawn and removed.

Ulcer antrum-8 mm , vissible vessel-s/p epinephrine 1:10, 000 -4 cc, cautery

/gold probe , 3 clips applied

NGT trauma.

 

 

 

ADVERSE EVENTS:     There were no complications.

IMPRESSIONS:     1.  Ulcer antrum-8 mm , vissible vessel-s/p epinephrine 1:10,

000 -4 cc, cautery /gold probe , 3 clips applied

NGT trauma

2.  Retroflexed views revealed a hiatal hernia

 

RECOMMENDATIONS:     1.  Anti-reflux regimen

2.  Continue PPI

3.  Avoid NSAIDS

4.  Clear liquid, speech eval first

carafate 1 gm pot bid

transfuse prn

if further bleeding -consult IR for angiogram

PATIENT CONDITION:     stable

DISPOSITION:     Inpatient

REPEAT EXAM:     Return 8 weeks EGD

 

 

___________________________________

Ciara Matta MD

eSigned:  Ciara Matta MD 03/19/2018 12:43 PM

 

 

cc:

 

 

 

 

PATIENT NAME:  Matt Luevano

MR#: [id number]

## 2018-03-20 VITALS
SYSTOLIC BLOOD PRESSURE: 175 MMHG | RESPIRATION RATE: 17 BRPM | DIASTOLIC BLOOD PRESSURE: 89 MMHG | TEMPERATURE: 98.8 F | OXYGEN SATURATION: 99 % | HEART RATE: 70 BPM

## 2018-03-20 VITALS
SYSTOLIC BLOOD PRESSURE: 189 MMHG | DIASTOLIC BLOOD PRESSURE: 93 MMHG | TEMPERATURE: 99 F | HEART RATE: 74 BPM | OXYGEN SATURATION: 99 % | RESPIRATION RATE: 21 BRPM

## 2018-03-20 VITALS
SYSTOLIC BLOOD PRESSURE: 154 MMHG | OXYGEN SATURATION: 100 % | RESPIRATION RATE: 18 BRPM | HEART RATE: 62 BPM | TEMPERATURE: 98.4 F | DIASTOLIC BLOOD PRESSURE: 71 MMHG

## 2018-03-20 VITALS
TEMPERATURE: 98.7 F | HEART RATE: 78 BPM | RESPIRATION RATE: 16 BRPM | SYSTOLIC BLOOD PRESSURE: 188 MMHG | DIASTOLIC BLOOD PRESSURE: 80 MMHG | OXYGEN SATURATION: 100 %

## 2018-03-20 VITALS
SYSTOLIC BLOOD PRESSURE: 148 MMHG | DIASTOLIC BLOOD PRESSURE: 70 MMHG | OXYGEN SATURATION: 95 % | TEMPERATURE: 97.4 F | HEART RATE: 74 BPM | RESPIRATION RATE: 12 BRPM

## 2018-03-20 VITALS
HEART RATE: 68 BPM | RESPIRATION RATE: 14 BRPM | DIASTOLIC BLOOD PRESSURE: 76 MMHG | SYSTOLIC BLOOD PRESSURE: 167 MMHG | OXYGEN SATURATION: 97 % | TEMPERATURE: 98.4 F

## 2018-03-20 VITALS
SYSTOLIC BLOOD PRESSURE: 133 MMHG | RESPIRATION RATE: 13 BRPM | DIASTOLIC BLOOD PRESSURE: 71 MMHG | TEMPERATURE: 97.9 F | OXYGEN SATURATION: 97 % | HEART RATE: 90 BPM

## 2018-03-20 VITALS
HEART RATE: 68 BPM | OXYGEN SATURATION: 100 % | TEMPERATURE: 98.7 F | RESPIRATION RATE: 12 BRPM | SYSTOLIC BLOOD PRESSURE: 181 MMHG | DIASTOLIC BLOOD PRESSURE: 85 MMHG

## 2018-03-20 LAB
BASOPHILS # BLD AUTO: 0.1 TH/MM3 (ref 0–0.2)
BASOPHILS NFR BLD: 0.5 % (ref 0–2)
BUN SERPL-MCNC: 16 MG/DL (ref 7–18)
CALCIUM SERPL-MCNC: 10.1 MG/DL (ref 8.5–10.1)
CHLORIDE SERPL-SCNC: 116 MEQ/L (ref 98–107)
CREAT SERPL-MCNC: 1.07 MG/DL (ref 0.6–1.3)
EOSINOPHIL # BLD: 0 TH/MM3 (ref 0–0.4)
EOSINOPHIL NFR BLD: 0.2 % (ref 0–4)
ERYTHROCYTE [DISTWIDTH] IN BLOOD BY AUTOMATED COUNT: 16 % (ref 11.6–17.2)
GFR SERPLBLD BASED ON 1.73 SQ M-ARVRAT: 68 ML/MIN (ref 89–?)
GLUCOSE SERPL-MCNC: 126 MG/DL (ref 74–106)
HCO3 BLD-SCNC: 24 MEQ/L (ref 21–32)
HCT VFR BLD CALC: 23.8 % (ref 39–51)
HGB BLD-MCNC: 7.9 GM/DL (ref 13–17)
LYMPHOCYTES # BLD AUTO: 2 TH/MM3 (ref 1–4.8)
LYMPHOCYTES NFR BLD AUTO: 18.6 % (ref 9–44)
MAGNESIUM SERPL-MCNC: 1.8 MG/DL (ref 1.5–2.5)
MCH RBC QN AUTO: 30.6 PG (ref 27–34)
MCHC RBC AUTO-ENTMCNC: 33.3 % (ref 32–36)
MCV RBC AUTO: 91.9 FL (ref 80–100)
MONOCYTE #: 0.9 TH/MM3 (ref 0–0.9)
MONOCYTES NFR BLD: 8.2 % (ref 0–8)
NEUTROPHILS # BLD AUTO: 7.7 TH/MM3 (ref 1.8–7.7)
NEUTROPHILS NFR BLD AUTO: 72.5 % (ref 16–70)
PLATELET # BLD: 331 TH/MM3 (ref 150–450)
PMV BLD AUTO: 7.7 FL (ref 7–11)
RBC # BLD AUTO: 2.59 MIL/MM3 (ref 4.5–5.9)
SODIUM SERPL-SCNC: 148 MEQ/L (ref 136–145)
WBC # BLD AUTO: 10.6 TH/MM3 (ref 4–11)

## 2018-03-20 RX ADMIN — PHENYTOIN SODIUM SCH MLS/HR: 50 INJECTION INTRAMUSCULAR; INTRAVENOUS at 04:19

## 2018-03-20 RX ADMIN — SUCRALFATE SCH GM: 1 TABLET ORAL at 16:00

## 2018-03-20 RX ADMIN — PHENYTOIN SODIUM SCH MLS/HR: 50 INJECTION INTRAMUSCULAR; INTRAVENOUS at 22:55

## 2018-03-20 RX ADMIN — CARVEDILOL SCH MG: 3.12 TABLET, FILM COATED ORAL at 08:17

## 2018-03-20 RX ADMIN — Medication SCH ML: at 20:37

## 2018-03-20 RX ADMIN — PANTOPRAZOLE SODIUM SCH MG: 40 INJECTION, POWDER, FOR SOLUTION INTRAVENOUS at 08:18

## 2018-03-20 RX ADMIN — ATORVASTATIN CALCIUM SCH MG: 40 TABLET, FILM COATED ORAL at 20:36

## 2018-03-20 RX ADMIN — PHENYTOIN SODIUM SCH MLS/HR: 50 INJECTION INTRAMUSCULAR; INTRAVENOUS at 16:11

## 2018-03-20 RX ADMIN — CEFUROXIME AXETIL SCH MG: 250 TABLET ORAL at 20:35

## 2018-03-20 RX ADMIN — LITHIUM CARBONATE SCH MG: 300 CAPSULE, GELATIN COATED ORAL at 20:37

## 2018-03-20 RX ADMIN — PANTOPRAZOLE SODIUM SCH MLS/HR: 40 INJECTION, POWDER, FOR SOLUTION INTRAVENOUS at 01:10

## 2018-03-20 RX ADMIN — SUCRALFATE SCH GM: 1 TABLET ORAL at 07:45

## 2018-03-20 RX ADMIN — TAMSULOSIN HYDROCHLORIDE SCH MG: 0.4 CAPSULE ORAL at 20:36

## 2018-03-20 RX ADMIN — Medication SCH ML: at 09:00

## 2018-03-20 RX ADMIN — ONDANSETRON PRN MG: 2 INJECTION, SOLUTION INTRAMUSCULAR; INTRAVENOUS at 23:00

## 2018-03-20 RX ADMIN — CEFUROXIME AXETIL SCH MG: 250 TABLET ORAL at 08:17

## 2018-03-20 RX ADMIN — PANTOPRAZOLE SODIUM SCH MG: 40 INJECTION, POWDER, FOR SOLUTION INTRAVENOUS at 20:35

## 2018-03-20 RX ADMIN — ENALAPRILAT PRN MG: 1.25 INJECTION INTRAVENOUS at 02:15

## 2018-03-20 RX ADMIN — ENALAPRILAT PRN MG: 1.25 INJECTION INTRAVENOUS at 16:15

## 2018-03-20 RX ADMIN — ENALAPRILAT PRN MG: 1.25 INJECTION INTRAVENOUS at 10:00

## 2018-03-20 NOTE — HHI.PR
Subjective


Remarks


F/u GIB. No further bleeding states hes ok. Admits being depressed but wants to 

get better dw RN and daughter, depressed for some time. He is a full code at 

this time





Objective


Vitals





Vital Signs








  Date Time  Temp Pulse Resp B/P (MAP) Pulse Ox O2 Delivery O2 Flow Rate FiO2


 


3/20/18 09:37 97.4 74 12 148/70 95   


 


3/20/18 09:15 98.4 62 18 154/71 100   


 


3/20/18 08:00 98.7 68 12 181/85 (117) 100   


 


3/20/18 04:00 98.7 78 16 188/80 (116) 100   


 


3/20/18 00:00 98.8 70 17 175/89 (117) 99   


 


3/19/18 20:00 98.6 80 17 178/80 (112) 98   


 


3/19/18 16:00 98.0 82 19 148/80 (102) 100   


 


3/19/18 14:00 98.6 88 19 179/83 (115) 100   


 


3/19/18 13:30 98.0 97 16 148/76 (100) 96 Nasal Cannula 2 


 


3/19/18 13:15  96 16 146/74 (98) 96 Nasal Cannula 2 


 


3/19/18 13:00  109 16 148/78 (101) 97 Nasal Cannula 2 


 


3/19/18 12:51 98.0 109 16 134/65 (88) 100 Nasal Cannula 2 


 


3/19/18 11:35  83 10 170/82 (111) 100   














I/O      


 


 3/19/18 3/19/18 3/19/18 3/20/18 3/20/18 3/20/18





 07:00 15:00 23:00 07:00 15:00 23:00


 


Intake Total 1000 ml 1650 ml  1000 ml 156 ml 


 


Output Total 100 ml  1250 ml   


 


Balance 900 ml 1650 ml -1250 ml 1000 ml 156 ml 


 


      


 


IV Total 250 ml 1100 ml  1000 ml 126 ml 


 


Packed Cells 250 ml     


 


Blood Product IV Normal Saline Flush 500 ml    30 ml 


 


Other  550 ml    


 


Output Urine Total 100 ml  1250 ml   


 


# Voids 3   3  


 


# Bowel Movements 2  1 1  








Result Diagram:  


3/20/18 0446                                                                   

             3/20/18 0446





Imaging





Last Impressions








Head CT 3/16/18 0000 Signed





Impressions: 





 Service Date/Time:  Friday, March 16, 2018 19:56 - CONCLUSION:  No acute 





 abnormality demonstrated. Atrophy and chronic white matter changes are again 





 seen. Evolving right pericallosal subacute infarct. No evidence of a large 





 ischemic event.     Ministerio Martin MD 


 


Gall Bladder Ultrasound 3/16/18 0000 Signed





Impressions: 





 Service Date/Time:  Friday, March 16, 2018 20:36 - CONCLUSION:  Fatty liver 

and 





 a tiny nonobstructing right renal stone. Otherwise negative.     Ministerio Martin MD 


 


Chest X-Ray 3/16/18 0000 Signed





Impressions: 





 Service Date/Time:  Friday, March 16, 2018 20:02 - CONCLUSION:  Mild left base 





 atelectasis.     Ministerio Martin MD 








Objective Remarks


GENERAL:   male lying in bed


SKIN: No rashes, ecchymoses or lesions. Cool and dry.


Pupils reactive to light right slightly larger than left 2 mm versus 1 mm which 

according to patient is chronic


CARDIOVASCULAR: Regular rate and rhythm without murmurs, gallops, or rubs. 


RESPIRATORY: Clear to auscultation. Breath sounds equal bilaterally. No wheezes

, rales, or rhonchi.  


GASTROINTESTINAL: Abdomen soft, non-tender, nondistended. No guarding.


MUSCULOSKELETAL: Extremities without clubbing, cyanosis, or edema. No joint 

tenderness, effusion, or edema noted. No calf tenderness. Negative Homans sign 

bilaterally.


NEUROLOGICAL: Awake and alert.  Left facial droop.  Dysarthric.  Generalized 

weakness weak on the left side


Procedures


none





A/P


Problem List:  


(1) Sepsis


ICD Code:  A41.9 - Sepsis, unspecified organism


Status:  Acute


(2) UTI (urinary tract infection)


ICD Code:  N39.0 - Urinary tract infection, site not specified


Status:  Acute


Assessment and Plan


1.  Sepsis secondary to UTI Cx E coli. Improved blood cultures negative to date 

dc ceftin in am





2.  Encephalopathy secondary to sepsis.  Improved





2.  Elevated bilirubin. Gallbladder ultrasound significant for fatty liver. 

Continue to monitor





3.  History of recent CVA.  Stable continue statin.  Restart Plavix when 

cleared by GI





4.  Hypertension/bipolar/GERD. Continue home medications with hold parameters





5.  Diabetes mellitus.  Stable





6.  DANE.  CK within normal limits.  Improved on IV fluids





7.  UGI bleed.  History of gastric ulcer in February 2018.  3/20/18 s/p EGD 

found ulcer with visible vessel, s/p epi inj, cautery, clips x 3.  Ct clear 

liquids, PPI, carafate, EGD 2 months. If bleeds again, IR consult for 

embolization





8.  Anemia secondary to acute blood loss status post blood transfusion.  Keep 

hemoglobin at least 8 Hb 7.9 will transfuse one unit PRBC today





FEN


Heart healthy liquid diet


Electrolytes: Monitor during sleep when necessary


Heparin has been discontinued


Discharge Planning


1500 BP uncontrolled SBP > 170 despite Norvasc and prn IV Vasotec and 

Hydralazine. Increase Norvasc 5 mg BID , hydralazine to 20 mg IV prn and 

cardene drip if all else fails. Hold transfer out of ICU





Problem Qualifiers





(1) Sepsis:  


Qualified Codes:  A41.9 - Sepsis, unspecified organism


(2) UTI (urinary tract infection):  


Qualified Codes:  N30.00 - Acute cystitis without hematuria








Abdoul Polk MD Mar 20, 2018 10:30

## 2018-03-20 NOTE — HHI.GIFU
Subjective


Remarks


Pt resting in bed in NAD.  No obvious bleeding.  Denies pain.  


 (Mary aCrmen Hart)





Objective


Vitals I&O





Vital Signs








  Date Time  Temp Pulse Resp B/P (MAP) Pulse Ox O2 Delivery O2 Flow Rate FiO2


 


3/20/18 09:37 97.4 74 12 148/70 95   


 


3/20/18 09:15 98.4 62 18 154/71 100   


 


3/20/18 08:00 98.7 68 12 181/85 (117) 100   


 


3/20/18 04:00 98.7 78 16 188/80 (116) 100   


 


3/20/18 00:00 98.8 70 17 175/89 (117) 99   


 


3/19/18 20:00 98.6 80 17 178/80 (112) 98   


 


3/19/18 16:00 98.0 82 19 148/80 (102) 100   


 


3/19/18 14:00 98.6 88 19 179/83 (115) 100   














I/O      


 


 3/19/18 3/19/18 3/19/18 3/20/18 3/20/18 3/20/18





 07:00 15:00 23:00 07:00 15:00 23:00


 


Intake Total 1000 ml 1650 ml  1000 ml 156 ml 


 


Output Total 100 ml  1250 ml   


 


Balance 900 ml 1650 ml -1250 ml 1000 ml 156 ml 


 


      


 


IV Total 250 ml 1100 ml  1000 ml 126 ml 


 


Packed Cells 250 ml     


 


Blood Product IV Normal Saline Flush 500 ml    30 ml 


 


Other  550 ml    


 


Output Urine Total 100 ml  1250 ml   


 


# Voids 3   3  


 


# Bowel Movements 2  1 1  








Laboratory





Laboratory Tests








Test


  3/19/18


16:09 3/20/18


04:46


 


Hemoglobin 8.4  7.9 


 


Hematocrit 25.3  23.8 


 


White Blood Count  10.6 


 


Red Blood Count  2.59 


 


Mean Corpuscular Volume  91.9 


 


Mean Corpuscular Hemoglobin  30.6 


 


Mean Corpuscular Hemoglobin


Concent 


  33.3 


 


 


Red Cell Distribution Width  16.0 


 


Platelet Count  331 


 


Mean Platelet Volume  7.7 


 


Neutrophils (%) (Auto)  72.5 


 


Lymphocytes (%) (Auto)  18.6 


 


Monocytes (%) (Auto)  8.2 


 


Eosinophils (%) (Auto)  0.2 


 


Basophils (%) (Auto)  0.5 


 


Neutrophils # (Auto)  7.7 


 


Lymphocytes # (Auto)  2.0 


 


Monocytes # (Auto)  0.9 


 


Eosinophils # (Auto)  0.0 


 


Basophils # (Auto)  0.1 


 


CBC Comment  DIFF FINAL 


 


Differential Comment   


 


Blood Urea Nitrogen  16 


 


Creatinine  1.07 


 


Random Glucose  126 


 


Calcium Level  10.1 


 


Magnesium Level  1.8 


 


Sodium Level  148 


 


Potassium Level  4.4 


 


Chloride Level  116 


 


Carbon Dioxide Level  24.0 


 


Anion Gap  8 


 


Estimat Glomerular Filtration


Rate 


  68 


 














 Date/Time


Source Procedure


Growth Status


 


 


 3/16/18 19:35


Blood Peripheral Aerobic Blood Culture - Preliminary


NO GROWTH IN 4 DAYS Resulted


 


 3/16/18 19:35


Blood Peripheral Anaerobic Blood Culture - Preliminary


NO GROWTH IN 4 DAYS Resulted


 


 3/16/18 19:15


Urine Catheterized Urine Urine Culture - Final


Escherichia Coli Complete








Imaging





Last Impressions








Head CT 3/16/18 0000 Signed





Impressions: 





 Service Date/Time:  Friday, March 16, 2018 19:56 - CONCLUSION:  No acute 





 abnormality demonstrated. Atrophy and chronic white matter changes are again 





 seen. Evolving right pericallosal subacute infarct. No evidence of a large 





 ischemic event.     Ministerio Martin MD 


 


Gall Bladder Ultrasound 3/16/18 0000 Signed





Impressions: 





 Service Date/Time:  Friday, March 16, 2018 20:36 - CONCLUSION:  Fatty liver 

and 





 a tiny nonobstructing right renal stone. Otherwise negative.     Ministerio Martin MD 


 


Chest X-Ray 3/16/18 0000 Signed





Impressions: 





 Service Date/Time:  Friday, March 16, 2018 20:02 - CONCLUSION:  Mild left base 





 atelectasis.     Ministerio Martin MD 








Physical Exam


HEENT: PERRL; normocephalic; atraumatic; no jaundice.  


CHEST:  diminished


CARDIAC:  RRR


ABDOMEN:  Soft, mildly distended, nontender;  bowel sounds are present in all 

four quadrants.


EXTREMITIES: No clubbing, cyanosis, or edema.


SKIN:  Normal; no rash; no jaundice.


CNS:  lethargic, answers appropriately


 (Mary Carmen Hart)





Assessment and Plan


Plan


ASSESSMENT


- hematemesis, melena, anemia with drop in HH - UGIB.  has prior hx gastric 

ulcer seen on EGD 2/26/18.  was on plavix, this is held.  


    denies abd pain.  


3/20/18 s/p EGD found ulcer with visible vessel, s/p epi inj, cautery, clips x 

3.  mild decrease HH.  





PLAN


- clear liquids


- PPI


- carafate


- EGD 2 months


- if bleeds again, IR consult for embolization


- monitor labs


- supportive care


seen by myself and dr Matta and this note is on her behalf


 (Mary Carmen Hart)


Physician Comments


ok to restart anticoagulation from gi point 


gi will sign off


call us as needed 


 (Ciara Matta MD)











Mary Carmen Hart Mar 20, 2018 13:36


Ciara Matta MD Mar 20, 2018 16:19

## 2018-03-21 VITALS
TEMPERATURE: 98.2 F | OXYGEN SATURATION: 100 % | HEART RATE: 98 BPM | SYSTOLIC BLOOD PRESSURE: 165 MMHG | DIASTOLIC BLOOD PRESSURE: 82 MMHG | RESPIRATION RATE: 20 BRPM

## 2018-03-21 VITALS
SYSTOLIC BLOOD PRESSURE: 158 MMHG | TEMPERATURE: 98 F | HEART RATE: 82 BPM | RESPIRATION RATE: 19 BRPM | OXYGEN SATURATION: 99 % | DIASTOLIC BLOOD PRESSURE: 74 MMHG

## 2018-03-21 VITALS
RESPIRATION RATE: 18 BRPM | OXYGEN SATURATION: 99 % | TEMPERATURE: 98.4 F | HEART RATE: 78 BPM | DIASTOLIC BLOOD PRESSURE: 64 MMHG | SYSTOLIC BLOOD PRESSURE: 122 MMHG

## 2018-03-21 VITALS
HEART RATE: 104 BPM | SYSTOLIC BLOOD PRESSURE: 95 MMHG | TEMPERATURE: 98.1 F | DIASTOLIC BLOOD PRESSURE: 52 MMHG | RESPIRATION RATE: 21 BRPM | OXYGEN SATURATION: 98 %

## 2018-03-21 VITALS
SYSTOLIC BLOOD PRESSURE: 157 MMHG | RESPIRATION RATE: 19 BRPM | DIASTOLIC BLOOD PRESSURE: 72 MMHG | TEMPERATURE: 98.3 F | HEART RATE: 65 BPM | OXYGEN SATURATION: 100 %

## 2018-03-21 VITALS
RESPIRATION RATE: 18 BRPM | TEMPERATURE: 97.9 F | OXYGEN SATURATION: 99 % | DIASTOLIC BLOOD PRESSURE: 86 MMHG | SYSTOLIC BLOOD PRESSURE: 189 MMHG | HEART RATE: 78 BPM

## 2018-03-21 LAB
BASOPHILS # BLD AUTO: 0.1 TH/MM3 (ref 0–0.2)
BASOPHILS NFR BLD: 0.4 % (ref 0–2)
BUN SERPL-MCNC: 17 MG/DL (ref 7–18)
CALCIUM SERPL-MCNC: 9.9 MG/DL (ref 8.5–10.1)
CHLORIDE SERPL-SCNC: 111 MEQ/L (ref 98–107)
CREAT SERPL-MCNC: 1.23 MG/DL (ref 0.6–1.3)
EOSINOPHIL # BLD: 0.1 TH/MM3 (ref 0–0.4)
EOSINOPHIL NFR BLD: 0.9 % (ref 0–4)
ERYTHROCYTE [DISTWIDTH] IN BLOOD BY AUTOMATED COUNT: 15.5 % (ref 11.6–17.2)
GFR SERPLBLD BASED ON 1.73 SQ M-ARVRAT: 58 ML/MIN (ref 89–?)
GLUCOSE SERPL-MCNC: 114 MG/DL (ref 74–106)
HCO3 BLD-SCNC: 26.9 MEQ/L (ref 21–32)
HCT VFR BLD CALC: 29.2 % (ref 39–51)
HGB BLD-MCNC: 10 GM/DL (ref 13–17)
LYMPHOCYTES # BLD AUTO: 3 TH/MM3 (ref 1–4.8)
LYMPHOCYTES NFR BLD AUTO: 22.3 % (ref 9–44)
MAGNESIUM SERPL-MCNC: 1.8 MG/DL (ref 1.5–2.5)
MCH RBC QN AUTO: 31 PG (ref 27–34)
MCHC RBC AUTO-ENTMCNC: 34.3 % (ref 32–36)
MCV RBC AUTO: 90.3 FL (ref 80–100)
MONOCYTE #: 1.4 TH/MM3 (ref 0–0.9)
MONOCYTES NFR BLD: 10.3 % (ref 0–8)
NEUTROPHILS # BLD AUTO: 8.9 TH/MM3 (ref 1.8–7.7)
NEUTROPHILS NFR BLD AUTO: 66.1 % (ref 16–70)
PLATELET # BLD: 339 TH/MM3 (ref 150–450)
PMV BLD AUTO: 7.5 FL (ref 7–11)
RBC # BLD AUTO: 3.23 MIL/MM3 (ref 4.5–5.9)
SODIUM SERPL-SCNC: 146 MEQ/L (ref 136–145)
WBC # BLD AUTO: 13.5 TH/MM3 (ref 4–11)

## 2018-03-21 RX ADMIN — CLOPIDOGREL BISULFATE SCH MG: 75 TABLET, FILM COATED ORAL at 09:55

## 2018-03-21 RX ADMIN — TAMSULOSIN HYDROCHLORIDE SCH MG: 0.4 CAPSULE ORAL at 20:23

## 2018-03-21 RX ADMIN — LITHIUM CARBONATE SCH MG: 300 CAPSULE, GELATIN COATED ORAL at 20:23

## 2018-03-21 RX ADMIN — Medication SCH ML: at 09:54

## 2018-03-21 RX ADMIN — ATORVASTATIN CALCIUM SCH MG: 40 TABLET, FILM COATED ORAL at 20:23

## 2018-03-21 RX ADMIN — HEPARIN SODIUM SCH UNITS: 10000 INJECTION, SOLUTION INTRAVENOUS; SUBCUTANEOUS at 20:22

## 2018-03-21 RX ADMIN — CEFUROXIME AXETIL SCH MG: 250 TABLET ORAL at 09:55

## 2018-03-21 RX ADMIN — SUCRALFATE SCH GM: 1 TABLET ORAL at 16:00

## 2018-03-21 RX ADMIN — ONDANSETRON PRN MG: 2 INJECTION, SOLUTION INTRAMUSCULAR; INTRAVENOUS at 20:22

## 2018-03-21 RX ADMIN — CARVEDILOL SCH MG: 3.12 TABLET, FILM COATED ORAL at 09:56

## 2018-03-21 RX ADMIN — HEPARIN SODIUM SCH UNITS: 10000 INJECTION, SOLUTION INTRAVENOUS; SUBCUTANEOUS at 09:55

## 2018-03-21 RX ADMIN — Medication SCH ML: at 20:24

## 2018-03-21 RX ADMIN — PANTOPRAZOLE SODIUM SCH MG: 40 INJECTION, POWDER, FOR SOLUTION INTRAVENOUS at 09:54

## 2018-03-21 RX ADMIN — PANTOPRAZOLE SCH MG: 40 TABLET, DELAYED RELEASE ORAL at 20:23

## 2018-03-21 RX ADMIN — SUCRALFATE SCH GM: 1 TABLET ORAL at 06:31

## 2018-03-21 RX ADMIN — PHENYTOIN SODIUM SCH MLS/HR: 50 INJECTION INTRAMUSCULAR; INTRAVENOUS at 15:35

## 2018-03-21 NOTE — HHI.GIFU
Subjective


Remarks


Resting in the bed, random conversation


Currently denies any abdominal pain


Afebrile


Hemoglobin 10.





Objective


Vitals I&O





Vital Signs








  Date Time  Temp Pulse Resp B/P (MAP) Pulse Ox O2 Delivery O2 Flow Rate FiO2


 


3/21/18 12:00 98.3 65 19 157/72 (100) 100   


 


3/21/18 08:00 98.0 82 19 158/74 (102) 99   


 


3/21/18 04:00 98.4 78 18 122/64 (83) 99   


 


3/21/18 00:00 98.1 104 21 95/52 (66) 98   


 


3/20/18 20:00 97.9 90 13 133/71 (91) 97   


 


3/20/18 16:28  103  197/88    


 


3/20/18 16:00 99.0 74 21 189/93 (125) 99   














I/O      


 


 3/20/18 3/20/18 3/20/18 3/21/18 3/21/18 3/21/18





 07:00 15:00 23:00 07:00 15:00 23:00


 


Intake Total 1000 ml 156 ml 2600 ml 480 ml  


 


Output Total   1500 ml 1750 ml  


 


Balance 1000 ml 156 ml 1100 ml -1270 ml  


 


      


 


Intake Oral   240 ml 480 ml  


 


IV Total 1000 ml 126 ml 2360 ml   


 


Blood Product IV Normal Saline Flush  30 ml    


 


Output Urine Total   1500 ml 1750 ml  


 


# Voids 3  5   


 


# Bowel Movements 1  0 0  








Laboratory





Laboratory Tests








Test


  3/21/18


04:48


 


White Blood Count 13.5 


 


Red Blood Count 3.23 


 


Hemoglobin 10.0 


 


Hematocrit 29.2 


 


Mean Corpuscular Volume 90.3 


 


Mean Corpuscular Hemoglobin 31.0 


 


Mean Corpuscular Hemoglobin


Concent 34.3 


 


 


Red Cell Distribution Width 15.5 


 


Platelet Count 339 


 


Mean Platelet Volume 7.5 


 


Neutrophils (%) (Auto) 66.1 


 


Lymphocytes (%) (Auto) 22.3 


 


Monocytes (%) (Auto) 10.3 


 


Eosinophils (%) (Auto) 0.9 


 


Basophils (%) (Auto) 0.4 


 


Neutrophils # (Auto) 8.9 


 


Lymphocytes # (Auto) 3.0 


 


Monocytes # (Auto) 1.4 


 


Eosinophils # (Auto) 0.1 


 


Basophils # (Auto) 0.1 


 


CBC Comment DIFF FINAL 


 


Differential Comment  


 


Blood Urea Nitrogen 17 


 


Creatinine 1.23 


 


Random Glucose 114 


 


Calcium Level 9.9 


 


Magnesium Level 1.8 


 


Sodium Level 146 


 


Potassium Level 3.6 


 


Chloride Level 111 


 


Carbon Dioxide Level 26.9 


 


Anion Gap 8 


 


Estimat Glomerular Filtration


Rate 58 


 














 Date/Time


Source Procedure


Growth Status


 


 


 3/16/18 19:35


Blood Peripheral Aerobic Blood Culture - Final


NO GROWTH IN 5 DAYS Complete


 


 3/16/18 19:35


Blood Peripheral Anaerobic Blood Culture - Final


NO GROWTH IN 5 DAYS Complete


 


 3/16/18 19:15


Urine Catheterized Urine Urine Culture - Final


Escherichia Coli Complete








Imaging





Last Impressions








Head CT 3/16/18 0000 Signed





Impressions: 





 Service Date/Time:  Friday, March 16, 2018 19:56 - CONCLUSION:  No acute 





 abnormality demonstrated. Atrophy and chronic white matter changes are again 





 seen. Evolving right pericallosal subacute infarct. No evidence of a large 





 ischemic event.     Ministerio Martin MD 


 


Gall Bladder Ultrasound 3/16/18 0000 Signed





Impressions: 





 Service Date/Time:  Friday, March 16, 2018 20:36 - CONCLUSION:  Fatty liver 

and 





 a tiny nonobstructing right renal stone. Otherwise negative.     Ministerio Martin MD 


 


Chest X-Ray 3/16/18 0000 Signed





Impressions: 





 Service Date/Time:  Friday, March 16, 2018 20:02 - CONCLUSION:  Mild left base 





 atelectasis.     iMnisterio Martin MD 








Physical Exam


HEENT: PERRL; normocephalic; atraumatic; no jaundice.  


CHEST:  No audible rhonchi or wheezing, no shortness of breath


CARDIAC:  RRR


ABDOMEN:  Large, soft, positive bowel sounds noted mildly distended, nontender


EXTREMITIES: No clubbing, cyanosis, or edema.


SKIN:  Pale, Normal; no rash; no jaundice.


CNS: Awake, conversation appears to be random but did deny no obvious abdominal 

pain





Assessment and Plan


Plan


ASSESSMENT


- hematemesis, melena, anemia with drop in HH - UGIB.  has prior hx gastric 

ulcer seen on EGD 2/26/18.  was on plavix, this is held.  


    denies abd pain.  


3/20/18 s/p EGD found ulcer with visible vessel, s/p epi inj, cautery, clips x 

3.  mild decrease HH.  


03/21/18 fatty liver noted on 03/16/18 gallbladder ultrasound, nonobstructing 

stone.  Patient randomly talking, altered mental status.  Currently denies any 

abdominal pain.  Tolerating pured and thin liquids. 





PLAN


- Diet change , Pured and thin liquids recommended per speech therapy. 


- PPI


- carafate


- EGD 2 months


- if bleeds again, IR consult for embolization


- monitor labs


- supportive care


- G I will sign off for now please call if any further needs





seen by myself and dr Matta and this note is on her behalf











Beti Null Mar 21, 2018 14:52

## 2018-03-21 NOTE — EKG
Date Performed: 03/20/2018       Time Performed: 19:26:08

 

PTAGE:      72 years

 

EKG:      Sinus tachycardia. Left bundle branch block Possible lateral infarct - age undetermined Abn
ormal ECG

 

PREVIOUS TRACING       : 03/18/2018 21.38 Since the previous tracing, no significant change noted

 

DOCTOR:   Rao Florence  Interpretating Date/Time  03/21/2018 20:37:25

## 2018-03-21 NOTE — HHI.PR
Subjective


Remarks


Follow-up GI bleed and hypertension.  No further GI bleed.  BP much improved.  

Cardene drip off all day today.  Discussed with nursing.





Objective


Vitals





Vital Signs








  Date Time  Temp Pulse Resp B/P (MAP) Pulse Ox O2 Delivery O2 Flow Rate FiO2


 


3/21/18 12:00 98.3 65 19 157/72 (100) 100   


 


3/21/18 08:00 98.0 82 19 158/74 (102) 99   


 


3/21/18 04:00 98.4 78 18 122/64 (83) 99   


 


3/21/18 00:00 98.1 104 21 95/52 (66) 98   


 


3/20/18 20:00 97.9 90 13 133/71 (91) 97   














I/O      


 


 3/20/18 3/20/18 3/20/18 3/21/18 3/21/18 3/21/18





 07:00 15:00 23:00 07:00 15:00 23:00


 


Intake Total 1000 ml 156 ml 2600 ml 480 ml  


 


Output Total   1500 ml 1750 ml  


 


Balance 1000 ml 156 ml 1100 ml -1270 ml  


 


      


 


Intake Oral   240 ml 480 ml  


 


IV Total 1000 ml 126 ml 2360 ml   


 


Blood Product IV Normal Saline Flush  30 ml    


 


Output Urine Total   1500 ml 1750 ml  


 


# Voids 3  5   


 


# Bowel Movements 1  0 0  








Result Diagram:  


3/21/18 0448                                                                   

             3/21/18 0448





Imaging





Last Impressions








Head CT 3/16/18 0000 Signed





Impressions: 





 Service Date/Time:  Friday, March 16, 2018 19:56 - CONCLUSION:  No acute 





 abnormality demonstrated. Atrophy and chronic white matter changes are again 





 seen. Evolving right pericallosal subacute infarct. No evidence of a large 





 ischemic event.     Ministerio Martin MD 


 


Gall Bladder Ultrasound 3/16/18 0000 Signed





Impressions: 





 Service Date/Time:  Friday, March 16, 2018 20:36 - CONCLUSION:  Fatty liver 

and 





 a tiny nonobstructing right renal stone. Otherwise negative.     Ministerio Martin MD 


 


Chest X-Ray 3/16/18 0000 Signed





Impressions: 





 Service Date/Time:  Friday, March 16, 2018 20:02 - CONCLUSION:  Mild left base 





 atelectasis.     Ministerio Martin MD 








Objective Remarks


GENERAL:   male lying in bed


SKIN: No rashes, ecchymoses or lesions. Cool and dry.


Pupils reactive to light right slightly larger than left 2 mm versus 1 mm which 

according to patient is chronic


CARDIOVASCULAR: Regular rate and rhythm without murmurs, gallops, or rubs. 


RESPIRATORY: Clear to auscultation. Breath sounds equal bilaterally. No wheezes

, rales, or rhonchi.  


GASTROINTESTINAL: Abdomen soft, non-tender, nondistended. No guarding.


MUSCULOSKELETAL: Extremities without clubbing, cyanosis, or edema. No joint 

tenderness, effusion, or edema noted. No calf tenderness. Negative Homans sign 

bilaterally.


NEUROLOGICAL: Awake and alert.  Left facial droop.  Dysarthric.  Generalized 

weakness weak on the left side


Procedures


none





A/P


Problem List:  


(1) Sepsis


ICD Code:  A41.9 - Sepsis, unspecified organism


Status:  Acute


(2) UTI (urinary tract infection)


ICD Code:  N39.0 - Urinary tract infection, site not specified


Status:  Acute


Assessment and Plan


1.  Sepsis secondary to UTI Cx E coli. Blood cultures negative to date status 

post Ceftin however leukocytosis noted today.  No fever and patient has no new 

complaints.  Will repeat CBC in the morning





2.  Encephalopathy secondary to sepsis.  Improved





2.  Elevated bilirubin. Gallbladder ultrasound significant for fatty liver. 

Continue to monitor





3.  History of recent CVA.  Stable continue statin.  Restart Plavix  cleared by 

GI





4.  Hypertension/bipolar/GERD.  BP much improved status post Cardene drip and 

IV Lasix.  Norvasc has been increased to 5 minutes twice a day continue Coreg 

and Flomax and IV as needed Vasotec and hydralazine continue to monitor.  





5.  Diabetes mellitus.  Fingersticks stable





6.  DANE.  CK within normal limits.  Improved on IV fluids





7.  UGI bleed.  History of gastric ulcer in February 2018.  3/20/18 s/p EGD 

found ulcer with visible vessel, s/p epi inj, cautery, clips x 3.  Diet 

advanced to heart healthy.  Ct PPI, carafate, EGD 2 months. If bleeds again, IR 

consult for embolization.  Upper GI okay to restart antiplatelets/

anticoagulation





8.  Anemia secondary to acute blood loss status post blood transfusion.  

Improved after 1 unit packed RBC transfusion keep hemoglobin at least 8





FEN


Heart healthy 


Electrolytes: Monitor during sleep when necessary


Heparin has been restarted


Discharge Planning


We will transfer patient to the floor and discharge to rehab tomorrow





Problem Qualifiers





(1) Sepsis:  


Qualified Codes:  A41.9 - Sepsis, unspecified organism


(2) UTI (urinary tract infection):  


Qualified Codes:  N30.00 - Acute cystitis without hematuria








Abdoul Polk MD Mar 21, 2018 17:02

## 2018-03-22 VITALS
TEMPERATURE: 98.2 F | SYSTOLIC BLOOD PRESSURE: 122 MMHG | HEART RATE: 95 BPM | RESPIRATION RATE: 18 BRPM | OXYGEN SATURATION: 96 % | DIASTOLIC BLOOD PRESSURE: 62 MMHG

## 2018-03-22 VITALS
SYSTOLIC BLOOD PRESSURE: 148 MMHG | OXYGEN SATURATION: 92 % | HEART RATE: 98 BPM | DIASTOLIC BLOOD PRESSURE: 71 MMHG | TEMPERATURE: 97.8 F | RESPIRATION RATE: 18 BRPM

## 2018-03-22 VITALS
DIASTOLIC BLOOD PRESSURE: 63 MMHG | RESPIRATION RATE: 19 BRPM | TEMPERATURE: 98.3 F | SYSTOLIC BLOOD PRESSURE: 131 MMHG | HEART RATE: 92 BPM | OXYGEN SATURATION: 96 %

## 2018-03-22 VITALS
OXYGEN SATURATION: 93 % | HEART RATE: 71 BPM | SYSTOLIC BLOOD PRESSURE: 111 MMHG | TEMPERATURE: 98.7 F | DIASTOLIC BLOOD PRESSURE: 59 MMHG | RESPIRATION RATE: 20 BRPM

## 2018-03-22 VITALS
HEART RATE: 90 BPM | OXYGEN SATURATION: 95 % | RESPIRATION RATE: 20 BRPM | SYSTOLIC BLOOD PRESSURE: 133 MMHG | TEMPERATURE: 99.8 F | DIASTOLIC BLOOD PRESSURE: 65 MMHG

## 2018-03-22 VITALS
TEMPERATURE: 99.6 F | RESPIRATION RATE: 20 BRPM | SYSTOLIC BLOOD PRESSURE: 112 MMHG | OXYGEN SATURATION: 95 % | HEART RATE: 92 BPM | DIASTOLIC BLOOD PRESSURE: 61 MMHG

## 2018-03-22 LAB
BASOPHILS # BLD AUTO: 0.1 TH/MM3 (ref 0–0.2)
BASOPHILS NFR BLD: 0.6 % (ref 0–2)
BUN SERPL-MCNC: 15 MG/DL (ref 7–18)
CALCIUM SERPL-MCNC: 9.8 MG/DL (ref 8.5–10.1)
CHLORIDE SERPL-SCNC: 111 MEQ/L (ref 98–107)
CREAT SERPL-MCNC: 1.3 MG/DL (ref 0.6–1.3)
EOSINOPHIL # BLD: 0.4 TH/MM3 (ref 0–0.4)
EOSINOPHIL NFR BLD: 3.2 % (ref 0–4)
ERYTHROCYTE [DISTWIDTH] IN BLOOD BY AUTOMATED COUNT: 15.3 % (ref 11.6–17.2)
GFR SERPLBLD BASED ON 1.73 SQ M-ARVRAT: 54 ML/MIN (ref 89–?)
GLUCOSE SERPL-MCNC: 124 MG/DL (ref 74–106)
HCO3 BLD-SCNC: 28 MEQ/L (ref 21–32)
HCT VFR BLD CALC: 27.2 % (ref 39–51)
HGB BLD-MCNC: 9.4 GM/DL (ref 13–17)
LYMPHOCYTES # BLD AUTO: 1.6 TH/MM3 (ref 1–4.8)
LYMPHOCYTES NFR BLD AUTO: 13.2 % (ref 9–44)
MAGNESIUM SERPL-MCNC: 1.7 MG/DL (ref 1.5–2.5)
MCH RBC QN AUTO: 31 PG (ref 27–34)
MCHC RBC AUTO-ENTMCNC: 34.5 % (ref 32–36)
MCV RBC AUTO: 90 FL (ref 80–100)
MONOCYTE #: 1 TH/MM3 (ref 0–0.9)
MONOCYTES NFR BLD: 8.5 % (ref 0–8)
NEUTROPHILS # BLD AUTO: 9 TH/MM3 (ref 1.8–7.7)
NEUTROPHILS NFR BLD AUTO: 74.5 % (ref 16–70)
PLATELET # BLD: 382 TH/MM3 (ref 150–450)
PMV BLD AUTO: 7.5 FL (ref 7–11)
RBC # BLD AUTO: 3.02 MIL/MM3 (ref 4.5–5.9)
SODIUM SERPL-SCNC: 145 MEQ/L (ref 136–145)
WBC # BLD AUTO: 12.1 TH/MM3 (ref 4–11)

## 2018-03-22 RX ADMIN — TAMSULOSIN HYDROCHLORIDE SCH MG: 0.4 CAPSULE ORAL at 20:11

## 2018-03-22 RX ADMIN — ONDANSETRON PRN MG: 2 INJECTION, SOLUTION INTRAMUSCULAR; INTRAVENOUS at 18:27

## 2018-03-22 RX ADMIN — Medication SCH ML: at 20:12

## 2018-03-22 RX ADMIN — CLOPIDOGREL BISULFATE SCH MG: 75 TABLET, FILM COATED ORAL at 10:21

## 2018-03-22 RX ADMIN — PANTOPRAZOLE SCH MG: 40 TABLET, DELAYED RELEASE ORAL at 20:11

## 2018-03-22 RX ADMIN — ATORVASTATIN CALCIUM SCH MG: 40 TABLET, FILM COATED ORAL at 20:11

## 2018-03-22 RX ADMIN — SUCRALFATE SCH GM: 1 TABLET ORAL at 18:22

## 2018-03-22 RX ADMIN — HEPARIN SODIUM SCH UNITS: 10000 INJECTION, SOLUTION INTRAVENOUS; SUBCUTANEOUS at 20:12

## 2018-03-22 RX ADMIN — SUCRALFATE SCH GM: 1 TABLET ORAL at 05:59

## 2018-03-22 RX ADMIN — LITHIUM CARBONATE SCH MG: 300 CAPSULE, GELATIN COATED ORAL at 20:12

## 2018-03-22 RX ADMIN — Medication SCH ML: at 10:21

## 2018-03-22 RX ADMIN — HEPARIN SODIUM SCH UNITS: 10000 INJECTION, SOLUTION INTRAVENOUS; SUBCUTANEOUS at 10:21

## 2018-03-22 RX ADMIN — CARVEDILOL SCH MG: 3.12 TABLET, FILM COATED ORAL at 10:21

## 2018-03-22 RX ADMIN — PANTOPRAZOLE SCH MG: 40 TABLET, DELAYED RELEASE ORAL at 10:21

## 2018-03-22 NOTE — MB
cc:

Eugene Polo MD

****

 

 

DATE:

03/22/2018

 

REQUESTING PHYSICIAN:

Dr. Polk.

 

REASON FOR CONSULTATION:

Pulmonary management.

 

HISTORY OF PRESENT ILLNESS:

Mr. Luevano is a pleasant 72-year-old  male with a history of 

hypertension, bipolar disorder.  He recently was admitted to the 

hospital with a GI bleed, also had a syncopal episode, which were 

attributed to heart block secondary to blood pressure medication.  

Then, a few weeks ago he was admitted with CVA and left-sided 

weakness.  The patient was discharged to nursing home.  He was brought

from the nursing home with altered mental status.  He was found to be 

in urosepsis, treated with antibiotic and feels significantly better.

 

The patient's daughter is at the bedside who provided most of the 

information.  The patient before this acute event a few weeks ago, he 

was on his own, living in his own apartment.

 

LABORATORY DATA:

WBC count 12.1, hemoglobin 9.4, hematocrit 27.2, MCV 90, platelet 

count 382.  Sodium 145, potassium 3.4, chloride 111, CO2 28, BUN 15, 

creatinine 1.30.  INR is 1.1.

 

His chest x-ray shows basal atelectasis.  Urine culture shows E coli.

 

PAST MEDICAL HISTORY:

Significant for history of hypertension, GI bleed, cerebrovascular 

accident with left-sided weakness, history of left hand surgery.

 

MEDICATIONS:

He is currently taking Protonix 40 mg a day, Plavix 75 mg a day, 

heparin 5000 q. 12 hour, amlodipine 5 mg twice a day, hydralazine 20 

mg p.r.n., Carafate 1 gram twice a day, lithium carbonate 600 mg at 

nighttime, Prozac 40 mg a day, Lipitor 40 mg a day, Coreg 3.125 mg 

twice a day.

 

ALLERGIES:

LISTED TO CODEINE, INSULIN, PORK.

 

SOCIAL HISTORY:

He is a .  He was living alone until his recent stroke.  He 

worked as a .  No history of smoking or alcohol abuse.

 

FAMILY HISTORY:

He has 1 daughter who lives in [facility].

 

REVIEW OF SYSTEMS:

The patient is now in the bed, able to talk, sometimes he has coughing

spells when he eats.  Prior to this episode, he did not have any 

seizure, stroke, DVT, pulmonary embolism, no malignancy.

 

PHYSICAL EXAMINATION:

GENERAL:  Well-built, well-nourished, not in acute distress.

VITAL SIGNS: Blood pressure 111/59, heart rate 71, respirations 20, 

temperature 98.7.

HEENT:  Pupils are equal and reactive to light.  Oral mucosa and nasal

mucosa normal.

NECK:  Supple.  JVP not raised.

CHEST:   Equal bilateral air entry. No rhonchi.

CARDIOVASCULAR: S1, S2 normal.

ABDOMEN:  Benign.

EXTREMITIES: No edema.

CENTRAL NERVOUS SYSTEM:  He is alert, oriented x 3.  He has left-sided

weakness.

 

IMPRESSION:

 

1.  History of bronchial asthma.

2.  Coughing spells, possible aspiration.

3.  CVA with left-sided weakness.

4.  Hypertension.

5.  History of gastrointestinal bleed.

6.  Bipolar disorder.

 

PLAN:

I discussed with the patient and his daughter, will give him aerosol 

treatment as needed.  He is stable on room air.  Speech therapy is 

evaluating the patient.  Continue Plavix and heparin.  Further 

treatment plan will depend on his course in the hospital.

 

Thank you Dr. Polk for this consult.

 

 

 

__________________________________

MD PAVITHRA Zepeda/HAMILTON

D: 03/22/2018, 03:00 PM

T: 03/22/2018, 03:46 PM

Visit #: [id number]

Job #: [id number]

## 2018-03-22 NOTE — HHI.PR
Subjective


Remarks


Follow-up hypertension.  BP much improved.  He does not have any complaints 

except he is not happy because his missed breakfast.  Patient already discharge 

pending SNF authorization.  Discussed with nursing





Objective


Vitals





Vital Signs








  Date Time  Temp Pulse Resp B/P (MAP) Pulse Ox O2 Delivery O2 Flow Rate FiO2


 


3/22/18 08:00 99.6 92 20 112/61 (78) 95   


 


3/22/18 04:00 98.2 95 18 122/62 (82) 96   


 


3/22/18 00:00 98.3 92 19 131/63 (85) 96   


 


3/21/18 20:00 98.2 98 20 165/82 (109) 100   


 


3/21/18 16:00 97.9 78 18 189/86 (120) 99   


 


3/21/18 12:00 98.3 65 19 157/72 (100) 100   














I/O      


 


 3/21/18 3/21/18 3/21/18 3/22/18 3/22/18 3/22/18





 07:00 15:00 23:00 07:00 15:00 23:00


 


Intake Total 480 ml  360 ml 480 ml  


 


Output Total 1750 ml   1200 ml  


 


Balance -1270 ml  360 ml -720 ml  


 


      


 


Intake Oral 480 ml  360 ml 480 ml  


 


Output Urine Total 1750 ml   1200 ml  


 


# Voids   4   


 


# Bowel Movements 0   0  








Result Diagram:  


3/22/18 0521                                                                   

             3/22/18 0521





Imaging





Last Impressions








Head CT 3/16/18 0000 Signed





Impressions: 





 Service Date/Time:  Friday, March 16, 2018 19:56 - CONCLUSION:  No acute 





 abnormality demonstrated. Atrophy and chronic white matter changes are again 





 seen. Evolving right pericallosal subacute infarct. No evidence of a large 





 ischemic event.     Ministerio Martin MD 


 


Gall Bladder Ultrasound 3/16/18 0000 Signed





Impressions: 





 Service Date/Time:  Friday, March 16, 2018 20:36 - CONCLUSION:  Fatty liver 

and 





 a tiny nonobstructing right renal stone. Otherwise negative.     Ministerio Martin MD 


 


Chest X-Ray 3/16/18 0000 Signed





Impressions: 





 Service Date/Time:  Friday, March 16, 2018 20:02 - CONCLUSION:  Mild left base 





 atelectasis.     Ministerio Martin MD 








Objective Remarks


GENERAL:   male lying in bed


SKIN: No rashes, ecchymoses or lesions. Cool and dry.


Pupils reactive to light right slightly larger than left 2 mm versus 1 mm which 

according to patient is chronic


CARDIOVASCULAR: Regular rate and rhythm without murmurs, gallops, or rubs. 


RESPIRATORY: Clear to auscultation. Breath sounds equal bilaterally. No wheezes

, rales, or rhonchi.  


GASTROINTESTINAL: Abdomen soft, non-tender, nondistended. No guarding.


MUSCULOSKELETAL: Extremities without clubbing, cyanosis, or edema. No joint 

tenderness, effusion, or edema noted. No calf tenderness. Negative Homans sign 

bilaterally.


NEUROLOGICAL: Awake and alert.  Left facial droop.  Dysarthric.  Generalized 

weakness weak on the left side


Procedures


none





A/P


Problem List:  


(1) Sepsis


ICD Code:  A41.9 - Sepsis, unspecified organism


Status:  Acute


(2) UTI (urinary tract infection)


ICD Code:  N39.0 - Urinary tract infection, site not specified


Status:  Acute


Assessment and Plan


1.  Sepsis secondary to UTI Cx E coli. Blood cultures negative to date status 

post Ceftin.  Clinically stable.   No fever and patient has no new complaints.  

Improved leukocytosis





2.  Encephalopathy secondary to sepsis.  Improved





2.  Elevated bilirubin. Gallbladder ultrasound significant for fatty liver. 

Continue to monitor





3.  History of recent CVA.  Stable continue statin.  Restart Plavix  cleared by 

GI





4.  Hypertension/bipolar/GERD.  BP much improved status post Cardene drip and 

IV Lasix.  Norvasc has been increased to 5 minutes twice a day continue Coreg 

and Flomax and IV as needed Vasotec and hydralazine continue to monitor.  





5.  Diabetes mellitus.  Fingersticks stable





6.  DANE.  CK within normal limits.  Improved on IV fluids





7.  UGI bleed.  History of gastric ulcer in February 2018.  3/20/18 s/p EGD 

found ulcer with visible vessel, s/p epi inj, cautery, clips x 3.  Diet 

advanced to heart healthy.  Ct PPI, carafate, EGD 2 months. If bleeds again, IR 

consult for embolization.  Upper GI okay to restart antiplatelets/

anticoagulation





8.  Anemia secondary to acute blood loss status post blood transfusion.  

Improved after 1 unit packed RBC transfusion keep hemoglobin at least 8





FEN


Heart healthy 


Electrolytes: Monitor during sleep when necessary


Heparin has been restarted


Discharge Planning


Discharge to rehab when authorized.





Problem Qualifiers





(1) Sepsis:  


Qualified Codes:  A41.9 - Sepsis, unspecified organism


(2) UTI (urinary tract infection):  


Qualified Codes:  N30.00 - Acute cystitis without hematuria








Abdoul Polk MD Mar 22, 2018 11:18

## 2018-03-23 VITALS
TEMPERATURE: 98 F | HEART RATE: 80 BPM | RESPIRATION RATE: 17 BRPM | DIASTOLIC BLOOD PRESSURE: 76 MMHG | SYSTOLIC BLOOD PRESSURE: 166 MMHG | OXYGEN SATURATION: 93 %

## 2018-03-23 VITALS
RESPIRATION RATE: 18 BRPM | DIASTOLIC BLOOD PRESSURE: 74 MMHG | HEART RATE: 92 BPM | SYSTOLIC BLOOD PRESSURE: 157 MMHG | TEMPERATURE: 96.8 F | OXYGEN SATURATION: 97 %

## 2018-03-23 VITALS
TEMPERATURE: 98.7 F | OXYGEN SATURATION: 96 % | SYSTOLIC BLOOD PRESSURE: 183 MMHG | HEART RATE: 84 BPM | RESPIRATION RATE: 17 BRPM | DIASTOLIC BLOOD PRESSURE: 86 MMHG

## 2018-03-23 VITALS
HEART RATE: 95 BPM | TEMPERATURE: 98.6 F | DIASTOLIC BLOOD PRESSURE: 85 MMHG | SYSTOLIC BLOOD PRESSURE: 164 MMHG | RESPIRATION RATE: 18 BRPM | OXYGEN SATURATION: 94 %

## 2018-03-23 VITALS
HEART RATE: 79 BPM | SYSTOLIC BLOOD PRESSURE: 142 MMHG | DIASTOLIC BLOOD PRESSURE: 59 MMHG | OXYGEN SATURATION: 96 % | TEMPERATURE: 99 F | RESPIRATION RATE: 20 BRPM

## 2018-03-23 VITALS
HEART RATE: 76 BPM | SYSTOLIC BLOOD PRESSURE: 135 MMHG | DIASTOLIC BLOOD PRESSURE: 67 MMHG | TEMPERATURE: 98.3 F | RESPIRATION RATE: 18 BRPM | OXYGEN SATURATION: 96 %

## 2018-03-23 LAB
BASOPHILS # BLD AUTO: 0.1 TH/MM3 (ref 0–0.2)
BASOPHILS NFR BLD: 0.4 % (ref 0–2)
BUN SERPL-MCNC: 14 MG/DL (ref 7–18)
CALCIUM SERPL-MCNC: 9.6 MG/DL (ref 8.5–10.1)
CHLORIDE SERPL-SCNC: 108 MEQ/L (ref 98–107)
CREAT SERPL-MCNC: 1.39 MG/DL (ref 0.6–1.3)
EOSINOPHIL # BLD: 0.5 TH/MM3 (ref 0–0.4)
EOSINOPHIL NFR BLD: 4.6 % (ref 0–4)
ERYTHROCYTE [DISTWIDTH] IN BLOOD BY AUTOMATED COUNT: 15.3 % (ref 11.6–17.2)
GFR SERPLBLD BASED ON 1.73 SQ M-ARVRAT: 50 ML/MIN (ref 89–?)
GLUCOSE SERPL-MCNC: 101 MG/DL (ref 74–106)
HCO3 BLD-SCNC: 28 MEQ/L (ref 21–32)
HCT VFR BLD CALC: 25.3 % (ref 39–51)
HGB BLD-MCNC: 8.5 GM/DL (ref 13–17)
LYMPHOCYTES # BLD AUTO: 2.2 TH/MM3 (ref 1–4.8)
LYMPHOCYTES NFR BLD AUTO: 19.6 % (ref 9–44)
MAGNESIUM SERPL-MCNC: 1.8 MG/DL (ref 1.5–2.5)
MCH RBC QN AUTO: 30.5 PG (ref 27–34)
MCHC RBC AUTO-ENTMCNC: 33.7 % (ref 32–36)
MCV RBC AUTO: 90.5 FL (ref 80–100)
MONOCYTE #: 1 TH/MM3 (ref 0–0.9)
MONOCYTES NFR BLD: 9.3 % (ref 0–8)
NEUTROPHILS # BLD AUTO: 7.4 TH/MM3 (ref 1.8–7.7)
NEUTROPHILS NFR BLD AUTO: 66.1 % (ref 16–70)
PLATELET # BLD: 353 TH/MM3 (ref 150–450)
PMV BLD AUTO: 7.6 FL (ref 7–11)
RBC # BLD AUTO: 2.8 MIL/MM3 (ref 4.5–5.9)
SODIUM SERPL-SCNC: 144 MEQ/L (ref 136–145)
WBC # BLD AUTO: 11.2 TH/MM3 (ref 4–11)

## 2018-03-23 RX ADMIN — PANTOPRAZOLE SCH MG: 40 TABLET, DELAYED RELEASE ORAL at 09:54

## 2018-03-23 RX ADMIN — HEPARIN SODIUM SCH UNITS: 10000 INJECTION, SOLUTION INTRAVENOUS; SUBCUTANEOUS at 20:43

## 2018-03-23 RX ADMIN — Medication SCH ML: at 20:43

## 2018-03-23 RX ADMIN — LITHIUM CARBONATE SCH MG: 300 CAPSULE, GELATIN COATED ORAL at 20:40

## 2018-03-23 RX ADMIN — Medication SCH ML: at 09:55

## 2018-03-23 RX ADMIN — STANDARDIZED SENNA CONCENTRATE AND DOCUSATE SODIUM SCH TAB: 8.6; 5 TABLET, FILM COATED ORAL at 10:40

## 2018-03-23 RX ADMIN — ATORVASTATIN CALCIUM SCH MG: 40 TABLET, FILM COATED ORAL at 20:39

## 2018-03-23 RX ADMIN — SUCRALFATE SCH GM: 1 TABLET ORAL at 06:16

## 2018-03-23 RX ADMIN — CLOPIDOGREL BISULFATE SCH MG: 75 TABLET, FILM COATED ORAL at 09:54

## 2018-03-23 RX ADMIN — PANTOPRAZOLE SCH MG: 40 TABLET, DELAYED RELEASE ORAL at 20:38

## 2018-03-23 RX ADMIN — STANDARDIZED SENNA CONCENTRATE AND DOCUSATE SODIUM SCH TAB: 8.6; 5 TABLET, FILM COATED ORAL at 20:43

## 2018-03-23 RX ADMIN — CARVEDILOL SCH MG: 3.12 TABLET, FILM COATED ORAL at 09:54

## 2018-03-23 RX ADMIN — SUCRALFATE SCH GM: 1 TABLET ORAL at 16:33

## 2018-03-23 RX ADMIN — ENALAPRILAT PRN MG: 1.25 INJECTION INTRAVENOUS at 12:08

## 2018-03-23 RX ADMIN — HEPARIN SODIUM SCH UNITS: 10000 INJECTION, SOLUTION INTRAVENOUS; SUBCUTANEOUS at 09:54

## 2018-03-23 RX ADMIN — TAMSULOSIN HYDROCHLORIDE SCH MG: 0.4 CAPSULE ORAL at 20:40

## 2018-03-23 NOTE — HHI.PR
Subjective


Remarks


73 YO  male with


Br asthma, Bipolar disorder, CVA


Breathing well


on RA


No SOB


Feels tired





Objective


Vital Signs





Vital Signs








  Date Time  Temp Pulse Resp B/P (MAP) Pulse Ox O2 Delivery O2 Flow Rate FiO2


 


3/23/18 16:00 98.3 76 18 135/67 (89) 96   


 


3/23/18 12:00 98.7 84 17 183/86 (118) 96   


 


3/23/18 08:00 98.0 80 17 166/76 (106) 93   


 


3/23/18 04:00 98.6 95 18 164/85 (111) 94   


 


3/23/18 00:22 96.8 92 18 157/74 (101) 97   


 


3/22/18 20:00 97.8 98 18 148/71 (96) 92   














I/O      


 


 3/22/18 3/22/18 3/22/18 3/23/18 3/23/18 3/23/18





 07:00 15:00 23:00 07:00 15:00 23:00


 


Intake Total 480 ml  1640 ml 760 ml  


 


Output Total 1200 ml  250 ml 1200 ml  


 


Balance -720 ml  1390 ml -440 ml  


 


      


 


Intake Oral 480 ml  1640 ml 760 ml  


 


Output Urine Total 1200 ml  250 ml 1200 ml  


 


# Bowel Movements 0  0 1  








Result Diagram:  


3/23/18 0345                                                                   

             3/23/18 0345





Objective Remarks


GENERAL: MBMN male,NAD


SKIN: Warm and dry.


HEAD: Normocephalic.


EYES: No scleral icterus. No injection or drainage. 


NECK: Supple, trachea midline. No JVD or lymphadenopathy.


CARDIOVASCULAR: Regular rate and rhythm without murmurs, gallops, or rubs. 


RESPIRATORY: Breath sounds equal bilaterally. No accessory muscle use.


GASTROINTESTINAL: Abdomen soft, non-tender, nondistended. 


MUSCULOSKELETAL: No cyanosis, or edema. 


Left sided weakness


BACK: Nontender without obvious deformity. No CVA tenderness.








A/P


Assessment and Plan


IMPRESSION:





1.  History of bronchial asthma.


2.  Coughing spells, possible aspiration.


3.  CVA with left-sided weakness.


4.  Hypertension.


5.  History of gastrointestinal bleed.


6.  Bipolar disorder.





PLAN:





Aerosol nebs


Cont Other meds


Stable on RA











Eugene Polo MD Mar 23, 2018 18:28

## 2018-03-23 NOTE — HHI.PR
Subjective


Remarks


f/u for sepsis and UTI


Per patient nurse at baseline patient has expressive aphasia.


patient  has no complaints. He follows [facility].  


no fevers overnight.





Objective


Vitals





Vital Signs








  Date Time  Temp Pulse Resp B/P (MAP) Pulse Ox O2 Delivery O2 Flow Rate FiO2


 


3/23/18 08:00 98.0 80 17 166/76 (106) 93   


 


3/23/18 04:00 98.6 95 18 164/85 (111) 94   


 


3/23/18 00:22 96.8 92 18 157/74 (101) 97   


 


3/22/18 20:00 97.8 98 18 148/71 (96) 92   


 


3/22/18 16:00 99.8 90 20 133/65 (87) 95   


 


3/22/18 12:00 98.7 71 20 111/59 (76) 93   














I/O      


 


 3/22/18 3/22/18 3/22/18 3/23/18 3/23/18 3/23/18





 06:59 14:59 22:59 06:59 14:59 22:59


 


Intake Total 480 ml  1640 ml 760 ml  


 


Output Total 1200 ml  250 ml 1200 ml  


 


Balance -720 ml  1390 ml -440 ml  


 


      


 


Intake Oral 480 ml  1640 ml 760 ml  


 


Output Urine Total 1200 ml  250 ml 1200 ml  


 


# Bowel Movements 0  0 1  








Result Diagram:  


3/23/18 0345                                                                   

             3/23/18 0345





Objective Remarks


GENERAL:   male lying in bed


SKIN: No rashes, ecchymoses or lesions. Cool and dry.


CARDIOVASCULAR: Regular rate and rhythm without murmurs, gallops, or rubs. 


RESPIRATORY: Clear to auscultation. Breath sounds equal bilaterally. No wheezes

, rales, or rhonchi.  


GASTROINTESTINAL: Abdomen soft, non-tender, nondistended. No guarding.


MUSCULOSKELETAL: Extremities without clubbing, cyanosis, or edema. No joint 

tenderness, effusion, or edema noted. No calf tenderness. Negative Homans sign 

bilaterally.


NEUROLOGICAL: Awake and alert.  Left facial droop.  Dysarthric.  Generalized 

weakness weak on the left side. This has been his baseline.


Procedures


none





A/P


Problem List:  


(1) Sepsis


ICD Code:  A41.9 - Sepsis, unspecified organism


Status:  Acute


(2) UTI (urinary tract infection)


ICD Code:  N39.0 - Urinary tract infection, site not specified


Status:  Acute


Assessment and Plan


73 y/o F with hx of CVA with expressive aphasia





1.  Sepsis secondary to UTI Cx E coli. Blood cultures negative to date status 

post Ceftin.  Clinically stable.   No fever and patient has no new complaints.  

Improved leukocytosis





2.  Encephalopathy secondary to sepsis.  Improved at baseline with expressive 

aphasia. 





2.  Elevated bilirubin. Gallbladder ultrasound significant for fatty liver. 

Continue to monitor





3.  History of recent CVA.  Stable continue statin.  Restart Plavix  cleared by 

GI





4.  Hypertension/bipolar/GERD.  BP much improved status post Cardene drip and 

IV Lasix.  on norvasc, Coreg and Flomax and IV as needed Vasotec and 

hydralazine continue to monitor.  





5.  Diabetes mellitus.  Fingersticks stable





6.  DANE.  CK within normal limits.  Improved on IV fluids





7.  UGI bleed.  History of gastric ulcer in February 2018.  3/20/18 s/p EGD 

found ulcer with visible vessel, s/p epi inj, cautery, clips x 3.  Diet 

advanced to heart healthy.  Ct PPI, carafate, EGD 2 months. If bleeds again, IR 

consult for embolization.  Upper GI okay to restart antiplatelets/

anticoagulation





8.  Anemia secondary to acute blood loss status post blood transfusion.  

Improved after 1 unit packed RBC transfusion keep hemoglobin at least 8





FEN


Heart healthy 


Electrolytes: Monitor during sleep when necessary


Heparin has been restarted


Discharge Planning


patient was cleared for discharge yesterday. pending placement.





Problem Qualifiers





(1) Sepsis:  


Qualified Codes:  A41.9 - Sepsis, unspecified organism


(2) UTI (urinary tract infection):  


Qualified Codes:  N30.00 - Acute cystitis without hematuria








Romelia Erwin MD Mar 23, 2018 10:20

## 2018-03-24 VITALS
RESPIRATION RATE: 20 BRPM | TEMPERATURE: 98.1 F | OXYGEN SATURATION: 96 % | HEART RATE: 75 BPM | SYSTOLIC BLOOD PRESSURE: 135 MMHG | DIASTOLIC BLOOD PRESSURE: 65 MMHG

## 2018-03-24 VITALS
TEMPERATURE: 98.6 F | OXYGEN SATURATION: 97 % | RESPIRATION RATE: 18 BRPM | SYSTOLIC BLOOD PRESSURE: 133 MMHG | HEART RATE: 78 BPM | DIASTOLIC BLOOD PRESSURE: 58 MMHG

## 2018-03-24 VITALS
SYSTOLIC BLOOD PRESSURE: 119 MMHG | RESPIRATION RATE: 18 BRPM | HEART RATE: 80 BPM | TEMPERATURE: 98.1 F | OXYGEN SATURATION: 97 % | DIASTOLIC BLOOD PRESSURE: 61 MMHG

## 2018-03-24 VITALS
SYSTOLIC BLOOD PRESSURE: 157 MMHG | HEART RATE: 70 BPM | OXYGEN SATURATION: 97 % | DIASTOLIC BLOOD PRESSURE: 72 MMHG | RESPIRATION RATE: 18 BRPM | TEMPERATURE: 98.4 F

## 2018-03-24 VITALS
RESPIRATION RATE: 17 BRPM | SYSTOLIC BLOOD PRESSURE: 152 MMHG | TEMPERATURE: 97.7 F | OXYGEN SATURATION: 96 % | DIASTOLIC BLOOD PRESSURE: 73 MMHG | HEART RATE: 88 BPM

## 2018-03-24 LAB
BUN SERPL-MCNC: 10 MG/DL (ref 7–18)
CALCIUM SERPL-MCNC: 9.4 MG/DL (ref 8.5–10.1)
CHLORIDE SERPL-SCNC: 105 MEQ/L (ref 98–107)
CREAT SERPL-MCNC: 1.34 MG/DL (ref 0.6–1.3)
GFR SERPLBLD BASED ON 1.73 SQ M-ARVRAT: 52 ML/MIN (ref 89–?)
GLUCOSE SERPL-MCNC: 118 MG/DL (ref 74–106)
HCO3 BLD-SCNC: 31.3 MEQ/L (ref 21–32)
SODIUM SERPL-SCNC: 142 MEQ/L (ref 136–145)

## 2018-03-24 RX ADMIN — STANDARDIZED SENNA CONCENTRATE AND DOCUSATE SODIUM SCH TAB: 8.6; 5 TABLET, FILM COATED ORAL at 08:51

## 2018-03-24 RX ADMIN — LITHIUM CARBONATE SCH MG: 300 CAPSULE, GELATIN COATED ORAL at 20:01

## 2018-03-24 RX ADMIN — STANDARDIZED SENNA CONCENTRATE AND DOCUSATE SODIUM SCH TAB: 8.6; 5 TABLET, FILM COATED ORAL at 19:56

## 2018-03-24 RX ADMIN — Medication SCH ML: at 08:52

## 2018-03-24 RX ADMIN — SUCRALFATE SCH GM: 1 TABLET ORAL at 16:00

## 2018-03-24 RX ADMIN — CARVEDILOL SCH MG: 3.12 TABLET, FILM COATED ORAL at 08:51

## 2018-03-24 RX ADMIN — Medication SCH ML: at 19:57

## 2018-03-24 RX ADMIN — ATORVASTATIN CALCIUM SCH MG: 40 TABLET, FILM COATED ORAL at 19:56

## 2018-03-24 RX ADMIN — PANTOPRAZOLE SCH MG: 40 TABLET, DELAYED RELEASE ORAL at 08:51

## 2018-03-24 RX ADMIN — HEPARIN SODIUM SCH UNITS: 10000 INJECTION, SOLUTION INTRAVENOUS; SUBCUTANEOUS at 19:57

## 2018-03-24 RX ADMIN — HEPARIN SODIUM SCH UNITS: 10000 INJECTION, SOLUTION INTRAVENOUS; SUBCUTANEOUS at 08:52

## 2018-03-24 RX ADMIN — SUCRALFATE SCH GM: 1 TABLET ORAL at 06:12

## 2018-03-24 RX ADMIN — PANTOPRAZOLE SCH MG: 40 TABLET, DELAYED RELEASE ORAL at 19:56

## 2018-03-24 RX ADMIN — TAMSULOSIN HYDROCHLORIDE SCH MG: 0.4 CAPSULE ORAL at 19:56

## 2018-03-24 RX ADMIN — CLOPIDOGREL BISULFATE SCH MG: 75 TABLET, FILM COATED ORAL at 08:51

## 2018-03-24 NOTE — HHI.PR
Subjective


Remarks


Follow-up for sepsis


Patient much more alert and talkative today.  He was able to answer questions 

appropriately.  He had a lot of questions for me because I was at the knees and 

he was in the [place].  He shared his story is with me.


He denies any shortness of breathing or pain.  He felt like his left-sided 

weakness is improving.  He has no other complaints.  Patient stated he had a 

bowel movement yesterday.





Objective


Vitals





Vital Signs








  Date Time  Temp Pulse Resp B/P (MAP) Pulse Ox O2 Delivery O2 Flow Rate FiO2


 


3/24/18 08:00 98.4 70 18 157/72 (100) 97   


 


3/24/18 00:00 98.1 75 20 135/65 (88) 96   


 


3/23/18 20:00 99.0 79 20 142/59 (86) 96   


 


3/23/18 16:00 98.3 76 18 135/67 (89) 96   


 


3/23/18 12:00 98.7 84 17 183/86 (118) 96   














I/O      


 


 3/23/18 3/23/18 3/23/18 3/24/18 3/24/18 3/24/18





 07:00 15:00 23:00 07:00 15:00 23:00


 


Intake Total 760 ml  1360 ml 780 ml  


 


Output Total 1200 ml  500 ml 1000 ml  


 


Balance -440 ml  860 ml -220 ml  


 


      


 


Intake Oral 760 ml  960 ml 780 ml  


 


Packed Cells   400 ml   


 


Output Urine Total 1200 ml  500 ml 1000 ml  


 


# Voids   3   


 


# Bowel Movements 1  2 3  








Result Diagram:  


3/23/18 0345                                                                   

             3/23/18 0345





Objective Remarks


GENERAL:   male lying in bed


SKIN: No rashes, ecchymoses or lesions. Cool and dry.


CARDIOVASCULAR: Regular rate and rhythm without murmurs, gallops, or rubs. 


RESPIRATORY: Clear to auscultation. Breath sounds equal bilaterally. No wheezes

, rales, or rhonchi.  


GASTROINTESTINAL: Abdomen soft, non-tender, nondistended. No guarding.


MUSCULOSKELETAL: Extremities without clubbing, cyanosis, or edema. No joint 

tenderness, effusion, or edema noted. No calf tenderness. Negative Homans sign 

bilaterally.


NEUROLOGICAL: Awake and alert.  Left facial droop.  Dysarthric.  Generalized 

weakness weak on the left side. This has been his baseline.


Procedures


none


Medications and IVs





Current Medications


Sodium Chloride 1,000 ml @  999 mls/hr BOLUS  ONCE IV  Last administered on 3/16

/18at 20:10;  Start 3/16/18 at 20:00;  Stop 3/16/18 at 21:00;  Status DC


Piperacillin Sod/ Tazobactam Sod 50 ml @  100 mls/hr ONCE  ONCE IV  Last 

administered on 3/16/18at 20:10;  Start 3/16/18 at 20:00;  Stop 3/16/18 at 20:29

;  Status DC


Vancomycin HCl 1250 mg/Sodium Chloride 262.5 ml @  250 mls/hr ONCE  ONCE IV  

Last administered on 3/16/18at 20:42;  Start 3/16/18 at 20:00;  Stop 3/16/18 at 

21:02;  Status DC


Sodium Chloride 1,000 ml @  100 mls/hr Q10H IV  Last administered on 3/18/18at 

06:39;  Start 3/16/18 at 21:32;  Stop 3/18/18 at 15:06;  Status DC


Sodium Chloride (NS Flush) 2 ml UNSCH  PRN IV FLUSH FLUSH AFTER USING IV ACCESS

;  Start 3/16/18 at 21:45


Sodium Chloride (NS Flush) 2 ml BID IV FLUSH  Last administered on 3/24/18at 08:

52;  Start 3/17/18 at 09:00


Ondansetron HCl (Zofran Inj) 4 mg Q6H  PRN IVP NAUSEA OR VOMITING Last 

administered on 3/22/18at 18:27;  Start 3/16/18 at 21:45


Heparin Sodium (Porcine) (Heparin Inj) 5,000 units Q8H SQ  Last administered on 

3/18/18at 14:15;  Start 3/16/18 at 22:00;  Stop 3/18/18 at 17:46;  Status DC


Naloxone HCl (Narcan Inj) 0.4 mg UNSCH  PRN IV PUSH SEE LABEL COMMENTS;  Start 3

/16/18 at 21:45


Pharmacy Profile Note 0 ml @ 0 mls/hr UNSCH OTHER ;  Start 3/16/18 at 21:45;  

Stop 3/16/18 at 22:28;  Status DC


Piperacillin Sod/ Tazobactam Sod 50 ml @  100 mls/hr Q6H IV  Last administered 

on 3/18/18at 14:15;  Start 3/17/18 at 03:00;  Stop 3/18/18 at 15:06;  Status DC


Vancomycin HCl 1000 mg/Sodium Chloride 250 ml @  250 mls/hr ONCE  ONCE IV ;  

Start 3/16/18 at 22:00;  Stop 3/16/18 at 22:28;  Status DC


Albuterol/ Ipratropium (Duoneb Neb) 1 ampule Q4HR NEB  PRN NEB SOB/wheezing;  

Start 3/16/18 at 22:15


Atorvastatin Calcium (Lipitor) 40 mg HS PO  Last administered on 3/23/18at 20:39

;  Start 3/17/18 at 21:00


Carvedilol (Coreg) 3.125 mg DAILY PO  Last administered on 3/24/18at 08:51;  

Start 3/17/18 at 09:00


Clopidogrel Bisulfate (Plavix) 75 mg DAILY PO  Last administered on 3/18/18at 07

:46;  Start 3/17/18 at 09:00;  Stop 3/18/18 at 17:46;  Status DC


Fluoxetine HCl (PROzac) 40 mg HS PO  Last administered on 3/23/18at 20:39;  

Start 3/17/18 at 21:00


Lithium Carbonate (Lithium Carbonate) 600 mg HS PO  Last administered on 3/23/

18at 20:40;  Start 3/17/18 at 21:00


Pantoprazole Sodium (Protonix) 40 mg DAILY PO  Last administered on 3/18/18at 07

:46;  Start 3/17/18 at 09:00;  Stop 3/18/18 at 17:46;  Status DC


Tamsulosin HCl (Flomax) 0.4 mg HS PO  Last administered on 3/23/18at 20:40;  

Start 3/17/18 at 21:00


Dextrose (D50w (Vial) Inj) 50 ml UNSCH  PRN IV PUSH HYPOGLYCEMIA-SEE COMMENTS;  

Start 3/17/18 at 00:15


Glucagon (Glucagon Inj) 1 mg UNSCH  PRN OTHER HYPOGLYCEMIA-SEE COMMENTS;  Start 

3/17/18 at 00:15


Insulin Aspart (NovoLOG SUPPLEMENTAL SCALE) 1 ACHS SLIDING  SCALE SQ ;  Start 3/

17/18 at 08:00;  Stop 3/17/18 at 10:27;  Status DC


Cefuroxime Axetil (Ceftin) 250 mg Q12HR PO  Last administered on 3/21/18at 09:55

;  Start 3/18/18 at 21:00;  Stop 3/21/18 at 09:00;  Status DC


Enalaprilat (Vasotec Inj) 1.25 mg Q6H  PRN IV PUSH SBP> OR = 180, DBP> OR = 100 

Last administered on 3/23/18at 12:08;  Start 3/18/18 at 17:00


Pantoprazole Sodium 80 mg/ Sodium Chloride 100 ml @  10 mls/hr Q10H IV  Last 

administered on 3/20/18at 01:10;  Start 3/18/18 at 18:38;  Stop 3/20/18 at 07:36

;  Status DC


Sodium Chloride 1,000 ml @  60 mls/hr [id number] IV  Last administered on 3/21/

18at 15:35;  Start 3/18/18 at 17:45;  Stop 3/21/18 at 17:35;  Status DC


Sodium Chloride 500 ml @  2,000 mls/hr Q15M ONCE IV  Last administered on 3/18/

18at 19:30;  Start 3/18/18 at 19:30;  Stop 3/18/18 at 19:44;  Status DC


Sodium Chloride 500 ml @  500 mls/hr BOLUS  ONCE IV  Last administered on 3/18/

18at 22:28;  Start 3/18/18 at 20:00;  Stop 3/18/18 at 20:59;  Status DC


Sodium Chloride 250 ml @  15 mls/hr ONCE  ONCE IV  Last administered on 3/18/

18at 23:02;  Start 3/18/18 at 22:15;  Stop 3/19/18 at 14:54;  Status DC


Sodium Chloride 1,000 ml @  999 mls/hr BOLUS  ONCE IV  Last administered on 3/18

/18at 22:27;  Start 3/18/18 at 22:15;  Stop 3/18/18 at 23:15;  Status DC


Epinephrine HCl (EPINEPHrine (1:10,000) INJ) 0.04 mg ONCE  ONCE SQ  Last 

administered on 3/19/18at 12:40;  Start 3/19/18 at 12:30;  Stop 3/19/18 at 12:40

;  Status DC


Miscellaneous Information ALL NURSING DEPARTME... UNSCH  PRN .XX SEE LABEL 

COMMENTS;  Start 3/19/18 at 12:50;  Stop 3/20/18 at 12:49;  Status DC


Pantoprazole Sodium (Protonix Inj) 40 mg Q12HR IV PUSH  Last administered on 3/

21/18at 09:54;  Start 3/20/18 at 09:00;  Stop 3/21/18 at 17:35;  Status DC


Sucralfate (Carafate) 1 gm BIDAC PO  Last administered on 3/24/18at 06:12;  

Start 3/20/18 at 07:45


Sodium Chloride 250 ml @  15 mls/hr ONCE  ONCE IV  Last administered on 3/20/

18at 07:45;  Start 3/20/18 at 07:45;  Stop 3/21/18 at 00:24;  Status DC


Lidocaine HCl (Xylocaine-Mpf 1% Inj) 5 ml STK-MED ONCE OTHER ;  Start 3/19/18 

at 12:00;  Stop 3/20/18 at 07:52;  Status DC


Phenylephrine HCl (Neosynephrine/ NS 1000 Mcg/10ml Syr) 1,000 mcg STK-MED ONCE 

IV ;  Start 3/19/18 at 12:00;  Stop 3/20/18 at 07:52;  Status DC


Ephedrine Sulfate (ePHEDrine/NS 25 MG/5 ML SYR) 25 mg STK-MED ONCE IV ;  Start 3

/19/18 at 12:00;  Stop 3/20/18 at 07:52;  Status DC


Dexamethasone Sodium Phosphate (Decadron Inj) 4 mg STK-MED ONCE IV ;  Start 3/19

/18 at 12:00;  Stop 3/20/18 at 07:52;  Status DC


Ondansetron HCl (Zofran Inj) 4 mg STK-MED ONCE IV ;  Start 3/19/18 at 12:00;  

Stop 3/20/18 at 07:52;  Status DC


Propofol (Diprivan  200 Mg/20 ml Inj) 200 mg STK-MED ONCE IV ;  Start 3/19/18 

at 12:00;  Stop 3/20/18 at 07:52;  Status DC


Amlodipine Besylate (Norvasc) 5 mg DAILY PO  Last administered on 3/20/18at 12:

00;  Start 3/20/18 at 12:00;  Stop 3/20/18 at 15:00;  Status DC


Hydralazine HCl (Apresoline Inj) 10 mg Q6H  PRN IV PUSH SBP> OR = 180, DBP> OR 

= 100 Last administered on 3/20/18at 13:46;  Start 3/20/18 at 11:15;  Stop 3/20/

18 at 15:00;  Status DC


Amlodipine Besylate (Norvasc) 5 mg BID PO  Last administered on 3/24/18at 08:51

;  Start 3/20/18 at 21:00


Hydralazine HCl (Apresoline Inj) 20 mg Q6H  PRN IV PUSH SBP> OR = 180, DBP> OR 

= 100;  Start 3/20/18 at 17:15


Furosemide (Lasix Inj) 40 mg ONCE  ONCE IV PUSH  Last administered on 3/20/18at 

16:11;  Start 3/20/18 at 15:30;  Stop 3/20/18 at 15:31;  Status DC


Nicardipine HCl 25 mg/Sodium Chloride 250 ml @  50 mls/hr TITRATE  PRN IV BP > 

200/100 Last administered on 3/20/18at 16:28;  Start 3/20/18 at 15:00


Clopidogrel Bisulfate (Plavix) 75 mg DAILY PO  Last administered on 3/24/18at 08

:51;  Start 3/21/18 at 09:00


Heparin Sodium (Porcine) (Heparin Inj) 5,000 units Q12HR SQ  Last administered 

on 3/24/18at 08:52;  Start 3/21/18 at 09:00


Pantoprazole Sodium (Protonix) 40 mg Q12HR PO  Last administered on 3/24/18at 08

:51;  Start 3/21/18 at 21:00


Potassium Chloride (KCl) 20 meq ONCE  ONCE PO  Last administered on 3/22/18at 18

:22;  Start 3/22/18 at 15:45;  Stop 3/22/18 at 15:46;  Status DC


Sodium Biphosphate/ Sodium Phosphate (Fleets Enema (Adult)) 133 ml ONCE  ONCE 

RECTAL  Last administered on 3/23/18at 10:40;  Start 3/23/18 at 11:00;  Stop 3/

23/18 at 11:01;  Status DC


Senna/Docusate Sodium (Sherrie-Colace) 2 tab BID PO  Last administered on 3/24/

18at 08:51;  Start 3/23/18 at 11:00





A/P


Problem List:  


(1) Sepsis


ICD Code:  A41.9 - Sepsis, unspecified organism


Status:  Acute


(2) UTI (urinary tract infection)


ICD Code:  N39.0 - Urinary tract infection, site not specified


Status:  Acute


Assessment and Plan


71 y/o F with hx of CVA 





1.  Sepsis secondary to UTI Cx E coli. Blood cultures negative to date status 

post Ceftin.  Clinically stable.   No fever and patient has no new complaints.  

Improved leukocytosis





2.  Encephalopathy secondary to sepsis.  Improved at baseline with expressive 

aphasia. 





2.  Elevated bilirubin. Gallbladder ultrasound significant for fatty liver. 

Continue to monitor





3.  History of recent CVA.  Stable continue statin.  Restart Plavix  cleared by 

GI





4.  Hypertension/bipolar/GERD.  BP much improved status post Cardene drip and 

IV Lasix.  on norvasc, Coreg and Flomax and IV as needed Vasotec and 

hydralazine continue to monitor.  





5.  Diabetes mellitus.  Fingersticks stable





6.  DANE.  CK within normal limits.  Improved on IV fluids





7.  UGI bleed.  History of gastric ulcer in February 2018.  3/20/18 s/p EGD 

found ulcer with visible vessel, s/p epi inj, cautery, clips x 3.  Diet 

advanced to heart healthy.  Ct PPI, carafate, EGD 2 months. If bleeds again, IR 

consult for embolization.  Upper GI okay to restart antiplatelets/

anticoagulation





8.  Anemia secondary to acute blood loss status post blood transfusion.  

Improved after 1 unit packed RBC transfusion keep hemoglobin at least 8





FEN


Heart healthy 


Electrolytes: Monitor during sleep when necessary


Heparin has been restarted


Discharge Planning


Pending placement.





Problem Qualifiers





(1) Sepsis:  


Qualified Codes:  A41.9 - Sepsis, unspecified organism


(2) UTI (urinary tract infection):  


Qualified Codes:  N30.00 - Acute cystitis without hematuria








Romelia Erwin MD Mar 24, 2018 09:54

## 2018-03-24 NOTE — HHI.PR
Subjective


Remarks


71 YO  male with


Br asthma, Bipolar disorder, CVA


Breathing well


on RA


No SOB


Aid feeding him lunch





Objective


Vital Signs





Vital Signs








  Date Time  Temp Pulse Resp B/P (MAP) Pulse Ox O2 Delivery O2 Flow Rate FiO2


 


3/24/18 12:00 98.1 80 18 119/61 (80) 97   


 


3/24/18 08:00 98.4 70 18 157/72 (100) 97   


 


3/24/18 00:00 98.1 75 20 135/65 (88) 96   


 


3/23/18 20:00 99.0 79 20 142/59 (86) 96   


 


3/23/18 16:00 98.3 76 18 135/67 (89) 96   














I/O      


 


 3/23/18 3/23/18 3/23/18 3/24/18 3/24/18 3/24/18





 07:00 15:00 23:00 07:00 15:00 23:00


 


Intake Total 760 ml  1360 ml 780 ml  


 


Output Total 1200 ml  500 ml 1000 ml  


 


Balance -440 ml  860 ml -220 ml  


 


      


 


Intake Oral 760 ml  960 ml 780 ml  


 


Packed Cells   400 ml   


 


Output Urine Total 1200 ml  500 ml 1000 ml  


 


# Voids   3   


 


# Bowel Movements 1  2 3  








Result Diagram:  


3/23/18 0345                                                                   

             3/23/18 0345





Objective Remarks


GENERAL: MBMN male,NAD


SKIN: Warm and dry.


HEAD: Normocephalic.


EYES: No scleral icterus. No injection or drainage. 


NECK: Supple, trachea midline. No JVD or lymphadenopathy.


CARDIOVASCULAR: Regular rate and rhythm without murmurs, gallops, or rubs. 


RESPIRATORY: Breath sounds equal bilaterally. No accessory muscle use.


GASTROINTESTINAL: Abdomen soft, non-tender, nondistended. 


MUSCULOSKELETAL: No cyanosis, or edema. 


Left sided weakness


BACK: Nontender without obvious deformity. No CVA tenderness.








A/P


Assessment and Plan


IMPRESSION:





1.  History of bronchial asthma.


2.  Coughing spells, possible aspiration.


3.  CVA with left-sided weakness.


4.  Hypertension.


5.  History of gastrointestinal bleed.


6.  Bipolar disorder.





PLAN:





Aerosol nebs


Cont Other meds


Stable on RA


Stable from Pulm standpoint.











Eugene Polo MD Mar 24, 2018 14:56

## 2018-03-25 VITALS
HEART RATE: 97 BPM | OXYGEN SATURATION: 97 % | DIASTOLIC BLOOD PRESSURE: 74 MMHG | SYSTOLIC BLOOD PRESSURE: 152 MMHG | TEMPERATURE: 98.6 F | RESPIRATION RATE: 18 BRPM

## 2018-03-25 VITALS
SYSTOLIC BLOOD PRESSURE: 139 MMHG | DIASTOLIC BLOOD PRESSURE: 82 MMHG | OXYGEN SATURATION: 92 % | TEMPERATURE: 97.8 F | RESPIRATION RATE: 18 BRPM | HEART RATE: 105 BPM

## 2018-03-25 VITALS
TEMPERATURE: 97.1 F | DIASTOLIC BLOOD PRESSURE: 77 MMHG | RESPIRATION RATE: 18 BRPM | SYSTOLIC BLOOD PRESSURE: 160 MMHG | HEART RATE: 81 BPM | OXYGEN SATURATION: 97 %

## 2018-03-25 RX ADMIN — Medication SCH ML: at 08:03

## 2018-03-25 RX ADMIN — HEPARIN SODIUM SCH UNITS: 10000 INJECTION, SOLUTION INTRAVENOUS; SUBCUTANEOUS at 08:00

## 2018-03-25 RX ADMIN — STANDARDIZED SENNA CONCENTRATE AND DOCUSATE SODIUM SCH TAB: 8.6; 5 TABLET, FILM COATED ORAL at 08:00

## 2018-03-25 RX ADMIN — CLOPIDOGREL BISULFATE SCH MG: 75 TABLET, FILM COATED ORAL at 08:00

## 2018-03-25 RX ADMIN — SUCRALFATE SCH GM: 1 TABLET ORAL at 06:13

## 2018-03-25 RX ADMIN — CARVEDILOL SCH MG: 3.12 TABLET, FILM COATED ORAL at 08:00

## 2018-03-25 RX ADMIN — PANTOPRAZOLE SCH MG: 40 TABLET, DELAYED RELEASE ORAL at 08:00

## 2018-03-25 NOTE — HHI.DS
__________________________________________________





Discharge Summary


Admission Date


Mar 16, 2018 at 20:51


Discharge Date:  Mar 25, 2018


Admitting Diagnosis





UTI, sepsis, dehydration, DANE





(1) Sepsis


ICD Code:  A41.9 - Sepsis, unspecified organism


Diagnosis:  Principal


Status:  Acute


(2) UTI (urinary tract infection)


ICD Code:  N39.0 - Urinary tract infection, site not specified


Diagnosis:  Principal


Status:  Acute


Procedures


none


Brief History - From Admission


72-year-old male with a past medical history significant for bipolar disorder, 

hypertension, diabetes mellitus, history of GI bleed and recent CVA presents to 

the emergency department from his rehabilitation facility for altered mental 

status.  Per ED documentation, the patient usually has a GCS of 14.  At the 

time of her interview the patient is able to answer some questions with yes or 

no however he is unable to provide any detail.  The daughter is bedside and 

states that usually her father is able to speak in complete sentences.  She 

states his speech is slower than prior to his stroke.  The patient denies being 

in any pain.  He states that he needs to urinate.  He denies fever/chills.  Has 

residual left-sided weakness although moves both upper and lower left 

extremities spontaneously.  Denies chest pain/shortness of breath.  No nausea/

vomiting/diarrhea.


CBC/BMP:  


3/23/18 0345                                                                   

             3/24/18 1542





Significant Findings





Laboratory Tests








Test


  3/23/18


03:45 3/24/18


15:42


 


White Blood Count


  11.2 TH/MM3


(4.0-11.0) 


 


 


Red Blood Count


  2.80 MIL/MM3


(4.50-5.90) 


 


 


Hemoglobin


  8.5 GM/DL


(13.0-17.0) 


 


 


Hematocrit


  25.3 %


(39.0-51.0) 


 


 


Monocytes (%) (Auto)


  9.3 %


(0.0-8.0) 


 


 


Eosinophils (%) (Auto)


  4.6 %


(0.0-4.0) 


 


 


Monocytes # (Auto)


  1.0 TH/MM3


(0-0.9) 


 


 


Eosinophils # (Auto)


  0.5 TH/MM3


(0-0.4) 


 


 


Creatinine


  1.39 MG/DL


(0.60-1.30) 1.34 MG/DL


(0.60-1.30)


 


Potassium Level


  3.4 MEQ/L


(3.5-5.1) 


 


 


Chloride Level


  108 MEQ/L


() 


 


 


Estimat Glomerular Filtration


Rate 50 ML/MIN


(>89) 52 ML/MIN


(>89)


 


Random Glucose


  


  118 MG/DL


()








Imaging





Last Impressions








Head CT 3/16/18 0000 Signed





Impressions: 





 Service Date/Time:  Friday, March 16, 2018 19:56 - CONCLUSION:  No acute 





 abnormality demonstrated. Atrophy and chronic white matter changes are again 





 seen. Evolving right pericallosal subacute infarct. No evidence of a large 





 ischemic event.     Ministerio Martin MD 


 


Gall Bladder Ultrasound 3/16/18 0000 Signed





Impressions: 





 Service Date/Time:  Friday, March 16, 2018 20:36 - CONCLUSION:  Fatty liver 

and 





 a tiny nonobstructing right renal stone. Otherwise negative.     Ministerio Martin MD 


 


Chest X-Ray 3/16/18 0000 Signed





Impressions: 





 Service Date/Time:  Friday, March 16, 2018 20:02 - CONCLUSION:  Mild left base 





 atelectasis.     Ministerio Martin MD 








PE at Discharge


GENERAL:   male lying in bed


SKIN: No rashes, ecchymoses or lesions. Cool and dry.


CARDIOVASCULAR: Regular rate and rhythm without murmurs, gallops, or rubs. 


RESPIRATORY: Clear to auscultation. Breath sounds equal bilaterally. No wheezes

, rales, or rhonchi.  


GASTROINTESTINAL: Abdomen soft, non-tender, nondistended. No guarding.


MUSCULOSKELETAL: Extremities without clubbing, cyanosis, or edema. No joint 

tenderness, effusion, or edema noted. No calf tenderness. Negative Homans sign 

bilaterally.


NEUROLOGICAL: Awake and alert.  Left facial droop.  Dysarthric.  Generalized 

weakness weak on the left side. This has been his baseline.


Pt update on day of discharge


Patient stated that 2 days ago he had episode of emesis.  He stated that he has 

not had any episodes sort of emesis since then.  He wanted me to ask his nurse 

about this.  I spoke to patient's nurse and patient stated that there was no 

witnessed of emesis.  Otherwise patient has no complaints.  No acute events.


Hospital Course


73 y/o F with hx of CVA who presented with sepsis secondary to UTI.  Cx E coli. 

Blood cultures negative to date status post Ceftin.  Clinically stable.   No 

fever and patient has no new complaints.  During his hospital course symptoms 

continue to improve.  Also during sepsis patient had encephalopathy that 

improved throughout hospital course.  Encephalopathy most likely secondary to 

sepsis. He was found to have elevated bilirubin so a Gallbladder ultrasound 

significant for fatty liver.


Patient also presented with UGI bleed.  History of gastric ulcer in February 2018.  3/20/18 s/p EGD found ulcer with visible vessel, s/p epi inj, cautery, 

clips x 3.  Diet advanced to heart healthy.  Ct PPI, carafate, EGD 2 months. If 

bleeds again, IR consult for embolization.  Initially his antiplatelets was 

held but after he had his procedure and hemoglobin is stable medication was 

restarted.  Due to the GI bleed and anemia he was transfused with 1 unit of 

packed red blood cells in which he responded appropriately.  Throughout his 

hospitalization after the episode.  No episode of GI bleed.


Pt Condition on Discharge:  Stable


Discharge Disposition:  Discharge to SNF


Discharge Time:  <= 30 minutes


Discharge Instructions


DIET: Follow Instructions for:  Heart Healthy Diet


Speech Therapy-Diet Recommends:  Pureed


Activities you can perform:  Regular-No Restrictions


Activities to Avoid:  Driving


Follow up Referrals:  


PCP Follow-up - 1 Week





New Medications:  


Amlodipine (Norvasc) 5 Mg Tab


5 MG PO BID for Blood Pressure Management, #60 TAB





Sucralfate (Carafate) 1 Gram Tab


1 GM PO BIDAC for GIB, #60 TAB





 


Changed Medications:  


Pantoprazole (Protonix) 40 Mg Tab


40 MG PO BID for Ulcer Prevention, #60 TAB 3 Refills (Changed from: DAILY; 90)





 


Continued Medications:  


Albuterol 8.5 GM Inh (Proair Hfa 8.5 GM Inh) 90 Mcg/Act Aer


2 PUFF INH Q4-6H PRN for SHORTNESS OF BREATH, #1 INHALER 0 Refills


108 mcg/actuation


Atorvastatin (Atorvastatin) 40 Mg Tab


40 MG PO HS for Cholesterol Management, #90 TAB 3 Refills





Carvedilol (Carvedilol) 3.125 Mg Tab


3.125 MG PO DAILY, #60 TAB 0 Refills





Clopidogrel (Plavix) 75 Mg Tab


75 MG PO DAILY for Blood Clot Prevention, #30 TAB 11 Refills





Fluoxetine (Fluoxetine) 40 Mg Cap


40 CAP PO HS, #30 CAP 0 Refills





Lithium Carbonate (Lithium Carbonate) 600 Mg Cap


600 MG PO HS, CAP 0 Refills





Metformin (Metformin) 500 Mg Tab


500 MG PO BIDPC for Blood Sugar Management, #60 TAB 0 Refills





Tamsulosin (Flomax) 0.4 Mg Cap


0.4 MG PO HS for Manage Prostate Problems, #30 CAP 0 Refills

















Romelia Erwin MD Mar 25, 2018 15:14

## 2018-03-29 ENCOUNTER — HOSPITAL ENCOUNTER (INPATIENT)
Dept: HOSPITAL 17 - NEPC | Age: 73
LOS: 19 days | Discharge: NURSING, REHAB OR LONG-TERM CARE | DRG: 357 | End: 2018-04-17
Attending: HOSPITALIST | Admitting: HOSPITALIST
Payer: COMMERCIAL

## 2018-03-29 VITALS
RESPIRATION RATE: 18 BRPM | DIASTOLIC BLOOD PRESSURE: 60 MMHG | SYSTOLIC BLOOD PRESSURE: 104 MMHG | HEART RATE: 89 BPM | OXYGEN SATURATION: 100 %

## 2018-03-29 VITALS
HEART RATE: 105 BPM | DIASTOLIC BLOOD PRESSURE: 67 MMHG | OXYGEN SATURATION: 100 % | RESPIRATION RATE: 16 BRPM | SYSTOLIC BLOOD PRESSURE: 102 MMHG

## 2018-03-29 VITALS
HEART RATE: 106 BPM | RESPIRATION RATE: 16 BRPM | DIASTOLIC BLOOD PRESSURE: 67 MMHG | OXYGEN SATURATION: 99 % | SYSTOLIC BLOOD PRESSURE: 118 MMHG

## 2018-03-29 VITALS
RESPIRATION RATE: 17 BRPM | OXYGEN SATURATION: 100 % | DIASTOLIC BLOOD PRESSURE: 47 MMHG | HEART RATE: 87 BPM | SYSTOLIC BLOOD PRESSURE: 69 MMHG

## 2018-03-29 VITALS
DIASTOLIC BLOOD PRESSURE: 60 MMHG | RESPIRATION RATE: 28 BRPM | HEART RATE: 94 BPM | OXYGEN SATURATION: 100 % | SYSTOLIC BLOOD PRESSURE: 107 MMHG

## 2018-03-29 VITALS
DIASTOLIC BLOOD PRESSURE: 56 MMHG | OXYGEN SATURATION: 99 % | SYSTOLIC BLOOD PRESSURE: 96 MMHG | HEART RATE: 108 BPM | RESPIRATION RATE: 16 BRPM

## 2018-03-29 VITALS
RESPIRATION RATE: 20 BRPM | SYSTOLIC BLOOD PRESSURE: 110 MMHG | OXYGEN SATURATION: 100 % | DIASTOLIC BLOOD PRESSURE: 64 MMHG | TEMPERATURE: 98.1 F | HEART RATE: 89 BPM

## 2018-03-29 VITALS
RESPIRATION RATE: 17 BRPM | DIASTOLIC BLOOD PRESSURE: 58 MMHG | HEART RATE: 85 BPM | SYSTOLIC BLOOD PRESSURE: 114 MMHG | OXYGEN SATURATION: 100 %

## 2018-03-29 VITALS
SYSTOLIC BLOOD PRESSURE: 97 MMHG | OXYGEN SATURATION: 100 % | DIASTOLIC BLOOD PRESSURE: 61 MMHG | RESPIRATION RATE: 16 BRPM | HEART RATE: 91 BPM

## 2018-03-29 VITALS
RESPIRATION RATE: 17 BRPM | SYSTOLIC BLOOD PRESSURE: 77 MMHG | OXYGEN SATURATION: 100 % | HEART RATE: 83 BPM | DIASTOLIC BLOOD PRESSURE: 51 MMHG

## 2018-03-29 VITALS
TEMPERATURE: 98.6 F | RESPIRATION RATE: 16 BRPM | DIASTOLIC BLOOD PRESSURE: 85 MMHG | OXYGEN SATURATION: 96 % | SYSTOLIC BLOOD PRESSURE: 141 MMHG | HEART RATE: 85 BPM

## 2018-03-29 VITALS
DIASTOLIC BLOOD PRESSURE: 69 MMHG | SYSTOLIC BLOOD PRESSURE: 128 MMHG | OXYGEN SATURATION: 99 % | RESPIRATION RATE: 16 BRPM | HEART RATE: 100 BPM

## 2018-03-29 VITALS
TEMPERATURE: 98.1 F | SYSTOLIC BLOOD PRESSURE: 91 MMHG | DIASTOLIC BLOOD PRESSURE: 56 MMHG | HEART RATE: 91 BPM | OXYGEN SATURATION: 100 % | RESPIRATION RATE: 19 BRPM

## 2018-03-29 VITALS — HEIGHT: 67.5 IN | BODY MASS INDEX: 29.44 KG/M2 | WEIGHT: 189.82 LBS

## 2018-03-29 VITALS
OXYGEN SATURATION: 100 % | TEMPERATURE: 98 F | HEART RATE: 83 BPM | SYSTOLIC BLOOD PRESSURE: 110 MMHG | DIASTOLIC BLOOD PRESSURE: 63 MMHG | RESPIRATION RATE: 25 BRPM

## 2018-03-29 VITALS
HEART RATE: 86 BPM | RESPIRATION RATE: 20 BRPM | SYSTOLIC BLOOD PRESSURE: 115 MMHG | TEMPERATURE: 98.2 F | OXYGEN SATURATION: 100 % | DIASTOLIC BLOOD PRESSURE: 75 MMHG

## 2018-03-29 VITALS
HEART RATE: 85 BPM | OXYGEN SATURATION: 100 % | SYSTOLIC BLOOD PRESSURE: 110 MMHG | RESPIRATION RATE: 13 BRPM | DIASTOLIC BLOOD PRESSURE: 61 MMHG

## 2018-03-29 VITALS
HEART RATE: 88 BPM | SYSTOLIC BLOOD PRESSURE: 116 MMHG | DIASTOLIC BLOOD PRESSURE: 60 MMHG | RESPIRATION RATE: 20 BRPM | OXYGEN SATURATION: 100 %

## 2018-03-29 VITALS
SYSTOLIC BLOOD PRESSURE: 141 MMHG | OXYGEN SATURATION: 96 % | TEMPERATURE: 98.6 F | HEART RATE: 85 BPM | RESPIRATION RATE: 16 BRPM | DIASTOLIC BLOOD PRESSURE: 85 MMHG

## 2018-03-29 VITALS — SYSTOLIC BLOOD PRESSURE: 115 MMHG | DIASTOLIC BLOOD PRESSURE: 75 MMHG | TEMPERATURE: 98.2 F

## 2018-03-29 DIAGNOSIS — R11.2: ICD-10-CM

## 2018-03-29 DIAGNOSIS — N40.0: ICD-10-CM

## 2018-03-29 DIAGNOSIS — K57.30: ICD-10-CM

## 2018-03-29 DIAGNOSIS — I95.9: ICD-10-CM

## 2018-03-29 DIAGNOSIS — R00.0: ICD-10-CM

## 2018-03-29 DIAGNOSIS — I69.354: ICD-10-CM

## 2018-03-29 DIAGNOSIS — E11.9: ICD-10-CM

## 2018-03-29 DIAGNOSIS — R55: ICD-10-CM

## 2018-03-29 DIAGNOSIS — E87.6: ICD-10-CM

## 2018-03-29 DIAGNOSIS — K29.70: ICD-10-CM

## 2018-03-29 DIAGNOSIS — D50.0: ICD-10-CM

## 2018-03-29 DIAGNOSIS — K26.4: Primary | ICD-10-CM

## 2018-03-29 DIAGNOSIS — D62: ICD-10-CM

## 2018-03-29 DIAGNOSIS — F31.9: ICD-10-CM

## 2018-03-29 DIAGNOSIS — E66.9: ICD-10-CM

## 2018-03-29 DIAGNOSIS — K25.4: ICD-10-CM

## 2018-03-29 DIAGNOSIS — Z79.84: ICD-10-CM

## 2018-03-29 DIAGNOSIS — J45.998: ICD-10-CM

## 2018-03-29 DIAGNOSIS — I10: ICD-10-CM

## 2018-03-29 LAB
ALBUMIN SERPL-MCNC: 3.3 GM/DL (ref 3.4–5)
ALP SERPL-CCNC: 71 U/L (ref 45–117)
ALT SERPL-CCNC: 34 U/L (ref 12–78)
AST SERPL-CCNC: 33 U/L (ref 15–37)
BACTERIA #/AREA URNS HPF: (no result) /HPF
BASOPHILS # BLD AUTO: 0.1 TH/MM3 (ref 0–0.2)
BASOPHILS NFR BLD: 0.4 % (ref 0–2)
BILIRUB SERPL-MCNC: 0.9 MG/DL (ref 0.2–1)
BUN SERPL-MCNC: 35 MG/DL (ref 7–18)
CALCIUM SERPL-MCNC: 10.6 MG/DL (ref 8.5–10.1)
CHLORIDE SERPL-SCNC: 102 MEQ/L (ref 98–107)
COLOR UR: YELLOW
CREAT SERPL-MCNC: 2.21 MG/DL (ref 0.6–1.3)
EOSINOPHIL # BLD: 0.2 TH/MM3 (ref 0–0.4)
EOSINOPHIL NFR BLD: 1.2 % (ref 0–4)
ERYTHROCYTE [DISTWIDTH] IN BLOOD BY AUTOMATED COUNT: 15.6 % (ref 11.6–17.2)
GFR SERPLBLD BASED ON 1.73 SQ M-ARVRAT: 29 ML/MIN (ref 89–?)
GLUCOSE SERPL-MCNC: 94 MG/DL (ref 74–106)
GLUCOSE UR STRIP-MCNC: (no result) MG/DL
HCO3 BLD-SCNC: 29.9 MEQ/L (ref 21–32)
HCT VFR BLD CALC: 24.5 % (ref 39–51)
HCT VFR BLD CALC: 31.2 % (ref 39–51)
HGB BLD-MCNC: 10.3 GM/DL (ref 13–17)
HGB BLD-MCNC: 8 GM/DL (ref 13–17)
HGB UR QL STRIP: (no result)
HYALINE CASTS #/AREA URNS LPF: 20 /LPF
INR PPP: 1 RATIO
KETONES UR STRIP-MCNC: (no result) MG/DL
LYMPHOCYTES # BLD AUTO: 1.9 TH/MM3 (ref 1–4.8)
LYMPHOCYTES NFR BLD AUTO: 10.2 % (ref 9–44)
MCH RBC QN AUTO: 30.1 PG (ref 27–34)
MCHC RBC AUTO-ENTMCNC: 33 % (ref 32–36)
MCV RBC AUTO: 91.4 FL (ref 80–100)
MONOCYTE #: 1.2 TH/MM3 (ref 0–0.9)
MONOCYTES NFR BLD: 6.3 % (ref 0–8)
MUCOUS THREADS #/AREA URNS LPF: (no result) /LPF
NEUTROPHILS # BLD AUTO: 15.7 TH/MM3 (ref 1.8–7.7)
NEUTROPHILS NFR BLD AUTO: 81.9 % (ref 16–70)
NITRITE UR QL STRIP: (no result)
PLATELET # BLD: 739 TH/MM3 (ref 150–450)
PMV BLD AUTO: 7.5 FL (ref 7–11)
PROT SERPL-MCNC: 7.7 GM/DL (ref 6.4–8.2)
PROTHROMBIN TIME: 10.5 SEC (ref 9.8–11.6)
RBC # BLD AUTO: 3.41 MIL/MM3 (ref 4.5–5.9)
SODIUM SERPL-SCNC: 140 MEQ/L (ref 136–145)
SP GR UR STRIP: 1.02 (ref 1–1.03)
SQUAMOUS #/AREA URNS HPF: 2 /HPF (ref 0–5)
URINE LEUKOCYTE ESTERASE: (no result)
WBC # BLD AUTO: 19.1 TH/MM3 (ref 4–11)
WHITE BLOOD CELL CLUMPS: (no result)

## 2018-03-29 PROCEDURE — C1894 INTRO/SHEATH, NON-LASER: HCPCS

## 2018-03-29 PROCEDURE — 30233N1 TRANSFUSION OF NONAUTOLOGOUS RED BLOOD CELLS INTO PERIPHERAL VEIN, PERCUTANEOUS APPROACH: ICD-10-PCS

## 2018-03-29 PROCEDURE — C1769 GUIDE WIRE: HCPCS

## 2018-03-29 PROCEDURE — 81001 URINALYSIS AUTO W/SCOPE: CPT

## 2018-03-29 PROCEDURE — 83735 ASSAY OF MAGNESIUM: CPT

## 2018-03-29 PROCEDURE — 74176 CT ABD & PELVIS W/O CONTRAST: CPT

## 2018-03-29 PROCEDURE — 85730 THROMBOPLASTIN TIME PARTIAL: CPT

## 2018-03-29 PROCEDURE — 94664 DEMO&/EVAL PT USE INHALER: CPT

## 2018-03-29 PROCEDURE — C9113 INJ PANTOPRAZOLE SODIUM, VIA: HCPCS

## 2018-03-29 PROCEDURE — 37244 VASC EMBOLIZE/OCCLUDE BLEED: CPT

## 2018-03-29 PROCEDURE — 76937 US GUIDE VASCULAR ACCESS: CPT

## 2018-03-29 PROCEDURE — 85027 COMPLETE CBC AUTOMATED: CPT

## 2018-03-29 PROCEDURE — 86901 BLOOD TYPING SEROLOGIC RH(D): CPT

## 2018-03-29 PROCEDURE — P9016 RBC LEUKOCYTES REDUCED: HCPCS

## 2018-03-29 PROCEDURE — 83690 ASSAY OF LIPASE: CPT

## 2018-03-29 PROCEDURE — 99152 MOD SED SAME PHYS/QHP 5/>YRS: CPT

## 2018-03-29 PROCEDURE — 36430 TRANSFUSION BLD/BLD COMPNT: CPT

## 2018-03-29 PROCEDURE — A9560 TC99M LABELED RBC: HCPCS

## 2018-03-29 PROCEDURE — 36245 INS CATH ABD/L-EXT ART 1ST: CPT

## 2018-03-29 PROCEDURE — 85018 HEMOGLOBIN: CPT

## 2018-03-29 PROCEDURE — 83605 ASSAY OF LACTIC ACID: CPT

## 2018-03-29 PROCEDURE — 80048 BASIC METABOLIC PNL TOTAL CA: CPT

## 2018-03-29 PROCEDURE — 75726 ARTERY X-RAYS ABDOMEN: CPT

## 2018-03-29 PROCEDURE — 78278 ACUTE GI BLOOD LOSS IMAGING: CPT

## 2018-03-29 PROCEDURE — 85610 PROTHROMBIN TIME: CPT

## 2018-03-29 PROCEDURE — 80053 COMPREHEN METABOLIC PANEL: CPT

## 2018-03-29 PROCEDURE — 83540 ASSAY OF IRON: CPT

## 2018-03-29 PROCEDURE — 75774 ARTERY X-RAY EACH VESSEL: CPT

## 2018-03-29 PROCEDURE — 86900 BLOOD TYPING SEROLOGIC ABO: CPT

## 2018-03-29 PROCEDURE — 36247 INS CATH ABD/L-EXT ART 3RD: CPT

## 2018-03-29 PROCEDURE — 87086 URINE CULTURE/COLONY COUNT: CPT

## 2018-03-29 PROCEDURE — 96375 TX/PRO/DX INJ NEW DRUG ADDON: CPT

## 2018-03-29 PROCEDURE — 87641 MR-STAPH DNA AMP PROBE: CPT

## 2018-03-29 PROCEDURE — 71045 X-RAY EXAM CHEST 1 VIEW: CPT

## 2018-03-29 PROCEDURE — 86850 RBC ANTIBODY SCREEN: CPT

## 2018-03-29 PROCEDURE — 94640 AIRWAY INHALATION TREATMENT: CPT

## 2018-03-29 PROCEDURE — 86920 COMPATIBILITY TEST SPIN: CPT

## 2018-03-29 PROCEDURE — 87186 SC STD MICRODIL/AGAR DIL: CPT

## 2018-03-29 PROCEDURE — 82948 REAGENT STRIP/BLOOD GLUCOSE: CPT

## 2018-03-29 PROCEDURE — 85014 HEMATOCRIT: CPT

## 2018-03-29 PROCEDURE — 85025 COMPLETE CBC W/AUTO DIFF WBC: CPT

## 2018-03-29 PROCEDURE — 99153 MOD SED SAME PHYS/QHP EA: CPT

## 2018-03-29 PROCEDURE — 87077 CULTURE AEROBIC IDENTIFY: CPT

## 2018-03-29 PROCEDURE — 96374 THER/PROPH/DIAG INJ IV PUSH: CPT

## 2018-03-29 PROCEDURE — 83550 IRON BINDING TEST: CPT

## 2018-03-29 PROCEDURE — C1887 CATHETER, GUIDING: HCPCS

## 2018-03-29 PROCEDURE — 84100 ASSAY OF PHOSPHORUS: CPT

## 2018-03-29 RX ADMIN — CHLORHEXIDINE GLUCONATE SCH PACK: 500 CLOTH TOPICAL at 21:30

## 2018-03-29 RX ADMIN — ATORVASTATIN CALCIUM SCH MG: 40 TABLET, FILM COATED ORAL at 22:47

## 2018-03-29 RX ADMIN — TAMSULOSIN HYDROCHLORIDE SCH MG: 0.4 CAPSULE ORAL at 22:47

## 2018-03-29 RX ADMIN — INSULIN ASPART SCH: 100 INJECTION, SOLUTION INTRAVENOUS; SUBCUTANEOUS at 21:00

## 2018-03-29 RX ADMIN — IPRATROPIUM BROMIDE AND ALBUTEROL SULFATE SCH AMPULE: .5; 3 SOLUTION RESPIRATORY (INHALATION) at 21:44

## 2018-03-29 RX ADMIN — PANTOPRAZOLE SODIUM SCH MG: 40 INJECTION, POWDER, FOR SOLUTION INTRAVENOUS at 20:00

## 2018-03-29 RX ADMIN — LITHIUM CARBONATE SCH MG: 300 CAPSULE, GELATIN COATED ORAL at 21:00

## 2018-03-29 RX ADMIN — PANTOPRAZOLE SODIUM SCH MLS/HR: 40 INJECTION, POWDER, FOR SOLUTION INTRAVENOUS at 20:20

## 2018-03-29 RX ADMIN — PHENYTOIN SODIUM SCH MLS/HR: 50 INJECTION INTRAMUSCULAR; INTRAVENOUS at 20:23

## 2018-03-29 RX ADMIN — STANDARDIZED SENNA CONCENTRATE AND DOCUSATE SODIUM SCH TAB: 8.6; 5 TABLET, FILM COATED ORAL at 21:00

## 2018-03-29 RX ADMIN — Medication SCH ML: at 22:10

## 2018-03-29 NOTE — HHI.HP
[facility]


Service


Critical Care Medicine


Primary Care Physician


Ministerio Rocha MD


Admission Diagnosis





Diagnosis:  


Travel History


International Travel<30 Days:  No


Contact w/Intl Traveler <30 Da:  No


Traveled to Known Affected Are:  No


History of Present Illness


72-year-old male patient from the nursing home with previous history of gastric 

ulcers and GI bleeding and the last endoscopy 2 weeks ago, presents today 

because he had hematemesis and was nauseous and throwing up at the nursing home 

facility.  He denies any chest pains, shortness of breath, abdominal pains, 

diarrhea, or any other symptoms.





Review of Systems


Constitutional:  COMPLAINS OF: Dizziness, DENIES: Diaphoretic episodes, Fatigue

, Fever, Weight gain, Weight loss, Chills, Change in appetite, Night Sweats


Endocrine:  DENIES: Heat/cold intolerance, Polydipsia, Polyuria, Polyphagia


Eyes:  DENIES: Blurred vision, Diplopia, Eye inflammation, Eye pain, Vision loss

, Photosensitivity, Double Vision


Ears, nose, mouth, throat:  DENIES: Tinnitus, Hearing loss, Vertigo, Nasal 

discharge, Oral lesions, Throat pain, Hoarseness, Ear Pain, Running Nose, 

Epistaxis, Sinus Pain, Toothache, Odynophagia


Respiratory:  DENIES: Apneas, Cough, Snoring, Wheezing, Hemoptysis, Sputum 

production, Shortness of breath


Cardiovascular:  DENIES: Chest pain, Palpitations, Syncope, Dyspnea on Exertion

, PND, Lower Extremity Edema, Orthopnea, Claudication


Gastrointestinal:  COMPLAINS OF: Abdominal pain, Nausea, Vomiting, DENIES: 

Black stools, Bloody stools, Constipation, Diarrhea, Difficulty Swallowing, 

Anorexia


Genitourinary:  DENIES: Sexual dysfunction, Urinary frequency, Urinary 

incontinence, Urgency, Hematuria, Dysuria, Nocturia, Penile Discharge, 

Testicular Pain, Testicular Swelling


Musculoskeletal:  DENIES: Joint pain, Muscle aches, Stiffness, Joint Swelling, 

Back pain, Neck pain


Integumentary:  DENIES: Abnormal pigmentation, Nail changes, Pruritus, Rash


Hematologic/lymphatic:  DENIES: Bruising, Lymphadenopathy


Immunologic/allergic:  DENIES: Eczema, Urticaria


Neurologic:  COMPLAINS OF: Abnormal gait, Poor Balance, DENIES: Headache, 

Localized weakness, Paresthesias, Seizures, Speech Problems, Tremor


Psychiatric:  DENIES: Anxiety, Confusion, Mood changes, Depression, 

Hallucinations, Agitation, Suicidal Ideation, Homicidal Ideation, Delusions





Past Family Social History


Allergies:  


Coded Allergies:  


     codeine (Verified  Allergy, Severe, MUSCULOSKELETAL ISSUES, 3/29/18)


     insulin,pork (Verified  Allergy, Severe, Anaphylaxis, 3/29/18)


     insulin aspart (Verified  Allergy, Intermediate, 3/29/18)


     lactose (Verified  Allergy, Intermediate, 3/29/18)


 LACTOSE INTOLERANT


Past Medical History


Bipolar disorder


Hypertension


Diabetes mellitus


History of GI bleed


History of recent CVA


Past Surgical History


Left hand crush injury related surgery





Reported Medications





Reported Meds & Active Scripts


Active


Carafate (Sucralfate) 1 Gram Tab 1 Gm PO BIDAC


Norvasc (Amlodipine Besylate) 5 Mg Tab 5 Mg PO BID


Protonix (Pantoprazole Sodium) 40 Mg Tab 40 Mg PO BID


Atorvastatin (Atorvastatin Calcium) 40 Mg Tab 40 Mg PO HS


Plavix (Clopidogrel Bisulfate) 75 Mg Tab 75 Mg PO DAILY


Reported


Proair Hfa 8.5 GM Inh (Albuterol Sulfate) 90 Mcg/Act Aer 2 Puff INH Q4-6H PRN


     108 mcg/actuation


Carvedilol 3.125 Mg Tab 3.125 Mg PO DAILY


Metformin (Metformin HCl) 500 Mg Tab 500 Mg PO BIDPC


Fluoxetine (Fluoxetine HCl) 40 Mg Cap 40 Cap PO HS


Flomax (Tamsulosin HCl) 0.4 Mg Cap 0.4 Mg PO HS


Lithium Carbonate 600 Mg Cap 600 Mg PO HS


Active Ordered Medications





Current Medications








 Medications


  (Trade)  Dose


 Ordered  Sig/Carolin


 Route


 PRN Reason  Start Time


 Stop Time Status Last Admin


Dose Admin


 


 Sodium Chloride


  (NS Flush)  2 ml  UNSCH  PRN


 IVF


 FLUSH AFTER USING IV ACCESS  3/29/18 18:00


     


 


 


 Octreotide Acetate


  (SandoSTATIN


 INJ)  50 mcg  ONCE


 IV PUSH


   3/29/18 19:30


    3/29/18 20:23


 


 


 Sodium Chloride  1,000 ml @ 


 999 mls/hr  BOLUS  ONCE


 IV


   3/29/18 19:30


 3/29/18 20:30  3/29/18 19:58


 


 


 Sodium Chloride  250 ml @ 


 15 mls/hr  ONCE  ONCE


 IV


   3/29/18 19:30


 3/30/18 12:09   


 


 


 Atorvastatin


 Calcium


  (Lipitor)  40 mg  HS


 PO


   3/29/18 21:00


     


 


 


 Fluoxetine HCl


  (PROzac)  40 mg  HS


 PO


   3/29/18 21:00


     


 


 


 Lithium Carbonate


  (Lithium


 Carbonate)  600 mg  HS


 PO


   3/29/18 21:00


     


 


 


 Sucralfate


  (Carafate)  1 gm  BIDAC


 PO


   3/30/18 07:00


   UNV  


 


 


 Tamsulosin HCl


  (Flomax)  0.4 mg  HS


 PO


   3/29/18 21:00


   UNV  


 


 


 Dextrose


  (D50w (Vial) Inj)  50 ml  UNSCH  PRN


 IV PUSH


 HYPOGLYCEMIA-SEE COMMENTS  3/29/18 19:45


     


 


 


 Glucagon


  (Glucagon Inj)  1 mg  UNSCH  PRN


 OTHER


 HYPOGLYCEMIA-SEE COMMENTS  3/29/18 19:45


     


 


 


 Insulin Aspart


  (NovoLOG


 SUPPLEMENTAL


 SCALE)  1  ACHS SLIDING  SCALE


 SQ


   3/29/18 21:00


     


 


 


 Sodium Chloride  1,000 ml @ 


 124 mls/hr  Q8H4M


 IV


   3/29/18 19:33


    3/29/18 20:23


 


 


 Sodium Chloride


  (NS Flush)  2 ml  UNSCH  PRN


 IV FLUSH


 FLUSH AFTER USING IV ACCESS  3/29/18 19:45


   UNV  


 


 


 Sodium Chloride


  (NS Flush)  2 ml  BID


 IV FLUSH


   3/29/18 21:00


   UNV  


 


 


 Acetaminophen


  (Tylenol)  650 mg  Q6H  PRN


 PO


 PAIN 1-5 AND/OR FEVER >101F  3/29/18 19:45


     


 


 


 Pantoprazole


 Sodium


  (Protonix Inj)  40 mg  Q12H


 IV PUSH


   3/29/18 20:00


     


 


 


 Ondansetron HCl


  (Zofran Inj)  4 mg  Q6H  PRN


 IV PUSH


 NAUSEA OR VOMITING  3/29/18 19:45


     


 


 


 Albuterol/


 Ipratropium


  (Duoneb Neb)  1 ampule  Q6HR  NEB


 INH


   3/29/18 22:00


   UNV  


 


 


 Albuterol/


 Ipratropium


  (Duoneb Neb)  1 ampule  Q2HR NEB  PRN


 INH


 WHEEZING  3/29/18 19:45


   UNV  


 


 


 Miscellaneous


 Information  1  Q361D


 XX


   3/29/18 19:45


     


 


 


 Chlorhexidine


 Gluconate


  (Chlorhexidine


 2% Cloth)  3 pack


 Taper  DAILY@04


 TOP


   3/30/18 04:00


 3/26/19 03:59   


 


 


 Chlorhexidine


 Gluconate


  (Chlorhexidine


 2% Cloth)  3 pack  UNSCH  PRN


 TOP


 HYGIENIC CARE  3/29/18 19:45


     


 


 


 Senna/Docusate


 Sodium


  (Sherrie-Colace)  1 tab  BID


 PO


   3/29/18 21:00


     


 


 


 Magnesium


 Hydroxide


  (Milk Of


 Magnesia Liq)  30 ml  Q12H  PRN


 PO


 Mild constipation  3/29/18 19:45


     


 


 


 Sennosides


  (Senokot)  17.2 mg  Q12H  PRN


 PO


 Moderate constipation  3/29/18 19:45


   UNV  


 


 


 Bisacodyl


  (Dulcolax Supp)  10 mg  DAILY  PRN


 RECTAL


 SEVERE CONSITIPATION  3/29/18 19:45


     


 


 


 Lactulose


  (Lactulose Liq)  30 ml  DAILY  PRN


 PO


 SEVERE CONSITIPATION  3/29/18 19:45


     


 


 


 Pantoprazole


 Sodium 80 mg/


 Sodium Chloride  100 ml @ 


 10 mls/hr  Q10H


 IV


   3/29/18 20:00


    3/29/18 20:20


 


 


 Sodium Chloride  250 ml @ 


 15 mls/hr  ONCE  ONCE


 IV


   3/29/18 20:00


 3/30/18 12:39   


 


 


 Sodium Chloride  250 ml @ 


 15 mls/hr  ONCE  ONCE


 IV


   3/29/18 20:00


 3/30/18 12:39   


 








Family History


No family history of CAD/DM.  Mother with esophageal cancer


Social History


Denies alcohol, tobacco and illicit drugs





Physical Exam


Vital Signs





Vital Signs








  Date Time  Temp Pulse Resp B/P (MAP) Pulse Ox O2 Delivery O2 Flow Rate FiO2


 


3/29/18 19:11  105 16 102/67 (79) 100 Nasal Cannula 2.00 


 


3/29/18 18:40 98.6 85 16 141/85 (103) 96 Room Air  


 


3/29/18 18:20 98.6 85 16 141/85 (103) 96   








Physical Exam


GENERAL: Well-developed elderly male patient currently in mild distress.  Awake 

and alert.  Left-sided residual weakness


SKIN: Focused skin assessment warm/dry.


HEAD: Atraumatic. Normocephalic. 


EYES: Pupils equal and round. No scleral icterus. No injection or drainage. 


ENT: No nasal bleeding or discharge.  Mucous membranes pink and moist.


NECK: Trachea midline. No JVD.  Supple.


CARDIOVASCULAR: Regular rate and rhythm.  No murmur appreciated.


RESPIRATORY: No accessory muscle use. Clear to auscultation. Breath sounds 

equal bilaterally. 


GASTROINTESTINAL: Abdomen soft, non-tender, nondistended. Hepatic and splenic 

margins not palpable. 


MUSCULOSKELETAL: No obvious deformities. No clubbing.  No cyanosis.  No edema. 


NEUROLOGICAL: Awake and alert. No obvious cranial nerve deficits.  Motor with 

left-sided residual weakness


Laboratory





Laboratory Tests








Test


  3/29/18


18:00


 


White Blood Count 19.1 


 


Red Blood Count 3.41 


 


Hemoglobin 10.3 


 


Hematocrit 31.2 


 


Mean Corpuscular Volume 91.4 


 


Mean Corpuscular Hemoglobin 30.1 


 


Mean Corpuscular Hemoglobin


Concent 33.0 


 


 


Red Cell Distribution Width 15.6 


 


Platelet Count 739 


 


Mean Platelet Volume 7.5 


 


Neutrophils (%) (Auto) 81.9 


 


Lymphocytes (%) (Auto) 10.2 


 


Monocytes (%) (Auto) 6.3 


 


Eosinophils (%) (Auto) 1.2 


 


Basophils (%) (Auto) 0.4 


 


Neutrophils # (Auto) 15.7 


 


Lymphocytes # (Auto) 1.9 


 


Monocytes # (Auto) 1.2 


 


Eosinophils # (Auto) 0.2 


 


Basophils # (Auto) 0.1 


 


CBC Comment DIFF FINAL 


 


Differential Comment  


 


Prothrombin Time 10.5 


 


Prothromb Time International


Ratio 1.0 


 


 


Activated Partial


Thromboplast Time 24.8 


 


 


Blood Urea Nitrogen 35 


 


Creatinine 2.21 


 


Random Glucose 94 


 


Total Protein 7.7 


 


Albumin 3.3 


 


Calcium Level 10.6 


 


Alkaline Phosphatase 71 


 


Aspartate Amino Transf


(AST/SGOT) 33 


 


 


Alanine Aminotransferase


(ALT/SGPT) 34 


 


 


Total Bilirubin 0.9 


 


Sodium Level 140 


 


Potassium Level 4.5 


 


Chloride Level 102 


 


Carbon Dioxide Level 29.9 


 


Anion Gap 8 


 


Estimat Glomerular Filtration


Rate 29 


 


 


Lipase 170 








Result Diagram:  


3/29/18 1800                                                                   

             3/29/18 1800





Imaging





Last 24 hours Impressions








Chest X-Ray 3/29/18 1749 Signed





Impressions: 





 Service Date/Time:   17:59 - CONCLUSION:  No acute 





 disease.  No free air is seen.     Ministerio Garcia MD 











Septic Shock Reassessment


Septic shock perfusion:  reassessment completed





Caprini VTE Risk Assessment


Caprini VTE Risk Assessment:  Mod/High Risk (score >= 2)


VTE Pharm Contraindication:  Hemorrhage


Caprini Risk Assessment Model











 Point Value = 1          Point Value = 2  Point Value = 3  Point Value = 5


 


Age 41-60


Minor surgery


BMI > 25 kg/m2


Swollen legs


Varicose veins


Pregnancy or postpartum


History of unexplained or recurrent


   spontaneous 


Oral contraceptives or hormone


   replacement


Sepsis (< 1 month)


Serious lung disease, including


   pneumonia (< 1 month)


Abnormal pulmonary function


Acute myocardial infarction


Congestive heart failure (< 1 month)


History of inflammatory bowel disease


Medical patient at bed rest Age 61-74


Arthroscopic surgery


Major open surgery (> 45 min)


Laparoscopic surgery (> 45 min)


Malignancy


Confined to bed (> 72 hours)


Immobilizing plaster cast


Central venous access Age >= 75


History of VTE


Family history of VTE


Factor V Leiden


Prothrombin 30155W


Lupus anticoagulant


Anticardiolipin antibodies


Elevated serum homocysteine


Heparin-induced thrombocytopenia


Other congenital or acquired


   thrombophilia Stroke (< 1 month)


Elective arthroplasty


Hip, pelvis, or leg fracture


Acute spinal cord injury (< 1 month)








Prophylaxis Regimen











   Total Risk


Factor Score Risk Level Prophylaxis Regimen


 


0-1      Low Early ambulation


 


2 Moderate Order ONE of the following:


*Sequential Compression Device (SCD)


*Heparin 5000 units SQ BID


 


3-4 Higher Order ONE of the following medications:


*Heparin 5000 units SQ TID


*Enoxaparin/Lovenox 40 mg SQ daily (WT < 150 kg, CrCl > 30 mL/min)


*Enoxaparin/Lovenox 30 mg SQ daily (WT < 150 kg, CrCl > 10-29 mL/min)


*Enoxaparin/Lovenox 30 mg SQ BID (WT < 150 kg, CrCl > 30 mL/min)


AND/OR


*Sequential Compression Device (SCD)


 


5 or more Highest Order ONE of the following medications:


*Heparin 5000 units SQ TID (Preferred with Epidurals)


*Enoxaparin/Lovenox 40 mg SQ daily (WT < 150 kg, CrCl > 30 mL/min)


*Enoxaparin/Lovenox 30 mg SQ daily (WT < 150 kg, CrCl > 10-29 mL/min)


*Enoxaparin/Lovenox 30 mg SQ BID (WT < 150 kg, CrCl > 30 mL/min)


AND


*Sequential Compression Device (SCD)











Assessment and Plan


Assessment and Plan


Hematemesis


-Protonix drip initiated in the ED


-Continue Protonix IV twice daily


-Admit to ICU


-Further management per gastroenterology





Diabetes mellitus


-Hold metformin while in the ICU


-Insulin sliding scale





Hypertension


-Hold antihypertensive meds due to acute GI bleed


-Resume when indicated





Bipolar disorder


-Continue Flomax and lithium





DVT GI prophylaxis


-Andre's and SCDs


-No pharmacological DVT prophylaxis due to acute GI bleed


-Protonix IV twice daily





Critical Care:


The total critical care time was 35 minutes. Time to perform other separately 

billable procedures was not included in the critical care time.











John Rosales MD Mar 29, 2018 7:36 pm

## 2018-03-29 NOTE — RADRPT
EXAM DATE/TIME:  03/29/2018 17:59 

 

[facility] COMPARISON:     

CHEST SINGLE AP, March 16, 2018, 20:02.

 

                     

INDICATIONS :     

Evaluate for free air.

                     

 

MEDICAL HISTORY :            

Stroke. Cardiovascular disease Hypertension.   

 

SURGICAL HISTORY :     

None.   

 

ENCOUNTER:     

Initial                                        

 

ACUITY:     

1 day      

 

PAIN SCORE:     

10/10

 

LOCATION:     

Bilateral chest 

 

FINDINGS:     

A single view of the chest demonstrates the lungs to be symmetrically aerated without evidence of mas
s, infiltrate or effusion.  The cardiomediastinal contours are unremarkable.  Osseous structures are 
intact.

 

CONCLUSION:     

No acute disease.  No free air is seen.

 

 

 

 Ministerio Garcia MD on March 29, 2018 at 18:32           

Board Certified Radiologist.

 This report was verified electronically.

## 2018-03-29 NOTE — RADRPT
EXAM DATE/TIME:  03/29/2018 20:54 

 

[facility] COMPARISON:     

No previous studies available for comparison.

 

 

INDICATIONS :     

Vomiting, post bleeding ulcer scope.

                  

 

ORAL CONTRAST:      

No oral contrast ingested.

                  

 

RADIATION DOSE:     

15.40 CTDIvol (mGy) 

 

 

MEDICAL HISTORY :     

Stroke. Cardiovascular disease Gastroesophageal reflux disease.BPH. Diabetes.

 

SURGICAL HISTORY :      

None. 

 

ENCOUNTER:      

Initial

 

ACUITY:      

1 day

 

PAIN SCALE:      

0/10

 

LOCATION:         

abdomen

 

TECHNIQUE:     

Volumetric scanning of the abdomen and pelvis was performed.  Using automated exposure control and ad
justment of the mA and/or kV according to patient size, radiation dose was kept as low as reasonably 
achievable to obtain optimal diagnostic quality images.  DICOM format image data is available electro
nically for review and comparison.  

 

FINDINGS:     

 

LOWER LUNGS:     

The visualized lower lungs are clear.

 

LIVER:     

Homogeneous density without lesion.  There is no dilation of the biliary tree.  No calcified gallston
es.

 

SPLEEN:     

Normal size without lesion.

 

PANCREAS:     

Within normal limits. 

 

KIDNEYS:     

Normal in size and shape.  There is no mass, stone, or hydronephrosis.

 

ADRENAL GLANDS:     

Within normal limits.

 

VASCULAR:     

There is no aortic aneurysm. Arterial calcifications are seen.

 

BOWEL/MESENTERY:     

The stomach, small bowel, and colon demonstrate no acute abnormality.  There is no free intraperitone
al air or fluid. The appendix is normal. There are scattered colonic diverticula. There is a moderate
 amount stool in the rectum.

 

ABDOMINAL WALL:     

Within normal limits.

 

RETROPERITONEUM:     

There is no lymphadenopathy.

 

BLADDER:     

No wall thickening or mass.

 

REPRODUCTIVE:     

Within normal limits.

 

INGUINAL:     

There is no lymphadenopathy or hernia.

 

MUSCULOSKELETAL:     

Degenerative changes seen in the lumbar spine.

 

CONCLUSION:     

1. No acute abnormality seen.

2. Scattered colonic diverticula without inflammatory change.

 

 

 

 Ministerio Garcia MD on March 29, 2018 at 21:33           

Board Certified Radiologist.

 This report was verified electronically.

## 2018-03-29 NOTE — PD
Physical Exam


Date Seen by Provider:  Mar 29, 2018


Time Seen by Provider:  19:00


Narrative


72-year-old male with a past medical history significant for bipolar disorder, 

hypertension, diabetes mellitus, history of GI bleed 2weeks ago had  clips 

cauderization  and now  return  Bright red blood per vomitus  in  [facility] and  

another  episide  in ER





Data


Data


Last Documented VS





Vital Signs








  Date Time  Temp Pulse Resp B/P (MAP) Pulse Ox O2 Delivery O2 Flow Rate FiO2


 


3/29/18 19:11  105 16 102/67 (79) 100 Nasal Cannula 2.00 


 


3/29/18 18:40 98.6       








Orders





 Orders


Complete Blood Count With Diff (3/29/18 17:49)


Comprehensive Metabolic Panel (3/29/18 17:49)


Lipase (3/29/18 17:49)


Prothrombin Time / Inr (Pt) (3/29/18 17:49)


Act Partial Throm Time (Ptt) (3/29/18 17:49)


Type And Screen (3/29/18 17:49)


Chest, Single Ap (3/29/18 17:49)


Ecg Monitoring (3/29/18 17:49)


Iv Access Insert/Monitor (3/29/18 17:49)


Oximetry (3/29/18 17:49)


Ondansetron Inj (Zofran Inj) (3/29/18 18:00)


Pantoprazole Inj (Protonix Inj) (3/29/18 18:00)


Sodium Chloride 0.9% Flush (Ns Flush) (3/29/18 18:00)


Ct Abd/Pel W/O Iv Contrast (3/29/18 18:51)


Urinalysis - C+S If Indicated (3/29/18 18:54)


Pantoprazole Inj (Protonix Inj) (3/29/18 19:30)


Pantoprazole Inj (Protonix Inj) (3/29/18 19:30)


Octreotide Inj (Sandostatin  Inj) (3/29/18 19:30)


Sodium Chlor 0.9% 1000 Ml Inj (Ns 1000 M (3/29/18 19:30)


Blood Product Administration (3/29/18 19:27)


Sodium Chlor 0.9% 250 Ml Inj (Ns 250 Ml (3/29/18 19:30)


Atorvastatin (Lipitor) (3/29/18 21:00)


Fluoxetine (Prozac) (3/29/18 21:00)


Lithium Carbonate (Lithium Carbonate) (3/29/18 21:00)


Sucralfate (Carafate) (3/30/18 07:00)


Tamsulosin (Flomax) (3/29/18 21:00)


Blood Glucose Goal (Criteria) (3/29/18 19:32)


Hypoglycemia 70 Mg/Dl Or < (3/29/18 19:32)


Notify Dr: Other (3/29/18 19:32)


Dextrose 50% In Elliot (Vial) Inj (D50w (Vi (3/29/18 19:45)


Glucagon Inj (Glucagon Inj) (3/29/18 19:45)


Insulin Aspart Supplemtl Scale (Novolog (3/29/18 21:00)


Admit To Inpatient (3/29/18 )


Code Status (3/29/18 19:33)


Vital Signs (Adult) ALBERTO.Q1H (3/29/18 19:33)


Intake + Output Q1H (3/29/18 19:33)


Diet Npo (3/30/18 Breakfast)


Sodium Chlor 0.9% 1000 Ml Inj (Ns 1000 M (3/29/18 19:33)


Sodium Chloride 0.9% Flush (Ns Flush) (3/29/18 19:45)


Sodium Chloride 0.9% Flush (Ns Flush) (3/29/18 21:00)


Acetaminophen (Tylenol) (3/29/18 19:45)


Pantoprazole Inj (Protonix Inj) (3/29/18 20:00)


Ondansetron Inj (Zofran Inj) (3/29/18 19:45)


Albuterol-Ipratropium Neb (Duoneb Neb) (3/29/18 22:00)


Albuterol-Ipratropium Neb (Duoneb Neb) (3/29/18 19:45)


Complete Blood Count With Diff (3/30/18 04:00)


Comprehensive Metabolic Panel (3/30/18 04:00)


Act Partial Throm Time (Ptt) (3/30/18 04:00)


Prothrombin Time / Inr (Pt) (3/30/18 04:00)


Magnesium (Mg) (3/30/18 04:00)


Phosphorus (Po4) (3/30/18 04:00)


Lactic Acid (3/30/18 04:00)


Hgb & Hct (3/29/18 19:33)


Pt Request For Service (3/29/18 19:33)


Consult Gastroenterology (3/29/18 )


Cardiac Monitor / Telemetry ALBERTO.Q8H (3/29/18 19:33)


Scd Bilateral/Knee High ALBERTO.BID (3/29/18 19:33)


Andre Bilateral/Knee High ALBERTO.QSHIFT (3/29/18 19:33)


Pharmacologic Contraindication (3/29/18 19:33)


^ Initiate Protocol (3/29/18 19:33)


Instruction (3/29/18 19:33)


Misc Nursing Information (3/29/18 19:45)


Chlorhexidine 2% Cloth (Chlorhexidine 2% (3/30/18 04:00)


Chlorhexidine 2% Cloth (Chlorhexidine 2% (3/29/18 19:45)


Mrsa Pcr Surveillance (3/29/18 19:33)


Docusate Sodium-Senna (Sherrie-Colace) (3/29/18 21:00)


Magnesium Hydroxide Liq (Milk Of Magnesi (3/29/18 19:45)


Sennosides (Senokot) (3/29/18 19:45)


Bisacodyl Supp (Dulcolax Supp) (3/29/18 19:45)


Lactulose Liq (Lactulose Liq) (3/29/18 19:45)


Inpatient Certification (3/29/18 )


Admit Order (Ed Use Only) (3/29/18 19:42)





Labs





Laboratory Tests








Test


  3/29/18


18:00


 


White Blood Count 19.1 TH/MM3 


 


Red Blood Count 3.41 MIL/MM3 


 


Hemoglobin 10.3 GM/DL 


 


Hematocrit 31.2 % 


 


Mean Corpuscular Volume 91.4 FL 


 


Mean Corpuscular Hemoglobin 30.1 PG 


 


Mean Corpuscular Hemoglobin


Concent 33.0 % 


 


 


Red Cell Distribution Width 15.6 % 


 


Platelet Count 739 TH/MM3 


 


Mean Platelet Volume 7.5 FL 


 


Neutrophils (%) (Auto) 81.9 % 


 


Lymphocytes (%) (Auto) 10.2 % 


 


Monocytes (%) (Auto) 6.3 % 


 


Eosinophils (%) (Auto) 1.2 % 


 


Basophils (%) (Auto) 0.4 % 


 


Neutrophils # (Auto) 15.7 TH/MM3 


 


Lymphocytes # (Auto) 1.9 TH/MM3 


 


Monocytes # (Auto) 1.2 TH/MM3 


 


Eosinophils # (Auto) 0.2 TH/MM3 


 


Basophils # (Auto) 0.1 TH/MM3 


 


CBC Comment DIFF FINAL 


 


Differential Comment  


 


Prothrombin Time 10.5 SEC 


 


Prothromb Time International


Ratio 1.0 RATIO 


 


 


Activated Partial


Thromboplast Time 24.8 SEC 


 


 


Blood Urea Nitrogen 35 MG/DL 


 


Creatinine 2.21 MG/DL 


 


Random Glucose 94 MG/DL 


 


Total Protein 7.7 GM/DL 


 


Albumin 3.3 GM/DL 


 


Calcium Level 10.6 MG/DL 


 


Alkaline Phosphatase 71 U/L 


 


Aspartate Amino Transf


(AST/SGOT) 33 U/L 


 


 


Alanine Aminotransferase


(ALT/SGPT) 34 U/L 


 


 


Total Bilirubin 0.9 MG/DL 


 


Sodium Level 140 MEQ/L 


 


Potassium Level 4.5 MEQ/L 


 


Chloride Level 102 MEQ/L 


 


Carbon Dioxide Level 29.9 MEQ/L 


 


Anion Gap 8 MEQ/L 


 


Estimat Glomerular Filtration


Rate 29 ML/MIN 


 


 


Lipase 170 U/L 











MDM


Medical Record Reviewed:  Yes


Supervised Visit with ROBYN:  No


Narrative Course


pt is signed out  to  me  at  730  PM  .  I  see pt  and  then  pt  vomits a 

large  amount  of  blood  onto the  floor  and  bed,   It is  full of clots and

  blood and  pt  needs immediate intervention  with  Protonix  drip  and  push  

and  PRBC  transfusion .  I  call GI  attending  and  ICU  attnding and  admit  

for  tranfusion  stablilization and  GI  EGD  and  re-cauderization ,   I  talk 

to  GI  and  tell of  prior EGD  10 days earlier.   Pt  repeat  CBC is drawn 

and I see significant  drop in HCT to 24 . ICU aware and  pt  is  resusitated 

with  NS and  blood in  ER .  GI  will scope in AM  from the UNIT I am informed 

by GI oncall


Critical Care Narrative


30 mns crit care  time from this MD


Physician Communication


Physician Communication


ICU  VODA   and  GI  attending Alvino





Diagnosis





 Primary Impression:  


 GI bleed


 Qualified Codes:  K92.0 - Hematemesis





Admitting Information


Admitting Physician Requests:  Admit











Carlos Enrique Magallanes MD Mar 29, 2018 19:37

## 2018-03-29 NOTE — PD
HPI


Chief Complaint:  Hematemesis


Time Seen by Provider:  17:49


Travel History


International Travel<30 days:  No


Contact w/Intl Traveler<30days:  No





History of Present Illness


HPI


72-year-old male patient from the nursing home with previous history of gastric 

ulcers and GI bleeding, here because he had hematemesis and was nauseous and 

throwing up at the facility today.  He denies any chest pains, shortness of 

breath, abdominal pains, diarrhea, or any other symptoms.





Modifying Factors: None


Associated Signs & Symptoms: Vomiting, blood in the vomit


Risk Factors: History of GI ulcer and GI bleeding





PFSH


Past Medical History


Hx Anticoagulant Therapy:  Yes (ASA 2X81)


Arthritis:  Yes


Asthma:  Yes (stress induced)


Bipolar Disorder:  Yes


Anxiety:  No


Depression:  Yes


Heart Rhythm Problems:  No


Cancer:  No


Cardiovascular Problems:  Yes


High Cholesterol:  No


Chest Pain:  No


Congestive Heart Failure:  No


COPD:  No


Cerebrovascular Accident:  Yes


Diabetes:  Yes


Diminished Hearing:  No


Endocrine:  Yes (DM II)


Gastrointestinal Disorders:  Yes (GERD)


GERD:  Yes


Genitourinary:  Yes (BPH)


Hiatal Hernia:  No


Hypertension:  Yes


Immune Disorder:  No


Implanted Vascular Access Dvce:  No


Kidney Stones:  No


Musculoskeletal:  Yes (BALANCE IS OFF)


Neurologic:  Yes (CURRENT ADMISSION WITH CONFUSION POSSIBLE CVA)


Psychiatric:  Yes (BIPOLAR)


Reproductive:  No


Respiratory:  Yes


Renal Failure:  No


Sleep Apnea:  No


Thyroid Disease:  No


Ulcer:  Yes





Past Surgical History


Abdominal Surgery:  No


Cardiac Surgery:  No


Ear Surgery:  No


Endocrine Surgery:  No


Eye Surgery:  No


Genitourinary Surgery:  No


Gynecologic Surgery:  No


Neurologic Surgery:  No


Oral Surgery:  No


Pacemaker:  No


Thoracic Surgery:  No





Social History


Alcohol Use:  No


Tobacco Use:  No


Substance Use:  No





Allergies-Medications


(Allergen,Severity, Reaction):  


Coded Allergies:  


     codeine (Verified  Allergy, Severe, MUSCULOSKELETAL ISSUES, 3/29/18)


     insulin,pork (Verified  Allergy, Severe, Anaphylaxis, 3/29/18)


     insulin aspart (Verified  Allergy, Intermediate, 3/29/18)


     lactose (Verified  Allergy, Intermediate, 3/29/18)


 LACTOSE INTOLERANT


Reported Meds & Prescriptions





Reported Meds & Active Scripts


Active


Carafate (Sucralfate) 1 Gram Tab 1 Gm PO BIDAC


Norvasc (Amlodipine Besylate) 5 Mg Tab 5 Mg PO BID


Protonix (Pantoprazole Sodium) 40 Mg Tab 40 Mg PO BID


Atorvastatin (Atorvastatin Calcium) 40 Mg Tab 40 Mg PO HS


Plavix (Clopidogrel Bisulfate) 75 Mg Tab 75 Mg PO DAILY


Reported


Proair Hfa 8.5 GM Inh (Albuterol Sulfate) 90 Mcg/Act Aer 2 Puff INH Q4-6H PRN


     108 mcg/actuation


Carvedilol 3.125 Mg Tab 3.125 Mg PO DAILY


Metformin (Metformin HCl) 500 Mg Tab 500 Mg PO BIDPC


Fluoxetine (Fluoxetine HCl) 40 Mg Cap 40 Cap PO HS


Flomax (Tamsulosin HCl) 0.4 Mg Cap 0.4 Mg PO HS


Lithium Carbonate 600 Mg Cap 600 Mg PO HS








Review of Systems


Except as stated in HPI:  all other systems reviewed are Neg





Physical Exam


Narrative


GENERAL: Well-developed elderly male patient currently in mild distress.  Awake 

and alert.


SKIN: Focused skin assessment warm/dry.


HEAD: Atraumatic. Normocephalic. 


EYES: Pupils equal and round. No scleral icterus. No injection or drainage. 


ENT: No nasal bleeding or discharge.  Mucous membranes pink and moist.


NECK: Trachea midline. No JVD.  Supple.


CARDIOVASCULAR: Regular rate and rhythm.  No murmur appreciated.


RESPIRATORY: No accessory muscle use. Clear to auscultation. Breath sounds 

equal bilaterally. 


GASTROINTESTINAL: Abdomen soft, non-tender, nondistended. Hepatic and splenic 

margins not palpable. 


MUSCULOSKELETAL: No obvious deformities. No clubbing.  No cyanosis.  No edema. 


NEUROLOGICAL: Awake and alert. No obvious cranial nerve deficits.  Motor 

grossly within normal limits. Normal speech.


PSYCHIATRIC: Appropriate mood and affect; insight and judgment poor.





Data


Data


Last Documented VS





Vital Signs








  Date Time  Temp Pulse Resp B/P (MAP) Pulse Ox O2 Delivery O2 Flow Rate FiO2


 


3/29/18 18:40 98.6 85 16 141/85 (103) 96 Room Air  








Orders





 Orders


Complete Blood Count With Diff (3/29/18 17:49)


Comprehensive Metabolic Panel (3/29/18 17:49)


Lipase (3/29/18 17:49)


Prothrombin Time / Inr (Pt) (3/29/18 17:49)


Act Partial Throm Time (Ptt) (3/29/18 17:49)


Type And Screen (3/29/18 17:49)


Chest, Single Ap (3/29/18 17:49)


Ecg Monitoring (3/29/18 17:49)


Iv Access Insert/Monitor (3/29/18 17:49)


Oximetry (3/29/18 17:49)


Ondansetron Inj (Zofran Inj) (3/29/18 18:00)


Pantoprazole Inj (Protonix Inj) (3/29/18 18:00)


Sodium Chloride 0.9% Flush (Ns Flush) (3/29/18 18:00)


Ct Abd/Pel W/O Iv Contrast (3/29/18 18:51)


Urinalysis - C+S If Indicated (3/29/18 18:54)





Labs





Laboratory Tests








Test


  3/29/18


18:00


 


White Blood Count 19.1 TH/MM3 


 


Red Blood Count 3.41 MIL/MM3 


 


Hemoglobin 10.3 GM/DL 


 


Hematocrit 31.2 % 


 


Mean Corpuscular Volume 91.4 FL 


 


Mean Corpuscular Hemoglobin 30.1 PG 


 


Mean Corpuscular Hemoglobin


Concent 33.0 % 


 


 


Red Cell Distribution Width 15.6 % 


 


Platelet Count 739 TH/MM3 


 


Mean Platelet Volume 7.5 FL 


 


Neutrophils (%) (Auto) 81.9 % 


 


Lymphocytes (%) (Auto) 10.2 % 


 


Monocytes (%) (Auto) 6.3 % 


 


Eosinophils (%) (Auto) 1.2 % 


 


Basophils (%) (Auto) 0.4 % 


 


Neutrophils # (Auto) 15.7 TH/MM3 


 


Lymphocytes # (Auto) 1.9 TH/MM3 


 


Monocytes # (Auto) 1.2 TH/MM3 


 


Eosinophils # (Auto) 0.2 TH/MM3 


 


Basophils # (Auto) 0.1 TH/MM3 


 


CBC Comment DIFF FINAL 


 


Differential Comment  


 


Prothrombin Time 10.5 SEC 


 


Prothromb Time International


Ratio 1.0 RATIO 


 


 


Activated Partial


Thromboplast Time 24.8 SEC 


 


 


Blood Urea Nitrogen 35 MG/DL 


 


Creatinine 2.21 MG/DL 


 


Random Glucose 94 MG/DL 


 


Total Protein 7.7 GM/DL 


 


Albumin 3.3 GM/DL 


 


Calcium Level 10.6 MG/DL 


 


Alkaline Phosphatase 71 U/L 


 


Aspartate Amino Transf


(AST/SGOT) 33 U/L 


 


 


Alanine Aminotransferase


(ALT/SGPT) 34 U/L 


 


 


Total Bilirubin 0.9 MG/DL 


 


Sodium Level 140 MEQ/L 


 


Potassium Level 4.5 MEQ/L 


 


Chloride Level 102 MEQ/L 


 


Carbon Dioxide Level 29.9 MEQ/L 


 


Anion Gap 8 MEQ/L 


 


Estimat Glomerular Filtration


Rate 29 ML/MIN 


 


 


Lipase 170 U/L 











[facility]


Medical Decision Making


Medical Screen Exam Complete:  Yes


Emergency Medical Condition:  Yes


Medical Record Reviewed:  Yes


Interpretation(s)





Laboratory Tests








Test


  3/29/18


18:00


 


White Blood Count


  19.1 TH/MM3


(4.0-11.0)


 


Red Blood Count


  3.41 MIL/MM3


(4.50-5.90)


 


Hemoglobin


  10.3 GM/DL


(13.0-17.0)


 


Hematocrit


  31.2 %


(39.0-51.0)


 


Platelet Count


  739 TH/MM3


(150-450)


 


Neutrophils (%) (Auto)


  81.9 %


(16.0-70.0)


 


Neutrophils # (Auto)


  15.7 TH/MM3


(1.8-7.7)


 


Monocytes # (Auto)


  1.2 TH/MM3


(0-0.9)


 


Blood Urea Nitrogen


  35 MG/DL


(7-18)


 


Creatinine


  2.21 MG/DL


(0.60-1.30)


 


Albumin


  3.3 GM/DL


(3.4-5.0)


 


Calcium Level


  10.6 MG/DL


(8.5-10.1)


 


Estimat Glomerular Filtration


Rate 29 ML/MIN


(>89)








Differential Diagnosis


Gastritis versus GI bleeding versus coagulopathy


Narrative Course


Chest x-ray did not show any signs of free air.  He was given IV Protonix, 

Zofran, and IV fluids.  Lab work shows leukocytosis and BUN and creatinine is 

elevated, CAT scan without contrast was ordered for further evaluation.





Physician Communication


Physician Communication


Case is discussed with Dr. Magallanes awaiting CAT scan for final disposition.  

Planning to admit as an observation for GI bleed.





Diagnosis





 Primary Impression:  


 GI bleed





Admitting Information


Admitting Physician Requests:  Admit











Marcelina Pereira MD Mar 29, 2018 18:38

## 2018-03-30 VITALS
HEART RATE: 75 BPM | RESPIRATION RATE: 13 BRPM | TEMPERATURE: 98.1 F | SYSTOLIC BLOOD PRESSURE: 143 MMHG | OXYGEN SATURATION: 100 % | DIASTOLIC BLOOD PRESSURE: 72 MMHG

## 2018-03-30 VITALS — HEART RATE: 68 BPM

## 2018-03-30 VITALS
RESPIRATION RATE: 17 BRPM | OXYGEN SATURATION: 99 % | SYSTOLIC BLOOD PRESSURE: 136 MMHG | DIASTOLIC BLOOD PRESSURE: 81 MMHG | TEMPERATURE: 98.3 F | HEART RATE: 88 BPM

## 2018-03-30 VITALS
HEART RATE: 68 BPM | RESPIRATION RATE: 17 BRPM | TEMPERATURE: 98.4 F | DIASTOLIC BLOOD PRESSURE: 74 MMHG | SYSTOLIC BLOOD PRESSURE: 152 MMHG | OXYGEN SATURATION: 99 %

## 2018-03-30 VITALS
OXYGEN SATURATION: 100 % | SYSTOLIC BLOOD PRESSURE: 118 MMHG | DIASTOLIC BLOOD PRESSURE: 66 MMHG | RESPIRATION RATE: 19 BRPM | HEART RATE: 79 BPM

## 2018-03-30 VITALS
HEART RATE: 84 BPM | OXYGEN SATURATION: 100 % | SYSTOLIC BLOOD PRESSURE: 121 MMHG | RESPIRATION RATE: 16 BRPM | DIASTOLIC BLOOD PRESSURE: 67 MMHG

## 2018-03-30 VITALS — HEART RATE: 74 BPM

## 2018-03-30 VITALS
DIASTOLIC BLOOD PRESSURE: 53 MMHG | OXYGEN SATURATION: 100 % | HEART RATE: 81 BPM | RESPIRATION RATE: 16 BRPM | SYSTOLIC BLOOD PRESSURE: 98 MMHG

## 2018-03-30 VITALS — HEART RATE: 66 BPM

## 2018-03-30 VITALS
TEMPERATURE: 98.2 F | RESPIRATION RATE: 13 BRPM | DIASTOLIC BLOOD PRESSURE: 60 MMHG | OXYGEN SATURATION: 100 % | HEART RATE: 86 BPM | SYSTOLIC BLOOD PRESSURE: 115 MMHG

## 2018-03-30 VITALS
DIASTOLIC BLOOD PRESSURE: 65 MMHG | RESPIRATION RATE: 20 BRPM | HEART RATE: 85 BPM | SYSTOLIC BLOOD PRESSURE: 118 MMHG | OXYGEN SATURATION: 100 %

## 2018-03-30 VITALS
RESPIRATION RATE: 20 BRPM | HEART RATE: 92 BPM | OXYGEN SATURATION: 100 % | SYSTOLIC BLOOD PRESSURE: 117 MMHG | TEMPERATURE: 98 F | DIASTOLIC BLOOD PRESSURE: 68 MMHG

## 2018-03-30 VITALS
SYSTOLIC BLOOD PRESSURE: 140 MMHG | DIASTOLIC BLOOD PRESSURE: 71 MMHG | RESPIRATION RATE: 17 BRPM | OXYGEN SATURATION: 99 % | HEART RATE: 71 BPM | TEMPERATURE: 98.8 F

## 2018-03-30 VITALS
TEMPERATURE: 97.7 F | DIASTOLIC BLOOD PRESSURE: 61 MMHG | SYSTOLIC BLOOD PRESSURE: 123 MMHG | RESPIRATION RATE: 18 BRPM | HEART RATE: 87 BPM | OXYGEN SATURATION: 100 %

## 2018-03-30 VITALS
HEART RATE: 89 BPM | SYSTOLIC BLOOD PRESSURE: 126 MMHG | OXYGEN SATURATION: 100 % | RESPIRATION RATE: 12 BRPM | DIASTOLIC BLOOD PRESSURE: 67 MMHG

## 2018-03-30 VITALS
SYSTOLIC BLOOD PRESSURE: 124 MMHG | OXYGEN SATURATION: 100 % | HEART RATE: 84 BPM | DIASTOLIC BLOOD PRESSURE: 65 MMHG | RESPIRATION RATE: 17 BRPM

## 2018-03-30 VITALS — OXYGEN SATURATION: 99 %

## 2018-03-30 VITALS — HEART RATE: 69 BPM

## 2018-03-30 LAB
ALBUMIN SERPL-MCNC: 2.3 GM/DL (ref 3.4–5)
ALP SERPL-CCNC: 48 U/L (ref 45–117)
ALT SERPL-CCNC: 21 U/L (ref 12–78)
AST SERPL-CCNC: 16 U/L (ref 15–37)
BASOPHILS # BLD AUTO: 0 TH/MM3 (ref 0–0.2)
BASOPHILS # BLD AUTO: 0.1 TH/MM3 (ref 0–0.2)
BASOPHILS NFR BLD: 0.3 % (ref 0–2)
BASOPHILS NFR BLD: 0.5 % (ref 0–2)
BILIRUB SERPL-MCNC: 1.1 MG/DL (ref 0.2–1)
BUN SERPL-MCNC: 40 MG/DL (ref 7–18)
CALCIUM SERPL-MCNC: 9.1 MG/DL (ref 8.5–10.1)
CHLORIDE SERPL-SCNC: 110 MEQ/L (ref 98–107)
CREAT SERPL-MCNC: 1.99 MG/DL (ref 0.6–1.3)
EOSINOPHIL # BLD: 0.1 TH/MM3 (ref 0–0.4)
EOSINOPHIL # BLD: 0.1 TH/MM3 (ref 0–0.4)
EOSINOPHIL NFR BLD: 0.5 % (ref 0–4)
EOSINOPHIL NFR BLD: 0.9 % (ref 0–4)
ERYTHROCYTE [DISTWIDTH] IN BLOOD BY AUTOMATED COUNT: 15.5 % (ref 11.6–17.2)
ERYTHROCYTE [DISTWIDTH] IN BLOOD BY AUTOMATED COUNT: 15.8 % (ref 11.6–17.2)
GFR SERPLBLD BASED ON 1.73 SQ M-ARVRAT: 33 ML/MIN (ref 89–?)
GLUCOSE SERPL-MCNC: 115 MG/DL (ref 74–106)
HCO3 BLD-SCNC: 29.3 MEQ/L (ref 21–32)
HCT VFR BLD CALC: 26.8 % (ref 39–51)
HCT VFR BLD CALC: 27 % (ref 39–51)
HGB BLD-MCNC: 9.1 GM/DL (ref 13–17)
HGB BLD-MCNC: 9.1 GM/DL (ref 13–17)
INR PPP: 1.1 RATIO
LYMPHOCYTES # BLD AUTO: 2.9 TH/MM3 (ref 1–4.8)
LYMPHOCYTES # BLD AUTO: 3.6 TH/MM3 (ref 1–4.8)
LYMPHOCYTES NFR BLD AUTO: 20.6 % (ref 9–44)
LYMPHOCYTES NFR BLD AUTO: 23.7 % (ref 9–44)
MAGNESIUM SERPL-MCNC: 2 MG/DL (ref 1.5–2.5)
MCH RBC QN AUTO: 30.2 PG (ref 27–34)
MCH RBC QN AUTO: 30.3 PG (ref 27–34)
MCHC RBC AUTO-ENTMCNC: 33.7 % (ref 32–36)
MCHC RBC AUTO-ENTMCNC: 33.8 % (ref 32–36)
MCV RBC AUTO: 89.5 FL (ref 80–100)
MCV RBC AUTO: 89.8 FL (ref 80–100)
MONOCYTE #: 1.2 TH/MM3 (ref 0–0.9)
MONOCYTE #: 1.4 TH/MM3 (ref 0–0.9)
MONOCYTES NFR BLD: 8.5 % (ref 0–8)
MONOCYTES NFR BLD: 9.1 % (ref 0–8)
NEUTROPHILS # BLD AUTO: 10 TH/MM3 (ref 1.8–7.7)
NEUTROPHILS # BLD AUTO: 9.7 TH/MM3 (ref 1.8–7.7)
NEUTROPHILS NFR BLD AUTO: 66.2 % (ref 16–70)
NEUTROPHILS NFR BLD AUTO: 69.7 % (ref 16–70)
PHOSPHATE SERPL-MCNC: 3.4 MG/DL (ref 2.5–4.9)
PLATELET # BLD: 460 TH/MM3 (ref 150–450)
PLATELET # BLD: 498 TH/MM3 (ref 150–450)
PMV BLD AUTO: 7.1 FL (ref 7–11)
PMV BLD AUTO: 7.3 FL (ref 7–11)
PROT SERPL-MCNC: 5.5 GM/DL (ref 6.4–8.2)
PROTHROMBIN TIME: 11.2 SEC (ref 9.8–11.6)
RBC # BLD AUTO: 3 MIL/MM3 (ref 4.5–5.9)
RBC # BLD AUTO: 3.01 MIL/MM3 (ref 4.5–5.9)
SODIUM SERPL-SCNC: 143 MEQ/L (ref 136–145)
WBC # BLD AUTO: 13.9 TH/MM3 (ref 4–11)
WBC # BLD AUTO: 15.1 TH/MM3 (ref 4–11)

## 2018-03-30 PROCEDURE — 0W3P8ZZ CONTROL BLEEDING IN GASTROINTESTINAL TRACT, VIA NATURAL OR ARTIFICIAL OPENING ENDOSCOPIC: ICD-10-PCS | Performed by: INTERNAL MEDICINE

## 2018-03-30 PROCEDURE — 3E0G8GC INTRODUCTION OF OTHER THERAPEUTIC SUBSTANCE INTO UPPER GI, VIA NATURAL OR ARTIFICIAL OPENING ENDOSCOPIC: ICD-10-PCS | Performed by: INTERNAL MEDICINE

## 2018-03-30 PROCEDURE — 0D598ZZ DESTRUCTION OF DUODENUM, VIA NATURAL OR ARTIFICIAL OPENING ENDOSCOPIC: ICD-10-PCS | Performed by: INTERNAL MEDICINE

## 2018-03-30 RX ADMIN — TAMSULOSIN HYDROCHLORIDE SCH MG: 0.4 CAPSULE ORAL at 20:47

## 2018-03-30 RX ADMIN — PANTOPRAZOLE SODIUM SCH MG: 40 INJECTION, POWDER, FOR SOLUTION INTRAVENOUS at 20:48

## 2018-03-30 RX ADMIN — STANDARDIZED SENNA CONCENTRATE AND DOCUSATE SODIUM SCH TAB: 8.6; 5 TABLET, FILM COATED ORAL at 09:00

## 2018-03-30 RX ADMIN — INSULIN ASPART SCH: 100 INJECTION, SOLUTION INTRAVENOUS; SUBCUTANEOUS at 17:00

## 2018-03-30 RX ADMIN — PANTOPRAZOLE SODIUM SCH MG: 40 INJECTION, POWDER, FOR SOLUTION INTRAVENOUS at 08:00

## 2018-03-30 RX ADMIN — PANTOPRAZOLE SODIUM SCH MLS/HR: 40 INJECTION, POWDER, FOR SOLUTION INTRAVENOUS at 04:55

## 2018-03-30 RX ADMIN — SUCRALFATE SCH GM: 1 TABLET ORAL at 17:02

## 2018-03-30 RX ADMIN — PANTOPRAZOLE SODIUM SCH MLS/HR: 40 INJECTION, POWDER, FOR SOLUTION INTRAVENOUS at 15:03

## 2018-03-30 RX ADMIN — IPRATROPIUM BROMIDE AND ALBUTEROL SULFATE SCH AMPULE: .5; 3 SOLUTION RESPIRATORY (INHALATION) at 03:49

## 2018-03-30 RX ADMIN — PHENYTOIN SODIUM SCH MLS/HR: 50 INJECTION INTRAMUSCULAR; INTRAVENOUS at 22:06

## 2018-03-30 RX ADMIN — SUCRALFATE SCH GM: 1 TABLET ORAL at 07:00

## 2018-03-30 RX ADMIN — PHENYTOIN SODIUM SCH MLS/HR: 50 INJECTION INTRAMUSCULAR; INTRAVENOUS at 01:47

## 2018-03-30 RX ADMIN — IPRATROPIUM BROMIDE AND ALBUTEROL SULFATE SCH AMPULE: .5; 3 SOLUTION RESPIRATORY (INHALATION) at 20:29

## 2018-03-30 RX ADMIN — PHENYTOIN SODIUM SCH MLS/HR: 50 INJECTION INTRAMUSCULAR; INTRAVENOUS at 15:02

## 2018-03-30 RX ADMIN — INSULIN ASPART SCH: 100 INJECTION, SOLUTION INTRAVENOUS; SUBCUTANEOUS at 21:00

## 2018-03-30 RX ADMIN — LITHIUM CARBONATE SCH MG: 300 CAPSULE, GELATIN COATED ORAL at 20:47

## 2018-03-30 RX ADMIN — INSULIN ASPART SCH: 100 INJECTION, SOLUTION INTRAVENOUS; SUBCUTANEOUS at 12:00

## 2018-03-30 RX ADMIN — Medication SCH ML: at 20:48

## 2018-03-30 RX ADMIN — INSULIN ASPART SCH: 100 INJECTION, SOLUTION INTRAVENOUS; SUBCUTANEOUS at 08:00

## 2018-03-30 RX ADMIN — IPRATROPIUM BROMIDE AND ALBUTEROL SULFATE SCH AMPULE: .5; 3 SOLUTION RESPIRATORY (INHALATION) at 16:19

## 2018-03-30 RX ADMIN — ATORVASTATIN CALCIUM SCH MG: 40 TABLET, FILM COATED ORAL at 20:47

## 2018-03-30 RX ADMIN — Medication SCH ML: at 09:00

## 2018-03-30 RX ADMIN — STANDARDIZED SENNA CONCENTRATE AND DOCUSATE SODIUM SCH TAB: 8.6; 5 TABLET, FILM COATED ORAL at 21:00

## 2018-03-30 NOTE — PD.PROCEDR
GI Procedure





PROCEDURE PERFORMED


EGD with epinephrine injection and cautery





INDICATION FOR PROCEDURE


Upper GI bleed





PROCEDURE:


The procedure, risks and benefits were discussed with Mr. Luevano and informed


consent was obtained.  Anesthesia sedated him with Diprivan.  He was placed in 

the left lateral decubitus position.





EGD:


The Pentax videoscope was introduced through the oropharynx and advanced to the 

second portion of the duodenum under direct visualization. Retroflexion was 

performed in the stomach.





FINDINGS:


The esophagus this was normal


The stomach this was normal


The duodenum in the bulb was a large duodenal ulcer with a visible vessel this 

was injected with 8 cc of epinephrine and cauterized also noted in this area 

was a clip from the prior endoscopy the rest of the duodenum was unremarkable





ESTIMATED BLOOD LOSS:


None





SPECIMENS REMOVED:


None





COMPLICATIONS:


None





IMPRESSION:


Duodenal ulcer with stigmata showing a visible vessel





PLAN:


Supportive care


Continue with Protonix 40 mg twice daily


Monitor lab and transfuse as needed


EGD in 2 months


If any active bleeding patient will need interventional radiology for 

embolization











Ej Redmond MD Mar 30, 2018 10:28

## 2018-03-30 NOTE — PD.CONS
HPI


History of Present Illness


This is a 72 year old male with hx CVA on plavix, GIB, gastric ulcer who 

presented from his NH for hematemesis.  Yesterday he was having blood in emesis 

and black tarry stools.  He had EGD 3/29/18 with finding ulcer with visible 

vessel, s/p epi inj, cautery, clips x 3.  Pt is limited historian, just had 

repeat EGD.


 (Mary Carmen Hart)





PFSH


Past Medical History


Bipolar disorder


Hypertension


Diabetes mellitus


History of GI bleed


History of recent CVA


Past Surgical History


Left hand crush injury related surgery


 (Mary Carmen Hart)


Coded Allergies:  


     codeine (Verified  Allergy, Severe, MUSCULOSKELETAL ISSUES, 3/29/18)


     insulin,pork (Verified  Allergy, Severe, Anaphylaxis, 3/29/18)


     insulin aspart (Verified  Allergy, Intermediate, 3/29/18)


     lactose (Verified  Allergy, Intermediate, 3/29/18)


 LACTOSE INTOLERANT


Family History


No family history of CAD/DM.  Mother with esophageal cancer


Social History


Denies alcohol, tobacco and illicit drugs


 (Mary Carmen Hart)





Review of Systems


Gastrointestinal:  COMPLAINS OF: Black stools, Hematemesis, DENIES: Abdominal 

pain


otherwise noncontributory


 (Mary Carmen Hart)





GI Exam


Vitals I&O





Vital Signs








  Date Time  Temp Pulse Resp B/P (MAP) Pulse Ox O2 Delivery O2 Flow Rate FiO2


 


3/30/18 10:45  91 18 125/68 (87) 99 Nasal Cannula 3 


 


3/30/18 10:30  99 18 138/65 (89) 99 Nasal Cannula 3 


 


3/30/18 10:10 98.0 106 18 122/64 (83) 97 Nasal Cannula 3 


 


3/30/18 08:00  86      


 


3/30/18 08:00 98.2 86 13 115/60 (78) 100   


 


3/30/18 08:00     100 Nasal Cannula 2.00 


 


3/30/18 07:00     99 Nasal Cannula 3.00 


 


3/30/18 06:00  69      


 


3/30/18 04:00 98.1 90 13 143/72 (95) 100   


 


3/30/18 04:00  75      


 


3/30/18 03:02  89 12 126/67 (86) 100   


 


3/30/18 02:00  79 19 118/66 (83) 100   


 


3/30/18 02:00  79      


 


3/30/18 01:00  81 16 98/53 (68) 100   


 


3/30/18 00:45  84 17 124/65 (84) 100   


 


3/30/18 00:30  85 20 118/65 (82) 100   


 


3/30/18 00:15  84 16 121/67 (85) 100   


 


3/30/18 00:00  80      


 


3/30/18 00:00     98 Nasal Cannula 3.00 


 


3/30/18 00:00 98.0 92 20 117/68 (84) 100   


 


3/29/18 23:45 98.1 89 20 110/64 (79) 100   


 


3/29/18 23:30 98.0 83 25 110/63 (79) 100   


 


3/29/18 23:15  85 13 110/61 (77) 100   


 


3/29/18 23:00  85 17 114/58 (76) 100   


 


3/29/18 22:45  88 20 116/60 (78) 100   


 


3/29/18 22:30  94 28 107/60 (76) 100   


 


3/29/18 22:15  89 18 104/60 (75) 100   


 


3/29/18 22:11  91 16 97/61 (73) 100   


 


3/29/18 22:05  87 17 69/47 (54) 100   


 


3/29/18 22:00  83      


 


3/29/18 22:00  88 17 77/51 (60) 100   


 


3/29/18 21:30     96 Nasal Cannula 3.00 


 


3/29/18 21:30     100 Nasal Cannula 3.00 


 


3/29/18 21:30 98.2 97 20 115/75 (88) 98   


 


3/29/18 21:30  86      


 


3/29/18 21:15 98.1 91 19 91/56 (68) 100   


 


3/29/18 21:02 98.2 97 16 115/75 (88) 98 Nasal Cannula 2.00 


 


3/29/18 20:45  106 16 118/67 (84) 99 Nasal Cannula 2.00 


 


3/29/18 20:30  100 16 128/69 (88) 99 Nasal Cannula 2.00 


 


3/29/18 20:00  108 16 96/56 (69) 99 Nasal Cannula 2.00 


 


3/29/18 19:11  105 16 102/67 (79) 100 Nasal Cannula 2.00 


 


3/29/18 18:40 98.6 85 16 141/85 (103) 96 Room Air  


 


3/29/18 18:20 98.6 85 16 141/85 (103) 96   














I/O      


 


 3/29/18 3/29/18 3/29/18 3/30/18 3/30/18 3/30/18





 07:00 15:00 23:00 07:00 15:00 23:00


 


Intake Total   1050 ml 3396 ml 200 ml 


 


Output Total   3000 ml   


 


Balance   -1950 ml 3396 ml 200 ml 


 


      


 


Intake Oral    0 ml  


 


IV Total   1000 ml 1646 ml  


 


Packed Cells    1600 ml  


 


Blood Product IV Normal Saline Flush   50 ml 150 ml  


 


Other     200 ml 


 


Output Emesis   3000 ml   


 


# Voids    3  


 


# Bowel Movements    0  








Imaging





Last Impressions








Abdomen/Pelvis CT 3/29/18 1851 Signed





Impressions: 





 Service Date/Time:  Thursday, March 29, 2018 20:54 - CONCLUSION:  1. No acute 





 abnormality seen. 2. Scattered colonic diverticula without inflammatory 

change.  





    Ministerio Garcia MD 


 


Chest X-Ray 3/29/18 1055 Signed





Impressions: 





 Service Date/Time:  Thursday, March 29, 2018 17:59 - CONCLUSION:  No acute 





 disease.  No free air is seen.     Ministerio Garcia MD 








Laboratory











Test


  3/29/18


18:00 3/29/18


20:00 3/29/18


20:40 3/29/18


21:25


 


White Blood Count 19.1 TH/MM3    


 


Red Blood Count 3.41 MIL/MM3    


 


Hemoglobin 10.3 GM/DL  8.0 GM/DL   


 


Hematocrit 31.2 %  24.5 %   


 


Mean Corpuscular Volume 91.4 FL    


 


Mean Corpuscular Hemoglobin 30.1 PG    


 


Mean Corpuscular Hemoglobin


Concent 33.0 % 


  


  


  


 


 


Red Cell Distribution Width 15.6 %    


 


Platelet Count 739 TH/MM3    


 


Mean Platelet Volume 7.5 FL    


 


Neutrophils (%) (Auto) 81.9 %    


 


Lymphocytes (%) (Auto) 10.2 %    


 


Monocytes (%) (Auto) 6.3 %    


 


Eosinophils (%) (Auto) 1.2 %    


 


Basophils (%) (Auto) 0.4 %    


 


Neutrophils # (Auto) 15.7 TH/MM3    


 


Lymphocytes # (Auto) 1.9 TH/MM3    


 


Monocytes # (Auto) 1.2 TH/MM3    


 


Eosinophils # (Auto) 0.2 TH/MM3    


 


Basophils # (Auto) 0.1 TH/MM3    


 


CBC Comment DIFF FINAL    


 


Differential Comment     


 


Prothrombin Time 10.5 SEC    


 


Prothromb Time International


Ratio 1.0 RATIO 


  


  


  


 


 


Activated Partial


Thromboplast Time 24.8 SEC 


  


  


  


 


 


Blood Urea Nitrogen 35 MG/DL    


 


Creatinine 2.21 MG/DL    


 


Random Glucose 94 MG/DL    


 


Total Protein 7.7 GM/DL    


 


Albumin 3.3 GM/DL    


 


Calcium Level 10.6 MG/DL    


 


Alkaline Phosphatase 71 U/L    


 


Aspartate Amino Transf


(AST/SGOT) 33 U/L 


  


  


  


 


 


Alanine Aminotransferase


(ALT/SGPT) 34 U/L 


  


  


  


 


 


Total Bilirubin 0.9 MG/DL    


 


Sodium Level 140 MEQ/L    


 


Potassium Level 4.5 MEQ/L    


 


Chloride Level 102 MEQ/L    


 


Carbon Dioxide Level 29.9 MEQ/L    


 


Anion Gap 8 MEQ/L    


 


Estimat Glomerular Filtration


Rate 29 ML/MIN 


  


  


  


 


 


Lipase 170 U/L    


 


Urine Color   YELLOW  


 


Urine Turbidity   CLOUDY  


 


Urine pH   5.5  


 


Urine Specific Gravity   1.016  


 


Urine Protein   30 mg/dL  


 


Urine Glucose (UA)   TRACE mg/dL  


 


Urine Ketones   NEG mg/dL  


 


Urine Occult Blood   MOD  


 


Urine Nitrite   NEG  


 


Urine Bilirubin   NEG  


 


Urine Urobilinogen


  


  


  LESS THAN 2.0


MG/DL 


 


 


Urine Leukocyte Esterase   LARGE  


 


Urine RBC   17 /hpf  


 


Urine WBC    /hpf  


 


Urine WBC Clumps   MANY  


 


Urine Squamous Epithelial


Cells 


  


  2 /hpf 


  


 


 


Urine Bacteria   FEW /hpf  


 


Urine Hyaline Casts   20 /lpf  


 


Urine Mucus   FEW /lpf  


 


Microscopic Urinalysis Comment


  


  


  CULTURE


INDICATED 


 


 


Nasal Screen MRSA (PCR)


  


  


  


  MRSA NOT


DETECTED


 


Test


  3/30/18


03:02 


  


  


 


 


White Blood Count 13.9 TH/MM3    


 


Red Blood Count 3.01 MIL/MM3    


 


Hemoglobin 9.1 GM/DL    


 


Hematocrit 27.0 %    


 


Mean Corpuscular Volume 89.8 FL    


 


Mean Corpuscular Hemoglobin 30.3 PG    


 


Mean Corpuscular Hemoglobin


Concent 33.7 % 


  


  


  


 


 


Red Cell Distribution Width 15.8 %    


 


Platelet Count 460 TH/MM3    


 


Mean Platelet Volume 7.1 FL    


 


Neutrophils (%) (Auto) 69.7 %    


 


Lymphocytes (%) (Auto) 20.6 %    


 


Monocytes (%) (Auto) 8.5 %    


 


Eosinophils (%) (Auto) 0.9 %    


 


Basophils (%) (Auto) 0.3 %    


 


Neutrophils # (Auto) 9.7 TH/MM3    


 


Lymphocytes # (Auto) 2.9 TH/MM3    


 


Monocytes # (Auto) 1.2 TH/MM3    


 


Eosinophils # (Auto) 0.1 TH/MM3    


 


Basophils # (Auto) 0.0 TH/MM3    


 


CBC Comment DIFF FINAL    


 


Differential Comment     


 


Prothrombin Time 11.2 SEC    


 


Prothromb Time International


Ratio 1.1 RATIO 


  


  


  


 


 


Activated Partial


Thromboplast Time 22.2 SEC 


  


  


  


 


 


Blood Urea Nitrogen 40 MG/DL    


 


Creatinine 1.99 MG/DL    


 


Random Glucose 115 MG/DL    


 


Total Protein 5.5 GM/DL    


 


Albumin 2.3 GM/DL    


 


Calcium Level 9.1 MG/DL    


 


Phosphorus Level 3.4 MG/DL    


 


Magnesium Level 2.0 MG/DL    


 


Alkaline Phosphatase 48 U/L    


 


Aspartate Amino Transf


(AST/SGOT) 16 U/L 


  


  


  


 


 


Alanine Aminotransferase


(ALT/SGPT) 21 U/L 


  


  


  


 


 


Total Bilirubin 1.1 MG/DL    


 


Sodium Level 143 MEQ/L    


 


Potassium Level 4.3 MEQ/L    


 


Chloride Level 110 MEQ/L    


 


Carbon Dioxide Level 29.3 MEQ/L    


 


Anion Gap 4 MEQ/L    


 


Estimat Glomerular Filtration


Rate 33 ML/MIN 


  


  


  


 


 


Lactic Acid Level 0.9 mmol/L    














 Date/Time


Source Procedure


Growth Status


 


 


 3/29/18 20:40


Urine Clean Catch Urine Culture


Pending Received








Physical Examination


HEENT: PERRL; normocephalic; atraumatic; no jaundice.  poor dentition 


CHEST:  CTA


CARDIAC:  RRR + murmur


ABDOMEN:  Soft, nondistended, nontender; no hepatosplenomegaly; bowel sounds 

are present in all four quadrants.


EXTREMITIES: No clubbing, cyanosis, or edema.


SKIN:  Normal; no rash; no jaundice.


CNS:  lethargic, answers appropriately


 (Mary Carmen Hart)





Assessment and Plan


Plan


ASSESSMENT


- hematemesis, black tarry stool - onset yesterday.  prior hx GIB.  EGD 3/20/18 

showed


   ulcer with visible vessel, s/p cautery, clips x 3 and epi inj.  had EGD 

today 3/30/18 showed


   large duodenal ulcer with visible vessel, s/p cautery, epi inj.


- anemia with drop in HH - hgb dropped from 10 to 8.  now s/p PRBC x 2





PLAN


- clear liquids


- BID protonix


- if further bleeding, IR consult for embolization 


- EGD 2m


- monitor HH


- supportive care





pt seen by myself and Dr Redmond and this note is on his behalf


 (Mary Carmen Hart)


Physician Comments


Patient seen and examined


Agree with above


Continue with current supportive care


Monitor labs


EGD done showing an ulcer of the duodenum with stigmata of recent bleed


 (Ej Redmond MD)











Mary Carmen Hart Mar 30, 2018 13:10


Ej Redmond MD Mar 30, 2018 23:50

## 2018-03-30 NOTE — HHI.CCPN
Subjective


Remarks/Hospital Course


3/29: 72-year-old male patient from the nursing home with previous history of 

gastric ulcers and GI bleeding and the last endoscopy 2 weeks ago, presents 

today because he had hematemesis and was nauseous and throwing up at the 

nursing home facility.  He denies any chest pains, shortness of breath, 

abdominal pains, diarrhea, or any other symptoms.





3/30: Underwent EGD with findings of large duodenal ulcer with visible vessel 

which was injected with epinephrine and clipped by GI.  Hemoglobin remained 

stable.  Received 2 units PRBCs last night.  Drowsy following EGD at the time 

of my evaluation earlier today.  Left-sided weakness from previous stroke.





Objective





Vital Signs








  Date Time  Temp Pulse Resp B/P (MAP) Pulse Ox O2 Delivery O2 Flow Rate FiO2


 


3/30/18 14:00  66      


 


3/30/18 12:00 98.8  17 140/71 (94) 99   


 


3/30/18 10:45      Nasal Cannula 3 














Intake and Output   


 


 3/30/18 3/30/18 3/31/18





 08:00 16:00 00:00


 


Intake Total 2396 ml 200 ml 


 


Balance 2396 ml 200 ml 








Result Diagram:  


3/30/18 1519                                                                   

             3/30/18 0302





Imaging





Last 24 hours Impressions








Chest X-Ray 3/29/18 1749 Signed





Impressions: 





 Service Date/Time:  Thursday, March 29, 2018 17:59 - CONCLUSION:  No acute 





 disease.  No free air is seen.     Ministerio Garcia MD 








Objective Remarks


GENERAL: Well-developed elderly male patient laying in bed in no acute distress

, drowsy, arousable..  Left-sided residual weakness


SKIN: Focused skin assessment warm/dry.


HEAD: Atraumatic. Normocephalic. 


EYES: Pupils equal and round. No scleral icterus. No injection or drainage. 


ENT: No nasal bleeding or discharge.  Mucous membranes pink and moist.


NECK: Trachea midline. No JVD.  Supple.


CARDIOVASCULAR: Regular rate and rhythm.  No murmur appreciated.


RESPIRATORY: No accessory muscle use. Clear to auscultation. Breath sounds 

equal bilaterally. 


GASTROINTESTINAL: Abdomen soft, non-tender, nondistended. Hepatic and splenic 

margins not palpable. 


MUSCULOSKELETAL: No obvious deformities. No clubbing.  No cyanosis.  No edema. 


NEUROLOGICAL: Awake and alert. No obvious cranial nerve deficits.  Motor with 

left-sided residual weakness





A/P


Assessment and Plan


Hematemesis


-Protonix drip 


-Status post EGD with injection and clipping of large vessel in duodenal ulcer. 


-Received 2 units PRBCs last night.  Follow serial H&H and transfuse if needed 

to keep hemoglobin greater than 8 g percent


-Further management per gastroenterology





Diabetes mellitus


-Hold metformin while in the ICU


-Insulin sliding scale





Hypertension


-Hold antihypertensive meds due to acute GI bleed


-Resume when indicated





Bipolar disorder


-Continue Flomax and lithium





DVT GI prophylaxis


-Andre's and SCDs


-No pharmacological DVT prophylaxis due to acute GI bleed


-Protonix IV twice daily





Consult and transfer to hospitalist service for further medical management.  

Transfer out of ICU.  Critical care will be signing off, please reconsult if 

needed.











Keenan Gonzales MD Mar 30, 2018 16:48

## 2018-03-31 VITALS
DIASTOLIC BLOOD PRESSURE: 97 MMHG | SYSTOLIC BLOOD PRESSURE: 152 MMHG | HEART RATE: 91 BPM | TEMPERATURE: 97.5 F | OXYGEN SATURATION: 94 % | RESPIRATION RATE: 17 BRPM

## 2018-03-31 VITALS
SYSTOLIC BLOOD PRESSURE: 145 MMHG | DIASTOLIC BLOOD PRESSURE: 73 MMHG | TEMPERATURE: 98.2 F | OXYGEN SATURATION: 100 % | RESPIRATION RATE: 19 BRPM | HEART RATE: 95 BPM

## 2018-03-31 VITALS
DIASTOLIC BLOOD PRESSURE: 67 MMHG | OXYGEN SATURATION: 100 % | RESPIRATION RATE: 19 BRPM | HEART RATE: 99 BPM | SYSTOLIC BLOOD PRESSURE: 139 MMHG | TEMPERATURE: 97.4 F

## 2018-03-31 VITALS
OXYGEN SATURATION: 100 % | SYSTOLIC BLOOD PRESSURE: 151 MMHG | RESPIRATION RATE: 19 BRPM | HEART RATE: 77 BPM | DIASTOLIC BLOOD PRESSURE: 73 MMHG | TEMPERATURE: 98 F

## 2018-03-31 VITALS — OXYGEN SATURATION: 98 %

## 2018-03-31 VITALS — HEART RATE: 65 BPM

## 2018-03-31 VITALS — HEART RATE: 74 BPM

## 2018-03-31 LAB
ERYTHROCYTE [DISTWIDTH] IN BLOOD BY AUTOMATED COUNT: 15.5 % (ref 11.6–17.2)
HCT VFR BLD CALC: 22.8 % (ref 39–51)
HCT VFR BLD CALC: 24.4 % (ref 39–51)
HGB BLD-MCNC: 7.6 GM/DL (ref 13–17)
HGB BLD-MCNC: 8.2 GM/DL (ref 13–17)
MCH RBC QN AUTO: 30.9 PG (ref 27–34)
MCHC RBC AUTO-ENTMCNC: 33.7 % (ref 32–36)
MCV RBC AUTO: 91.6 FL (ref 80–100)
PLATELET # BLD: 428 TH/MM3 (ref 150–450)
PMV BLD AUTO: 7.4 FL (ref 7–11)
RBC # BLD AUTO: 2.66 MIL/MM3 (ref 4.5–5.9)
WBC # BLD AUTO: 9.3 TH/MM3 (ref 4–11)

## 2018-03-31 RX ADMIN — PANTOPRAZOLE SODIUM SCH MLS/HR: 40 INJECTION, POWDER, FOR SOLUTION INTRAVENOUS at 14:29

## 2018-03-31 RX ADMIN — SUCRALFATE SCH GM: 1 TABLET ORAL at 05:36

## 2018-03-31 RX ADMIN — SODIUM CHLORIDE SCH MLS/HR: 900 INJECTION INTRAVENOUS at 14:29

## 2018-03-31 RX ADMIN — PHENYTOIN SODIUM SCH MLS/HR: 50 INJECTION INTRAMUSCULAR; INTRAVENOUS at 21:48

## 2018-03-31 RX ADMIN — STANDARDIZED SENNA CONCENTRATE AND DOCUSATE SODIUM SCH TAB: 8.6; 5 TABLET, FILM COATED ORAL at 20:36

## 2018-03-31 RX ADMIN — IPRATROPIUM BROMIDE AND ALBUTEROL SULFATE SCH AMPULE: .5; 3 SOLUTION RESPIRATORY (INHALATION) at 04:57

## 2018-03-31 RX ADMIN — SUCRALFATE SCH GM: 1 TABLET ORAL at 16:00

## 2018-03-31 RX ADMIN — STANDARDIZED SENNA CONCENTRATE AND DOCUSATE SODIUM SCH TAB: 8.6; 5 TABLET, FILM COATED ORAL at 09:30

## 2018-03-31 RX ADMIN — PHENYTOIN SODIUM SCH MLS/HR: 50 INJECTION INTRAMUSCULAR; INTRAVENOUS at 14:30

## 2018-03-31 RX ADMIN — IPRATROPIUM BROMIDE AND ALBUTEROL SULFATE SCH AMPULE: .5; 3 SOLUTION RESPIRATORY (INHALATION) at 09:32

## 2018-03-31 RX ADMIN — INSULIN ASPART SCH: 100 INJECTION, SOLUTION INTRAVENOUS; SUBCUTANEOUS at 14:29

## 2018-03-31 RX ADMIN — Medication SCH ML: at 09:00

## 2018-03-31 RX ADMIN — CHLORHEXIDINE GLUCONATE SCH PACK: 500 CLOTH TOPICAL at 21:51

## 2018-03-31 RX ADMIN — LITHIUM CARBONATE SCH MG: 300 CAPSULE, GELATIN COATED ORAL at 22:31

## 2018-03-31 RX ADMIN — ATORVASTATIN CALCIUM SCH MG: 40 TABLET, FILM COATED ORAL at 20:36

## 2018-03-31 RX ADMIN — PHENYTOIN SODIUM SCH MLS/HR: 50 INJECTION INTRAMUSCULAR; INTRAVENOUS at 05:36

## 2018-03-31 RX ADMIN — TAMSULOSIN HYDROCHLORIDE SCH MG: 0.4 CAPSULE ORAL at 20:36

## 2018-03-31 RX ADMIN — PANTOPRAZOLE SODIUM SCH MLS/HR: 40 INJECTION, POWDER, FOR SOLUTION INTRAVENOUS at 21:51

## 2018-03-31 RX ADMIN — PANTOPRAZOLE SODIUM SCH MG: 40 INJECTION, POWDER, FOR SOLUTION INTRAVENOUS at 20:35

## 2018-03-31 RX ADMIN — INSULIN ASPART SCH: 100 INJECTION, SOLUTION INTRAVENOUS; SUBCUTANEOUS at 21:00

## 2018-03-31 RX ADMIN — CHLORHEXIDINE GLUCONATE SCH PACK: 500 CLOTH TOPICAL at 04:00

## 2018-03-31 RX ADMIN — INSULIN ASPART SCH: 100 INJECTION, SOLUTION INTRAVENOUS; SUBCUTANEOUS at 08:00

## 2018-03-31 RX ADMIN — INSULIN ASPART SCH: 100 INJECTION, SOLUTION INTRAVENOUS; SUBCUTANEOUS at 17:00

## 2018-03-31 RX ADMIN — Medication SCH ML: at 20:35

## 2018-03-31 RX ADMIN — PANTOPRAZOLE SODIUM SCH MG: 40 INJECTION, POWDER, FOR SOLUTION INTRAVENOUS at 09:30

## 2018-03-31 RX ADMIN — IPRATROPIUM BROMIDE AND ALBUTEROL SULFATE SCH AMPULE: .5; 3 SOLUTION RESPIRATORY (INHALATION) at 16:00

## 2018-03-31 RX ADMIN — PANTOPRAZOLE SODIUM SCH MLS/HR: 40 INJECTION, POWDER, FOR SOLUTION INTRAVENOUS at 01:34

## 2018-03-31 NOTE — HHI.PR
Subjective


Remarks


Critical Care specialist notes: 


3/29: 72-year-old male patient from the nursing home with previous history of 

gastric ulcers and GI bleeding and the last endoscopy 2 weeks ago, presents 

today because he had 


hematemesis and was nauseous and throwing up at the nursing home facility.  He 

denies any chest pains, shortness of breath, abdominal pains, diarrhea, or any 

other symptoms.





3/30: Underwent EGD with findings of large duodenal ulcer with visible vessel 

which was injected with epinephrine and clipped by GI.  Hemoglobin remained 

stable.  


Received 2 units PRBCs last night.  Drowsy following EGD at the time of my 

evaluation earlier today.  Left-sided weakness from previous stroke.





Hospitalist notes:


3/31: Stable seen in his bedroom, with consult of GI bleed, hematemesis, EGD 3/

20/18 showed ulcer with visible vessel, status post cautery, clip x 3 epi 

injection


        had EGD 3/30/18 showed large duodenal ulcer with visible vessel, status 

post cautery, epi injection. Anemia wit status post PRBC x 2, 


        Endoscopy done on 03/30/18.  Findings include normal esophagus, normal 

stomach, duodenum in the bulb was a large duodenal ulcer with a visible vessel, 


        injected and cauterized, clip was there from the previous endoscopy.  

Otherwise unremarkable.  Noted scattered diverticula on CT scan.  03/29/18


        advanced diet by GI specialist, switch to Protonix 40 mg BID, EGD in 2 

months,





Objective





Vital Signs








  Date Time  Temp Pulse Resp B/P (MAP) Pulse Ox O2 Delivery O2 Flow Rate FiO2


 


3/31/18 09:34     98 Nasal Cannula 2.00 


 


3/31/18 03:58  65      


 


3/30/18 23:58  74      


 


3/30/18 22:37 97.7 87 18 123/61 (81) 100   


 


3/30/18 21:49     100 Nasal Cannula 2.00 


 


3/30/18 20:31     99 Nasal Cannula 2.00 


 


3/30/18 20:00 98.3 88 17 136/81 (99) 99   


 


3/30/18 20:00  88      


 


3/30/18 19:00     100 Nasal Cannula 3.00 


 


3/30/18 18:00  68      


 


3/30/18 16:00  68      


 


3/30/18 16:00 98.4 68 17 152/74 (100) 99   


 


3/30/18 14:00  66      


 


3/30/18 12:00  71      


 


3/30/18 12:00 98.8 71 17 140/71 (94) 99   


 


3/30/18 10:45  91 18 125/68 (87) 99 Nasal Cannula 3 


 


3/30/18 10:30  99 18 138/65 (89) 99 Nasal Cannula 3 


 


3/30/18 10:10 98.0 106 18 122/64 (83) 97 Nasal Cannula 3 














I/O      


 


 3/30/18 3/30/18 3/30/18 3/31/18 3/31/18 3/31/18





 07:00 15:00 23:00 07:00 15:00 23:00


 


Intake Total 3396 ml 200 ml 1060 ml 1252 ml  


 


Balance 3396 ml 200 ml 1060 ml 1252 ml  


 


      


 


Intake Oral 0 ml  0 ml 480 ml  


 


IV Total 1646 ml  1060 ml 772 ml  


 


Packed Cells 1600 ml     


 


Blood Product IV Normal Saline Flush 150 ml     


 


Other  200 ml    


 


# Voids 3  5 3  


 


# Bowel Movements 0  0 0  








Result Diagram:  


3/31/18 0632                                                                   

             3/30/18 0302





Imaging





Last Impressions








Abdomen/Pelvis CT 3/29/18 1851 Signed





Impressions: 





 Service Date/Time:  Thursday, March 29, 2018 20:54 - CONCLUSION:  1. No acute 





 abnormality seen. 2. Scattered colonic diverticula without inflammatory 

change.  





    Ministerio Garcia MD 


 


Chest X-Ray 3/29/18 4126 Signed





Impressions: 





 Service Date/Time:  Thursday, March 29, 2018 17:59 - CONCLUSION:  No acute 





 disease.  No free air is seen.     Ministerio Garcia MD 








Procedures


EGD


Other Results





Laboratory Tests








Test


  3/29/18


18:00 3/29/18


20:40 3/29/18


21:25 3/30/18


03:02


 


Lipase 170 U/L    


 


Urine Color  YELLOW   


 


Urine Turbidity  CLOUDY   


 


Urine pH  5.5   


 


Urine Specific Gravity  1.016   


 


Urine Protein  30 mg/dL   


 


Urine Glucose (UA)  TRACE mg/dL   


 


Urine Ketones  NEG mg/dL   


 


Urine Occult Blood  MOD   


 


Urine Nitrite  NEG   


 


Urine Bilirubin  NEG   


 


Urine Urobilinogen


  


  LESS THAN 2.0


MG/DL 


  


 


 


Urine Leukocyte Esterase  LARGE   


 


Urine RBC  17 /hpf   


 


Urine WBC   /hpf   


 


Urine WBC Clumps  MANY   


 


Urine Squamous Epithelial


Cells 


  2 /hpf 


  


  


 


 


Urine Bacteria  FEW /hpf   


 


Urine Hyaline Casts  20 /lpf   


 


Urine Mucus  FEW /lpf   


 


Microscopic Urinalysis Comment


  


  CULTURE


INDICATED 


  


 


 


Nasal Screen MRSA (PCR)


  


  


  MRSA NOT


DETECTED 


 


 


Prothrombin Time    11.2 SEC 


 


Prothromb Time International


Ratio 


  


  


  1.1 RATIO 


 


 


Activated Partial


Thromboplast Time 


  


  


  22.2 SEC 


 


 


Blood Urea Nitrogen    40 MG/DL 


 


Creatinine    1.99 MG/DL 


 


Random Glucose    115 MG/DL 


 


Total Protein    5.5 GM/DL 


 


Albumin    2.3 GM/DL 


 


Calcium Level    9.1 MG/DL 


 


Phosphorus Level    3.4 MG/DL 


 


Magnesium Level    2.0 MG/DL 


 


Alkaline Phosphatase    48 U/L 


 


Aspartate Amino Transf


(AST/SGOT) 


  


  


  16 U/L 


 


 


Alanine Aminotransferase


(ALT/SGPT) 


  


  


  21 U/L 


 


 


Total Bilirubin    1.1 MG/DL 


 


Sodium Level    143 MEQ/L 


 


Potassium Level    4.3 MEQ/L 


 


Chloride Level    110 MEQ/L 


 


Carbon Dioxide Level    29.3 MEQ/L 


 


Anion Gap    4 MEQ/L 


 


Estimat Glomerular Filtration


Rate 


  


  


  33 ML/MIN 


 


 


Lactic Acid Level    0.9 mmol/L 


 


Test


  3/30/18


15:19 3/31/18


06:32 


  


 


 


Neutrophils (%) (Auto) 66.2 %    


 


Lymphocytes (%) (Auto) 23.7 %    


 


Monocytes (%) (Auto) 9.1 %    


 


Eosinophils (%) (Auto) 0.5 %    


 


Basophils (%) (Auto) 0.5 %    


 


Neutrophils # (Auto) 10.0 TH/MM3    


 


Lymphocytes # (Auto) 3.6 TH/MM3    


 


Monocytes # (Auto) 1.4 TH/MM3    


 


Eosinophils # (Auto) 0.1 TH/MM3    


 


Basophils # (Auto) 0.1 TH/MM3    


 


CBC Comment DIFF FINAL    


 


Differential Comment     


 


White Blood Count  9.3 TH/MM3   


 


Red Blood Count  2.66 MIL/MM3   


 


Hemoglobin  8.2 GM/DL   


 


Hematocrit  24.4 %   


 


Mean Corpuscular Volume  91.6 FL   


 


Mean Corpuscular Hemoglobin  30.9 PG   


 


Mean Corpuscular Hemoglobin


Concent 


  33.7 % 


  


  


 


 


Red Cell Distribution Width  15.5 %   


 


Platelet Count  428 TH/MM3   


 


Mean Platelet Volume  7.4 FL   








Objective Remarks


GENERAL: No acute distress


SKIN: Focused skin assessment warm/dry.


HEAD: Atraumatic. Normocephalic. 


EYES: Pupils equal and round. No scleral icterus. No injection or drainage. 


ENT: No nasal bleeding or discharge.  Mucous membranes pink and moist.


NECK: Trachea midline. No JVD.  Supple.


CARDIOVASCULAR: Regular rate and rhythm.  No murmur appreciated.


RESPIRATORY: No accessory muscle use. Clear to auscultation. Breath sounds 

equal bilaterally. 


GASTROINTESTINAL: Abdomen soft, non-tender, nondistended. Hepatic and splenic 

margins not palpable. 


MUSCULOSKELETAL: No obvious deformities. No clubbing.  No cyanosis.  No edema. 


NEUROLOGICAL: Awake and alert. No obvious cranial nerve deficits.  Motor with 

left-sided residual weakness


Medications and IVs





Current Medications








 Medications


  (Trade)  Dose


 Ordered  Sig/Carolin


 Route  Start Time


 Stop Time Status Last Admin


 


  (NS Flush)  2 ml  UNSCH  PRN


 IVF  3/29/18 18:00


     


 


 


  (SandoSTATIN


 INJ)  50 mcg  ONCE


 IV PUSH  3/29/18 19:30


    3/29/18 20:23


 


 


  (Lipitor)  40 mg  HS


 PO  3/29/18 21:00


    3/30/18 20:47


 


 


  (PROzac)  40 mg  HS


 PO  3/29/18 21:00


    3/30/18 20:48


 


 


  (Lithium


 Carbonate)  600 mg  HS


 PO  3/29/18 21:00


    3/30/18 20:47


 


 


  (Carafate)  1 gm  BIDAC


 PO  3/30/18 07:00


    3/31/18 05:36


 


 


  (Flomax)  0.4 mg  HS


 PO  3/29/18 21:00


    3/30/18 20:47


 


 


  (D50w (Vial) Inj)  50 ml  UNSCH  PRN


 IV PUSH  3/29/18 19:45


     


 


 


  (Glucagon Inj)  1 mg  UNSCH  PRN


 OTHER  3/29/18 19:45


     


 


 


  (NovoLOG


 SUPPLEMENTAL


 SCALE)  1  ACHS SLIDING  SCALE


 SQ  3/29/18 21:00


     


 


 


 Sodium Chloride  1,000 ml @ 


 124 mls/hr  Q8H4M


 IV  3/29/18 19:33


    3/31/18 05:36


 


 


  (NS Flush)  2 ml  UNSCH  PRN


 IV FLUSH  3/29/18 19:45


     


 


 


  (NS Flush)  2 ml  BID


 IV FLUSH  3/29/18 21:00


    3/30/18 20:48


 


 


  (Tylenol)  650 mg  Q6H  PRN


 PO  3/29/18 19:45


     


 


 


  (Protonix Inj)  40 mg  Q12H


 IV PUSH  3/29/18 20:00


    3/31/18 09:30


 


 


  (Zofran Inj)  4 mg  Q6H  PRN


 IV PUSH  3/29/18 19:45


     


 


 


  (Duoneb Neb)  1 ampule  Q6HR  NEB


 INH  3/29/18 22:00


    3/31/18 09:32


 


 


  (Duoneb Neb)  1 ampule  Q2HR NEB  PRN


 INH  3/29/18 19:45


     


 


 


 Miscellaneous


 Information  1  Q361D


 XX  3/29/18 19:45


    3/29/18 21:15


 


 


  (Chlorhexidine


 2% Cloth)  3 pack


 Taper  DAILY@04


 TOP  3/30/18 04:00


 3/26/19 03:59  3/29/18 21:30


 


 


  (Chlorhexidine


 2% Cloth)  3 pack  UNSCH  PRN


 TOP  3/29/18 19:45


     


 


 


  (Sherrie-Colace)  1 tab  BID


 PO  3/29/18 21:00


    3/31/18 09:30


 


 


  (Milk Of


 Magnesia Liq)  30 ml  Q12H  PRN


 PO  3/29/18 19:45


     


 


 


  (Senokot)  17.2 mg  Q12H  PRN


 PO  3/29/18 19:45


     


 


 


  (Dulcolax Supp)  10 mg  DAILY  PRN


 RECTAL  3/29/18 19:45


     


 


 


  (Lactulose Liq)  30 ml  DAILY  PRN


 PO  3/29/18 19:45


     


 


 


 Pantoprazole


 Sodium 80 mg/


 Sodium Chloride  100 ml @ 


 10 mls/hr  Q10H


 IV  3/29/18 20:00


    3/31/18 01:34


 


 


 Miscellaneous


 Information  ALL


 NURSING


 DEPARTME...  UNSCH  PRN


 .XX  3/30/18 11:00


 3/31/18 10:59   


 


 


 Miscellaneous


 Information  ALL


 NURSING


 DEPARTME...  UNSCH  PRN


 .XX  3/30/18 11:15


 3/31/18 11:14   


 











A/P


Assessment and Plan


Hematemesis


-Protonix drip 


-Status post EGD with injection and clipping of large vessel in duodenal ulcer. 


-Received 2 units PRBCs last night.  Follow serial H&H and transfuse if needed 

to keep hemoglobin greater than 8 g percent


-EGD 3/20/18 showed ulcer with visible vessel, status post cautery, clip x 3 

epi injection had EGD 3/30/18 showed large duodenal ulcer 


 with visible vessel, status post cautery, epi injection. Anemia wit status 

post PRBC x 2,  Endoscopy done on 03/30/18.  Findings include 


 normal esophagus, normal stomach, duodenum in the bulb was a large duodenal 

ulcer with a visible vessel,  injected and cauterized, clip 


 was there from the previous endoscopy.  Otherwise unremarkable.  Noted 

scattered diverticula on CT scan.  03/29/18


 advanced diet by GI specialist, switch to Protonix 40 mg BID, EGD in 2 months, 





-Diabetes mellitus


-Hold metformin 


-Insulin sliding scale





Hypertension


-Hold antihypertensive meds due to acute GI bleed


-Resume when indicated





Bipolar disorder


-Continue Flomax and lithium





DVT GI prophylaxis


-Andre's and SCDs


-No pharmacological DVT prophylaxis due to acute GI bleed


-Protonix IV twice daily


Discharge Planning


once cleared by GI specialist.











David Leon MD Mar 31, 2018 09:55

## 2018-03-31 NOTE — HHI.GIFU
Subjective


Remarks


Resting in the bed


Using O2 at 2 L but no obvious shortness of breath


Hemoglobin 8.2 no obvious bleeding


Denies any nausea or vomiting, or abdominal pain


 (Beti Null)





Objective


Vitals I&O





Vital Signs








  Date Time  Temp Pulse Resp B/P (MAP) Pulse Ox O2 Delivery O2 Flow Rate FiO2


 


3/31/18 09:34     98 Nasal Cannula 2.00 


 


3/31/18 08:00 98.0 77 19 151/73 (99) 100   


 


3/31/18 03:58  65      


 


3/30/18 23:58  74      


 


3/30/18 22:37 97.7 87 18 123/61 (81) 100   


 


3/30/18 21:49     100 Nasal Cannula 2.00 


 


3/30/18 20:31     99 Nasal Cannula 2.00 


 


3/30/18 20:00 98.3 88 17 136/81 (99) 99   


 


3/30/18 20:00  88      


 


3/30/18 19:00     100 Nasal Cannula 3.00 


 


3/30/18 18:00  68      


 


3/30/18 16:00  68      


 


3/30/18 16:00 98.4 68 17 152/74 (100) 99   


 


3/30/18 14:00  66      


 


3/30/18 12:00  71      


 


3/30/18 12:00 98.8 71 17 140/71 (94) 99   














I/O      


 


 3/30/18 3/30/18 3/30/18 3/31/18 3/31/18 3/31/18





 07:00 15:00 23:00 07:00 15:00 23:00


 


Intake Total 3396 ml 200 ml 1060 ml 1252 ml  


 


Balance 3396 ml 200 ml 1060 ml 1252 ml  


 


      


 


Intake Oral 0 ml  0 ml 480 ml  


 


IV Total 1646 ml  1060 ml 772 ml  


 


Packed Cells 1600 ml     


 


Blood Product IV Normal Saline Flush 150 ml     


 


Other  200 ml    


 


# Voids 3  5 3  


 


# Bowel Movements 0  0 0  








Laboratory





Laboratory Tests








Test


  3/30/18


15:19 3/31/18


06:32


 


White Blood Count 15.1  9.3 


 


Red Blood Count 3.00  2.66 


 


Hemoglobin 9.1  8.2 


 


Hematocrit 26.8  24.4 


 


Mean Corpuscular Volume 89.5  91.6 


 


Mean Corpuscular Hemoglobin 30.2  30.9 


 


Mean Corpuscular Hemoglobin


Concent 33.8 


  33.7 


 


 


Red Cell Distribution Width 15.5  15.5 


 


Platelet Count 498  428 


 


Mean Platelet Volume 7.3  7.4 


 


Neutrophils (%) (Auto) 66.2  


 


Lymphocytes (%) (Auto) 23.7  


 


Monocytes (%) (Auto) 9.1  


 


Eosinophils (%) (Auto) 0.5  


 


Basophils (%) (Auto) 0.5  


 


Neutrophils # (Auto) 10.0  


 


Lymphocytes # (Auto) 3.6  


 


Monocytes # (Auto) 1.4  


 


Eosinophils # (Auto) 0.1  


 


Basophils # (Auto) 0.1  


 


CBC Comment DIFF FINAL  


 


Differential Comment   














 Date/Time


Source Procedure


Growth Status


 


 


 3/29/18 20:40


Urine Clean Catch Urine Culture - Preliminary


Gram Negative Roel Resulted








Imaging





Last Impressions








Abdomen/Pelvis CT 3/29/18 1851 Signed





Impressions: 





 Service Date/Time:  Thursday, March 29, 2018 20:54 - CONCLUSION:  1. No acute 





 abnormality seen. 2. Scattered colonic diverticula without inflammatory 

change.  





    Ministerio Garcia MD 


 


Chest X-Ray 3/29/18 7619 Signed





Impressions: 





 Service Date/Time:  Thursday, March 29, 2018 17:59 - CONCLUSION:  No acute 





 disease.  No free air is seen.     Ministerio Garcia MD 








Physical Exam


HEENT: Pupils round and reactive to light; normocephalic; atraumatic; no 

jaundice


NECK: Neck is supple


CHEST: No Obvious shortness of breath or rhonchi, but using oxygen at 2 L as 

needed


CARDIAC:  Regular rate and rhythm 


ABDOMEN: Round, Soft, nondistended, nontender; no hepatosplenomegaly; bowel 

sounds are present in all four quadrants.


EXTREMITIES: No clubbing, cyanosis, or edema.


SKIN:  Normal; no rash; no jaundice.  Pale


CNS:  No focal deficits; alert and oriented times two


 (Beti Null)





Assessment and Plan


Plan


ASSESSMENT


- hematemesis, black tarry stool - onset yesterday.  prior hx GIB.  EGD 3/20/18 

showed


   ulcer with visible vessel, s/p cautery, clips x 3 and epi inj.  had EGD 

today 3/30/18 showed


   large duodenal ulcer with visible vessel, s/p cautery, epi inj.


- anemia with drop in HH - hgb dropped from 10 to 8.  now s/p PRBC x 2


Endoscopy done on 03/30/18.  Findings include normal esophagus, normal stomach, 

duodenum in the bulb was a large duodenal ulcer with a visible vessel, injected 

and cauterized, clip was there from the previous endoscopy.  Otherwise 

unremarkable.  Noted scattered diverticula on CT scan.  03/29/18 03/31/18 patient is resting in the bed asymptomatic.  No obvious nausea or 

vomiting or abdominal pain, appetite fair, would like to increase to solid 

food.  discussed EGD findings





PLAN:


Diet, changed from clear liquid to heart healthy


Supportive care


Continue with Protonix 40 mg twice daily


Monitor lab and transfuse as needed.  Current hemoglobin 8.2


EGD in 2 months


If any active bleeding patient will need interventional radiology for 

embolization


Supportive care


Monitor hemoglobin and labs





pt seen by myself and Dr Redmond and this note is on his behalf


 (Beti Null)


Physician Comments


Patient seen and examined


Agree with above


Continue with current supportive care


Monitor labs


Patient tells me he was taken ranitidine as an outpatient problem than PPI


I emphasized to the patient that he must continue on PPI twice daily for at 

least the next 3 months


 (Ej Redmond MD)











Beti Null Mar 31, 2018 11:48


Ej Redmond MD Mar 31, 2018 15:52

## 2018-04-01 VITALS
HEART RATE: 78 BPM | RESPIRATION RATE: 18 BRPM | DIASTOLIC BLOOD PRESSURE: 67 MMHG | TEMPERATURE: 98 F | SYSTOLIC BLOOD PRESSURE: 138 MMHG | OXYGEN SATURATION: 98 %

## 2018-04-01 VITALS
SYSTOLIC BLOOD PRESSURE: 175 MMHG | DIASTOLIC BLOOD PRESSURE: 97 MMHG | HEART RATE: 95 BPM | OXYGEN SATURATION: 96 % | TEMPERATURE: 98.9 F | RESPIRATION RATE: 18 BRPM

## 2018-04-01 VITALS
DIASTOLIC BLOOD PRESSURE: 79 MMHG | OXYGEN SATURATION: 98 % | SYSTOLIC BLOOD PRESSURE: 166 MMHG | HEART RATE: 88 BPM | RESPIRATION RATE: 18 BRPM | TEMPERATURE: 97.1 F

## 2018-04-01 VITALS
DIASTOLIC BLOOD PRESSURE: 68 MMHG | TEMPERATURE: 97.8 F | OXYGEN SATURATION: 100 % | HEART RATE: 81 BPM | RESPIRATION RATE: 17 BRPM | SYSTOLIC BLOOD PRESSURE: 138 MMHG

## 2018-04-01 VITALS — HEART RATE: 90 BPM

## 2018-04-01 VITALS
DIASTOLIC BLOOD PRESSURE: 84 MMHG | RESPIRATION RATE: 18 BRPM | OXYGEN SATURATION: 97 % | HEART RATE: 99 BPM | SYSTOLIC BLOOD PRESSURE: 130 MMHG | TEMPERATURE: 98.1 F

## 2018-04-01 VITALS
OXYGEN SATURATION: 98 % | SYSTOLIC BLOOD PRESSURE: 122 MMHG | RESPIRATION RATE: 16 BRPM | TEMPERATURE: 98.8 F | DIASTOLIC BLOOD PRESSURE: 60 MMHG | HEART RATE: 100 BPM

## 2018-04-01 VITALS — HEART RATE: 91 BPM

## 2018-04-01 VITALS — OXYGEN SATURATION: 96 %

## 2018-04-01 VITALS — OXYGEN SATURATION: 97 %

## 2018-04-01 LAB
BUN SERPL-MCNC: 9 MG/DL (ref 7–18)
CALCIUM SERPL-MCNC: 9.3 MG/DL (ref 8.5–10.1)
CHLORIDE SERPL-SCNC: 117 MEQ/L (ref 98–107)
CREAT SERPL-MCNC: 1.23 MG/DL (ref 0.6–1.3)
GFR SERPLBLD BASED ON 1.73 SQ M-ARVRAT: 58 ML/MIN (ref 89–?)
GLUCOSE SERPL-MCNC: 102 MG/DL (ref 74–106)
HCO3 BLD-SCNC: 27.8 MEQ/L (ref 21–32)
SODIUM SERPL-SCNC: 147 MEQ/L (ref 136–145)

## 2018-04-01 RX ADMIN — SODIUM CHLORIDE SCH MLS/HR: 900 INJECTION INTRAVENOUS at 10:03

## 2018-04-01 RX ADMIN — PHENYTOIN SODIUM SCH MLS/HR: 50 INJECTION INTRAMUSCULAR; INTRAVENOUS at 05:10

## 2018-04-01 RX ADMIN — INSULIN ASPART SCH: 100 INJECTION, SOLUTION INTRAVENOUS; SUBCUTANEOUS at 08:00

## 2018-04-01 RX ADMIN — TAMSULOSIN HYDROCHLORIDE SCH MG: 0.4 CAPSULE ORAL at 20:33

## 2018-04-01 RX ADMIN — SUCRALFATE SCH GM: 1 TABLET ORAL at 17:49

## 2018-04-01 RX ADMIN — PANTOPRAZOLE SODIUM SCH MG: 40 INJECTION, POWDER, FOR SOLUTION INTRAVENOUS at 08:00

## 2018-04-01 RX ADMIN — INSULIN ASPART SCH: 100 INJECTION, SOLUTION INTRAVENOUS; SUBCUTANEOUS at 21:00

## 2018-04-01 RX ADMIN — Medication SCH ML: at 17:50

## 2018-04-01 RX ADMIN — ONDANSETRON PRN MG: 2 INJECTION, SOLUTION INTRAMUSCULAR; INTRAVENOUS at 10:03

## 2018-04-01 RX ADMIN — LITHIUM CARBONATE SCH MG: 300 CAPSULE, GELATIN COATED ORAL at 20:37

## 2018-04-01 RX ADMIN — IPRATROPIUM BROMIDE AND ALBUTEROL SULFATE SCH AMPULE: .5; 3 SOLUTION RESPIRATORY (INHALATION) at 08:30

## 2018-04-01 RX ADMIN — Medication SCH ML: at 20:33

## 2018-04-01 RX ADMIN — IPRATROPIUM BROMIDE AND ALBUTEROL SULFATE SCH AMPULE: .5; 3 SOLUTION RESPIRATORY (INHALATION) at 16:13

## 2018-04-01 RX ADMIN — STANDARDIZED SENNA CONCENTRATE AND DOCUSATE SODIUM SCH TAB: 8.6; 5 TABLET, FILM COATED ORAL at 20:33

## 2018-04-01 RX ADMIN — SUCRALFATE SCH GM: 1 TABLET ORAL at 05:10

## 2018-04-01 RX ADMIN — PANTOPRAZOLE SODIUM SCH MG: 40 INJECTION, POWDER, FOR SOLUTION INTRAVENOUS at 20:33

## 2018-04-01 RX ADMIN — INSULIN ASPART SCH: 100 INJECTION, SOLUTION INTRAVENOUS; SUBCUTANEOUS at 17:00

## 2018-04-01 RX ADMIN — IPRATROPIUM BROMIDE AND ALBUTEROL SULFATE SCH AMPULE: .5; 3 SOLUTION RESPIRATORY (INHALATION) at 20:50

## 2018-04-01 RX ADMIN — STANDARDIZED SENNA CONCENTRATE AND DOCUSATE SODIUM SCH TAB: 8.6; 5 TABLET, FILM COATED ORAL at 10:03

## 2018-04-01 RX ADMIN — ATORVASTATIN CALCIUM SCH MG: 40 TABLET, FILM COATED ORAL at 20:33

## 2018-04-01 RX ADMIN — INSULIN ASPART SCH: 100 INJECTION, SOLUTION INTRAVENOUS; SUBCUTANEOUS at 12:00

## 2018-04-01 RX ADMIN — PANTOPRAZOLE SODIUM SCH MLS/HR: 40 INJECTION, POWDER, FOR SOLUTION INTRAVENOUS at 10:03

## 2018-04-01 RX ADMIN — IPRATROPIUM BROMIDE AND ALBUTEROL SULFATE SCH AMPULE: .5; 3 SOLUTION RESPIRATORY (INHALATION) at 03:41

## 2018-04-01 NOTE — HHI.GIFU
Subjective


Remarks


c/o nausea.  No vomiting.  Ate breakfast.  Denies bleeding, abd pain.  


 (Mary Carmen Hart)





Objective


Vitals I&O





Vital Signs








  Date Time  Temp Pulse Resp B/P (MAP) Pulse Ox O2 Delivery O2 Flow Rate FiO2


 


4/1/18 08:34     97 Nasal Cannula 2.00 


 


4/1/18 08:00 97.8 81 17 138/68 (91) 100   


 


4/1/18 05:15      Nasal Cannula 2.00 


 


4/1/18 04:13 98.0 78 18 138/67 (90) 98   


 


4/1/18 03:44  90      


 


4/1/18 00:12 98.1 99 18 130/84 (99) 97   


 


4/1/18 00:00  91      


 


3/31/18 20:00 97.4 91 20 152/97 (115) 97   


 


3/31/18 20:00  93      


 


3/31/18 19:41  74      


 


3/31/18 15:59     97 Room Air  


 


3/31/18 15:42 98.2 95 19 145/73 (97) 100   














I/O      


 


 3/31/18 3/31/18 3/31/18 4/1/18 4/1/18 4/1/18





 07:00 15:00 23:00 07:00 15:00 23:00


 


Intake Total 1252 ml  1100 ml 780 ml  


 


Output Total   650 ml 1400 ml  


 


Balance 1252 ml  450 ml -620 ml  


 


      


 


Intake Oral 480 ml   780 ml  


 


IV Total 772 ml  1100 ml   


 


Output Urine Total   650 ml 1400 ml  


 


# Voids 3     


 


# Bowel Movements 0   0  








Laboratory





Laboratory Tests








Test


  3/31/18


15:59 4/1/18


04:30


 


Hemoglobin 7.6  


 


Hematocrit 22.8  


 


Blood Urea Nitrogen  9 


 


Creatinine  1.23 


 


Random Glucose  102 


 


Calcium Level  9.3 


 


Sodium Level  147 


 


Potassium Level  3.9 


 


Chloride Level  117 


 


Carbon Dioxide Level  27.8 


 


Anion Gap  2 


 


Estimat Glomerular Filtration


Rate 


  58 


 














 Date/Time


Source Procedure


Growth Status


 


 


 3/29/18 20:40


Urine Clean Catch Urine Culture - Final


Escherichia Coli Complete








Imaging





Last Impressions








Abdomen/Pelvis CT 3/29/18 1851 Signed





Impressions: 





 Service Date/Time:  Thursday, March 29, 2018 20:54 - CONCLUSION:  1. No acute 





 abnormality seen. 2. Scattered colonic diverticula without inflammatory 

change.  





    Ministerio Garcia MD 


 


Chest X-Ray 3/29/18 [id number] Signed





Impressions: 





 Service Date/Time:  Thursday, March 29, 2018 17:59 - CONCLUSION:  No acute 





 disease.  No free air is seen.     Ministerio Garcia MD 








Physical Exam


HEENT: Pupils round and reactive to light; normocephalic; atraumatic; no 

jaundice 


CHEST: CTA


CARDIAC:  RRR


ABDOMEN: Soft, nondistended, nontender; no hepatosplenomegaly; bowel sounds are 

present in all four quadrants.


EXTREMITIES: No clubbing, cyanosis, or edema.


SKIN:  Normal; no rash; no jaundice.  Pale


CNS:  No focal deficits; alert and oriented times two


 (Mary Carmen Hart)





Assessment and Plan


Plan


ASSESSMENT


- hematemesis, black tarry stool - onset yesterday.  prior hx GIB.  EGD 3/20/18 

showed


   ulcer with visible vessel, s/p cautery, clips x 3 and epi inj.  had EGD 

today 3/30/18 showed


   large duodenal ulcer with visible vessel, s/p cautery, epi inj.


- anemia with drop in HH - hgb dropped from 10 to 8.  now s/p PRBC x 2


Endoscopy done on 03/30/18.  Findings include normal esophagus, normal stomach, 

duodenum in the bulb was a large duodenal ulcer with a visible vessel, injected 

and cauterized, clip was there from the previous endoscopy.  Otherwise 

unremarkable.  Noted scattered diverticula on CT scan.  03/29/18 03/31/18 patient is resting in the bed asymptomatic.  No obvious nausea or 

vomiting or abdominal pain, appetite fair, would like to increase to solid 

food.  discussed EGD findings


4/1/18 no bleeding, no abd pain.  is c/o nausea but no vomiting.  ate 

breakfast.  no HH done today.





PLAN:


CBC in am


SARA


BID protonix


Monitor lab 


transfuse as needed.   


EGD in 2 months


If any active bleeding patient will need interventional radiology for 

embolization


Supportive care





pt seen by myself and Dr Redmond and this note is on his behalf


 (Mary Carmen Hart)


Physician Comments


Patient seen and examined


Agree with above


Continue with current supportive care


Monitor labs


 (Ej Redmond MD)











Mary Carmen Hart Apr 1, 2018 12:06


Ej Redmond MD Apr 1, 2018 18:54

## 2018-04-01 NOTE — HHI.PR
Subjective


Remarks


Critical Care specialist notes: 


3/29: 72-year-old male patient from the nursing home with previous history of 

gastric ulcers and GI bleeding and the last endoscopy 2 weeks ago, presents 

today because he had 


hematemesis and was nauseous and throwing up at the nursing home facility.  He 

denies any chest pains, shortness of breath, abdominal pains, diarrhea, or any 

other symptoms.





3/30: Underwent EGD with findings of large duodenal ulcer with visible vessel 

which was injected with epinephrine and clipped by GI.  Hemoglobin remained 

stable.  


Received 2 units PRBCs last night.  Drowsy following EGD at the time of my 

evaluation earlier today.  Left-sided weakness from previous stroke.





Hospitalist notes:


3/31: Stable seen in his bedroom, with consult of GI bleed, hematemesis, EGD 3/

20/18 showed ulcer with visible vessel, status post cautery, clip x 3 epi 

injection


        had EGD 3/30/18 showed large duodenal ulcer with visible vessel, status 

post cautery, epi injection. Anemia wit status post PRBC x 2, 


        Endoscopy done on 03/30/18.  Findings include normal esophagus, normal 

stomach, duodenum in the bulb was a large duodenal ulcer with a visible vessel, 


        injected and cauterized, clip was there from the previous endoscopy.  

Otherwise unremarkable.  Noted scattered diverticula on CT scan.  03/29/18


        advanced diet by GI specialist, switch to Protonix 40 mg BID, EGD in 2 

months, 





4/01: Seen in his bedroom, No new episodes of bleeding, complaint of nausea but 

no vomit, as per GI specialist recommended to continue SARA, PPIs BID, 


        EGD in two months. if again comes active bleeding will need 

Interventional Radiology.





Objective





Vital Signs








  Date Time  Temp Pulse Resp B/P (MAP) Pulse Ox O2 Delivery O2 Flow Rate FiO2


 


4/1/18 16:13     96 Nasal Cannula 2.00 


 


4/1/18 12:00 98.8 100 16 122/60 (80) 98   


 


4/1/18 08:34     97 Nasal Cannula 2.00 


 


4/1/18 08:15     87  6.00 


 


4/1/18 08:00 97.8 81 17 138/68 (91) 100   


 


4/1/18 05:15      Nasal Cannula 2.00 


 


4/1/18 04:13 98.0 78 18 138/67 (90) 98   


 


4/1/18 03:44  90      


 


4/1/18 00:12 98.1 99 18 130/84 (99) 97   


 


4/1/18 00:00  91      


 


3/31/18 20:00 97.4 91 20 152/97 (115) 97   


 


3/31/18 20:00  93      


 


3/31/18 19:41  74      














I/O      


 


 3/31/18 3/31/18 3/31/18 4/1/18 4/1/18 4/1/18





 07:00 15:00 23:00 07:00 15:00 23:00


 


Intake Total 1252 ml  1100 ml 780 ml  


 


Output Total   650 ml 1400 ml  


 


Balance 1252 ml  450 ml -620 ml  


 


      


 


Intake Oral 480 ml   780 ml  


 


IV Total 772 ml  1100 ml   


 


Output Urine Total   650 ml 1400 ml  


 


# Voids 3     


 


# Bowel Movements 0   0  








Result Diagram:  


3/31/18 1559                                                                   

             4/1/18 0430





Imaging





Last Impressions








Abdomen/Pelvis CT 3/29/18 1851 Signed





Impressions: 





 Service Date/Time:  Thursday, March 29, 2018 20:54 - CONCLUSION:  1. No acute 





 abnormality seen. 2. Scattered colonic diverticula without inflammatory 

change.  





    Ministerio Garcia MD 


 


Chest X-Ray 3/29/18 3561 Signed





Impressions: 





 Service Date/Time:  Thursday, March 29, 2018 17:59 - CONCLUSION:  No acute 





 disease.  No free air is seen.     Ministerio Garcia MD 








Procedures


EGD


Other Results





Laboratory Tests








Test


  3/29/18


18:00 3/29/18


20:40 3/29/18


21:25 3/30/18


03:02


 


Lipase 170 U/L    


 


Urine Color  YELLOW   


 


Urine Turbidity  CLOUDY   


 


Urine pH  5.5   


 


Urine Specific Gravity  1.016   


 


Urine Protein  30 mg/dL   


 


Urine Glucose (UA)  TRACE mg/dL   


 


Urine Ketones  NEG mg/dL   


 


Urine Occult Blood  MOD   


 


Urine Nitrite  NEG   


 


Urine Bilirubin  NEG   


 


Urine Urobilinogen


  


  LESS THAN 2.0


MG/DL 


  


 


 


Urine Leukocyte Esterase  LARGE   


 


Urine RBC  17 /hpf   


 


Urine WBC   /hpf   


 


Urine WBC Clumps  MANY   


 


Urine Squamous Epithelial


Cells 


  2 /hpf 


  


  


 


 


Urine Bacteria  FEW /hpf   


 


Urine Hyaline Casts  20 /lpf   


 


Urine Mucus  FEW /lpf   


 


Microscopic Urinalysis Comment


  


  CULTURE


INDICATED 


  


 


 


Nasal Screen MRSA (PCR)


  


  


  MRSA NOT


DETECTED 


 


 


Prothrombin Time    11.2 SEC 


 


Prothromb Time International


Ratio 


  


  


  1.1 RATIO 


 


 


Activated Partial


Thromboplast Time 


  


  


  22.2 SEC 


 


 


Lactic Acid Level    0.9 mmol/L 


 


Blood Urea Nitrogen    40 MG/DL 


 


Creatinine    1.99 MG/DL 


 


Random Glucose    115 MG/DL 


 


Total Protein    5.5 GM/DL 


 


Albumin    2.3 GM/DL 


 


Calcium Level    9.1 MG/DL 


 


Phosphorus Level    3.4 MG/DL 


 


Magnesium Level    2.0 MG/DL 


 


Alkaline Phosphatase    48 U/L 


 


Aspartate Amino Transf


(AST/SGOT) 


  


  


  16 U/L 


 


 


Alanine Aminotransferase


(ALT/SGPT) 


  


  


  21 U/L 


 


 


Total Bilirubin    1.1 MG/DL 


 


Sodium Level    143 MEQ/L 


 


Potassium Level    4.3 MEQ/L 


 


Chloride Level    110 MEQ/L 


 


Carbon Dioxide Level    29.3 MEQ/L 


 


Test


  3/30/18


15:19 3/31/18


06:32 3/31/18


15:59 4/1/18


04:30


 


Neutrophils (%) (Auto) 66.2 %    


 


Lymphocytes (%) (Auto) 23.7 %    


 


Monocytes (%) (Auto) 9.1 %    


 


Eosinophils (%) (Auto) 0.5 %    


 


Basophils (%) (Auto) 0.5 %    


 


Neutrophils # (Auto) 10.0 TH/MM3    


 


Lymphocytes # (Auto) 3.6 TH/MM3    


 


Monocytes # (Auto) 1.4 TH/MM3    


 


Eosinophils # (Auto) 0.1 TH/MM3    


 


Basophils # (Auto) 0.1 TH/MM3    


 


CBC Comment DIFF FINAL    


 


Differential Comment     


 


White Blood Count  9.3 TH/MM3   


 


Red Blood Count  2.66 MIL/MM3   


 


Mean Corpuscular Volume  91.6 FL   


 


Mean Corpuscular Hemoglobin  30.9 PG   


 


Mean Corpuscular Hemoglobin


Concent 


  33.7 % 


  


  


 


 


Red Cell Distribution Width  15.5 %   


 


Platelet Count  428 TH/MM3   


 


Mean Platelet Volume  7.4 FL   


 


Hemoglobin   7.6 GM/DL  


 


Hematocrit   22.8 %  


 


Blood Urea Nitrogen    9 MG/DL 


 


Creatinine    1.23 MG/DL 


 


Random Glucose    102 MG/DL 


 


Calcium Level    9.3 MG/DL 


 


Sodium Level    147 MEQ/L 


 


Potassium Level    3.9 MEQ/L 


 


Chloride Level    117 MEQ/L 


 


Carbon Dioxide Level    27.8 MEQ/L 


 


Anion Gap    2 MEQ/L 


 


Estimat Glomerular Filtration


Rate 


  


  


  58 ML/MIN 


 








Objective Remarks


GENERAL: No acute distress


SKIN: Focused skin assessment warm/dry.


HEAD: Atraumatic. Normocephalic. 


EYES: Pupils equal and round. No scleral icterus. No injection or drainage. 


ENT: No nasal bleeding or discharge.  Mucous membranes pink and moist.


NECK: Trachea midline. No JVD.  Supple.


CARDIOVASCULAR: Regular rate and rhythm.  No murmur appreciated.


RESPIRATORY: No accessory muscle use. Clear to auscultation. Breath sounds 

equal bilaterally. 


GASTROINTESTINAL: Abdomen soft, non-tender, nondistended. Hepatic and splenic 

margins not palpable. 


MUSCULOSKELETAL: No obvious deformities. No clubbing.  No cyanosis.  No edema. 


NEUROLOGICAL: Awake and alert. No obvious cranial nerve deficits.  Motor with 

left-sided residual weakness


Medications and IVs





Current Medications








 Medications


  (Trade)  Dose


 Ordered  Sig/Carolin


 Route  Start Time


 Stop Time Status Last Admin


 


  (NS Flush)  2 ml  UNSCH  PRN


 IVF  3/29/18 18:00


     


 


 


  (SandoSTATIN


 INJ)  50 mcg  ONCE


 IV PUSH  3/29/18 19:30


    3/29/18 20:23


 


 


  (Lipitor)  40 mg  HS


 PO  3/29/18 21:00


    3/31/18 20:36


 


 


  (PROzac)  40 mg  HS


 PO  3/29/18 21:00


    3/31/18 20:36


 


 


  (Lithium


 Carbonate)  600 mg  HS


 PO  3/29/18 21:00


    3/31/18 22:31


 


 


  (Carafate)  1 gm  BIDAC


 PO  3/30/18 07:00


    4/1/18 05:10


 


 


  (Flomax)  0.4 mg  HS


 PO  3/29/18 21:00


    3/31/18 20:36


 


 


  (D50w (Vial) Inj)  50 ml  UNSCH  PRN


 IV PUSH  3/29/18 19:45


     


 


 


  (Glucagon Inj)  1 mg  UNSCH  PRN


 OTHER  3/29/18 19:45


     


 


 


  (NovoLOG


 SUPPLEMENTAL


 SCALE)  1  ACHS SLIDING  SCALE


 SQ  3/29/18 21:00


    4/1/18 12:00


 


 


  (NS Flush)  2 ml  UNSCH  PRN


 IV FLUSH  3/29/18 19:45


     


 


 


  (NS Flush)  2 ml  BID


 IV FLUSH  3/29/18 21:00


    3/31/18 20:35


 


 


  (Tylenol)  650 mg  Q6H  PRN


 PO  3/29/18 19:45


     


 


 


  (Protonix Inj)  40 mg  Q12H


 IV PUSH  3/29/18 20:00


    4/1/18 08:00


 


 


  (Zofran Inj)  4 mg  Q6H  PRN


 IV PUSH  3/29/18 19:45


    4/1/18 10:03


 


 


  (Duoneb Neb)  1 ampule  Q6HR  NEB


 INH  3/29/18 22:00


    4/1/18 16:13


 


 


  (Duoneb Neb)  1 ampule  Q2HR NEB  PRN


 INH  3/29/18 19:45


     


 


 


 Miscellaneous


 Information  1  Q361D


 XX  3/29/18 19:45


    3/29/18 21:15


 


 


  (Chlorhexidine


 2% Cloth)  3 pack


 Taper  DAILY@04


 TOP  3/30/18 04:00


 3/26/19 03:59  3/29/18 21:30


 


 


  (Chlorhexidine


 2% Cloth)  3 pack  UNSCH  PRN


 TOP  3/29/18 19:45


     


 


 


  (Sherrie-Colace)  1 tab  BID


 PO  3/29/18 21:00


    4/1/18 10:03


 


 


  (Milk Of


 Magnesia Liq)  30 ml  Q12H  PRN


 PO  3/29/18 19:45


     


 


 


  (Senokot)  17.2 mg  Q12H  PRN


 PO  3/29/18 19:45


     


 


 


  (Dulcolax Supp)  10 mg  DAILY  PRN


 RECTAL  3/29/18 19:45


     


 


 


  (Lactulose Liq)  30 ml  DAILY  PRN


 PO  3/29/18 19:45


     


 


 


 Ceftriaxone


 Sodium 1000 mg/


 Sodium Chloride  100 ml @ 


 200 mls/hr  Q24H


 IV  3/31/18 10:00


    4/1/18 10:03


 











A/P


Assessment and Plan


Hematemesis


-Protonix drip 


-Status post EGD with injection and clipping of large vessel in duodenal ulcer. 


-Received 2 units PRBCs last night.  Follow serial H&H and transfuse if needed 

to keep hemoglobin greater than 8 g percent


-EGD 3/20/18 showed ulcer with visible vessel, status post cautery, clip x 3 

epi injection had EGD 3/30/18 showed large duodenal ulcer 


 with visible vessel, status post cautery, epi injection. Anemia wit status 

post PRBC x 2,  Endoscopy done on 03/30/18.  Findings include 


 normal esophagus, normal stomach, duodenum in the bulb was a large duodenal 

ulcer with a visible vessel,  injected and cauterized, clip 


 was there from the previous endoscopy.  Otherwise unremarkable.  Noted 

scattered diverticula on CT scan.  03/29/18


 advanced diet by GI specialist, switch to Protonix 40 mg BID, EGD in 2 months, 





-Diabetes mellitus controlled with sliding scale. 


-Hold metformin 


-Insulin sliding scale





Hypertension uncontrolled intermittently with no blood pressure medicine given. 





Bipolar disorder


-Continue Flomax and lithium





Obesity will need diet and exercise to improve his condition. 





DVT GI prophylaxis


-Andre's and SCDs


-No pharmacological DVT prophylaxis due to acute GI bleed


-Protonix IV twice daily


Discharge Planning


Once cleared by GI specialist.











David Leon MD Apr 1, 2018 17:30

## 2018-04-02 VITALS
TEMPERATURE: 98.6 F | HEART RATE: 87 BPM | OXYGEN SATURATION: 96 % | SYSTOLIC BLOOD PRESSURE: 124 MMHG | DIASTOLIC BLOOD PRESSURE: 77 MMHG | RESPIRATION RATE: 20 BRPM

## 2018-04-02 VITALS — OXYGEN SATURATION: 98 %

## 2018-04-02 VITALS
RESPIRATION RATE: 18 BRPM | OXYGEN SATURATION: 100 % | SYSTOLIC BLOOD PRESSURE: 155 MMHG | DIASTOLIC BLOOD PRESSURE: 87 MMHG | TEMPERATURE: 98.4 F | HEART RATE: 95 BPM

## 2018-04-02 VITALS
DIASTOLIC BLOOD PRESSURE: 81 MMHG | HEART RATE: 92 BPM | RESPIRATION RATE: 18 BRPM | OXYGEN SATURATION: 96 % | TEMPERATURE: 98 F | SYSTOLIC BLOOD PRESSURE: 162 MMHG

## 2018-04-02 VITALS
DIASTOLIC BLOOD PRESSURE: 80 MMHG | RESPIRATION RATE: 19 BRPM | OXYGEN SATURATION: 99 % | SYSTOLIC BLOOD PRESSURE: 142 MMHG | TEMPERATURE: 98 F | HEART RATE: 98 BPM

## 2018-04-02 VITALS — HEART RATE: 73 BPM

## 2018-04-02 VITALS
SYSTOLIC BLOOD PRESSURE: 156 MMHG | DIASTOLIC BLOOD PRESSURE: 73 MMHG | HEART RATE: 94 BPM | RESPIRATION RATE: 17 BRPM | OXYGEN SATURATION: 96 % | TEMPERATURE: 98.4 F

## 2018-04-02 VITALS
OXYGEN SATURATION: 96 % | HEART RATE: 108 BPM | DIASTOLIC BLOOD PRESSURE: 71 MMHG | RESPIRATION RATE: 18 BRPM | SYSTOLIC BLOOD PRESSURE: 153 MMHG | TEMPERATURE: 98.3 F

## 2018-04-02 VITALS — OXYGEN SATURATION: 99 %

## 2018-04-02 LAB
ERYTHROCYTE [DISTWIDTH] IN BLOOD BY AUTOMATED COUNT: 15.1 % (ref 11.6–17.2)
HCT VFR BLD CALC: 23.3 % (ref 39–51)
HGB BLD-MCNC: 8.1 GM/DL (ref 13–17)
MCH RBC QN AUTO: 31.5 PG (ref 27–34)
MCHC RBC AUTO-ENTMCNC: 34.6 % (ref 32–36)
MCV RBC AUTO: 91.1 FL (ref 80–100)
PLATELET # BLD: 437 TH/MM3 (ref 150–450)
PMV BLD AUTO: 7 FL (ref 7–11)
RBC # BLD AUTO: 2.55 MIL/MM3 (ref 4.5–5.9)
WBC # BLD AUTO: 8.8 TH/MM3 (ref 4–11)

## 2018-04-02 RX ADMIN — CHLORHEXIDINE GLUCONATE SCH PACK: 500 CLOTH TOPICAL at 03:03

## 2018-04-02 RX ADMIN — IPRATROPIUM BROMIDE AND ALBUTEROL SULFATE SCH AMPULE: .5; 3 SOLUTION RESPIRATORY (INHALATION) at 08:32

## 2018-04-02 RX ADMIN — SODIUM CHLORIDE SCH MLS/HR: 900 INJECTION INTRAVENOUS at 08:53

## 2018-04-02 RX ADMIN — PANTOPRAZOLE SODIUM SCH MG: 40 INJECTION, POWDER, FOR SOLUTION INTRAVENOUS at 08:53

## 2018-04-02 RX ADMIN — IPRATROPIUM BROMIDE AND ALBUTEROL SULFATE SCH AMPULE: .5; 3 SOLUTION RESPIRATORY (INHALATION) at 03:57

## 2018-04-02 RX ADMIN — CHLORHEXIDINE GLUCONATE SCH PACK: 500 CLOTH TOPICAL at 20:53

## 2018-04-02 RX ADMIN — INSULIN ASPART SCH: 100 INJECTION, SOLUTION INTRAVENOUS; SUBCUTANEOUS at 07:53

## 2018-04-02 RX ADMIN — SUCRALFATE SCH GM: 1 TABLET ORAL at 06:10

## 2018-04-02 RX ADMIN — Medication SCH ML: at 20:44

## 2018-04-02 RX ADMIN — INSULIN ASPART SCH: 100 INJECTION, SOLUTION INTRAVENOUS; SUBCUTANEOUS at 20:44

## 2018-04-02 RX ADMIN — SUCRALFATE SCH GM: 1 TABLET ORAL at 17:08

## 2018-04-02 RX ADMIN — INSULIN ASPART SCH: 100 INJECTION, SOLUTION INTRAVENOUS; SUBCUTANEOUS at 16:34

## 2018-04-02 RX ADMIN — STANDARDIZED SENNA CONCENTRATE AND DOCUSATE SODIUM SCH TAB: 8.6; 5 TABLET, FILM COATED ORAL at 08:53

## 2018-04-02 RX ADMIN — INSULIN ASPART SCH: 100 INJECTION, SOLUTION INTRAVENOUS; SUBCUTANEOUS at 12:00

## 2018-04-02 RX ADMIN — IPRATROPIUM BROMIDE AND ALBUTEROL SULFATE SCH AMPULE: .5; 3 SOLUTION RESPIRATORY (INHALATION) at 21:18

## 2018-04-02 RX ADMIN — STANDARDIZED SENNA CONCENTRATE AND DOCUSATE SODIUM SCH TAB: 8.6; 5 TABLET, FILM COATED ORAL at 20:43

## 2018-04-02 RX ADMIN — IPRATROPIUM BROMIDE AND ALBUTEROL SULFATE SCH AMPULE: .5; 3 SOLUTION RESPIRATORY (INHALATION) at 16:10

## 2018-04-02 RX ADMIN — LITHIUM CARBONATE SCH MG: 300 CAPSULE, GELATIN COATED ORAL at 20:43

## 2018-04-02 RX ADMIN — ATORVASTATIN CALCIUM SCH MG: 40 TABLET, FILM COATED ORAL at 20:43

## 2018-04-02 RX ADMIN — TAMSULOSIN HYDROCHLORIDE SCH MG: 0.4 CAPSULE ORAL at 20:43

## 2018-04-02 RX ADMIN — PANTOPRAZOLE SODIUM SCH MG: 40 INJECTION, POWDER, FOR SOLUTION INTRAVENOUS at 20:42

## 2018-04-02 RX ADMIN — Medication SCH ML: at 08:53

## 2018-04-02 RX ADMIN — CARVEDILOL SCH MG: 3.12 TABLET, FILM COATED ORAL at 18:23
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
## 2018-04-02 NOTE — HHI.GIFU
Subjective


Remarks


Pt resting in bed in NAD.  c/o nausea after eating his salad for lunch, "I didn'

t like the salad."  he did tolerate breakfast with no problems.  There has been 

no vomiting.  Night staff report dark hard BM.


 (Mary Carmen Hart)





Objective


Vitals I&O





Vital Signs








  Date Time  Temp Pulse Resp B/P (MAP) Pulse Ox O2 Delivery O2 Flow Rate FiO2


 


4/2/18 12:00 98.0 98 19 142/80 (100) 99   


 


4/2/18 08:50      Nasal Cannula 2.00 


 


4/2/18 08:33     99 Nasal Cannula 2.00 


 


4/2/18 08:00 98.6 87 20 124/77 (93) 96   


 


4/2/18 04:08 98.0 92 18 162/81 (108) 96   


 


4/2/18 00:33 98.3 108 18 153/71 (98) 96   


 


4/2/18 00:00  73      


 


4/1/18 20:34 98.9 95 18 175/97 (123) 96   


 


4/1/18 20:00  90      


 


4/1/18 16:13     96 Nasal Cannula 2.00 


 


4/1/18 16:00 97.1 88 18 166/79 (108) 98   














I/O      


 


 4/1/18 4/1/18 4/1/18 4/2/18 4/2/18 4/2/18





 07:00 15:00 23:00 07:00 15:00 23:00


 


Intake Total 780 ml  1200 ml 780 ml 480 ml 


 


Output Total 1400 ml  2350 ml 1800 ml  


 


Balance -620 ml  -1150 ml -1020 ml 480 ml 


 


      


 


Intake Oral 780 ml  1200 ml 780 ml 480 ml 


 


Output Urine Total 1400 ml  2350 ml 1800 ml  


 


# Bowel Movements 0  1   








Laboratory





Laboratory Tests








Test


  4/2/18


07:30


 


White Blood Count 8.8 


 


Red Blood Count 2.55 


 


Hemoglobin 8.1 


 


Hematocrit 23.3 


 


Mean Corpuscular Volume 91.1 


 


Mean Corpuscular Hemoglobin 31.5 


 


Mean Corpuscular Hemoglobin


Concent 34.6 


 


 


Red Cell Distribution Width 15.1 


 


Platelet Count 437 


 


Mean Platelet Volume 7.0 














 Date/Time


Source Procedure


Growth Status


 


 


 3/29/18 20:40


Urine Clean Catch Urine Culture - Final


Escherichia Coli Complete








Imaging





Last Impressions








Abdomen/Pelvis CT 3/29/18 1851 Signed





Impressions: 





 Service Date/Time:  Thursday, March 29, 2018 20:54 - CONCLUSION:  1. No acute 





 abnormality seen. 2. Scattered colonic diverticula without inflammatory 

change.  





    Ministerio Garcia MD 


 


Chest X-Ray 3/29/18 3659 Signed





Impressions: 





 Service Date/Time:  Thursday, March 29, 2018 17:59 - CONCLUSION:  No acute 





 disease.  No free air is seen.     Ministerio Garcia MD 








Physical Exam


HEENT: Pupils round and reactive to light; normocephalic; atraumatic; no 

jaundice 


CHEST: CTA


CARDIAC:  RRR


ABDOMEN: Soft, nondistended, nontender; no hepatosplenomegaly; bowel sounds are 

present in all four quadrants.


EXTREMITIES: No clubbing, cyanosis, or edema.


SKIN:  Normal; no rash; no jaundice.  Pale


CNS:  alert and oriented times two


 (Mary Carmen Hart)





Assessment and Plan


Plan


ASSESSMENT


- hematemesis, black tarry stool - onset yesterday.  prior hx GIB.  EGD 3/20/18 

showed


   ulcer with visible vessel, s/p cautery, clips x 3 and epi inj.  had EGD 

today 3/30/18 showed


   large duodenal ulcer with visible vessel, s/p cautery, epi inj.


- anemia with drop in HH - hgb dropped from 10 to 8.  now s/p PRBC x 2


Endoscopy done on 03/30/18.  Findings include normal esophagus, normal stomach, 

duodenum in the bulb was a large duodenal ulcer with a visible vessel, injected 

and cauterized, clip was there from the previous endoscopy.  Otherwise 

unremarkable.  Noted scattered diverticula on CT scan.  03/29/18 03/31/18 patient is resting in the bed asymptomatic.  No obvious nausea or 

vomiting or abdominal pain, appetite fair, would like to increase to solid 

food.  discussed EGD findings


4/1/18 no bleeding, no abd pain.  is c/o nausea but no vomiting.  ate 

breakfast.  no HH done today.


4/2/18  + black hard BM reported by night staff.  c/o nausea but no vomiting, 

tolerating diet.  HH stable.





PLAN:


SARA


BID protonix


Monitor lab 


transfuse as needed.   


EGD in 2 months


If any active bleeding patient will need interventional radiology for 

embolization


Supportive care





pt seen by myself and Kd and this note is on his behalf


 (Mary Carmen Hart)


Physician Comments


Seen and examined with ARNP, family in room. On Protonix, no bleeding, 

tolerating diet. Can dc home with gi fu. No NSAIDs. Thank you


 (Nicolette Yi MD)











Mary Carmen Hart Apr 2, 2018 14:34


Nicolette Yi MD Apr 2, 2018 20:17

## 2018-04-03 VITALS
DIASTOLIC BLOOD PRESSURE: 88 MMHG | HEART RATE: 93 BPM | SYSTOLIC BLOOD PRESSURE: 130 MMHG | OXYGEN SATURATION: 98 % | RESPIRATION RATE: 17 BRPM | TEMPERATURE: 97.5 F

## 2018-04-03 VITALS
TEMPERATURE: 98.3 F | SYSTOLIC BLOOD PRESSURE: 110 MMHG | RESPIRATION RATE: 16 BRPM | HEART RATE: 113 BPM | DIASTOLIC BLOOD PRESSURE: 79 MMHG | OXYGEN SATURATION: 97 %

## 2018-04-03 VITALS
RESPIRATION RATE: 17 BRPM | TEMPERATURE: 97.9 F | HEART RATE: 83 BPM | OXYGEN SATURATION: 99 % | SYSTOLIC BLOOD PRESSURE: 143 MMHG | DIASTOLIC BLOOD PRESSURE: 83 MMHG

## 2018-04-03 VITALS
HEART RATE: 84 BPM | OXYGEN SATURATION: 97 % | TEMPERATURE: 98.1 F | DIASTOLIC BLOOD PRESSURE: 62 MMHG | RESPIRATION RATE: 18 BRPM | SYSTOLIC BLOOD PRESSURE: 135 MMHG

## 2018-04-03 VITALS
HEART RATE: 78 BPM | SYSTOLIC BLOOD PRESSURE: 152 MMHG | OXYGEN SATURATION: 99 % | TEMPERATURE: 97.9 F | RESPIRATION RATE: 16 BRPM | DIASTOLIC BLOOD PRESSURE: 74 MMHG

## 2018-04-03 VITALS — HEART RATE: 114 BPM

## 2018-04-03 VITALS — OXYGEN SATURATION: 98 %

## 2018-04-03 LAB
ERYTHROCYTE [DISTWIDTH] IN BLOOD BY AUTOMATED COUNT: 15.3 % (ref 11.6–17.2)
HCT VFR BLD CALC: 23.8 % (ref 39–51)
HGB BLD-MCNC: 7.9 GM/DL (ref 13–17)
MCH RBC QN AUTO: 30.2 PG (ref 27–34)
MCHC RBC AUTO-ENTMCNC: 33.2 % (ref 32–36)
MCV RBC AUTO: 91.2 FL (ref 80–100)
PLATELET # BLD: 495 TH/MM3 (ref 150–450)
PMV BLD AUTO: 6.9 FL (ref 7–11)
RBC # BLD AUTO: 2.61 MIL/MM3 (ref 4.5–5.9)
WBC # BLD AUTO: 14.5 TH/MM3 (ref 4–11)

## 2018-04-03 RX ADMIN — SODIUM CHLORIDE SCH MLS/HR: 900 INJECTION INTRAVENOUS at 08:11

## 2018-04-03 RX ADMIN — STANDARDIZED SENNA CONCENTRATE AND DOCUSATE SODIUM SCH TAB: 8.6; 5 TABLET, FILM COATED ORAL at 21:26

## 2018-04-03 RX ADMIN — ATORVASTATIN CALCIUM SCH MG: 40 TABLET, FILM COATED ORAL at 21:27

## 2018-04-03 RX ADMIN — SUCRALFATE SCH GM: 1 TABLET ORAL at 06:23

## 2018-04-03 RX ADMIN — ONDANSETRON PRN MG: 2 INJECTION, SOLUTION INTRAMUSCULAR; INTRAVENOUS at 23:34

## 2018-04-03 RX ADMIN — INSULIN ASPART SCH: 100 INJECTION, SOLUTION INTRAVENOUS; SUBCUTANEOUS at 21:00

## 2018-04-03 RX ADMIN — CARVEDILOL SCH MG: 3.12 TABLET, FILM COATED ORAL at 08:10

## 2018-04-03 RX ADMIN — INSULIN ASPART SCH: 100 INJECTION, SOLUTION INTRAVENOUS; SUBCUTANEOUS at 08:00

## 2018-04-03 RX ADMIN — Medication SCH ML: at 08:11

## 2018-04-03 RX ADMIN — PANTOPRAZOLE SODIUM SCH MLS/HR: 40 INJECTION, POWDER, FOR SOLUTION INTRAVENOUS at 23:31

## 2018-04-03 RX ADMIN — SUCRALFATE SCH GM: 1 TABLET ORAL at 17:56

## 2018-04-03 RX ADMIN — LITHIUM CARBONATE SCH MG: 300 CAPSULE, GELATIN COATED ORAL at 21:27

## 2018-04-03 RX ADMIN — INSULIN ASPART SCH: 100 INJECTION, SOLUTION INTRAVENOUS; SUBCUTANEOUS at 12:00

## 2018-04-03 RX ADMIN — CHLORHEXIDINE GLUCONATE SCH PACK: 500 CLOTH TOPICAL at 21:28

## 2018-04-03 RX ADMIN — STANDARDIZED SENNA CONCENTRATE AND DOCUSATE SODIUM SCH TAB: 8.6; 5 TABLET, FILM COATED ORAL at 08:11

## 2018-04-03 RX ADMIN — PANTOPRAZOLE SODIUM SCH MG: 40 INJECTION, POWDER, FOR SOLUTION INTRAVENOUS at 08:11

## 2018-04-03 RX ADMIN — PANTOPRAZOLE SODIUM SCH MG: 40 INJECTION, POWDER, FOR SOLUTION INTRAVENOUS at 21:26

## 2018-04-03 RX ADMIN — Medication SCH ML: at 21:27

## 2018-04-03 RX ADMIN — INSULIN ASPART SCH: 100 INJECTION, SOLUTION INTRAVENOUS; SUBCUTANEOUS at 17:00

## 2018-04-03 RX ADMIN — TAMSULOSIN HYDROCHLORIDE SCH MG: 0.4 CAPSULE ORAL at 21:27

## 2018-04-03 NOTE — HHI.PR
Addendum to Inpatient Note


Addendum Reason:  Additional Documentation


Additional Information


Halicat note





S: Resident team was paged around 2200 for a Halicat.  Upon arrival nursing 

staff notified us that the patient had experienced one episode of hematemesis.  

Majority of the blood was contained in a towel in the sink as well as some 

which could be seen on the patient and bed.  The towel in the sink contained 

many clots, as well as partially digested food.  The patient reported that he 

had thrown up one time.  Denies any pain at this time.  Denies additional nausea

, lightheadedness, dizziness, changes in vision, shortness of breath, chest pain

, palpitations, abdominal pain. Patient was alert and oriented during 

interview. 





O: 


Vitals


BP: 138/85 P: 120 SPO2: 98% temperature: 97.8


Repeat BP approximately 15 minutes later: 105/69





GENERAL: Alert, laying in bed, visible blood in bed


SKIN: Warm and dry.


HEAD: Atraumatic. Normocephalic. 


EYES: Pupils equal and round. No scleral icterus. No injection or drainage. 


ENT: Blood around nares.  Mucous membranes pink and moist.


NECK: Trachea midline. No JVD. 


CARDIOVASCULAR: Regular rhythm, tachycardic.  


RESPIRATORY: No accessory muscle use. Clear to auscultation. Breath sounds 

equal bilaterally. 


GASTROINTESTINAL: Abdomen soft, non-tender, nondistended. 


MUSCULOSKELETAL: Extremities without clubbing, cyanosis, or edema. No obvious 

deformities. 


NEUROLOGICAL: Awake and alert. No obvious cranial nerve deficits.  Motor 

grossly within normal limits.


PSYCHIATRIC: Appropriate mood and affect; insight and judgment normal.








A/P:


72-year-old male previously admitted with hematemesis, has had EGD with 

multiple clippings.  No hematemesis for several days until tonight.  One 

episode of hematemesis with multiple clots.  Previous hemoglobin 8.1 yesterday.

  Currently denies any symptoms associated with hypovolemic shock.





-stat CBC


-500 mL fluid bolus, will perform a second 500 mL fluid bolus if CBC is not 

back by the end of the first one.  Anticipate decreased hemoglobin and likely 

need for transfusion.


-Transfuse blood as needed per CBC results.


-Transfer to critical care





Current patient status discussed with intensivist, care transferred











Odell Hinton MD R1 Apr 3, 2018 22:50

## 2018-04-03 NOTE — HHI.CCPN
Subjective


Remarks/Hospital Course


3/29: 72-year-old male patient from the nursing home with previous history of 

gastric ulcers and GI bleeding and the last endoscopy 2 weeks ago, presents 

today because he had hematemesis and was nauseous and throwing up at the 

nursing home facility.  He denies any chest pains, shortness of breath, 

abdominal pains, diarrhea, or any other symptoms.





3/30: Underwent EGD with findings of large duodenal ulcer with visible vessel 

which was injected with epinephrine and clipped by GI.  Hemoglobin remained 

stable.  Received 2 units PRBCs last night.  Drowsy following EGD at the time 

of my evaluation earlier today.  Left-sided weakness from previous stroke.





4/3: Patient was on the MedSurg floor today when vomited copious amount of 

bright red blood.  He became borderline hypotensive and tachycardic.  He 

responded well to the bolus of IV fluids and was transferred back to ICU.





Objective





Vital Signs








  Date Time  Temp Pulse Resp B/P (MAP) Pulse Ox O2 Delivery O2 Flow Rate FiO2


 


4/3/18 18:00     98 Nasal Cannula 2.00 


 


4/3/18 16:00 97.9 83 17 143/83 (103)    














Intake and Output   


 


 4/3/18 4/3/18 4/4/18





 08:00 16:00 00:00


 


Intake Total   375 ml


 


Output Total 675 ml  675 ml


 


Balance -675 ml  -300 ml








Result Diagram:  


4/3/18 2305                                                                    

            4/1/18 0430





Imaging





Last 24 hours Impressions








Chest X-Ray 3/29/18 1019 Signed





Impressions: 





 Service Date/Time:  Thursday, March 29, 2018 17:59 - CONCLUSION:  No acute 





 disease.  No free air is seen.     Ministerio Garcia MD 








Objective Remarks


GENERAL: Well-developed elderly male patient laying in bed in no acute distress

, drowsy, arousable..  Left-sided residual weakness


SKIN: Focused skin assessment warm/dry.


HEAD: Atraumatic. Normocephalic. 


EYES: Pupils equal and round. No scleral icterus. No injection or drainage. 


ENT: No nasal bleeding or discharge.  Mucous membranes pink and moist.


NECK: Trachea midline. No JVD.  Supple.


CARDIOVASCULAR: Regular rate and rhythm.  No murmur appreciated.


RESPIRATORY: No accessory muscle use. Clear to auscultation. Breath sounds 

equal bilaterally. 


GASTROINTESTINAL: Abdomen soft, non-tender, nondistended. Hepatic and splenic 

margins not palpable. 


MUSCULOSKELETAL: No obvious deformities. No clubbing.  No cyanosis.  No edema. 


NEUROLOGICAL: Awake and alert. No obvious cranial nerve deficits.  Motor with 

left-sided residual weakness





A/P


Assessment and Plan


Hematemesis


-Protonix drip 


-Status post EGD with injection and clipping of large vessel in duodenal ulcer. 


-H&H stable - will continue to monitor


-Further management per gastroenterology





Diabetes mellitus


-Hold metformin while in the ICU


-Insulin sliding scale





Hypertension


-Hold antihypertensive meds due to acute GI bleed


-Resume when indicated





Bipolar disorder


-Continue Flomax and lithium





DVT GI prophylaxis


-Andre's and SCDs


-No pharmacological DVT prophylaxis due to acute GI bleed


-Protonix drip





Critical Care:


The total critical care time was 35 minutes. Time to perform other separately 

billable procedures was not included in the critical care time.











John Rosales MD Apr 3, 2018 11:43 pm

## 2018-04-03 NOTE — HHI.PR
Subjective


Remarks


Critical Care specialist notes: 


3/29: 72-year-old male patient from the nursing home with previous history of 

gastric ulcers and GI bleeding and the last endoscopy 2 weeks ago, presents 

today because he had 


hematemesis and was nauseous and throwing up at the nursing home facility.  He 

denies any chest pains, shortness of breath, abdominal pains, diarrhea, or any 

other symptoms.





3/30: Underwent EGD with findings of large duodenal ulcer with visible vessel 

which was injected with epinephrine and clipped by GI.  Hemoglobin remained 

stable.  


Received 2 units PRBCs last night.  Drowsy following EGD at the time of my 

evaluation earlier today.  Left-sided weakness from previous stroke.





Hospitalist notes:


3/31: Stable seen in his bedroom, with consult of GI bleed, hematemesis, EGD 3/

20/18 showed ulcer with visible vessel, status post cautery, clip x 3 epi 

injection


        had EGD 3/30/18 showed large duodenal ulcer with visible vessel, status 

post cautery, epi injection. Anemia wit status post PRBC x 2, 


        Endoscopy done on 03/30/18.  Findings include normal esophagus, normal 

stomach, duodenum in the bulb was a large duodenal ulcer with a visible vessel, 


        injected and cauterized, clip was there from the previous endoscopy.  

Otherwise unremarkable.  Noted scattered diverticula on CT scan.  03/29/18


        advanced diet by GI specialist, switch to Protonix 40 mg BID, EGD in 2 

months, 





4/01: Seen in his bedroom, No new episodes of bleeding, complaint of nausea but 

no vomit, as per GI specialist recommended to continue SARA, PPIs BID, 


        EGD in two months. if again comes active bleeding will need 

Interventional Radiology. 





4/02: Seen in his bedroom recommended for discharge to SNF, difficult placement 

due to that the patient is been arrested in the past due to being a sexual 

Predator


       


4/03: Seen in his bedroom stable, no nausea, vomit or diarrhea.





Objective





Vital Signs








  Date Time  Temp Pulse Resp B/P (MAP) Pulse Ox O2 Delivery O2 Flow Rate FiO2


 


4/3/18 12:31      Nasal Cannula 2.00 


 


4/3/18 12:00 97.5 93 17 130/88 (102) 98   


 


4/3/18 08:10      Nasal Cannula 2.00 


 


4/3/18 08:00 97.9 78 16 152/74 (100) 99   


 


4/3/18 00:00 98.1 84 18 135/62 (86) 97   


 


4/2/18 21:20     98 Nasal Cannula 2.00 


 


4/2/18 20:30      Nasal Cannula 2.00 


 


4/2/18 20:00 98.4 95 18 155/87 (109) 100   














I/O      


 


 4/2/18 4/2/18 4/2/18 4/3/18 4/3/18 4/3/18





 06:59 14:59 22:59 06:59 14:59 22:59


 


Intake Total 780 ml 480 ml 960 ml   


 


Output Total 1800 ml  800 ml 675 ml  


 


Balance -1020 ml 480 ml 160 ml -675 ml  


 


      


 


Intake Oral 780 ml 480 ml 960 ml   


 


Output Urine Total 1800 ml  800 ml 675 ml  


 


# Bowel Movements   1   








Result Diagram:  


4/2/18 0730                                                                    

            4/1/18 0430





Imaging





Last Impressions








Abdomen/Pelvis CT 3/29/18 1851 Signed





Impressions: 





 Service Date/Time:  Thursday, March 29, 2018 20:54 - CONCLUSION:  1. No acute 





 abnormality seen. 2. Scattered colonic diverticula without inflammatory 

change.  





    Ministerio Garcia MD 


 


Chest X-Ray 3/29/18 [id number] Signed





Impressions: 





 Service Date/Time:  Thursday, March 29, 2018 17:59 - CONCLUSION:  No acute 





 disease.  No free air is seen.     Ministerio Garcia MD 








Procedures


EGD


Other Results





Laboratory Tests








Test


  3/29/18


18:00 3/29/18


20:40 3/29/18


21:25 3/30/18


03:02


 


Lipase 170 U/L    


 


Urine Color  YELLOW   


 


Urine Turbidity  CLOUDY   


 


Urine pH  5.5   


 


Urine Specific Gravity  1.016   


 


Urine Protein  30 mg/dL   


 


Urine Glucose (UA)  TRACE mg/dL   


 


Urine Ketones  NEG mg/dL   


 


Urine Occult Blood  MOD   


 


Urine Nitrite  NEG   


 


Urine Bilirubin  NEG   


 


Urine Urobilinogen


  


  LESS THAN 2.0


MG/DL 


  


 


 


Urine Leukocyte Esterase  LARGE   


 


Urine RBC  17 /hpf   


 


Urine WBC   /hpf   


 


Urine WBC Clumps  MANY   


 


Urine Squamous Epithelial


Cells 


  2 /hpf 


  


  


 


 


Urine Bacteria  FEW /hpf   


 


Urine Hyaline Casts  20 /lpf   


 


Urine Mucus  FEW /lpf   


 


Microscopic Urinalysis Comment


  


  CULTURE


INDICATED 


  


 


 


Nasal Screen MRSA (PCR)


  


  


  MRSA NOT


DETECTED 


 


 


Prothrombin Time    11.2 SEC 


 


Prothromb Time International


Ratio 


  


  


  1.1 RATIO 


 


 


Activated Partial


Thromboplast Time 


  


  


  22.2 SEC 


 


 


Lactic Acid Level    0.9 mmol/L 


 


Blood Urea Nitrogen    40 MG/DL 


 


Creatinine    1.99 MG/DL 


 


Random Glucose    115 MG/DL 


 


Total Protein    5.5 GM/DL 


 


Albumin    2.3 GM/DL 


 


Calcium Level    9.1 MG/DL 


 


Phosphorus Level    3.4 MG/DL 


 


Magnesium Level    2.0 MG/DL 


 


Alkaline Phosphatase    48 U/L 


 


Aspartate Amino Transf


(AST/SGOT) 


  


  


  16 U/L 


 


 


Alanine Aminotransferase


(ALT/SGPT) 


  


  


  21 U/L 


 


 


Total Bilirubin    1.1 MG/DL 


 


Sodium Level    143 MEQ/L 


 


Potassium Level    4.3 MEQ/L 


 


Chloride Level    110 MEQ/L 


 


Carbon Dioxide Level    29.3 MEQ/L 


 


Test


  3/30/18


15:19 4/1/18


04:30 4/2/18


07:30 


 


 


Neutrophils (%) (Auto) 66.2 %    


 


Lymphocytes (%) (Auto) 23.7 %    


 


Monocytes (%) (Auto) 9.1 %    


 


Eosinophils (%) (Auto) 0.5 %    


 


Basophils (%) (Auto) 0.5 %    


 


Neutrophils # (Auto) 10.0 TH/MM3    


 


Lymphocytes # (Auto) 3.6 TH/MM3    


 


Monocytes # (Auto) 1.4 TH/MM3    


 


Eosinophils # (Auto) 0.1 TH/MM3    


 


Basophils # (Auto) 0.1 TH/MM3    


 


CBC Comment DIFF FINAL    


 


Differential Comment     


 


Blood Urea Nitrogen  9 MG/DL   


 


Creatinine  1.23 MG/DL   


 


Random Glucose  102 MG/DL   


 


Calcium Level  9.3 MG/DL   


 


Sodium Level  147 MEQ/L   


 


Potassium Level  3.9 MEQ/L   


 


Chloride Level  117 MEQ/L   


 


Carbon Dioxide Level  27.8 MEQ/L   


 


Anion Gap  2 MEQ/L   


 


Estimat Glomerular Filtration


Rate 


  58 ML/MIN 


  


  


 


 


White Blood Count   8.8 TH/MM3  


 


Red Blood Count   2.55 MIL/MM3  


 


Hemoglobin   8.1 GM/DL  


 


Hematocrit   23.3 %  


 


Mean Corpuscular Volume   91.1 FL  


 


Mean Corpuscular Hemoglobin   31.5 PG  


 


Mean Corpuscular Hemoglobin


Concent 


  


  34.6 % 


  


 


 


Red Cell Distribution Width   15.1 %  


 


Platelet Count   437 TH/MM3  


 


Mean Platelet Volume   7.0 FL  








Objective Remarks


GENERAL: No acute distress


SKIN: Focused skin assessment warm/dry.


HEAD: Atraumatic. Normocephalic. 


EYES: Pupils equal and round. No scleral icterus. No injection or drainage. 


ENT: No nasal bleeding or discharge.  Mucous membranes pink and moist.


NECK: Trachea midline. No JVD.  Supple.


CARDIOVASCULAR: Regular rate and rhythm.  No murmur appreciated.


RESPIRATORY: No accessory muscle use. Clear to auscultation. Breath sounds 

equal bilaterally. 


GASTROINTESTINAL: Abdomen soft, non-tender, nondistended. Hepatic and splenic 

margins not palpable. 


MUSCULOSKELETAL: No obvious deformities. No clubbing.  No cyanosis.  No edema. 


NEUROLOGICAL: Awake and alert. No obvious cranial nerve deficits.  Motor with 

left-sided residual weakness


Medications and IVs





Current Medications








 Medications


  (Trade)  Dose


 Ordered  Sig/Carolin


 Route  Start Time


 Stop Time Status Last Admin


 


  (SandoSTATIN


 INJ)  50 mcg  ONCE


 IV PUSH  3/29/18 19:30


    3/29/18 20:23


 


 


  (Lipitor)  40 mg  HS


 PO  3/29/18 21:00


    4/2/18 20:43


 


 


  (PROzac)  40 mg  HS


 PO  3/29/18 21:00


    4/2/18 20:43


 


 


  (Lithium


 Carbonate)  600 mg  HS


 PO  3/29/18 21:00


    4/2/18 20:43


 


 


  (Carafate)  1 gm  BIDAC


 PO  3/30/18 07:00


    4/3/18 06:23


 


 


  (Flomax)  0.4 mg  HS


 PO  3/29/18 21:00


    4/2/18 20:43


 


 


  (D50w (Vial) Inj)  50 ml  UNSCH  PRN


 IV PUSH  3/29/18 19:45


     


 


 


  (Glucagon Inj)  1 mg  UNSCH  PRN


 OTHER  3/29/18 19:45


     


 


 


  (NovoLOG


 SUPPLEMENTAL


 SCALE)  1  ACHS SLIDING  SCALE


 SQ  3/29/18 21:00


    4/1/18 12:00


 


 


  (NS Flush)  2 ml  UNSCH  PRN


 IV FLUSH  3/29/18 19:45


     


 


 


  (NS Flush)  2 ml  BID


 IV FLUSH  3/29/18 21:00


    4/3/18 08:11


 


 


  (Tylenol)  650 mg  Q6H  PRN


 PO  3/29/18 19:45


     


 


 


  (Protonix Inj)  40 mg  Q12H


 IV PUSH  3/29/18 20:00


    4/3/18 08:11


 


 


  (Zofran Inj)  4 mg  Q6H  PRN


 IV PUSH  3/29/18 19:45


    4/1/18 10:03


 


 


  (Duoneb Neb)  1 ampule  Q2HR NEB  PRN


 INH  3/29/18 19:45


     


 


 


 Miscellaneous


 Information  1  Q361D


 XX  3/29/18 19:45


    3/29/18 21:15


 


 


  (Chlorhexidine


 2% Cloth)  3 pack


 Taper  DAILY@04


 TOP  3/30/18 04:00


 3/26/19 03:59  3/29/18 21:30


 


 


  (Chlorhexidine


 2% Cloth)  3 pack  UNSCH  PRN


 TOP  3/29/18 19:45


     


 


 


  (Sherrie-Colace)  1 tab  BID


 PO  3/29/18 21:00


    4/3/18 08:11


 


 


  (Milk Of


 Magnesia Liq)  30 ml  Q12H  PRN


 PO  3/29/18 19:45


     


 


 


  (Senokot)  17.2 mg  Q12H  PRN


 PO  3/29/18 19:45


     


 


 


  (Dulcolax Supp)  10 mg  DAILY  PRN


 RECTAL  3/29/18 19:45


     


 


 


  (Lactulose Liq)  30 ml  DAILY  PRN


 PO  3/29/18 19:45


     


 


 


 Ceftriaxone


 Sodium 1000 mg/


 Sodium Chloride  100 ml @ 


 200 mls/hr  Q24H


 IV  3/31/18 10:00


    4/3/18 08:11


 


 


  (Coreg)  3.125 mg  DAILY


 PO  4/2/18 19:00


    4/3/18 08:10


 











A/P


Assessment and Plan


Hematemesis


-Protonix drip 


-Status post EGD with injection and clipping of large vessel in duodenal ulcer. 


-Received 2 units PRBCs.  Follow serial H&H and transfuse if needed to keep 

hemoglobin greater than 8 g percent


-EGD 3/20/18 showed ulcer with visible vessel, status post cautery, clip x 3 

epi injection had EGD 3/30/18 showed large duodenal ulcer 


 with visible vessel, status post cautery, epi injection. Anemia wit status 

post PRBC x 2,  Endoscopy done on 03/30/18.  Findings include 


 normal esophagus, normal stomach, duodenum in the bulb was a large duodenal 

ulcer with a visible vessel,  injected and cauterized, clip 


 was there from the previous endoscopy.  Otherwise unremarkable.  Noted 

scattered diverticula on CT scan.  03/29/18


 advanced diet by GI specialist, switch to Protonix 40 mg BID, EGD in 2 months,

  no new signs of Hematemesis





- Anemia with drop in HH - hgb dropped from 10 to 8.  now s/p PRBC x 2. today 

has hemoglobin 7.9





-Diabetes mellitus controlled with sliding scale. 


-Hold metformin 


-Insulin sliding scale





Hypertension uncontrolled intermittently with no blood pressure medicine given. 

started on Coreg 3.125 mg daily





Bipolar disorder


-Continue Flomax and lithium





Obesity will need diet and exercise to improve his condition. 





Note yet cleared for discharge by GI specialist. 





DVT GI prophylaxis


-Andre's and SCDs


-No pharmacological DVT prophylaxis due to acute GI bleed


-Protonix IV twice daily


Discharge Planning


Once cleared by GI specialist.











David Leon MD Apr 3, 2018 17:47

## 2018-04-04 VITALS
TEMPERATURE: 98.7 F | DIASTOLIC BLOOD PRESSURE: 73 MMHG | HEART RATE: 85 BPM | OXYGEN SATURATION: 96 % | RESPIRATION RATE: 16 BRPM | SYSTOLIC BLOOD PRESSURE: 148 MMHG

## 2018-04-04 VITALS
SYSTOLIC BLOOD PRESSURE: 87 MMHG | RESPIRATION RATE: 21 BRPM | HEART RATE: 118 BPM | OXYGEN SATURATION: 94 % | DIASTOLIC BLOOD PRESSURE: 49 MMHG | TEMPERATURE: 98.3 F

## 2018-04-04 VITALS
TEMPERATURE: 98.4 F | RESPIRATION RATE: 18 BRPM | SYSTOLIC BLOOD PRESSURE: 80 MMHG | DIASTOLIC BLOOD PRESSURE: 53 MMHG | HEART RATE: 111 BPM | OXYGEN SATURATION: 96 %

## 2018-04-04 VITALS
SYSTOLIC BLOOD PRESSURE: 140 MMHG | TEMPERATURE: 98.5 F | RESPIRATION RATE: 15 BRPM | HEART RATE: 75 BPM | OXYGEN SATURATION: 95 % | DIASTOLIC BLOOD PRESSURE: 69 MMHG

## 2018-04-04 VITALS
HEART RATE: 99 BPM | DIASTOLIC BLOOD PRESSURE: 68 MMHG | SYSTOLIC BLOOD PRESSURE: 123 MMHG | OXYGEN SATURATION: 99 % | RESPIRATION RATE: 14 BRPM | TEMPERATURE: 98.4 F

## 2018-04-04 VITALS
RESPIRATION RATE: 17 BRPM | DIASTOLIC BLOOD PRESSURE: 80 MMHG | TEMPERATURE: 97.8 F | SYSTOLIC BLOOD PRESSURE: 173 MMHG | HEART RATE: 76 BPM | OXYGEN SATURATION: 95 %

## 2018-04-04 VITALS — OXYGEN SATURATION: 98 %

## 2018-04-04 VITALS — HEART RATE: 85 BPM

## 2018-04-04 VITALS — OXYGEN SATURATION: 97 %

## 2018-04-04 LAB
ALBUMIN SERPL-MCNC: 2.3 GM/DL (ref 3.4–5)
ALP SERPL-CCNC: 54 U/L (ref 45–117)
ALT SERPL-CCNC: 15 U/L (ref 12–78)
AST SERPL-CCNC: 16 U/L (ref 15–37)
BASOPHILS # BLD AUTO: 0.1 TH/MM3 (ref 0–0.2)
BASOPHILS # BLD AUTO: 0.1 TH/MM3 (ref 0–0.2)
BASOPHILS NFR BLD: 0.4 % (ref 0–2)
BASOPHILS NFR BLD: 0.6 % (ref 0–2)
BILIRUB SERPL-MCNC: 0.4 MG/DL (ref 0.2–1)
BUN SERPL-MCNC: 16 MG/DL (ref 7–18)
CALCIUM SERPL-MCNC: 8.7 MG/DL (ref 8.5–10.1)
CHLORIDE SERPL-SCNC: 110 MEQ/L (ref 98–107)
CREAT SERPL-MCNC: 1.38 MG/DL (ref 0.6–1.3)
EOSINOPHIL # BLD: 0 TH/MM3 (ref 0–0.4)
EOSINOPHIL # BLD: 0.1 TH/MM3 (ref 0–0.4)
EOSINOPHIL NFR BLD: 0.2 % (ref 0–4)
EOSINOPHIL NFR BLD: 1.3 % (ref 0–4)
ERYTHROCYTE [DISTWIDTH] IN BLOOD BY AUTOMATED COUNT: 15.4 % (ref 11.6–17.2)
ERYTHROCYTE [DISTWIDTH] IN BLOOD BY AUTOMATED COUNT: 15.7 % (ref 11.6–17.2)
GFR SERPLBLD BASED ON 1.73 SQ M-ARVRAT: 51 ML/MIN (ref 89–?)
GLUCOSE SERPL-MCNC: 110 MG/DL (ref 74–106)
HCO3 BLD-SCNC: 26.4 MEQ/L (ref 21–32)
HCT VFR BLD CALC: 22.4 % (ref 39–51)
HCT VFR BLD CALC: 23.6 % (ref 39–51)
HCT VFR BLD CALC: 24.1 % (ref 39–51)
HGB BLD-MCNC: 7.7 GM/DL (ref 13–17)
HGB BLD-MCNC: 8 GM/DL (ref 13–17)
HGB BLD-MCNC: 8.1 GM/DL (ref 13–17)
LYMPHOCYTES # BLD AUTO: 2 TH/MM3 (ref 1–4.8)
LYMPHOCYTES # BLD AUTO: 2.2 TH/MM3 (ref 1–4.8)
LYMPHOCYTES NFR BLD AUTO: 15.8 % (ref 9–44)
LYMPHOCYTES NFR BLD AUTO: 23.6 % (ref 9–44)
MCH RBC QN AUTO: 30.1 PG (ref 27–34)
MCH RBC QN AUTO: 30.5 PG (ref 27–34)
MCHC RBC AUTO-ENTMCNC: 33.9 % (ref 32–36)
MCHC RBC AUTO-ENTMCNC: 34.2 % (ref 32–36)
MCV RBC AUTO: 88.7 FL (ref 80–100)
MCV RBC AUTO: 89.2 FL (ref 80–100)
MONOCYTE #: 0.6 TH/MM3 (ref 0–0.9)
MONOCYTE #: 0.7 TH/MM3 (ref 0–0.9)
MONOCYTES NFR BLD: 5.1 % (ref 0–8)
MONOCYTES NFR BLD: 6.9 % (ref 0–8)
NEUTROPHILS # BLD AUTO: 6.4 TH/MM3 (ref 1.8–7.7)
NEUTROPHILS # BLD AUTO: 9.9 TH/MM3 (ref 1.8–7.7)
NEUTROPHILS NFR BLD AUTO: 67.6 % (ref 16–70)
NEUTROPHILS NFR BLD AUTO: 78.5 % (ref 16–70)
PLATELET # BLD: 404 TH/MM3 (ref 150–450)
PLATELET # BLD: 413 TH/MM3 (ref 150–450)
PMV BLD AUTO: 7.1 FL (ref 7–11)
PMV BLD AUTO: 7.4 FL (ref 7–11)
PROT SERPL-MCNC: 5.4 GM/DL (ref 6.4–8.2)
RBC # BLD AUTO: 2.51 MIL/MM3 (ref 4.5–5.9)
RBC # BLD AUTO: 2.65 MIL/MM3 (ref 4.5–5.9)
SODIUM SERPL-SCNC: 143 MEQ/L (ref 136–145)
WBC # BLD AUTO: 12.6 TH/MM3 (ref 4–11)
WBC # BLD AUTO: 9.5 TH/MM3 (ref 4–11)

## 2018-04-04 PROCEDURE — 04L33ZZ OCCLUSION OF HEPATIC ARTERY, PERCUTANEOUS APPROACH: ICD-10-PCS | Performed by: RADIOLOGY

## 2018-04-04 PROCEDURE — B41B1ZZ FLUOROSCOPY OF OTHER INTRA-ABDOMINAL ARTERIES USING LOW OSMOLAR CONTRAST: ICD-10-PCS | Performed by: RADIOLOGY

## 2018-04-04 PROCEDURE — B4121ZZ FLUOROSCOPY OF HEPATIC ARTERY USING LOW OSMOLAR CONTRAST: ICD-10-PCS | Performed by: RADIOLOGY

## 2018-04-04 PROCEDURE — B4141ZZ FLUOROSCOPY OF SUPERIOR MESENTERIC ARTERY USING LOW OSMOLAR CONTRAST: ICD-10-PCS | Performed by: RADIOLOGY

## 2018-04-04 RX ADMIN — STANDARDIZED SENNA CONCENTRATE AND DOCUSATE SODIUM SCH TAB: 8.6; 5 TABLET, FILM COATED ORAL at 09:07

## 2018-04-04 RX ADMIN — INSULIN ASPART SCH: 100 INJECTION, SOLUTION INTRAVENOUS; SUBCUTANEOUS at 20:00

## 2018-04-04 RX ADMIN — SUCRALFATE SCH GM: 1 TABLET ORAL at 09:06

## 2018-04-04 RX ADMIN — PANTOPRAZOLE SODIUM SCH MLS/HR: 40 INJECTION, POWDER, FOR SOLUTION INTRAVENOUS at 07:27

## 2018-04-04 RX ADMIN — Medication SCH ML: at 19:58

## 2018-04-04 RX ADMIN — PANTOPRAZOLE SODIUM SCH MLS/HR: 40 INJECTION, POWDER, FOR SOLUTION INTRAVENOUS at 20:00

## 2018-04-04 RX ADMIN — SUCRALFATE SCH GM: 1 TABLET ORAL at 17:20

## 2018-04-04 RX ADMIN — ONDANSETRON PRN MG: 2 INJECTION, SOLUTION INTRAMUSCULAR; INTRAVENOUS at 09:06

## 2018-04-04 RX ADMIN — INSULIN ASPART SCH: 100 INJECTION, SOLUTION INTRAVENOUS; SUBCUTANEOUS at 11:48

## 2018-04-04 RX ADMIN — CARVEDILOL SCH MG: 3.12 TABLET, FILM COATED ORAL at 09:07

## 2018-04-04 RX ADMIN — LITHIUM CARBONATE SCH MG: 300 CAPSULE, GELATIN COATED ORAL at 19:59

## 2018-04-04 RX ADMIN — INSULIN ASPART SCH: 100 INJECTION, SOLUTION INTRAVENOUS; SUBCUTANEOUS at 08:00

## 2018-04-04 RX ADMIN — INSULIN ASPART SCH: 100 INJECTION, SOLUTION INTRAVENOUS; SUBCUTANEOUS at 17:00

## 2018-04-04 RX ADMIN — ATORVASTATIN CALCIUM SCH MG: 40 TABLET, FILM COATED ORAL at 19:59

## 2018-04-04 RX ADMIN — SODIUM CHLORIDE SCH MLS/HR: 900 INJECTION INTRAVENOUS at 09:05

## 2018-04-04 RX ADMIN — Medication SCH ML: at 09:07

## 2018-04-04 RX ADMIN — STANDARDIZED SENNA CONCENTRATE AND DOCUSATE SODIUM SCH TAB: 8.6; 5 TABLET, FILM COATED ORAL at 19:59

## 2018-04-04 RX ADMIN — TAMSULOSIN HYDROCHLORIDE SCH MG: 0.4 CAPSULE ORAL at 19:59

## 2018-04-04 NOTE — RADRPT
EXAM DATE/TIME:  04/04/2018 12:06 

 

[facility] COMPARISON:  

No previous studies available for comparison.

 

 

INDICATIONS :      

72-year-old male with history of recurrent upper GI hemorrhage following clipping of bleeding duodena
l ulcer. Therefore, patient presents for angiography and prophylactic embolization of the gastroduode
nal artery.

                        

 

MEDICAL HISTORY :     

GI bleed, Hematemesis, HTN, Diabetes, CVA, GERD, BPH

 

SURGICAL HISTORY :     

Left hand surgery, Endoscopy with clipping of ulcer

 

ENCOUNTER:     

Initial

 

ACUITY:     

1 week

 

PAIN SCORE:     

0/10

                       

                        

 

FLUORO TIME:     

15 minutes

 

IMAGE SERIES:     

5

 

ACCESS SITE:     

Left Radial artery 

 

SEDATION TIME:     

60 minutes

 

CONTRAST:       

1.)  100 cc Omnipaque (iohexol) 350

 

MEDICATION(S):       

1.)  1.5 mg midazolam (Versed)  IV     

2.)  75 mcg fentanyl (Sublimaze)  IV     

3.)  2 g cefazolin (Ancef)  IV     

      

Intra-procedural antibiotics were given as prescribed above.     

      

 

DEVICE(S):      

1.)   gastroduodenal artery 2D Interlock-35 [id number] embolic coil(s)      

2.)   gastroduodenal artery 035 Tornado 7X3MM embolic coil(s)      

3.)   gastroduodenal artery 12-7MM Gelfoam      

4.)  Left radial artery Radial compression band [id number]       

 

 

PROCEDURE :     

1.  Ultrasound-guided puncture of the access site.

2.  Conscious sedation with continuous EKG and Oximetry monitoring.

3.  Selective catheter placement in the celiac artery was selected angiography

4.  Selective catheter placement in the SMA with selective angiography 

5.  Coil and Gelfoam embolization of the gastroduodenal artery 

 

DISCUSSION:

 

Monitored moderate conscious sedation was provided for this procedure with continued monitoring of pa
tient consciousness and physiologic status provided by interventional radiology nurse throughout the 
procedure and documented.

 

Patient was placed supine on the angiographic table. Ultrasound evaluation of the left radial artery 
demonstrated the artery to be patent. Single image was obtained and placed in PACS archive. Micropunc
ture needle was advanced into the left radial artery under direct ultrasound guidance and subsequentl
y exchanged for a slim 5 Spanish sheath. The 4 Spanish Nolan catheter was then advanced into the celiac
 artery and angiography was performed. This did not demonstrate any contrast extravasation. There is 
standard celiac anatomy. Catheter was then advanced into the gastroduodenal artery. Angiography was p
erformed again without evidence for contrast extravasation. Therefore, the GDA was embolized with Gel
foam and two 6mm coils. Followup angiography demonstrated stasis of flow in the GDA. The catheter was
 then repositioned into the SMA and angiography was performed. Again, no evidence for contrast extrav
asation. Catheters and wires were then removed.

 

Hemostasis was then obtained by placement of a radial compression device. Patient tolerated the proce
dure well.

 

 

CONCLUSION:

 

1. Celiac and SMA angiography demonstrates standard anatomy without evidence for active hemorrhage.

2. Uncomplicated prophylactic coil and Gelfoam embolization of the GDA. 

 

 

 Akbar Humphrey MD on April 04, 2018 at 17:11           

Board Certified Radiologist.

 This report was verified electronically.

## 2018-04-04 NOTE — HHI.GIFU
Subjective


Remarks


GI reconsulted for GIB.  Pt developed hematemesis and was transferred to [facility]. 

He admits continued black tarry stool. Last colonoscopy was 5 y ago with Dr menchaca and was normal per pt.


 (Mary Carmen Hart)





Objective


Vitals I&O





Vital Signs








  Date Time  Temp Pulse Resp B/P (MAP) Pulse Ox O2 Delivery O2 Flow Rate FiO2


 


4/4/18 08:14     98 Nasal Cannula 2.00 


 


4/4/18 04:00  99      


 


4/4/18 04:00 98.4 99 14 123/68 (86) 99   


 


4/4/18 03:37     97 Nasal Cannula 2.00 


 


4/4/18 03:15     98 Nasal Cannula 2.00 


 


4/4/18 01:36 98.4 111 18 80/53 96   


 


4/4/18 00:38     98 Nasal Cannula 2.00 


 


4/4/18 00:00 98.3 118 21 87/49 (62) 94   


 


4/4/18 00:00  118      


 


4/3/18 23:00     98 Room Air  


 


4/3/18 23:00  114      


 


4/3/18 22:45 98.3 113 16 110/79 (89) 97   


 


4/3/18 21:00      Nasal Cannula 2.00 


 


4/3/18 18:00     98 Nasal Cannula 2.00 


 


4/3/18 16:00 97.9 83 17 143/83 (103) 99   


 


4/3/18 12:31      Nasal Cannula 2.00 


 


4/3/18 12:00 97.5 93 17 130/88 (102) 98   














I/O      


 


 4/3/18 4/3/18 4/3/18 4/4/18 4/4/18 4/4/18





 07:00 15:00 23:00 07:00 15:00 23:00


 


Intake Total   375 ml 1424 ml 20 ml 


 


Output Total 675 ml  675 ml 1200 ml  


 


Balance -675 ml  -300 ml 224 ml 20 ml 


 


      


 


Intake Oral   375 ml   


 


IV Total    1059 ml 20 ml 


 


Packed Cells    330 ml  


 


Blood Product IV Normal Saline Flush    35 ml  


 


Output Urine Total 675 ml  675 ml 700 ml  


 


Emesis    500 ml  


 


# Voids   1   


 


# Bowel Movements   0   








Laboratory





Laboratory Tests








Test


  4/3/18


23:05 4/4/18


05:09


 


White Blood Count 14.5  12.6 


 


Red Blood Count 2.61  2.65 


 


Hemoglobin 7.9  8.0 


 


Hematocrit 23.8  23.6 


 


Mean Corpuscular Volume 91.2  88.7 


 


Mean Corpuscular Hemoglobin 30.2  30.1 


 


Mean Corpuscular Hemoglobin


Concent 33.2 


  33.9 


 


 


Red Cell Distribution Width 15.3  15.4 


 


Platelet Count 495  404 


 


Mean Platelet Volume 6.9  7.1 


 


Neutrophils (%) (Auto)  78.5 


 


Lymphocytes (%) (Auto)  15.8 


 


Monocytes (%) (Auto)  5.1 


 


Eosinophils (%) (Auto)  0.2 


 


Basophils (%) (Auto)  0.4 


 


Neutrophils # (Auto)  9.9 


 


Lymphocytes # (Auto)  2.0 


 


Monocytes # (Auto)  0.6 


 


Eosinophils # (Auto)  0.0 


 


Basophils # (Auto)  0.1 


 


CBC Comment  DIFF FINAL 


 


Differential Comment   














 Date/Time


Source Procedure


Growth Status


 


 


 3/29/18 20:40


Urine Clean Catch Urine Culture - Final


Escherichia Coli Complete








Imaging





Last Impressions








Abdomen/Pelvis CT 3/29/18 1851 Signed





Impressions: 





 Service Date/Time:  Thursday, March 29, 2018 20:54 - CONCLUSION:  1. No acute 





 abnormality seen. 2. Scattered colonic diverticula without inflammatory 

change.  





    Ministerio Garcia MD 


 


Chest X-Ray 3/29/18 0839 Signed





Impressions: 





 Service Date/Time:  Thursday, March 29, 2018 17:59 - CONCLUSION:  No acute 





 disease.  No free air is seen.     Ministerio Garcia MD 








Physical Exam


HEENT: Pupils round and reactive to light; normocephalic; atraumatic; no 

jaundice 


CHEST: CTA


CARDIAC:  RRR


ABDOMEN: Soft, nondistended, nontender; no hepatosplenomegaly; bowel sounds are 

present in all four quadrants.


EXTREMITIES: No clubbing, cyanosis, or edema.


SKIN:  no rash; no jaundice.  Pale


CNS:  alert


 (Mary Carmen Hart)





Assessment and Plan


Plan


ASSESSMENT


- hematemesis, black tarry stool - onset yesterday.  prior hx GIB.  EGD 3/20/18 

showed


   ulcer with visible vessel, s/p cautery, clips x 3 and epi inj.  had EGD 

today 3/30/18 showed


   large duodenal ulcer with visible vessel, s/p cautery, epi inj.


- anemia with drop in HH - hgb dropped from 10 to 8.  now s/p PRBC x 2


Endoscopy done on 03/30/18.  Findings include normal esophagus, normal stomach, 

duodenum in the bulb was a large duodenal ulcer with a visible vessel, injected 

and cauterized, clip was there from the previous endoscopy.  Otherwise 

unremarkable.  Noted scattered diverticula on CT scan.  03/29/18 03/31/18 patient is resting in the bed asymptomatic.  No obvious nausea or 

vomiting or abdominal pain, appetite fair, would like to increase to solid 

food.  discussed EGD findings


4/1/18 no bleeding, no abd pain.  is c/o nausea but no vomiting.  ate 

breakfast.  no HH done today.


4/2/18  + black hard BM reported by night staff.  c/o nausea but no vomiting, 

tolerating diet.  HH stable.


4/4/18    developed hematemesis and transferred to [facility], GI reconsulted.  IR 

consult pending.  





PLAN:


await IR consult


poss repeat egd vs colonsocopy depending on results above


BID protonix


Monitor lab 


transfuse as needed.   


Supportive care





pt seen by myself and Kd and this note is on his behalf


 (Mary Carmen Hart)


Physician Comments


Seen and examined with ARNP, s/p GDA embolization. Clear liquid diet, monitor 

labs and for signs of bleeding. Bleeding scan if rebleeds. IV protonix. 


 (Nicolette Yi MD)











Mary Carmen Hart Apr 4, 2018 11:22


Nicolette Yi MD Apr 4, 2018 16:19

## 2018-04-04 NOTE — OTSOAPIP
TIME SESSION COMPLETED:  AM



TREATMENT TIME:  0    MINS.

CHART REVIEWED.  



RECEIVED ORDERS FROM DR. LUGO FOR OT CONSULT. PATIENT HAS HAD A CHANGE IN STATUS AND 
NOW IN INTENSIVE CARE SINCE ORDER WRITTEN. SPOKE WITH NURSE ELMER AND DISCUSSED THAT OT 
WILL SIGN OFF AND WILL NEED NEW ORDERS WHEN STABLE. 



Therapist: NELL SALAS OT/L

                          Signature on file

## 2018-04-04 NOTE — HHI.CCPN
Subjective


Remarks/Hospital Course


3/29: 72-year-old male patient from the nursing home with previous history of 

gastric ulcers and GI bleeding and the last endoscopy 2 weeks ago, presents 

today because he had hematemesis and was nauseous and throwing up at the 

nursing home facility.  He denies any chest pains, shortness of breath, 

abdominal pains, diarrhea, or any other symptoms.





3/30: Underwent EGD with findings of large duodenal ulcer with visible vessel 

which was injected with epinephrine and clipped by GI.  Hemoglobin remained 

stable.  Received 2 units PRBCs last night.  Drowsy following EGD at the time 

of my evaluation earlier today.  Left-sided weakness from previous stroke.


               RECONSULT





4/3: Patient was on the MedSurg floor today when vomited copious amount of 

bright red blood.  He became borderline hypotensive and tachycardic.  He 

responded well to the bolus of IV fluids and was transferred back to ICU.


4/4: Patient is S/P prophylactic embolization of the gastroduodenal artery via 

interventional radiology.  Repeat CBC and BMP pending.  Patient normotensive .  

Patient continues on Protonix infusion.





Objective





Vital Signs








  Date Time  Temp Pulse Resp B/P (MAP) Pulse Ox O2 Delivery O2 Flow Rate FiO2


 


4/4/18 08:14     98 Nasal Cannula 2.00 


 


4/4/18 08:00  85      


 


4/4/18 04:00 98.4  14 123/68 (86)    














Intake and Output   


 


 4/4/18 4/4/18 4/5/18





 08:00 16:00 00:00


 


Intake Total 944 ml  


 


Output Total 1200 ml  


 


Balance -256 ml  








Result Diagram:  


4/4/18 0509                                                                    

            4/1/18 0430





Imaging





Last 24 hours Impressions








Chest X-Ray 3/29/18 8219 Signed





Impressions: 





 Service Date/Time:  Thursday, March 29, 2018 17:59 - CONCLUSION:  No acute 





 disease.  No free air is seen.     Ministerio Garcia MD 








Objective Remarks


GENERAL: Well-developed elderly male patient laying in bed in no acute distress

, with complaints of back pain left-sided residual weakness


SKIN: Focused skin assessment warm/dry.


HEAD: Atraumatic. Normocephalic. 


EYES: Pupils equal and round. No scleral icterus. No injection or drainage. 


ENT: No nasal bleeding or discharge.  Mucous membranes pink and moist.


NECK: Trachea midline. No JVD.  Supple.


CARDIOVASCULAR: Regular rate and rhythm.  No murmur appreciated.


RESPIRATORY: No accessory muscle use. Clear to auscultation. Breath sounds 

equal bilaterally. 


GASTROINTESTINAL: Abdomen soft, non-tender, nondistended. Hepatic and splenic 

margins not palpable. 


MUSCULOSKELETAL: No obvious deformities. No clubbing.  No cyanosis.  No edema. 


NEUROLOGICAL: GCS 15. awake and alert. No obvious cranial nerve deficits.  

Motor with left-sided residual weakness





A/P


Assessment and Plan


Hematemesis


-Protonix infusion


-Status post EGD with injection and clipping of large vessel in duodenal ulcer. 


-H&H stable - will continue to monitor


-Further management /clear liquid diet resumption per gastroenterology


-4/4 status post embolization of gastroduodenal artery


-Follow-up repeat labs postprocedure





Diabetes mellitus


-Hold metformin while in the ICU


-Insulin sliding scale





Hypertension


-Hold antihypertensive meds due to acute GI bleed


-Resume when indicated





Bipolar disorder


-Continue Flomax and lithium





DVT GI prophylaxis


-Andre's and SCDs


-No pharmacological DVT prophylaxis due to acute GI bleed


-Protonix drip





Critical Care:


Level 2 follow-up.


Planned transfer to [facility] in [facility]ending sale to [facility]


Physician


Neha Palomino MD Apr 4, 2018 14:17

## 2018-04-05 VITALS — HEART RATE: 88 BPM

## 2018-04-05 VITALS — OXYGEN SATURATION: 100 %

## 2018-04-05 VITALS
RESPIRATION RATE: 23 BRPM | HEART RATE: 87 BPM | SYSTOLIC BLOOD PRESSURE: 162 MMHG | DIASTOLIC BLOOD PRESSURE: 117 MMHG | TEMPERATURE: 99.8 F | OXYGEN SATURATION: 94 %

## 2018-04-05 VITALS
DIASTOLIC BLOOD PRESSURE: 118 MMHG | RESPIRATION RATE: 18 BRPM | OXYGEN SATURATION: 97 % | SYSTOLIC BLOOD PRESSURE: 160 MMHG | HEART RATE: 85 BPM | TEMPERATURE: 99.1 F

## 2018-04-05 VITALS
HEART RATE: 96 BPM | SYSTOLIC BLOOD PRESSURE: 146 MMHG | OXYGEN SATURATION: 97 % | RESPIRATION RATE: 16 BRPM | TEMPERATURE: 98.4 F | DIASTOLIC BLOOD PRESSURE: 76 MMHG

## 2018-04-05 VITALS
DIASTOLIC BLOOD PRESSURE: 77 MMHG | SYSTOLIC BLOOD PRESSURE: 151 MMHG | RESPIRATION RATE: 20 BRPM | HEART RATE: 84 BPM | TEMPERATURE: 99.3 F | OXYGEN SATURATION: 98 %

## 2018-04-05 VITALS
HEART RATE: 86 BPM | RESPIRATION RATE: 16 BRPM | OXYGEN SATURATION: 96 % | SYSTOLIC BLOOD PRESSURE: 152 MMHG | TEMPERATURE: 98.8 F | DIASTOLIC BLOOD PRESSURE: 73 MMHG

## 2018-04-05 VITALS — HEART RATE: 86 BPM

## 2018-04-05 VITALS
RESPIRATION RATE: 16 BRPM | TEMPERATURE: 99.4 F | DIASTOLIC BLOOD PRESSURE: 82 MMHG | SYSTOLIC BLOOD PRESSURE: 160 MMHG | HEART RATE: 75 BPM | OXYGEN SATURATION: 96 %

## 2018-04-05 VITALS
TEMPERATURE: 98.9 F | DIASTOLIC BLOOD PRESSURE: 78 MMHG | RESPIRATION RATE: 14 BRPM | OXYGEN SATURATION: 96 % | HEART RATE: 89 BPM | SYSTOLIC BLOOD PRESSURE: 154 MMHG

## 2018-04-05 VITALS
TEMPERATURE: 99.3 F | DIASTOLIC BLOOD PRESSURE: 83 MMHG | OXYGEN SATURATION: 94 % | RESPIRATION RATE: 18 BRPM | HEART RATE: 83 BPM | SYSTOLIC BLOOD PRESSURE: 150 MMHG

## 2018-04-05 VITALS
SYSTOLIC BLOOD PRESSURE: 154 MMHG | DIASTOLIC BLOOD PRESSURE: 86 MMHG | OXYGEN SATURATION: 96 % | TEMPERATURE: 99.4 F | HEART RATE: 77 BPM | RESPIRATION RATE: 21 BRPM

## 2018-04-05 VITALS — HEART RATE: 85 BPM

## 2018-04-05 VITALS
SYSTOLIC BLOOD PRESSURE: 151 MMHG | OXYGEN SATURATION: 98 % | HEART RATE: 90 BPM | DIASTOLIC BLOOD PRESSURE: 74 MMHG | TEMPERATURE: 99.4 F | RESPIRATION RATE: 15 BRPM

## 2018-04-05 VITALS
DIASTOLIC BLOOD PRESSURE: 82 MMHG | RESPIRATION RATE: 18 BRPM | SYSTOLIC BLOOD PRESSURE: 154 MMHG | HEART RATE: 74 BPM | OXYGEN SATURATION: 94 %

## 2018-04-05 VITALS — HEART RATE: 70 BPM

## 2018-04-05 VITALS — OXYGEN SATURATION: 96 %

## 2018-04-05 VITALS — OXYGEN SATURATION: 94 %

## 2018-04-05 VITALS — HEART RATE: 75 BPM

## 2018-04-05 VITALS — OXYGEN SATURATION: 97 %

## 2018-04-05 LAB
BUN SERPL-MCNC: 12 MG/DL (ref 7–18)
CALCIUM SERPL-MCNC: 9 MG/DL (ref 8.5–10.1)
CHLORIDE SERPL-SCNC: 108 MEQ/L (ref 98–107)
CREAT SERPL-MCNC: 1.32 MG/DL (ref 0.6–1.3)
ERYTHROCYTE [DISTWIDTH] IN BLOOD BY AUTOMATED COUNT: 14.8 % (ref 11.6–17.2)
ERYTHROCYTE [DISTWIDTH] IN BLOOD BY AUTOMATED COUNT: 15.9 % (ref 11.6–17.2)
GFR SERPLBLD BASED ON 1.73 SQ M-ARVRAT: 53 ML/MIN (ref 89–?)
GLUCOSE SERPL-MCNC: 105 MG/DL (ref 74–106)
HCO3 BLD-SCNC: 27.9 MEQ/L (ref 21–32)
HCT VFR BLD CALC: 19.8 % (ref 39–51)
HCT VFR BLD CALC: 20.4 % (ref 39–51)
HCT VFR BLD CALC: 31.8 % (ref 39–51)
HGB BLD-MCNC: 10.8 GM/DL (ref 13–17)
HGB BLD-MCNC: 6.8 GM/DL (ref 13–17)
HGB BLD-MCNC: 6.9 GM/DL (ref 13–17)
MAGNESIUM SERPL-MCNC: 1.6 MG/DL (ref 1.5–2.5)
MCH RBC QN AUTO: 30.3 PG (ref 27–34)
MCH RBC QN AUTO: 30.6 PG (ref 27–34)
MCHC RBC AUTO-ENTMCNC: 33.9 % (ref 32–36)
MCHC RBC AUTO-ENTMCNC: 34.5 % (ref 32–36)
MCV RBC AUTO: 88.7 FL (ref 80–100)
MCV RBC AUTO: 89.4 FL (ref 80–100)
PLATELET # BLD: 261 TH/MM3 (ref 150–450)
PLATELET # BLD: 400 TH/MM3 (ref 150–450)
PMV BLD AUTO: 6.7 FL (ref 7–11)
PMV BLD AUTO: 7.1 FL (ref 7–11)
RBC # BLD AUTO: 2.23 MIL/MM3 (ref 4.5–5.9)
RBC # BLD AUTO: 2.28 MIL/MM3 (ref 4.5–5.9)
SODIUM SERPL-SCNC: 141 MEQ/L (ref 136–145)
WBC # BLD AUTO: 12.4 TH/MM3 (ref 4–11)
WBC # BLD AUTO: 9.2 TH/MM3 (ref 4–11)

## 2018-04-05 RX ADMIN — SUCRALFATE SCH GM: 1 TABLET ORAL at 06:01

## 2018-04-05 RX ADMIN — PANTOPRAZOLE SODIUM SCH MLS/HR: 40 INJECTION, POWDER, FOR SOLUTION INTRAVENOUS at 18:54

## 2018-04-05 RX ADMIN — INSULIN ASPART SCH: 100 INJECTION, SOLUTION INTRAVENOUS; SUBCUTANEOUS at 08:00

## 2018-04-05 RX ADMIN — TAMSULOSIN HYDROCHLORIDE SCH MG: 0.4 CAPSULE ORAL at 19:28

## 2018-04-05 RX ADMIN — SUCRALFATE SCH GM: 1 TABLET ORAL at 17:10

## 2018-04-05 RX ADMIN — ONDANSETRON PRN MG: 2 INJECTION, SOLUTION INTRAMUSCULAR; INTRAVENOUS at 18:48

## 2018-04-05 RX ADMIN — CARVEDILOL SCH MG: 3.12 TABLET, FILM COATED ORAL at 08:12

## 2018-04-05 RX ADMIN — STANDARDIZED SENNA CONCENTRATE AND DOCUSATE SODIUM SCH TAB: 8.6; 5 TABLET, FILM COATED ORAL at 19:28

## 2018-04-05 RX ADMIN — INSULIN ASPART SCH: 100 INJECTION, SOLUTION INTRAVENOUS; SUBCUTANEOUS at 17:00

## 2018-04-05 RX ADMIN — LITHIUM CARBONATE SCH MG: 300 CAPSULE, GELATIN COATED ORAL at 19:28

## 2018-04-05 RX ADMIN — Medication SCH ML: at 08:14

## 2018-04-05 RX ADMIN — Medication SCH ML: at 19:28

## 2018-04-05 RX ADMIN — INSULIN ASPART SCH: 100 INJECTION, SOLUTION INTRAVENOUS; SUBCUTANEOUS at 19:28

## 2018-04-05 RX ADMIN — CHLORHEXIDINE GLUCONATE SCH PACK: 500 CLOTH TOPICAL at 04:00

## 2018-04-05 RX ADMIN — INSULIN ASPART SCH: 100 INJECTION, SOLUTION INTRAVENOUS; SUBCUTANEOUS at 12:00

## 2018-04-05 RX ADMIN — ATORVASTATIN CALCIUM SCH MG: 40 TABLET, FILM COATED ORAL at 19:28

## 2018-04-05 RX ADMIN — SODIUM CHLORIDE SCH MLS/HR: 900 INJECTION INTRAVENOUS at 10:17

## 2018-04-05 RX ADMIN — PANTOPRAZOLE SODIUM SCH MLS/HR: 40 INJECTION, POWDER, FOR SOLUTION INTRAVENOUS at 06:00

## 2018-04-05 RX ADMIN — STANDARDIZED SENNA CONCENTRATE AND DOCUSATE SODIUM SCH TAB: 8.6; 5 TABLET, FILM COATED ORAL at 08:12

## 2018-04-05 RX ADMIN — ONDANSETRON PRN MG: 2 INJECTION, SOLUTION INTRAMUSCULAR; INTRAVENOUS at 23:32

## 2018-04-05 NOTE — HHI.CCPN
Subjective


Remarks/Hospital Course


3/29: 72-year-old male patient from the nursing home with previous history of 

gastric ulcers and GI bleeding and the last endoscopy 2 weeks ago, presents 

today because he had hematemesis and was nauseous and throwing up at the 

nursing home facility.  He denies any chest pains, shortness of breath, 

abdominal pains, diarrhea, or any other symptoms.





3/30: Underwent EGD with findings of large duodenal ulcer with visible vessel 

which was injected with epinephrine and clipped by GI.  Hemoglobin remained 

stable.  Received 2 units PRBCs last night.  Drowsy following EGD at the time 

of my evaluation earlier today.  Left-sided weakness from previous stroke.


               RECONSULT





4/3: Patient was on the MedSurg floor today when vomited copious amount of 

bright red blood.  He became borderline hypotensive and tachycardic.  He 

responded well to the bolus of IV fluids and was transferred back to ICU.


4/4: Patient is S/P  prophylactic embolization of gastroduodenal artery via 

interventional radiology.  Repeat CBC and BMP pending.  Patient normotensive .  

Patient continues on Protonix infusion.


4/5: T-max 99.4.  Patient's hemoglobin was noted to be 6.9 early this a.m.  

Patient being transfused 2 units of packed red blood cells at this time.  

Patient continues to tolerate clear liquid diet.  Hemodynamically stable.





Objective





Vital Signs








  Date Time  Temp Pulse Resp B/P (MAP) Pulse Ox O2 Delivery O2 Flow Rate FiO2


 


4/5/18 06:10 98.9 89 14 154/78 96   


 


4/4/18 19:00      Room Air  


 


4/4/18 08:14       2.00 














Intake and Output   


 


 4/5/18 4/5/18 4/6/18





 08:00 16:00 00:00


 


Intake Total 750 ml  


 


Output Total 1250 ml  


 


Balance -500 ml  








Result Diagram:  


4/5/18 0439                                                                    

            4/5/18 0439





Imaging





Last 24 hours Impressions








Chest X-Ray 3/29/18 1741 Signed





Impressions: 





 Service Date/Time:  Thursday, March 29, 2018 17:59 - CONCLUSION:  No acute 





 disease.  No free air is seen.     Ministerio Garcia MD 








Procedures


4/4: S/P angiography and prophylactic coil and embolization of gastroduodenal 

artery


Objective Remarks


GENERAL: Well-developed elderly male patient laying in bed in no acute distress

, with complaints of back pain left-sided residual weakness


SKIN: Focused skin assessment warm/dry.


HEAD: Atraumatic. Normocephalic. 


EYES: Pupils equal and round. No scleral icterus. No injection or drainage. 


ENT: No nasal bleeding or discharge.  Mucous membranes pink and moist.


NECK: Trachea midline. No JVD.  Supple.


CARDIOVASCULAR: Regular rate and rhythm.  No murmur appreciated.


RESPIRATORY: No accessory muscle use. Clear to auscultation. Breath sounds 

equal bilaterally. 


GASTROINTESTINAL: Abdomen soft, non-tender, nondistended. Hepatic and splenic 

margins not palpable. 


MUSCULOSKELETAL: No obvious deformities. No clubbing.  No cyanosis.  No edema. 


NEUROLOGICAL: GCS 15. awake and alert. No obvious cranial nerve deficits.  

Motor with left-sided residual weakness, left facial droop





A/P


Assessment and Plan


Hematemesis-resolved


-Protonix infusion 8mg/hr


-Status post EGD with injection and clipping of large vessel in duodenal ulcer. 


-H&H stable - will continue to monitor


-Further management /clear liquid diet resumption per gastroenterology


-4/4 S/P prophylactic embolization of gastroduodenal artery





Acute blood loss anemia


-Continue monitoring every 6 hours H&H


-Hemoglobin this a.m. 6.9, patient to be transfused 2 units packed red blood 

cells follow-up posttransfusion CBC





Diabetes mellitus


-Hold metformin while in the ICU


-Insulin sliding scale





Hypertension


-Hold antihypertensive meds due to acute GI bleed


-Resume when indicated





Bipolar disorder


-Continue Prozac and Lithium





DVT GI prophylaxis


-Andre's and SCDs


-No pharmacological DVT prophylaxis due to acute GI bleed


-Protonix drip





Critical Care:


Level 2 follow-up.


[facility]


Physician


Neha Palomino MD Apr 5, 2018 08:11

## 2018-04-05 NOTE — MB
cc:

Sb Elizondo MD,Ministerio Temple,_____

****

 

 

DATE:

04/05/2018

 

REASON FOR CONSULTATION:

GI bleed.

 

HISTORY OF PRESENT ILLNESS:

This is a pleasant 72-year-old gentleman who was admitted to the 

hospital on the 29th or 30th of this month.  He has been having 

problems with GI bleed and underwent endoscopy on the 30th.  He has 

had a fair number of procedures to stop the bleeding and watching.  He

recently had an arteriogram with embolization.  Today, underwent a 

bleeding scan, showed no bleeding, but his hemoglobin and hematocrit 

took a little drop.  They are planning on doing a colonoscopy and EGD 

tomorrow.  They wanted surgery to standby just in case anything 

happen.  They wanted my advice on the ulcer management as well.  He 

had the scope on the 30th that showed 8 mm ulcer with a visible vessel

that was cauterized and clipped.  Again, recently had an arteriogram 

with embolization and a recent bleeding scan.

 

PAST MEDICAL HISTORY:

He says he just has knee pain and was taking some Advil and ibuprofen 

for knee pain over time.  He denies any other chronic medical 

problems, at least he states that, but in the records, he has some 

neurologic problems, had a CVA in the past, syncopal episode, cardiac 

disorder and has stress-induced asthma and has longstanding BPH.

 

REVIEW OF SYSTEMS:

He is in the intensive care unit.  Presently, he is having a bowel 

movement.  He does not look in any distress.  He does not really 

complain of anything except being a little weak and tired.

 

PHYSICAL EXAMINATION:

GENERAL:  He is hemodynamically stable with blood pressure 160/118, 

sats are 97.  Pulses are good, temperature 99.

NECK:  Supple.

CHEST:  Clear.

HEART:  Regular rate.

ABDOMEN:  Obese, soft, without any surgical scars, without any rebound

or guarding.  No masses are appreciated.  He is able to move all 

extremities.  The nurses just cleaned him up after having a big bowel 

movement.

 

LABORATORY DATA:

His H and H this morning was 6 and 19, presently at 5:00, it was 10 

and 31.  Chemistry shows a creatinine of 1.3.  Otherwise, everything 

is pretty good with the magnesium 1.6.  Coags on the 30th were all 

normal.  From the computer and the blood bank, it looks like he has 

had 10 units of blood transfused over the last 6 days.  He had a 

biopsy of his stomach on 02/26 that showed gastritis, negative for H 

pylori.  He had a urine culture, which showed E. coli UTI.

 

IMAGING STUDIES:

He had a CT of the abdomen done on 03/29, just last month, and no 

obvious abnormality except for some colonic diverticula.  Yesterday, 

he had an arteriogram with embolization and today, he had a bleeding 

scan with no evidence of any bleeding.  On 03/16, he did have an 

ultrasound of his gallbladder, which showed a kidney stone.  No 

gallstones.  January of this year, he had a carotid ultrasound, which 

showed 50% stenosis of his carotids.  He had a CT of his head in 

January of this year, which was normal.

 

ASSESSMENT:

A 72-year-old gentleman with a documented gastrointestinal bleed that 

appears to have been cauterized, clipped and embolized.  Recently had 

a bleeding scan, which does not show any active bleeding.  He did have

a slow drop in his hemoglobin and hematocrit.  He is to undergo a 

repeat endoscopy tomorrow from what the nurses report to me and a 

colonoscopy.

 

PLAN:

At this time, I will continue to follow him.  I will watch what they 

find on his endoscopy.  Hopefully, no surgical intervention needs to 

be done.  He will be fine with maximal medical therapy.

 

 

__________________________________

Sb Elizondo MD

 

 

JDB/MEGHAN

D: 04/05/2018, 06:25 PM

T: 04/05/2018, 06:53 PM

Visit #: [id number]

Job #: [id number]

## 2018-04-05 NOTE — RADRPT
EXAM DATE/TIME:  2018 13:57 

 

[facility] COMPARISON:     

No previous studies available for comparison.

 

 

INDICATIONS :      

Blood in stool.

                       

 

DOSE:      

20.3 mCi Tc99m Ultratag labeled red blood cells IV 

                       

 

IMAGIN hrs 

                       

 

MEDICAL HISTORY :     

Diabetes mellitus type 2.   

 

SURGICAL HISTORY :          

Left hand surgery.

 

ENCOUNTER:     

Initial

 

ACUITY:     

4 - 6 days

 

PAIN SCALE:     

3/10

 

LOCATION:        

Abdomen.

 

TECHNIQUE:     

Following the modified in vitro labeling of autologous red cells, dynamic continuous images were acqu
ired for the specified interval.

 

FINDINGS:     

 

BIODISTRIBUTION:     

There is a very good labeling of red cells without significant uptake in the gastric wall.  There is 
good delineation of the blood pool of the spleen and abdominal vessels.

 

BLEEDING:     

No episodes of active GI bleeding are observed during specified interval of continuous observation.

 

CONCLUSION:     Negative GI bleeding scan.

 

 

 

 Olman Peck MD on 2018 at 16:38           

Board Certified Radiologist.

 This report was verified electronically.

## 2018-04-06 VITALS — HEART RATE: 77 BPM

## 2018-04-06 VITALS
SYSTOLIC BLOOD PRESSURE: 146 MMHG | HEART RATE: 81 BPM | RESPIRATION RATE: 20 BRPM | OXYGEN SATURATION: 95 % | DIASTOLIC BLOOD PRESSURE: 77 MMHG | TEMPERATURE: 98.7 F

## 2018-04-06 VITALS
SYSTOLIC BLOOD PRESSURE: 154 MMHG | HEART RATE: 82 BPM | RESPIRATION RATE: 18 BRPM | DIASTOLIC BLOOD PRESSURE: 74 MMHG | TEMPERATURE: 97.8 F | OXYGEN SATURATION: 91 %

## 2018-04-06 VITALS
SYSTOLIC BLOOD PRESSURE: 178 MMHG | OXYGEN SATURATION: 98 % | DIASTOLIC BLOOD PRESSURE: 96 MMHG | RESPIRATION RATE: 19 BRPM | TEMPERATURE: 97.8 F | HEART RATE: 95 BPM

## 2018-04-06 VITALS
SYSTOLIC BLOOD PRESSURE: 120 MMHG | OXYGEN SATURATION: 93 % | HEART RATE: 65 BPM | TEMPERATURE: 98.5 F | DIASTOLIC BLOOD PRESSURE: 61 MMHG | RESPIRATION RATE: 17 BRPM

## 2018-04-06 VITALS
HEART RATE: 69 BPM | DIASTOLIC BLOOD PRESSURE: 74 MMHG | OXYGEN SATURATION: 93 % | SYSTOLIC BLOOD PRESSURE: 151 MMHG | RESPIRATION RATE: 17 BRPM | TEMPERATURE: 97.8 F

## 2018-04-06 VITALS
SYSTOLIC BLOOD PRESSURE: 175 MMHG | RESPIRATION RATE: 16 BRPM | DIASTOLIC BLOOD PRESSURE: 93 MMHG | TEMPERATURE: 97.6 F | HEART RATE: 78 BPM | OXYGEN SATURATION: 96 %

## 2018-04-06 VITALS — HEART RATE: 71 BPM

## 2018-04-06 VITALS — HEART RATE: 83 BPM

## 2018-04-06 VITALS — HEART RATE: 79 BPM

## 2018-04-06 VITALS — HEART RATE: 74 BPM

## 2018-04-06 LAB
HCT VFR BLD CALC: 32.6 % (ref 39–51)
HCT VFR BLD CALC: 33.9 % (ref 39–51)
HGB BLD-MCNC: 11.2 GM/DL (ref 13–17)
HGB BLD-MCNC: 11.7 GM/DL (ref 13–17)
INR PPP: 1.1 RATIO
MAGNESIUM SERPL-MCNC: 1.6 MG/DL (ref 1.5–2.5)
PHOSPHATE SERPL-MCNC: 2.6 MG/DL (ref 2.5–4.9)
PROTHROMBIN TIME: 10.7 SEC (ref 9.8–11.6)

## 2018-04-06 PROCEDURE — 0D598ZZ DESTRUCTION OF DUODENUM, VIA NATURAL OR ARTIFICIAL OPENING ENDOSCOPIC: ICD-10-PCS | Performed by: INTERNAL MEDICINE

## 2018-04-06 RX ADMIN — LITHIUM CARBONATE SCH MG: 300 CAPSULE, GELATIN COATED ORAL at 22:00

## 2018-04-06 RX ADMIN — TAMSULOSIN HYDROCHLORIDE SCH MG: 0.4 CAPSULE ORAL at 22:00

## 2018-04-06 RX ADMIN — SUCRALFATE SCH GM: 1 TABLET ORAL at 06:54

## 2018-04-06 RX ADMIN — CHLORHEXIDINE GLUCONATE SCH PACK: 500 CLOTH TOPICAL at 04:30

## 2018-04-06 RX ADMIN — INSULIN ASPART SCH: 100 INJECTION, SOLUTION INTRAVENOUS; SUBCUTANEOUS at 13:53

## 2018-04-06 RX ADMIN — Medication SCH ML: at 22:00

## 2018-04-06 RX ADMIN — CARVEDILOL SCH MG: 3.12 TABLET, FILM COATED ORAL at 07:53

## 2018-04-06 RX ADMIN — INSULIN ASPART SCH: 100 INJECTION, SOLUTION INTRAVENOUS; SUBCUTANEOUS at 17:00

## 2018-04-06 RX ADMIN — INSULIN ASPART SCH: 100 INJECTION, SOLUTION INTRAVENOUS; SUBCUTANEOUS at 07:52

## 2018-04-06 RX ADMIN — SODIUM CHLORIDE SCH MLS/HR: 900 INJECTION INTRAVENOUS at 07:53

## 2018-04-06 RX ADMIN — STANDARDIZED SENNA CONCENTRATE AND DOCUSATE SODIUM SCH TAB: 8.6; 5 TABLET, FILM COATED ORAL at 07:53

## 2018-04-06 RX ADMIN — SUCRALFATE SCH GM: 1 TABLET ORAL at 17:48

## 2018-04-06 RX ADMIN — INSULIN ASPART SCH: 100 INJECTION, SOLUTION INTRAVENOUS; SUBCUTANEOUS at 21:00

## 2018-04-06 RX ADMIN — STANDARDIZED SENNA CONCENTRATE AND DOCUSATE SODIUM SCH TAB: 8.6; 5 TABLET, FILM COATED ORAL at 21:59

## 2018-04-06 RX ADMIN — ATORVASTATIN CALCIUM SCH MG: 40 TABLET, FILM COATED ORAL at 22:00

## 2018-04-06 RX ADMIN — PANTOPRAZOLE SODIUM SCH MLS/HR: 40 INJECTION, POWDER, FOR SOLUTION INTRAVENOUS at 18:25

## 2018-04-06 RX ADMIN — PANTOPRAZOLE SODIUM SCH MLS/HR: 40 INJECTION, POWDER, FOR SOLUTION INTRAVENOUS at 05:34

## 2018-04-06 RX ADMIN — Medication SCH ML: at 07:53

## 2018-04-06 NOTE — HHI.PR
cc:   Sb Elizondo MD


__________________________________________________





Subjective


Subjective Notes





--------------------------------------------------------------------------------

------------------------------------------------------


DAILY PROGRESS NOTE FOR SURGICAL ATTENDING, DR. SB ELIZONDO


--------------------------------------------------------------------------------

--------------------------------------------------------


Patient in the ICU


Wants to know when he can eat something and hopefully go home soon


Underwent endoscopy did not show any bleeding





Objective


Vitals/I&O





Vital Signs








  Date Time  Temp Pulse Resp B/P (MAP) Pulse Ox O2 Delivery O2 Flow Rate FiO2


 


4/6/18 16:00  69      


 


4/6/18 15:15 98.8  18 152/72 (98) 99 Room Air  


 


4/6/18 14:45       2 


 


4/5/18 20:25        21








Labs





Laboratory Tests








Test


  4/5/18


16:54 4/6/18


02:08 4/6/18


05:55


 


Hemoglobin 10.8  11.2  11.7 


 


Hematocrit 31.8  32.6  33.9 


 


Prothrombin Time   10.7 


 


Prothromb Time International


Ratio 


  


  1.1 


 


 


Phosphorus Level   2.6 


 


Magnesium Level   1.6 














 Date/Time


Source Procedure


Growth Status


 


 


 3/29/18 20:40


Urine Clean Catch Urine Culture - Final


Escherichia Coli Complete








Radiology





Last Impressions








GI Bleed Scan Nuclear Medicine 4/5/18 0000 Signed





Impressions: 





 Service Date/Time:  Thursday, April 5, 2018 13:57 - CONCLUSION: Negative GI 





 bleeding scan.     Olman Peck MD 


 


Abdomen Arteriogram 4/4/18 0000 Signed





Impressions: 





 Service Date/Time:  Wednesday, April 4, 2018 12:06 - CONCLUSION:   1. Celiac 

and 





 SMA angiography demonstrates standard anatomy without evidence for active 





 hemorrhage. 2. Uncomplicated prophylactic coil and Gelfoam embolization of the 





 GDA.     Akbar Humphrey MD 


 


Abdomen/Pelvis CT 3/29/18 1851 Signed





Impressions: 





 Service Date/Time:  Thursday, March 29, 2018 20:54 - CONCLUSION:  1. No acute 





 abnormality seen. 2. Scattered colonic diverticula without inflammatory 

change.  





    Ministerio Garcia MD 


 


Chest X-Ray 3/29/18 1749 Signed





Impressions: 





 Service Date/Time:  Thursday, March 29, 2018 17:59 - CONCLUSION:  No acute 





 disease.  No free air is seen.     Ministerio Garcia MD 








Cardiovascular:  Regular


Lungs:  Clear


Abdomen:  Non-distended, Non-tender


Extremities:  Perfused





A/P


Problem List:  


(1) UGI bleed


ICD Codes:  K92.2 - UGI bleed


Status:  Acute


(2) Syncope


ICD Codes:  R55 - Syncope and collapse


(3) DM (diabetes mellitus)


ICD Codes:  E11.9 - Type 2 diabetes mellitus without complications


(4) GI bleed


ICD Codes:  K92.2 - Gastrointestinal hemorrhage, unspecified


Assessment and Plan


72-year-old gentleman who has a upper GI bleed from a ulcer that has been 

embolized clipped and cauterized as no evidence of bleeding on recent bleeding 

scan and the recent EGD


Hemoglobin appears to be stable


Continue aggressive medical management





Attending Statement











--------------------------------------------------------------------------------

------------------------------------------------------


 NOTE FOR SURGICAL ATTENDING, DR. SB ELIZONDO


--------------------------------------------------------------------------------

--------------------------------------------------------








I attest that I had a face-to-face encounter with the patient on the same day, 

and personally performed and documented my assessment and findings in the 

medical record.





  





The following services were provided during this hospital visit:





   Chart data review, vital sign assessments/reviewing monitor data


   Review of consultations notes if present.


   Medication orders/review and/or management


   Ordering and/or reviewing lab tests


   Ordering and/or interpreting/reviewing x-rays and/or diagnostic studies 


   Care of the patient and discussion of the patient with the care team 


   Documentation time


   To help prompt me to consider important information that might be impacting 

today's encounter and assessment,


   information from prior notes written by myself or my colleagues may have 

been "brought forward/copy and pasted" into today's note.





Problem Qualifiers





(1) GI bleed:  


Qualified Codes:  K92.0 - Hematemesis








Sb Elizondo MD Apr 6, 2018 16:54

## 2018-04-06 NOTE — HHI.PR
Subjective


Remarks


Follow-up for GI bleed, anemia.  Patient is currently resting in bed.  No acute 

concerns.  He is undergoing colonoscopy preparation.  However he is unable to 

do the prep very well.  GI is aware of this.  He is supposed to have endoscopic 

studies done today.





Objective


Vitals





Vital Signs








  Date Time  Temp Pulse Resp B/P (MAP) Pulse Ox O2 Delivery O2 Flow Rate FiO2


 


4/6/18 13:50      Room Air  


 


4/6/18 13:50 98.9 79 15 179/86 (117) 96   


 


4/6/18 13:50  79      


 


4/6/18 10:00  77      


 


4/6/18 08:00  95      


 


4/6/18 08:00 97.8 95 19 178/96 (123) 98   


 


4/6/18 07:00     98 Room Air  


 


4/6/18 06:00  71      


 


4/6/18 04:00 98.7 81 20 146/77 (100) 95   


 


4/6/18 04:00  81      


 


4/6/18 02:00  83      


 


4/6/18 00:00 98.2 82 18 154/74 (100) 91   


 


4/6/18 00:00  82      


 


4/5/18 22:00  75      


 


4/5/18 20:25     94   21


 


4/5/18 20:00  87      


 


4/5/18 20:00 99.8 87 23 162/117 (132) 94   


 


4/5/18 19:00     95 Room Air  


 


4/5/18 18:00  85      


 


4/5/18 16:00 99.1 85 18 160/118 (132) 97   


 


4/5/18 16:00  85      


 


4/5/18 14:46  74 18 154/82 94   














I/O      


 


 4/5/18 4/5/18 4/5/18 4/6/18 4/6/18 4/6/18





 07:00 15:00 23:00 07:00 15:00 23:00


 


Intake Total 750 ml 1410 ml 100 ml 2300 ml  


 


Output Total 1250 ml 1500 ml 1250 ml 850 ml  


 


Balance -500 ml -90 ml -1150 ml 1450 ml  


 


      


 


Intake Oral 750 ml   2200 ml  


 


IV Total   100 ml 100 ml  


 


Packed Cells  1200 ml    


 


Blood Product IV Normal Saline Flush  210 ml    


 


Output Urine Total 1250 ml 1500 ml 1250 ml 850 ml  


 


# Voids    1  


 


# Bowel Movements 0   3  








Result Diagram:  


4/6/18 0555                                                                    

            4/5/18 0439





Imaging





Last Impressions








GI Bleed Scan Nuclear Medicine 4/5/18 0000 Signed





Impressions: 





 Service Date/Time:  Thursday, April 5, 2018 13:57 - CONCLUSION: Negative GI 





 bleeding scan.     Olman Peck MD 


 


Abdomen Arteriogram 4/4/18 0000 Signed





Impressions: 





 Service Date/Time:  Wednesday, April 4, 2018 12:06 - CONCLUSION:   1. Celiac 

and 





 SMA angiography demonstrates standard anatomy without evidence for active 





 hemorrhage. 2. Uncomplicated prophylactic coil and Gelfoam embolization of the 





 GDA.     Akbar Humphrey MD 


 


Abdomen/Pelvis CT 3/29/18 1851 Signed





Impressions: 





 Service Date/Time:  Thursday, March 29, 2018 20:54 - CONCLUSION:  1. No acute 





 abnormality seen. 2. Scattered colonic diverticula without inflammatory 

change.  





    Ministerio Garcia MD 


 


Chest X-Ray 3/29/18 0489 Signed





Impressions: 





 Service Date/Time:  Thursday, March 29, 2018 17:59 - CONCLUSION:  No acute 





 disease.  No free air is seen.     Ministerio Garcia MD 








Objective Remarks


GENERAL: Alert, NAD.


SKIN: Warm and dry.


HEAD: Normocephalic.


EYES: No scleral icterus. No injection or drainage. 


NECK: Supple, trachea midline. No JVD or lymphadenopathy.


CARDIOVASCULAR: Regular rate and rhythm without murmurs, gallops, or rubs. 


RESPIRATORY: Breath sounds equal bilaterally. No accessory muscle use.


GASTROINTESTINAL: Abdomen soft, non-tender, nondistended. 


MUSCULOSKELETAL: No cyanosis, or edema. 


BACK: Nontender without obvious deformity. No CVA tenderness.





Procedures


4/4: S/P angiography and prophylactic coil and embolization of gastroduodenal 

artery





A/P


Assessment and Plan


Mr. Luevano is a pleasant 72-year-old male with a history of GI bleed who 

admitted to the hospital due to hematemesis, nausea and vomiting.  He was 

initially managed in the ICU.  He received 2 units of PRBCs upon admission.  He 

underwent EGD which showed large duodenal ulcer with visible vessel which was 

injected with epinephrine and clipped by GI.  He was briefly transferred to 

[facility] floor but returned to ICU due to copious amount of bright red blood in 

the vomitus.  Patient was hypotensive and tachycardic.  He underwent 

prophylactic embolization of gastroduodenal artery by interventional radiology 

on 4/4/2018.  Patient received 2 more units of PRBCs on 4/5/2018.  He has been 

hemodynamically stable and his hemoglobin has been stable around 11. 





Hematemesis-resolved


Acute GI blood loss anemia


   Protonix infusion 8mg/hr


   Status post EGD with injection and clipping of large vessel in duodenal 

ulcer. 


   H&H stable - will continue to monitor


   Further management /clear liquid diet resumption per gastroenterology


   4/4 S/P prophylactic embolization of gastroduodenal artery


   Repeat EGD and colonoscopy planned for today for 6/2018.





Acute blood loss anemia


   Will check H&H in the AM. 


   Hgb 11.7 today. 





Diabetes mellitus


   Hold metformin while in the ICU


   Insulin sliding scale





Hypertension


   Hold antihypertensive meds due to acute GI bleed


   Continue amlodipine 5 mg twice daily (home dose)





Bipolar disorder


-Continue Prozac and Lithium





DVT GI prophylaxis


-Andre's and SCDs


-No pharmacological DVT prophylaxis due to acute GI bleed


-Protonix drip





Full code.











Dustin Solis DO Apr 6, 2018 2:21 pm

## 2018-04-07 VITALS — HEART RATE: 76 BPM

## 2018-04-07 VITALS
HEART RATE: 63 BPM | SYSTOLIC BLOOD PRESSURE: 143 MMHG | OXYGEN SATURATION: 95 % | DIASTOLIC BLOOD PRESSURE: 67 MMHG | RESPIRATION RATE: 15 BRPM | TEMPERATURE: 97.9 F

## 2018-04-07 VITALS
OXYGEN SATURATION: 94 % | DIASTOLIC BLOOD PRESSURE: 69 MMHG | TEMPERATURE: 98.8 F | RESPIRATION RATE: 18 BRPM | SYSTOLIC BLOOD PRESSURE: 146 MMHG | HEART RATE: 77 BPM

## 2018-04-07 VITALS
HEART RATE: 62 BPM | RESPIRATION RATE: 17 BRPM | DIASTOLIC BLOOD PRESSURE: 56 MMHG | OXYGEN SATURATION: 94 % | SYSTOLIC BLOOD PRESSURE: 124 MMHG | TEMPERATURE: 97.1 F

## 2018-04-07 VITALS
HEART RATE: 73 BPM | TEMPERATURE: 97.5 F | OXYGEN SATURATION: 93 % | RESPIRATION RATE: 16 BRPM | DIASTOLIC BLOOD PRESSURE: 55 MMHG | SYSTOLIC BLOOD PRESSURE: 109 MMHG

## 2018-04-07 VITALS
RESPIRATION RATE: 17 BRPM | DIASTOLIC BLOOD PRESSURE: 77 MMHG | OXYGEN SATURATION: 94 % | HEART RATE: 64 BPM | SYSTOLIC BLOOD PRESSURE: 165 MMHG | TEMPERATURE: 96.4 F

## 2018-04-07 VITALS — HEART RATE: 62 BPM

## 2018-04-07 VITALS
TEMPERATURE: 98.1 F | RESPIRATION RATE: 17 BRPM | DIASTOLIC BLOOD PRESSURE: 67 MMHG | OXYGEN SATURATION: 92 % | SYSTOLIC BLOOD PRESSURE: 151 MMHG | HEART RATE: 61 BPM

## 2018-04-07 VITALS — HEART RATE: 70 BPM

## 2018-04-07 VITALS — HEART RATE: 60 BPM

## 2018-04-07 VITALS — HEART RATE: 61 BPM

## 2018-04-07 LAB
HCT VFR BLD CALC: 30.7 % (ref 39–51)
HCT VFR BLD CALC: 30.9 % (ref 39–51)
HGB BLD-MCNC: 10.4 GM/DL (ref 13–17)
HGB BLD-MCNC: 10.6 GM/DL (ref 13–17)

## 2018-04-07 RX ADMIN — LITHIUM CARBONATE SCH MG: 300 CAPSULE, GELATIN COATED ORAL at 21:44

## 2018-04-07 RX ADMIN — Medication SCH ML: at 08:09

## 2018-04-07 RX ADMIN — Medication SCH ML: at 21:52

## 2018-04-07 RX ADMIN — PANTOPRAZOLE SODIUM SCH MLS/HR: 40 INJECTION, POWDER, FOR SOLUTION INTRAVENOUS at 04:33

## 2018-04-07 RX ADMIN — TAMSULOSIN HYDROCHLORIDE SCH MG: 0.4 CAPSULE ORAL at 21:45

## 2018-04-07 RX ADMIN — PANTOPRAZOLE SCH MG: 40 TABLET, DELAYED RELEASE ORAL at 21:44

## 2018-04-07 RX ADMIN — ATORVASTATIN CALCIUM SCH MG: 40 TABLET, FILM COATED ORAL at 21:45

## 2018-04-07 RX ADMIN — INSULIN ASPART SCH: 100 INJECTION, SOLUTION INTRAVENOUS; SUBCUTANEOUS at 21:00

## 2018-04-07 RX ADMIN — SUCRALFATE SCH GM: 1 TABLET ORAL at 06:02

## 2018-04-07 RX ADMIN — ONDANSETRON PRN MG: 2 INJECTION, SOLUTION INTRAMUSCULAR; INTRAVENOUS at 13:13

## 2018-04-07 RX ADMIN — STANDARDIZED SENNA CONCENTRATE AND DOCUSATE SODIUM SCH TAB: 8.6; 5 TABLET, FILM COATED ORAL at 21:45

## 2018-04-07 RX ADMIN — INSULIN ASPART SCH: 100 INJECTION, SOLUTION INTRAVENOUS; SUBCUTANEOUS at 07:46

## 2018-04-07 RX ADMIN — INSULIN ASPART SCH: 100 INJECTION, SOLUTION INTRAVENOUS; SUBCUTANEOUS at 12:00

## 2018-04-07 RX ADMIN — STANDARDIZED SENNA CONCENTRATE AND DOCUSATE SODIUM SCH TAB: 8.6; 5 TABLET, FILM COATED ORAL at 08:09

## 2018-04-07 RX ADMIN — ONDANSETRON PRN MG: 2 INJECTION, SOLUTION INTRAMUSCULAR; INTRAVENOUS at 22:06

## 2018-04-07 RX ADMIN — SUCRALFATE SCH GM: 1 TABLET ORAL at 16:04

## 2018-04-07 RX ADMIN — SODIUM CHLORIDE SCH MLS/HR: 900 INJECTION INTRAVENOUS at 10:37

## 2018-04-07 RX ADMIN — CHLORHEXIDINE GLUCONATE SCH PACK: 500 CLOTH TOPICAL at 04:00

## 2018-04-07 RX ADMIN — CARVEDILOL SCH MG: 3.12 TABLET, FILM COATED ORAL at 08:09

## 2018-04-07 RX ADMIN — INSULIN ASPART SCH: 100 INJECTION, SOLUTION INTRAVENOUS; SUBCUTANEOUS at 17:00

## 2018-04-07 NOTE — GIPROC
[facility]

[address] [facility]. [facility], [address]

 

 

EGD PROCEDURE REPORT     EXAM DATE: 04/06/2018

 

PATIENT NAME:      Matt Luevano           MR #:      [id number]

YOB: 1945      VISIT #:     [id number]

ATTENDING:     Nicolette Yi MD     ORDER #:     [id number]

ASSISTANT:      Sincere Gan and Sonia Monteiro     STATUS:     inpatient

 

 

INDICATIONS:  The patient is a 72 yr old male here for an EGD due to acute post

hemorrhagic anemia

PROCEDURE PERFORMED:     EGD w/ ablation

MEDICATIONS:     None and Per Anesthesia.

TOPICAL ANESTHETIC:

 

CONSENT: The patient understands the risks and benefits of the procedure and

understands that these risks include, but are not limited to: sedation,

allergic reaction, infection, perforation and/or bleeding. Alternative means of

evaluation and treatment include, among others: physical exam, x-rays, and/or

surgical intervention. The patient elects to proceed with this endoscopic

procedure.

 



medical equipment was checked for proper function. Hand hygiene and appropriate

measures for infection prevention was taken. After the risks, benefits and

alternatives of the procedure were thoroughly explained, Informed consent was

verified, confirmed and timeout was successfully executed by the treatment

team. The patient was anesthetized with topical anesthesia and the EC-[id number]

(Pedi C) endoscope was introduced through the mouth and advanced to the second

portion of the duodenum.  Retroflexed views revealed no abnormalities  The

gastroscope was then slowly withdrawn and removed.

 

ESOPHAGUS: The mucosa of the esophagus appeared normal.

 

STOMACH: There was mild gastritis in the gastric antrum.

 

DUODENUM: The duodenal mucosa appeared normal in the 2nd part of the duodenum.

Two ranging between 5-9mm in size non-bleeding and shallow ulcers with

surrounding edema and a pigmented spot were found in the duodenal bulb.  Argon

plasma coagulation was applied to the sites.  With complete hemostasis

achieved.

 

 

 

ADVERSE EVENTS:     There were no complications.

IMPRESSIONS:     1.  The esophagus appeared normal

2.  There was mild gastritis in the gastric antrum

3.  Normal duodenal mucosa in the 2nd part of the duodenum

4.  Two ranging between 5-9mm in size ulcers were found in the duodenal bulb;

Argon plasma coagulation was applied to the sites; with complete hemostasis

achieved

5.  Retroflexed views revealed no abnormalities

 

RECOMMENDATIONS:     1.  Anti-reflux regimen

2.  Continue PPI

3.  Avoid NSAIDS

4.  Colonoscopy

PATIENT CONDITION:     stable

DISPOSITION:     Inpatient

REPEAT EXAM:     Return 1 year EGD

 

 

___________________________________

Nicolette Yi MD

eSigned:  Nicolette Yi MD 04/07/2018 2:41 PM

 

 

cc:

 

 

 

 

PATIENT NAME:  Matt Luevano

MR#: [id number]

## 2018-04-07 NOTE — HHI.PR
Subjective


Remarks


Follow-up for GI bleed, anemia. Patient is resting in bed. No acute concerns. 

No chest pain, SOB, fever, chills. No abdominal pain.





Objective


Vitals





Vital Signs








  Date Time  Temp Pulse Resp B/P (MAP) Pulse Ox O2 Delivery O2 Flow Rate FiO2


 


4/7/18 10:00  70      


 


4/7/18 08:00 96.4 64 17 165/77 (106) 94   


 


4/7/18 08:00  64      


 


4/7/18 06:00  62      


 


4/7/18 04:00 97.9 63 15 143/67 (92) 95   


 


4/7/18 04:00  63      


 


4/7/18 02:00  60      


 


4/7/18 00:00  77      


 


4/7/18 00:00 98.8 77 18 146/69 (94) 94   


 


4/6/18 22:00  74      


 


4/6/18 20:00  65      


 


4/6/18 20:00 98.5 65 17 120/61 (80) 93   


 


4/6/18 18:00  71      


 


4/6/18 16:00  69      


 


4/6/18 16:00 97.8 69 17 151/74 (99) 93   


 


4/6/18 15:15 98.8 73 18 152/72 (98) 99 Room Air  


 


4/6/18 15:00  74 18 130/60 (83) 98 Room Air  


 


4/6/18 14:45  85 17 126/60 (82) 98 Nasal Cannula 2 


 


4/6/18 14:44 98.0 88 17 126/58 (80) 98 Nasal Cannula 2 


 


4/6/18 14:00 97.6 78 16 175/93 (120) 96   


 


4/6/18 13:50      Room Air  


 


4/6/18 13:50 98.9 79 15 179/86 (117) 96   


 


4/6/18 13:50  79      














I/O      


 


 4/6/18 4/6/18 4/6/18 4/7/18 4/7/18 4/7/18





 07:00 15:00 23:00 07:00 15:00 23:00


 


Intake Total 2300 ml 100 ml 220 ml 820 ml  


 


Output Total 850 ml  950 ml 450 ml  


 


Balance 1450 ml 100 ml -730 ml 370 ml  


 


      


 


Intake Oral 2200 ml  120 ml 720 ml  


 


IV Total 100 ml  100 ml 100 ml  


 


Other  100 ml    


 


Output Urine Total 850 ml  950 ml 450 ml  


 


# Voids 1  4   


 


# Bowel Movements 3  3 2  








Result Diagram:  


4/7/18 0500                                                                    

            4/5/18 0439





Objective Remarks


GENERAL: Alert, NAD.


SKIN: Warm and dry.


HEAD: Normocephalic.


EYES: No scleral icterus. No injection or drainage. 


NECK: Supple, trachea midline. No JVD or lymphadenopathy.


CARDIOVASCULAR: Regular rate and rhythm without murmurs, gallops, or rubs. 


RESPIRATORY: Breath sounds equal bilaterally. No accessory muscle use.


GASTROINTESTINAL: Abdomen soft, non-tender, nondistended. 


MUSCULOSKELETAL: No cyanosis, or edema. 


BACK: Nontender without obvious deformity. No CVA tenderness.





Procedures


4/4: S/P angiography and prophylactic coil and embolization of gastroduodenal 

artery





A/P


Assessment and Plan


Mr. Luevano is a pleasant 72-year-old male with a history of GI bleed who 

admitted to the hospital due to hematemesis, nausea and vomiting.  He was 

initially managed in the ICU.  He received 2 units of PRBCs upon admission.  He 

underwent EGD which showed large duodenal ulcer with visible vessel which was 

injected with epinephrine and clipped by GI.  He was briefly transferred to 

[facility] floor but returned to ICU due to copious amount of bright red blood in 

the vomitus.  Patient was hypotensive and tachycardic.  He underwent 

prophylactic embolization of gastroduodenal artery by interventional radiology 

on 4/4/2018.  Patient received 2 more units of PRBCs on 4/5/2018.  He has been 

hemodynamically stable and his hemoglobin has been stable around 11. 





Hematemesis-resolved


Acute GI blood loss anemia


   Protonix infusion 8mg/hr


   Status post EGD with injection and clipping of large vessel in duodenal 

ulcer. 


   H&H stable - will continue to monitor


   Further management /clear liquid diet resumption per gastroenterology


   4/4 S/P prophylactic embolization of gastroduodenal artery


   Repeat EGD done on 4/6/2018 but Colonoscopy was not done due to poor prep. 





Acute blood loss anemia


   Hgb 11.7 --> 10.6 today. Will start checking H&H Q12hrs. 


   Discussed with GI. If Hgb continues to drop, we will re-consult GI. 


   D/C Protonix drip and start PO Protonix BID. 





Diabetes mellitus


   Hold metformin while in the ICU


   Insulin sliding scale





Hypertension


   Hold antihypertensive meds due to acute GI bleed


   Continue amlodipine 5 mg twice daily (home dose)





Bipolar disorder


-Continue Prozac and Lithium





DVT GI prophylaxis


-Andre's and SCDs


-No pharmacological DVT prophylaxis due to acute GI bleed





Full code.











Dustin Solis DO Apr 7, 2018 2:10 pm

## 2018-04-07 NOTE — HHI.GIFU
GI Follow-up Note


Consult Follow-up





Subjective:  Patient laying in bed comfortably, no new complaints except 

feeling hungry. No bleeding





Objective:


PHYSICAL EXAMINATION:


Vitals signs stable


No fever





HEENT: Pupils round and reactive to light; normocephalic; atraumatic; no 

jaundice.  Throat is clear.


NECK: Neck is supple, no JVD, no lymphadenopathy.


CHEST:  Chest is clear to auscultation and percussion.


CARDIAC:  Regular rate and rhythm with no murmur gallop or rubs.


ABDOMEN:  Soft, nondistended, nontender; no hepatosplenomegaly; bowel sounds 

are present in all four quadrants.


EXTREMITIES: No clubbing, cyanosis, or edema.


SKIN:  Normal; no rash; no jaundice.


CNS:  No focal deficits; alert and oriented times three.


Available Data (labs, X- Rays, Procedues) : 


Last Impressions








GI Bleed Scan Nuclear Medicine 4/5/18 0000 Signed





Impressions: 





 Service Date/Time:  Thursday, April 5, 2018 13:57 - CONCLUSION: Negative GI 





 bleeding scan.     Olman Peck MD 


 


Abdomen Arteriogram 4/4/18 0000 Signed





Impressions: 





 Service Date/Time:  Wednesday, April 4, 2018 12:06 - CONCLUSION:   1. Celiac 

and 





 SMA angiography demonstrates standard anatomy without evidence for active 





 hemorrhage. 2. Uncomplicated prophylactic coil and Gelfoam embolization of the 





 GDA.     Akbar Humphrey MD 


 


Abdomen/Pelvis CT 3/29/18 1851 Signed





Impressions: 





 Service Date/Time:  Thursday, March 29, 2018 20:54 - CONCLUSION:  1. No acute 





 abnormality seen. 2. Scattered colonic diverticula without inflammatory 

change.  





    Ministerio Garcia MD 


 


Chest X-Ray 3/29/18 5099 Signed





Impressions: 





 Service Date/Time:  Thursday, March 29, 2018 17:59 - CONCLUSION:  No acute 





 disease.  No free air is seen.     Ministerio Garcia MD 








 Laboratory Tests








Test


  4/5/18


16:54 4/6/18


02:08 4/6/18


05:55 4/7/18


05:00


 


Hemoglobin 10.8 GM/DL  11.2 GM/DL  11.7 GM/DL  10.6 GM/DL 


 


Hematocrit 31.8 %  32.6 %  33.9 %  30.9 % 


 


Prothrombin Time   10.7 SEC  


 


Prothromb Time International


Ratio 


  


  1.1 RATIO 


  


 


 


Phosphorus Level   2.6 MG/DL  


 


Magnesium Level   1.6 MG/DL  








 Allergies








Coded Allergies Type Severity Reaction Last Updated Verified


 


  codeine Allergy Severe MUSCULOSKELETAL ISSUES 3/29/18 Yes


 


  insulin,pork Allergy Severe Anaphylaxis 3/29/18 Yes


 


  insulin aspart Allergy Intermediate  3/29/18 Yes


 


  lactose Allergy Intermediate  3/29/18 Yes








 Active Scripts








 Medications  Dose


 Route/Sig


 Max Daily Dose Days Date Category Dose


Instructions


 


 Carafate


  (Sucralfate) 1


 Gram Tab  1 Gm


 PO BIDAC


    3/21/18 Rx 


 


 Norvasc


  (Amlodipine


 Besylate) 5 Mg Tab  5 Mg


 PO BID


    3/21/18 Rx 


 


 Protonix


  (Pantoprazole


 Sodium) 40 Mg Tab  40 Mg


 PO BID


    3/21/18 Rx 


 


 Atorvastatin


  (Atorvastatin


 Calcium) 40 Mg Tab  40 Mg


 PO HS


    3/8/18 Rx 


 


 Plavix


  (Clopidogrel


 Bisulfate) 75 Mg


 Tab  75 Mg


 PO DAILY


    3/8/18 Rx 


 


 Proair Hfa 8.5 GM


 Inh (Albuterol


 Sulfate) 90


 Mcg/Act Aer  2 Puff


 INH Q4-6H PRN


    3/7/18 Reported  108 mcg/actuation


 


 Carvedilol 3.125


 Mg Tab  3.125 Mg


 PO DAILY


    3/7/18 Reported 


 


 Metformin


  (Metformin HCl)


 500 Mg Tab  500 Mg


 PO BIDPC


    1/18/18 Reported 


 


 Fluoxetine


  (Fluoxetine HCl)


 40 Mg Cap  40 Cap


 PO HS


    7/30/17 Reported 


 


 Flomax


  (Tamsulosin HCl)


 0.4 Mg Cap  0.4 Mg


 PO HS


    7/30/17 Reported 


 


 Lithium Carbonate


 600 Mg Cap  600 Mg


 PO HS


    7/30/17 Reported 




















ASSESSMENT/PLAN: Seen and examined, no bleeding . s/p egd with ablation 

yesterday. Colonoscopy not done due to inability to prep. Advance diet, can dc 

home with gi fu. Dc home on protonix 40mg daily. Discussed with Dr. Dan. 

Thank you





It was a pleasure seeing Matt Luevano. Thank you for this consult.


Entered by: Nicolette Rivers MD Apr 7, 2018 13:51

## 2018-04-07 NOTE — HHI.PR
cc:   Sb Elizondo MD


__________________________________________________





Subjective


Subjective Notes











--------------------------------------------------------------------------------

--------------------------------------------


DAILY PROGRESS NOTE FOR SURGICAL ATTENDING, DR. SB ELIZONDO


--------------------------------------------------------------------------------

-------------------------------------------


In the ICU would like more to eat





Objective


Vitals/I&O





Vital Signs








  Date Time  Temp Pulse Resp B/P (MAP) Pulse Ox O2 Delivery O2 Flow Rate FiO2


 


4/7/18 10:00  70      


 


4/7/18 08:00 96.4  17 165/77 (106) 94   


 


4/6/18 15:15      Room Air  


 


4/6/18 14:45       2 


 


4/5/18 20:25        21








Labs





Laboratory Tests








Test


  4/7/18


05:00


 


Hemoglobin 10.6 


 


Hematocrit 30.9 














 Date/Time


Source Procedure


Growth Status


 


 


 3/29/18 20:40


Urine Clean Catch Urine Culture - Final


Escherichia Coli Complete








Radiology





Last Impressions








GI Bleed Scan Nuclear Medicine 4/5/18 0000 Signed





Impressions: 





 Service Date/Time:  Thursday, April 5, 2018 13:57 - CONCLUSION: Negative GI 





 bleeding scan.     Olman Peck MD 


 


Abdomen Arteriogram 4/4/18 0000 Signed





Impressions: 





 Service Date/Time:  Wednesday, April 4, 2018 12:06 - CONCLUSION:   1. Celiac 

and 





 SMA angiography demonstrates standard anatomy without evidence for active 





 hemorrhage. 2. Uncomplicated prophylactic coil and Gelfoam embolization of the 





 GDA.     Akbar Humphrey MD 


 


Abdomen/Pelvis CT 3/29/18 1851 Signed





Impressions: 





 Service Date/Time:  Thursday, March 29, 2018 20:54 - CONCLUSION:  1. No acute 





 abnormality seen. 2. Scattered colonic diverticula without inflammatory 

change.  





    Ministerio Garcia MD 


 


Chest X-Ray 3/29/18 1749 Signed





Impressions: 





 Service Date/Time:  Thursday, March 29, 2018 17:59 - CONCLUSION:  No acute 





 disease.  No free air is seen.     Ministerio Garcia MD 








Cardiovascular:  Regular


Lungs:  Clear


Abdomen:  Non-tender, Other


Extremities:  Perfused, SCD's on





A/P


Problem List:  


(1) Bleeding duodenal ulcer


ICD Codes:  K26.4 - Chronic or unspecified duodenal ulcer with hemorrhage


Status:  Acute


(2) UGI bleed


ICD Codes:  K92.2 - UGI bleed


Status:  Resolved


(3) Syncope


ICD Codes:  R55 - Syncope and collapse


Status:  Acute


(4) DM (diabetes mellitus)


ICD Codes:  E11.9 - Type 2 diabetes mellitus without complications


(5) GI bleed


ICD Codes:  K92.2 - Gastrointestinal hemorrhage, unspecified


Status:  Resolved


Assessment and Plan


72-year-old gentleman who has a upper GI bleed from a ulcer that has been 

embolized clipped and cauterized as no evidence of bleeding on recent bleeding 

scan and the recent EGD


Hemoglobin appears to be stable


Continue aggressive medical management





increase activities and diet as tolerated


OK to start making plans for discharge


fu with GI as arranged





Attending Statement








--------------------------------------------------------------------------------

------------------------------------------------


 NOTE FOR SURGICAL ATTENDING, DR. SB ELIZONDO


--------------------------------------------------------------------------------

------------------------------------------------








I attest that I had a face-to-face encounter with the patient on the same day, 

and personally performed and documented my assessment and findings in the 

medical record.





The following services were provided during this hospital visit:





   Chart data review, vital sign assessments/reviewing monitor data


   Review of consultations notes if present.


   Medication orders/review and/or management


   Ordering and/or reviewing lab tests


   Ordering and/or interpreting/reviewing x-rays and/or diagnostic studies 


   Care of the patient and discussion of the patient with the care team 


   Documentation time


   To help prompt me to consider important information that might be impacting 

today's encounter and     assessment,


   Information from prior notes written by myself or my colleagues may have 

been "brought forward/copy and     pasted" into today's note.





Problem Qualifiers





(1) GI bleed:  


Qualified Codes:  K92.0 - Hematemesis








Sb Elizondo MD Apr 7, 2018 17:12

## 2018-04-08 VITALS
TEMPERATURE: 98 F | HEART RATE: 87 BPM | RESPIRATION RATE: 11 BRPM | DIASTOLIC BLOOD PRESSURE: 79 MMHG | SYSTOLIC BLOOD PRESSURE: 151 MMHG | OXYGEN SATURATION: 96 %

## 2018-04-08 VITALS
HEART RATE: 89 BPM | OXYGEN SATURATION: 96 % | RESPIRATION RATE: 18 BRPM | DIASTOLIC BLOOD PRESSURE: 73 MMHG | SYSTOLIC BLOOD PRESSURE: 128 MMHG

## 2018-04-08 VITALS
DIASTOLIC BLOOD PRESSURE: 77 MMHG | SYSTOLIC BLOOD PRESSURE: 169 MMHG | TEMPERATURE: 98.8 F | OXYGEN SATURATION: 95 % | RESPIRATION RATE: 14 BRPM | HEART RATE: 71 BPM

## 2018-04-08 VITALS
SYSTOLIC BLOOD PRESSURE: 143 MMHG | HEART RATE: 91 BPM | DIASTOLIC BLOOD PRESSURE: 77 MMHG | RESPIRATION RATE: 15 BRPM | OXYGEN SATURATION: 95 %

## 2018-04-08 VITALS
DIASTOLIC BLOOD PRESSURE: 73 MMHG | HEART RATE: 83 BPM | OXYGEN SATURATION: 97 % | RESPIRATION RATE: 14 BRPM | SYSTOLIC BLOOD PRESSURE: 148 MMHG

## 2018-04-08 VITALS
HEART RATE: 84 BPM | TEMPERATURE: 98.8 F | RESPIRATION RATE: 14 BRPM | DIASTOLIC BLOOD PRESSURE: 74 MMHG | OXYGEN SATURATION: 97 % | SYSTOLIC BLOOD PRESSURE: 124 MMHG

## 2018-04-08 VITALS
TEMPERATURE: 96.8 F | OXYGEN SATURATION: 93 % | HEART RATE: 65 BPM | SYSTOLIC BLOOD PRESSURE: 145 MMHG | RESPIRATION RATE: 17 BRPM | DIASTOLIC BLOOD PRESSURE: 65 MMHG

## 2018-04-08 VITALS
TEMPERATURE: 98.3 F | HEART RATE: 62 BPM | RESPIRATION RATE: 18 BRPM | OXYGEN SATURATION: 91 % | DIASTOLIC BLOOD PRESSURE: 63 MMHG | SYSTOLIC BLOOD PRESSURE: 141 MMHG

## 2018-04-08 VITALS — HEART RATE: 75 BPM

## 2018-04-08 VITALS — HEART RATE: 63 BPM

## 2018-04-08 VITALS
RESPIRATION RATE: 18 BRPM | SYSTOLIC BLOOD PRESSURE: 138 MMHG | DIASTOLIC BLOOD PRESSURE: 58 MMHG | HEART RATE: 58 BPM | TEMPERATURE: 98 F | OXYGEN SATURATION: 92 %

## 2018-04-08 VITALS — HEART RATE: 77 BPM

## 2018-04-08 VITALS — HEART RATE: 102 BPM

## 2018-04-08 VITALS — HEART RATE: 71 BPM

## 2018-04-08 LAB
HCT VFR BLD CALC: 26.4 % (ref 39–51)
HCT VFR BLD CALC: 32.2 % (ref 39–51)
HGB BLD-MCNC: 10.9 GM/DL (ref 13–17)
HGB BLD-MCNC: 9.8 GM/DL (ref 13–17)

## 2018-04-08 RX ADMIN — CHLORHEXIDINE GLUCONATE SCH PACK: 500 CLOTH TOPICAL at 04:00

## 2018-04-08 RX ADMIN — SUCRALFATE SCH GM: 1 TABLET ORAL at 15:50

## 2018-04-08 RX ADMIN — Medication SCH ML: at 09:18

## 2018-04-08 RX ADMIN — SODIUM CHLORIDE SCH MLS/HR: 900 INJECTION INTRAVENOUS at 10:44

## 2018-04-08 RX ADMIN — INSULIN ASPART SCH: 100 INJECTION, SOLUTION INTRAVENOUS; SUBCUTANEOUS at 08:00

## 2018-04-08 RX ADMIN — LITHIUM CARBONATE SCH MG: 300 CAPSULE, GELATIN COATED ORAL at 21:27

## 2018-04-08 RX ADMIN — INSULIN ASPART SCH: 100 INJECTION, SOLUTION INTRAVENOUS; SUBCUTANEOUS at 17:00

## 2018-04-08 RX ADMIN — CARVEDILOL SCH MG: 3.12 TABLET, FILM COATED ORAL at 09:19

## 2018-04-08 RX ADMIN — ONDANSETRON PRN MG: 2 INJECTION, SOLUTION INTRAMUSCULAR; INTRAVENOUS at 10:53

## 2018-04-08 RX ADMIN — INSULIN ASPART SCH: 100 INJECTION, SOLUTION INTRAVENOUS; SUBCUTANEOUS at 11:57

## 2018-04-08 RX ADMIN — STANDARDIZED SENNA CONCENTRATE AND DOCUSATE SODIUM SCH TAB: 8.6; 5 TABLET, FILM COATED ORAL at 21:00

## 2018-04-08 RX ADMIN — ATORVASTATIN CALCIUM SCH MG: 40 TABLET, FILM COATED ORAL at 21:26

## 2018-04-08 RX ADMIN — INSULIN ASPART SCH: 100 INJECTION, SOLUTION INTRAVENOUS; SUBCUTANEOUS at 21:00

## 2018-04-08 RX ADMIN — SUCRALFATE SCH GM: 1 TABLET ORAL at 09:18

## 2018-04-08 RX ADMIN — STANDARDIZED SENNA CONCENTRATE AND DOCUSATE SODIUM SCH TAB: 8.6; 5 TABLET, FILM COATED ORAL at 09:19

## 2018-04-08 RX ADMIN — Medication SCH ML: at 21:31

## 2018-04-08 RX ADMIN — PANTOPRAZOLE SCH MG: 40 TABLET, DELAYED RELEASE ORAL at 21:25

## 2018-04-08 RX ADMIN — ONDANSETRON PRN MG: 2 INJECTION, SOLUTION INTRAMUSCULAR; INTRAVENOUS at 21:31

## 2018-04-08 RX ADMIN — TAMSULOSIN HYDROCHLORIDE SCH MG: 0.4 CAPSULE ORAL at 21:25

## 2018-04-08 RX ADMIN — PANTOPRAZOLE SCH MG: 40 TABLET, DELAYED RELEASE ORAL at 09:19

## 2018-04-08 NOTE — HHI.PR
cc:   Dante Fernandez MD


__________________________________________________





Subjective


Subjective Notes


tolerating diet but decreased po with some nausea, +bm no blood





Objective


Vitals/I&O





Vital Signs








  Date Time  Temp Pulse Resp B/P (MAP) Pulse Ox O2 Delivery O2 Flow Rate FiO2


 


4/8/18 14:00  71      


 


4/8/18 12:00 98.8  14 169/77 (107) 95   


 


4/6/18 15:15      Room Air  


 


4/6/18 14:45       2 


 


4/5/18 20:25        21








Labs





Laboratory Tests








Test


  4/7/18


16:56 4/8/18


05:05


 


Hemoglobin 10.4  10.9 


 


Hematocrit 30.7  32.2 














 Date/Time


Source Procedure


Growth Status


 


 


 3/29/18 20:40


Urine Clean Catch Urine Culture - Final


Escherichia Coli Complete








Radiology





Last Impressions








GI Bleed Scan Nuclear Medicine 4/5/18 0000 Signed





Impressions: 





 Service Date/Time:  Thursday, April 5, 2018 13:57 - CONCLUSION: Negative GI 





 bleeding scan.     Olman Peck MD 


 


Abdomen Arteriogram 4/4/18 0000 Signed





Impressions: 





 Service Date/Time:  Wednesday, April 4, 2018 12:06 - CONCLUSION:   1. Celiac 

and 





 SMA angiography demonstrates standard anatomy without evidence for active 





 hemorrhage. 2. Uncomplicated prophylactic coil and Gelfoam embolization of the 





 GDA.     Akbar Humphrey MD 


 


Abdomen/Pelvis CT 3/29/18 1851 Signed





Impressions: 





 Service Date/Time:  Thursday, March 29, 2018 20:54 - CONCLUSION:  1. No acute 





 abnormality seen. 2. Scattered colonic diverticula without inflammatory 

change.  





    Ministerio Garcia MD 


 


Chest X-Ray 3/29/18 [id number] Signed





Impressions: 





 Service Date/Time:  Thursday, March 29, 2018 17:59 - CONCLUSION:  No acute 





 disease.  No free air is seen.     Ministerio Garcia MD 








Lungs:  Clear


Abdomen:  Other (soft, mild ttp)





A/P


Problem List:  


(1) Bleeding duodenal ulcer


ICD Codes:  K26.4 - Chronic or unspecified duodenal ulcer with hemorrhage


Status:  Acute


(2) UGI bleed


ICD Codes:  K92.2 - UGI bleed


Status:  Resolved


(3) Syncope


ICD Codes:  R55 - Syncope and collapse


Status:  Acute


(4) DM (diabetes mellitus)


ICD Codes:  E11.9 - Type 2 diabetes mellitus without complications


(5) GI bleed


ICD Codes:  K92.2 - Gastrointestinal hemorrhage, unspecified


Status:  Acute


Assessment and Plan


72-year-old gentleman who has a upper GI bleed from a ulcer that has been 

embolized clipped and cauterized as no evidence of bleeding on recent bleeding 

scan and the recent EGD


Hemoglobin appears to be stable- 10.9


Continue aggressive medical management


increase activities and diet as tolerated


reg diet


 discharge planning


fu with GI as arranged


will continue to follow





Problem Qualifiers





(1) GI bleed:  


Qualified Codes:  K92.0 - Hematemesis








Dante Fernandez MD Apr 8, 2018 14:28

## 2018-04-08 NOTE — HHI.PR
Subjective


Remarks


Follow-up for GI bleed, anemia. Patient is doing well, resting in bed. No acute 

concerns. No fever, chills.





Objective


Vitals





Vital Signs








  Date Time  Temp Pulse Resp B/P (MAP) Pulse Ox O2 Delivery O2 Flow Rate FiO2


 


4/8/18 14:00  71      


 


4/8/18 12:00 98.8 71 14 169/77 (107) 95   


 


4/8/18 12:00  71      


 


4/8/18 10:00  75      


 


4/8/18 08:00  65      


 


4/8/18 08:00 96.8 65 17 145/65 (91) 93   


 


4/8/18 06:00  63      


 


4/8/18 04:00 98.0 58 18 138/58 (84) 92   


 


4/8/18 04:00  58      


 


4/8/18 02:00  77      


 


4/8/18 00:00  62      


 


4/8/18 00:00 98.3 62 18 141/63 (89) 91   


 


4/7/18 22:00  76      


 


4/7/18 20:00  61      


 


4/7/18 20:00 98.1 61 17 151/67 (95) 92   


 


4/7/18 18:00  62      














I/O      


 


 4/7/18 4/7/18 4/7/18 4/8/18 4/8/18 4/8/18





 07:00 15:00 23:00 07:00 15:00 23:00


 


Intake Total 820 ml 200 ml 480 ml  100 ml 


 


Output Total 450 ml  350 ml 300 ml  


 


Balance 370 ml 200 ml 130 ml -300 ml 100 ml 


 


      


 


Intake Oral 720 ml  480 ml   


 


IV Total 100 ml 200 ml   100 ml 


 


Output Urine Total 450 ml  350 ml 300 ml  


 


# Voids   3 3  


 


# Bowel Movements 2  2 0  








Result Diagram:  


4/8/18 0505                                                                    

            4/5/18 0439





Objective Remarks


GENERAL: Alert, NAD.


SKIN: Warm and dry.


HEAD: Normocephalic.


EYES: No scleral icterus. No injection or drainage. 


NECK: Supple, trachea midline. No JVD or lymphadenopathy.


CARDIOVASCULAR: Regular rate and rhythm without murmurs, gallops, or rubs. 


RESPIRATORY: Breath sounds equal bilaterally. No accessory muscle use.


GASTROINTESTINAL: Abdomen soft, non-tender, nondistended. 


MUSCULOSKELETAL: No cyanosis, or edema. 


BACK: Nontender without obvious deformity. No CVA tenderness.





Procedures


4/4: S/P angiography and prophylactic coil and embolization of gastroduodenal 

artery





A/P


Assessment and Plan


Mr. Luevano is a pleasant 72-year-old male with a history of GI bleed who 

admitted to the hospital due to hematemesis, nausea and vomiting.  He was 

initially managed in the ICU.  He received 2 units of PRBCs upon admission.  He 

underwent EGD which showed large duodenal ulcer with visible vessel which was 

injected with epinephrine and clipped by GI.  He was briefly transferred to 

[facility] floor but returned to ICU due to copious amount of bright red blood in 

the vomitus.  Patient was hypotensive and tachycardic.  He underwent 

prophylactic embolization of gastroduodenal artery by interventional radiology 

on 4/4/2018.  Patient received 2 more units of PRBCs on 4/5/2018.  He has been 

hemodynamically stable and his hemoglobin has been stable around 11. 





Hematemesis-resolved


Acute GI blood loss anemia


   Protonix infusion 8mg/hr


   Status post EGD with injection and clipping of large vessel in duodenal 

ulcer. 


   H&H stable - will continue to monitor


   Further management /clear liquid diet resumption per gastroenterology


   4/4 S/P prophylactic embolization of gastroduodenal artery


   Repeat EGD done on 4/6/2018 but Colonoscopy was not done due to poor prep. 





Acute blood loss anemia


   Hgb 11.7 --> 10.6 --> 10.9 today. 


   Continue Protonix 40mg BID. 





Diabetes mellitus


   Hold metformin while in the ICU


   Insulin sliding scale





Hypertension


   Hold antihypertensive meds due to acute GI bleed


   Continue amlodipine 5 mg twice daily (home dose)





Bipolar disorder


-Continue Prozac and Lithium





DVT GI prophylaxis


-Andre's and SCDs


-No pharmacological DVT prophylaxis due to acute GI bleed





Full code. 





Discharge plan: Discussed with RN who will contact daughter. PT recommends 

rehab. However, due to patient's history, rehab placement is a difficult 

option. After discussing with patient's daughter, we maybe able to discharge 

patient home with home health today or tomorrow.











Dustin Solis DO Apr 8, 2018 16:14

## 2018-04-09 VITALS
RESPIRATION RATE: 18 BRPM | DIASTOLIC BLOOD PRESSURE: 65 MMHG | TEMPERATURE: 98.9 F | SYSTOLIC BLOOD PRESSURE: 123 MMHG | HEART RATE: 86 BPM | OXYGEN SATURATION: 96 %

## 2018-04-09 VITALS
DIASTOLIC BLOOD PRESSURE: 56 MMHG | HEART RATE: 87 BPM | SYSTOLIC BLOOD PRESSURE: 96 MMHG | RESPIRATION RATE: 19 BRPM | TEMPERATURE: 98.6 F

## 2018-04-09 VITALS — DIASTOLIC BLOOD PRESSURE: 57 MMHG | RESPIRATION RATE: 18 BRPM | SYSTOLIC BLOOD PRESSURE: 94 MMHG | HEART RATE: 86 BPM

## 2018-04-09 VITALS
DIASTOLIC BLOOD PRESSURE: 57 MMHG | SYSTOLIC BLOOD PRESSURE: 103 MMHG | RESPIRATION RATE: 16 BRPM | TEMPERATURE: 99 F | OXYGEN SATURATION: 93 % | HEART RATE: 74 BPM

## 2018-04-09 VITALS
OXYGEN SATURATION: 95 % | SYSTOLIC BLOOD PRESSURE: 100 MMHG | DIASTOLIC BLOOD PRESSURE: 55 MMHG | RESPIRATION RATE: 19 BRPM | HEART RATE: 65 BPM | TEMPERATURE: 99.2 F

## 2018-04-09 VITALS — RESPIRATION RATE: 18 BRPM | HEART RATE: 95 BPM | DIASTOLIC BLOOD PRESSURE: 64 MMHG | SYSTOLIC BLOOD PRESSURE: 142 MMHG

## 2018-04-09 VITALS
DIASTOLIC BLOOD PRESSURE: 63 MMHG | TEMPERATURE: 98.8 F | RESPIRATION RATE: 18 BRPM | OXYGEN SATURATION: 98 % | HEART RATE: 83 BPM | SYSTOLIC BLOOD PRESSURE: 140 MMHG

## 2018-04-09 VITALS
TEMPERATURE: 98.4 F | RESPIRATION RATE: 15 BRPM | HEART RATE: 85 BPM | DIASTOLIC BLOOD PRESSURE: 64 MMHG | SYSTOLIC BLOOD PRESSURE: 136 MMHG

## 2018-04-09 VITALS — RESPIRATION RATE: 19 BRPM | HEART RATE: 116 BPM | DIASTOLIC BLOOD PRESSURE: 87 MMHG | SYSTOLIC BLOOD PRESSURE: 169 MMHG

## 2018-04-09 VITALS — HEART RATE: 93 BPM | SYSTOLIC BLOOD PRESSURE: 99 MMHG | DIASTOLIC BLOOD PRESSURE: 60 MMHG | RESPIRATION RATE: 18 BRPM

## 2018-04-09 VITALS — HEART RATE: 80 BPM

## 2018-04-09 VITALS — HEART RATE: 72 BPM

## 2018-04-09 VITALS — HEART RATE: 66 BPM

## 2018-04-09 VITALS — SYSTOLIC BLOOD PRESSURE: 103 MMHG | RESPIRATION RATE: 18 BRPM | HEART RATE: 85 BPM | DIASTOLIC BLOOD PRESSURE: 59 MMHG

## 2018-04-09 VITALS — OXYGEN SATURATION: 92 %

## 2018-04-09 VITALS — HEART RATE: 76 BPM

## 2018-04-09 VITALS — OXYGEN SATURATION: 96 %

## 2018-04-09 LAB
HCT VFR BLD CALC: 25.1 % (ref 39–51)
HCT VFR BLD CALC: 25.4 % (ref 39–51)
HGB BLD-MCNC: 8.6 GM/DL (ref 13–17)
HGB BLD-MCNC: 8.6 GM/DL (ref 13–17)

## 2018-04-09 RX ADMIN — Medication SCH ML: at 21:00

## 2018-04-09 RX ADMIN — ONDANSETRON PRN MG: 2 INJECTION, SOLUTION INTRAMUSCULAR; INTRAVENOUS at 06:43

## 2018-04-09 RX ADMIN — INSULIN ASPART SCH: 100 INJECTION, SOLUTION INTRAVENOUS; SUBCUTANEOUS at 16:27

## 2018-04-09 RX ADMIN — PANTOPRAZOLE SCH MG: 40 TABLET, DELAYED RELEASE ORAL at 09:14

## 2018-04-09 RX ADMIN — STANDARDIZED SENNA CONCENTRATE AND DOCUSATE SODIUM SCH TAB: 8.6; 5 TABLET, FILM COATED ORAL at 21:00

## 2018-04-09 RX ADMIN — CHLORHEXIDINE GLUCONATE SCH PACK: 500 CLOTH TOPICAL at 06:43

## 2018-04-09 RX ADMIN — TAMSULOSIN HYDROCHLORIDE SCH MG: 0.4 CAPSULE ORAL at 23:45

## 2018-04-09 RX ADMIN — SUCRALFATE SCH GM: 1 TABLET ORAL at 16:25

## 2018-04-09 RX ADMIN — INSULIN ASPART SCH: 100 INJECTION, SOLUTION INTRAVENOUS; SUBCUTANEOUS at 12:00

## 2018-04-09 RX ADMIN — STANDARDIZED SENNA CONCENTRATE AND DOCUSATE SODIUM SCH TAB: 8.6; 5 TABLET, FILM COATED ORAL at 09:14

## 2018-04-09 RX ADMIN — CARVEDILOL SCH MG: 3.12 TABLET, FILM COATED ORAL at 09:14

## 2018-04-09 RX ADMIN — SUCRALFATE SCH GM: 1 TABLET ORAL at 06:42

## 2018-04-09 RX ADMIN — ATORVASTATIN CALCIUM SCH MG: 40 TABLET, FILM COATED ORAL at 23:46

## 2018-04-09 RX ADMIN — Medication SCH ML: at 09:15

## 2018-04-09 RX ADMIN — PANTOPRAZOLE SCH MG: 40 TABLET, DELAYED RELEASE ORAL at 21:00

## 2018-04-09 RX ADMIN — LITHIUM CARBONATE SCH MG: 300 CAPSULE, GELATIN COATED ORAL at 23:45

## 2018-04-09 RX ADMIN — CHLORHEXIDINE GLUCONATE SCH PACK: 500 CLOTH TOPICAL at 23:46

## 2018-04-09 RX ADMIN — SODIUM CHLORIDE SCH MLS/HR: 900 INJECTION INTRAVENOUS at 09:15

## 2018-04-09 RX ADMIN — INSULIN ASPART SCH: 100 INJECTION, SOLUTION INTRAVENOUS; SUBCUTANEOUS at 21:00

## 2018-04-09 RX ADMIN — INSULIN ASPART SCH: 100 INJECTION, SOLUTION INTRAVENOUS; SUBCUTANEOUS at 08:00

## 2018-04-09 NOTE — HHI.PR
Subjective


Remarks


Follow-up for GI bleed, anemia. Patient is currently doing well, resting in bed.





Objective


Vitals





Vital Signs








  Date Time  Temp Pulse Resp B/P (MAP) Pulse Ox O2 Delivery O2 Flow Rate FiO2


 


4/9/18 18:00  80      


 


4/9/18 16:00  74 16 103/57 (72) 93   


 


4/9/18 16:00 99.0 74 16 103/57 (72) 93   


 


4/9/18 16:00  74      


 


4/9/18 14:00  66      


 


4/9/18 13:04     96   


 


4/9/18 12:00  65      


 


4/9/18 12:00 99.2 65 19 100/55 (70) 95   


 


4/9/18 10:00  72      


 


4/9/18 08:00 98.9 86 18 123/65 (84) 96   


 


4/9/18 08:00  86      


 


4/9/18 06:00  95 18 142/64 (90)    


 


4/9/18 06:00  95      


 


4/9/18 05:00  86 18 94/57 (69)    


 


4/9/18 05:00  86      


 


4/9/18 04:00  87      


 


4/9/18 04:00 98.6 87 19 96/56 (69)    


 


4/9/18 03:00  93 18 99/60 (73)    


 


4/9/18 03:00  93      


 


4/9/18 02:01  116      


 


4/9/18 02:01  116 19 169/87 (114)    


 


4/9/18 01:00  85      


 


4/9/18 01:00  85 18 103/59 (74)    


 


4/9/18 00:00  85      


 


4/9/18 00:00 98.4 85 15 136/64 (88)    


 


4/8/18 23:00  89 18 128/73 (91) 96   


 


4/8/18 23:00  89      


 


4/8/18 22:00  83      


 


4/8/18 22:00  83 14 148/73 (98) 97   


 


4/8/18 21:00  91      


 


4/8/18 21:00  91 15 143/77 (99) 95   


 


4/8/18 20:00  84      


 


4/8/18 20:00 98.8 84 14 124/74 (91) 97   














I/O      


 


 4/8/18 4/8/18 4/8/18 4/9/18 4/9/18 4/9/18





 07:00 15:00 23:00 07:00 15:00 23:00


 


Intake Total  100 ml 480 ml 240 ml 100 ml 225 ml


 


Output Total 300 ml  425 ml 4850 ml  450 ml


 


Balance -300 ml 100 ml 55 ml -4610 ml 100 ml -225 ml


 


      


 


Intake Oral   480 ml 240 ml  225 ml


 


IV Total  100 ml   100 ml 


 


Output Urine Total 300 ml  425 ml 4850 ml  450 ml


 


# Voids 3  1 3  2


 


# Bowel Movements 0  1 6  0








Result Diagram:  


4/9/18 1742                                                                    

            4/5/18 0439





Objective Remarks


GENERAL: Alert, NAD.


SKIN: Warm and dry.


HEAD: Normocephalic.


EYES: No scleral icterus. No injection or drainage. 


NECK: Supple, trachea midline. No JVD or lymphadenopathy.


CARDIOVASCULAR: Regular rate and rhythm without murmurs, gallops, or rubs. 


RESPIRATORY: Breath sounds equal bilaterally. No accessory muscle use.


GASTROINTESTINAL: Abdomen soft, non-tender, nondistended. 


MUSCULOSKELETAL: No cyanosis, or edema. 


BACK: Nontender without obvious deformity. No CVA tenderness.





Procedures


4/4: S/P angiography and prophylactic coil and embolization of gastroduodenal 

artery





A/P


Assessment and Plan


Mr. Luevano is a pleasant 72-year-old male with a history of GI bleed who 

admitted to the hospital due to hematemesis, nausea and vomiting.  He was 

initially managed in the ICU.  He received 2 units of PRBCs upon admission.  He 

underwent EGD which showed large duodenal ulcer with visible vessel which was 

injected with epinephrine and clipped by GI.  He was briefly transferred to 

[facility] floor but returned to ICU due to copious amount of bright red blood in 

the vomitus.  Patient was hypotensive and tachycardic.  He underwent 

prophylactic embolization of gastroduodenal artery by interventional radiology 

on 4/4/2018.  Patient received 2 more units of PRBCs on 4/5/2018.  He has been 

hemodynamically stable and his hemoglobin has been stable around 11. 





Hematemesis-resolved


Acute GI blood loss anemia


   Will switch Protonix IV to PO BID. 


   Status post EGD with injection and clipping of large vessel in duodenal 

ulcer. 


   H&H dropping again 10.9 --> 9.8 --> 8.6. 


   Currently on regular diet. 


   4/4 S/P prophylactic embolization of gastroduodenal artery


   Repeat EGD done on 4/6/2018 but Colonoscopy was not done due to poor prep. 





Acute blood loss anemia


   Hgb 11.7 --> 10.6 --> 10.9 ==> 8.6. May need to re-consult GI. 


   Continue Protonix 40mg BID. 





Diabetes mellitus


   Hold metformin while in the ICU


   Insulin sliding scale





Hypertension


   Hold antihypertensive meds due to acute GI bleed


   Continue amlodipine 5 mg twice daily (home dose)





Bipolar disorder


-Continue Prozac and Lithium





DVT GI prophylaxis


-Andre's and SCDs


-No pharmacological DVT prophylaxis due to acute GI bleed





Full code. 





Will consult PT/OT. If [facility] does not accept patient, likely discharge home 

with daughter.











Dustin Solis DO Apr 9, 2018 19:49

## 2018-04-10 VITALS
TEMPERATURE: 98.5 F | DIASTOLIC BLOOD PRESSURE: 73 MMHG | HEART RATE: 79 BPM | RESPIRATION RATE: 20 BRPM | SYSTOLIC BLOOD PRESSURE: 131 MMHG | OXYGEN SATURATION: 99 %

## 2018-04-10 VITALS
RESPIRATION RATE: 18 BRPM | DIASTOLIC BLOOD PRESSURE: 82 MMHG | SYSTOLIC BLOOD PRESSURE: 141 MMHG | OXYGEN SATURATION: 97 % | HEART RATE: 83 BPM | TEMPERATURE: 98.1 F

## 2018-04-10 VITALS
HEART RATE: 72 BPM | RESPIRATION RATE: 20 BRPM | OXYGEN SATURATION: 96 % | DIASTOLIC BLOOD PRESSURE: 74 MMHG | SYSTOLIC BLOOD PRESSURE: 145 MMHG | TEMPERATURE: 97.6 F

## 2018-04-10 VITALS
OXYGEN SATURATION: 96 % | HEART RATE: 18 BPM | SYSTOLIC BLOOD PRESSURE: 140 MMHG | DIASTOLIC BLOOD PRESSURE: 70 MMHG | RESPIRATION RATE: 18 BRPM | TEMPERATURE: 97.5 F

## 2018-04-10 VITALS
DIASTOLIC BLOOD PRESSURE: 68 MMHG | RESPIRATION RATE: 20 BRPM | SYSTOLIC BLOOD PRESSURE: 144 MMHG | TEMPERATURE: 97.7 F | HEART RATE: 77 BPM | OXYGEN SATURATION: 97 %

## 2018-04-10 VITALS
HEART RATE: 89 BPM | SYSTOLIC BLOOD PRESSURE: 142 MMHG | OXYGEN SATURATION: 98 % | DIASTOLIC BLOOD PRESSURE: 65 MMHG | RESPIRATION RATE: 20 BRPM | TEMPERATURE: 98.6 F

## 2018-04-10 VITALS
HEART RATE: 78 BPM | SYSTOLIC BLOOD PRESSURE: 145 MMHG | OXYGEN SATURATION: 96 % | RESPIRATION RATE: 20 BRPM | TEMPERATURE: 98.6 F | DIASTOLIC BLOOD PRESSURE: 72 MMHG

## 2018-04-10 LAB
HCT VFR BLD CALC: 24.6 % (ref 39–51)
HCT VFR BLD CALC: 25.3 % (ref 39–51)
HGB BLD-MCNC: 8.4 GM/DL (ref 13–17)
HGB BLD-MCNC: 8.6 GM/DL (ref 13–17)

## 2018-04-10 RX ADMIN — PANTOPRAZOLE SCH MG: 40 TABLET, DELAYED RELEASE ORAL at 10:13

## 2018-04-10 RX ADMIN — SODIUM CHLORIDE SCH MLS/HR: 900 INJECTION INTRAVENOUS at 10:56

## 2018-04-10 RX ADMIN — CARVEDILOL SCH MG: 3.12 TABLET, FILM COATED ORAL at 10:13

## 2018-04-10 RX ADMIN — STANDARDIZED SENNA CONCENTRATE AND DOCUSATE SODIUM SCH TAB: 8.6; 5 TABLET, FILM COATED ORAL at 10:13

## 2018-04-10 RX ADMIN — INSULIN ASPART SCH: 100 INJECTION, SOLUTION INTRAVENOUS; SUBCUTANEOUS at 08:00

## 2018-04-10 RX ADMIN — TAMSULOSIN HYDROCHLORIDE SCH MG: 0.4 CAPSULE ORAL at 20:37

## 2018-04-10 RX ADMIN — SUCRALFATE SCH GM: 1 TABLET ORAL at 16:48

## 2018-04-10 RX ADMIN — ATORVASTATIN CALCIUM SCH MG: 40 TABLET, FILM COATED ORAL at 20:37

## 2018-04-10 RX ADMIN — Medication SCH ML: at 20:38

## 2018-04-10 RX ADMIN — Medication SCH ML: at 10:14

## 2018-04-10 RX ADMIN — LITHIUM CARBONATE SCH MG: 300 CAPSULE, GELATIN COATED ORAL at 20:37

## 2018-04-10 RX ADMIN — SUCRALFATE SCH GM: 1 TABLET ORAL at 08:06

## 2018-04-10 RX ADMIN — STANDARDIZED SENNA CONCENTRATE AND DOCUSATE SODIUM SCH TAB: 8.6; 5 TABLET, FILM COATED ORAL at 20:36

## 2018-04-10 RX ADMIN — PANTOPRAZOLE SCH MG: 40 TABLET, DELAYED RELEASE ORAL at 20:37

## 2018-04-10 RX ADMIN — ONDANSETRON PRN MG: 2 INJECTION, SOLUTION INTRAMUSCULAR; INTRAVENOUS at 10:14

## 2018-04-10 NOTE — HHI.PR
Subjective


Remarks


Follow-up GI bleed


04/10/18-patient seen and examined him a alert and oriented 3.  H&H dropping 

however patient denies any current gross GI bleed





Objective


Vitals





Vital Signs








  Date Time  Temp Pulse Resp B/P (MAP) Pulse Ox O2 Delivery O2 Flow Rate FiO2


 


4/10/18 12:13 98.6 89 20 142/65 (90) 98   


 


4/10/18 07:52 98.5 79 20 131/73 (92) 99   


 


4/10/18 05:30 97.5 18 18 140/70 (93) 96   


 


4/10/18 01:16 98.1 83 18 141/82 (101) 97   


 


4/10/18 00:00 98.6 78 20 145/72 (96) 96   


 


4/10/18 00:00  78      


 


4/9/18 22:00  76      


 


4/9/18 21:00     92   21


 


4/9/18 20:00  83      


 


4/9/18 20:00 98.8 83 18 140/63 (88) 98   


 


4/9/18 18:00  80      


 


4/9/18 16:00  74 16 103/57 (72) 93   


 


4/9/18 16:00 99.0 74 16 103/57 (72) 93   


 


4/9/18 16:00  74      


 


4/9/18 14:00  66      


 


4/9/18 13:04     96   














I/O      


 


 4/9/18 4/9/18 4/9/18 4/10/18 4/10/18 4/10/18





 06:59 14:59 22:59 06:59 14:59 22:59


 


Intake Total 240 ml 100 ml 225 ml   


 


Output Total 4850 ml  450 ml 200 ml  


 


Balance -4610 ml 100 ml -225 ml -200 ml  


 


      


 


Intake Oral 240 ml  225 ml   


 


IV Total  100 ml    


 


Output Urine Total 4850 ml  450 ml 200 ml  


 


# Voids 3  2 1  


 


# Bowel Movements 6  0   








Result Diagram:  


4/10/18 0850





Imaging





Last Impressions








GI Bleed Scan Nuclear Medicine 4/5/18 0000 Signed





Impressions: 





 Service Date/Time:  Thursday, April 5, 2018 13:57 - CONCLUSION: Negative GI 





 bleeding scan.     Olman Peck MD 


 


Abdomen Arteriogram 4/4/18 0000 Signed





Impressions: 





 Service Date/Time:  Wednesday, April 4, 2018 12:06 - CONCLUSION:   1. Celiac 

and 





 SMA angiography demonstrates standard anatomy without evidence for active 





 hemorrhage. 2. Uncomplicated prophylactic coil and Gelfoam embolization of the 





 GDA.     Akbar Humphrey MD 


 


Abdomen/Pelvis CT 3/29/18 1851 Signed





Impressions: 





 Service Date/Time:  Thursday, March 29, 2018 20:54 - CONCLUSION:  1. No acute 





 abnormality seen. 2. Scattered colonic diverticula without inflammatory 

change.  





    Ministerio Garcia MD 


 


Chest X-Ray 3/29/18 [id number] Signed





Impressions: 





 Service Date/Time:  Thursday, March 29, 2018 17:59 - CONCLUSION:  No acute 





 disease.  No free air is seen.     Ministerio Garcia MD 








Objective Remarks


GENERAL: NAD


SKIN: Warm and dry.


HEAD: Normocephalic.


EYES: No scleral icterus. No injection or drainage. 


NECK: Supple, trachea midline. No JVD or lymphadenopathy.


CARDIOVASCULAR: Regular rate and rhythm without murmurs, gallops, or rubs. 


RESPIRATORY: Breath sounds equal bilaterally. No accessory muscle use.


GASTROINTESTINAL: Abdomen soft, non-tender, nondistended. 


MUSCULOSKELETAL: No cyanosis, or edema. 


BACK: Nontender without obvious deformity. No CVA tenderness.





Procedures


4/4: S/P angiography and prophylactic coil and embolization of gastroduodenal 

artery





A/P


Problem List:  


(1) Bleeding duodenal ulcer


ICD Code:  K26.4 - Chronic or unspecified duodenal ulcer with hemorrhage


Status:  Acute


Assessment and Plan


72-year-old male with





Hematemesis-resolved


Acute GI blood loss anemia


   On Protonix  PO BID. 


   Status post EGD with injection and clipping of large vessel in duodenal 

ulcer. 


   4/4 S/P prophylactic embolization of gastroduodenal artery


   Repeat EGD done on 4/6/2018 but Colonoscopy was not done due to poor prep. 





Acute blood loss anemia


   Re-consult GI d/t decreasing H&H. 


   Continue Protonix 40mg BID. 





Diabetes mellitus


   Resume metformin 


   Insulin sliding scale





Hypertension


   Continue amlodipine 5 mg twice daily 





Bipolar disorder


   Continue Prozac and Lithium





DVT GI prophylaxis


-Andre's and SCDs


-No pharmacological DVT prophylaxis due to acute GI bleed











Jose Angel Hernandez MD Apr 10, 2018 12:31
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
## 2018-04-11 VITALS
SYSTOLIC BLOOD PRESSURE: 144 MMHG | HEART RATE: 75 BPM | RESPIRATION RATE: 18 BRPM | OXYGEN SATURATION: 98 % | TEMPERATURE: 98.1 F | DIASTOLIC BLOOD PRESSURE: 66 MMHG

## 2018-04-11 VITALS
TEMPERATURE: 98.6 F | RESPIRATION RATE: 17 BRPM | SYSTOLIC BLOOD PRESSURE: 119 MMHG | HEART RATE: 81 BPM | DIASTOLIC BLOOD PRESSURE: 65 MMHG | OXYGEN SATURATION: 98 %

## 2018-04-11 VITALS
OXYGEN SATURATION: 99 % | HEART RATE: 85 BPM | SYSTOLIC BLOOD PRESSURE: 140 MMHG | DIASTOLIC BLOOD PRESSURE: 76 MMHG | RESPIRATION RATE: 18 BRPM | TEMPERATURE: 97.8 F

## 2018-04-11 VITALS
OXYGEN SATURATION: 95 % | TEMPERATURE: 97.7 F | HEART RATE: 85 BPM | DIASTOLIC BLOOD PRESSURE: 75 MMHG | RESPIRATION RATE: 16 BRPM | SYSTOLIC BLOOD PRESSURE: 134 MMHG

## 2018-04-11 VITALS
SYSTOLIC BLOOD PRESSURE: 158 MMHG | OXYGEN SATURATION: 98 % | HEART RATE: 87 BPM | DIASTOLIC BLOOD PRESSURE: 74 MMHG | TEMPERATURE: 97.9 F | RESPIRATION RATE: 20 BRPM

## 2018-04-11 VITALS — OXYGEN SATURATION: 99 %

## 2018-04-11 VITALS
RESPIRATION RATE: 17 BRPM | HEART RATE: 74 BPM | SYSTOLIC BLOOD PRESSURE: 120 MMHG | TEMPERATURE: 98.4 F | DIASTOLIC BLOOD PRESSURE: 73 MMHG | OXYGEN SATURATION: 98 %

## 2018-04-11 PROCEDURE — 0D578ZZ DESTRUCTION OF STOMACH, PYLORUS, VIA NATURAL OR ARTIFICIAL OPENING ENDOSCOPIC: ICD-10-PCS | Performed by: INTERNAL MEDICINE

## 2018-04-11 PROCEDURE — 3E0G8GC INTRODUCTION OF OTHER THERAPEUTIC SUBSTANCE INTO UPPER GI, VIA NATURAL OR ARTIFICIAL OPENING ENDOSCOPIC: ICD-10-PCS | Performed by: INTERNAL MEDICINE

## 2018-04-11 RX ADMIN — SUCRALFATE SCH GM: 1 TABLET ORAL at 15:41

## 2018-04-11 RX ADMIN — ATORVASTATIN CALCIUM SCH MG: 40 TABLET, FILM COATED ORAL at 21:30

## 2018-04-11 RX ADMIN — CARVEDILOL SCH MG: 3.12 TABLET, FILM COATED ORAL at 13:46

## 2018-04-11 RX ADMIN — Medication SCH ML: at 21:31

## 2018-04-11 RX ADMIN — ONDANSETRON PRN MG: 2 INJECTION, SOLUTION INTRAMUSCULAR; INTRAVENOUS at 15:41

## 2018-04-11 RX ADMIN — LITHIUM CARBONATE SCH MG: 300 CAPSULE, GELATIN COATED ORAL at 21:30

## 2018-04-11 RX ADMIN — TAMSULOSIN HYDROCHLORIDE SCH MG: 0.4 CAPSULE ORAL at 21:31

## 2018-04-11 RX ADMIN — PANTOPRAZOLE SCH MG: 40 TABLET, DELAYED RELEASE ORAL at 21:30

## 2018-04-11 RX ADMIN — Medication SCH ML: at 13:47

## 2018-04-11 RX ADMIN — SUCRALFATE SCH GM: 1 TABLET ORAL at 06:06

## 2018-04-11 RX ADMIN — STANDARDIZED SENNA CONCENTRATE AND DOCUSATE SODIUM SCH TAB: 8.6; 5 TABLET, FILM COATED ORAL at 13:46

## 2018-04-11 RX ADMIN — PANTOPRAZOLE SCH MG: 40 TABLET, DELAYED RELEASE ORAL at 13:46

## 2018-04-11 RX ADMIN — STANDARDIZED SENNA CONCENTRATE AND DOCUSATE SODIUM SCH TAB: 8.6; 5 TABLET, FILM COATED ORAL at 21:30

## 2018-04-11 RX ADMIN — CHLORHEXIDINE GLUCONATE SCH PACK: 500 CLOTH TOPICAL at 03:56

## 2018-04-11 RX ADMIN — SODIUM CHLORIDE SCH MLS/HR: 900 INJECTION INTRAVENOUS at 13:45

## 2018-04-11 NOTE — PD.PROCEDR
GI Procedure





PROCEDURE PERFORMED


EGD with cautery and epinephrine injection





INDICATION FOR PROCEDURE


Worsening anemia probable GI bleed





PROCEDURE:


The procedure, risks and benefits were discussed with Patient/POA and informed


consent was obtained.  Anesthesia sedated Patient with Diprivan.  Patient was 

placed in the left lateral decubitus position.








EGD:


The Pentax videoscope was introduced through the oropharynx and advanced to the 

second portion of the duodenum under direct visualization. Retroflexion was 

performed in the stomach.





FINDINGS:


The esophagus this was normal


The stomach this was unremarkable except for an ulcer in the antrum in the 

prepyloric lesion this was covered partially by a fold and this is what I had 

perceived on previous endoscopy has possibly reflecting a duodenal bulb ulcer 

but this is exactly the same ulcer but it is in the antrum a clip was noted and 

beside the clip was a cherry spot suggestive of a blood vessel no blood or 

bleeding was seen this area was cauterized and injected with 6 cc of 

epinephrine the rest of the stomach was unremarkable


The duodenum this to appear to be unremarkable and within normal limits





ESTIMATED BLOOD LOSS:


None





SPECIMENS REMOVED:


None





COMPLICATIONS:


None





IMPRESSION:


Antral ulcer with visible vessel





PLAN:


Supportive care


Monitor labs and transfuse as needed


Continue with PPI


Consider iron supplementation


EGD in 2 months


Avoid NSAIDs and aspirin











Ej Redmond MD Apr 11, 2018 12:24

## 2018-04-11 NOTE — HHI.PR
Subjective


Remarks


Follow-up GI bleed


04/10/18-patient seen and examined him a alert and oriented 3.  H&H dropping 

however patient denies any current gross GI bleed


04/11/18-patient seen and examined, s/p panendoscopy. stable and denies any 

abdominal pain





Objective


Vitals





Vital Signs








  Date Time  Temp Pulse Resp B/P (MAP) Pulse Ox O2 Delivery O2 Flow Rate FiO2


 


4/11/18 12:17 97.0 98 18 160/75 (103) 100   


 


4/11/18 08:00 98.1 75 18 144/66 (92) 98   


 


4/11/18 04:43 97.9 87 20 158/74 (102) 98   


 


4/11/18 00:34 97.7 85 16 134/75 (94) 95   


 


4/10/18 20:04 97.7 77 20 144/68 (93) 97   


 


4/10/18 16:29 97.6 72 20 145/74 (97) 96   














I/O      


 


 4/10/18 4/10/18 4/10/18 4/11/18 4/11/18 4/11/18





 07:00 15:00 23:00 07:00 15:00 23:00


 


Intake Total   480 ml  700 ml 


 


Output Total 200 ml     


 


Balance -200 ml  480 ml  700 ml 


 


      


 


Intake Oral   480 ml   


 


Other     700 ml 


 


Output Urine Total 200 ml     


 


# Voids 1  1 3  








Result Diagram:  


4/10/18 1835





Objective Remarks


GENERAL: NAD


SKIN: Warm and dry.


HEAD: Normocephalic.


EYES: No scleral icterus. No injection or drainage. 


NECK: Supple, trachea midline. No JVD or lymphadenopathy.


CARDIOVASCULAR: Regular rate and rhythm without murmurs, gallops, or rubs. 


RESPIRATORY: Breath sounds equal bilaterally. No accessory muscle use.


GASTROINTESTINAL: Abdomen soft, non-tender, nondistended. 


MUSCULOSKELETAL: No cyanosis, or edema. 


BACK: Nontender without obvious deformity. No CVA tenderness.





Procedures


4/4: S/P angiography and prophylactic coil and embolization of gastroduodenal 

artery





A/P


Problem List:  


(1) Bleeding duodenal ulcer


ICD Code:  K26.4 - Chronic or unspecified duodenal ulcer with hemorrhage


Status:  Acute


Assessment and Plan


72-year-old male with





Hematemesis-resolved


Acute GI blood loss anemia


   On Protonix  PO BID. 


   Status post EGD with injection and clipping of large vessel in duodenal 

ulcer. 


   4/4 S/P prophylactic embolization of gastroduodenal artery


   Repeat EGD done on 4/6/2018 but Colonoscopy was not done due to poor prep. 


   patient is s/p Panendoscopy 4/11/18 pending biopsy report





Acute blood loss anemia


   patient is s/p Panendoscopy 4/11/18 pending biopsy report


   Continue Protonix 40mg BID. 





Diabetes mellitus


   continue metformin 


   Insulin sliding scale





Hypertension


   Continue amlodipine 5 mg twice daily 





Bipolar disorder


   Continue Prozac and Lithium





DVT GI prophylaxis


-Andre's and SCDs


-No pharmacological DVT prophylaxis due to acute GI bleed











Jose Angel Hernandez MD Apr 11, 2018 13:42

## 2018-04-12 VITALS
RESPIRATION RATE: 18 BRPM | OXYGEN SATURATION: 96 % | HEART RATE: 84 BPM | SYSTOLIC BLOOD PRESSURE: 131 MMHG | DIASTOLIC BLOOD PRESSURE: 65 MMHG | TEMPERATURE: 97.9 F

## 2018-04-12 VITALS
DIASTOLIC BLOOD PRESSURE: 64 MMHG | OXYGEN SATURATION: 94 % | HEART RATE: 91 BPM | SYSTOLIC BLOOD PRESSURE: 129 MMHG | RESPIRATION RATE: 18 BRPM | TEMPERATURE: 97.8 F

## 2018-04-12 VITALS
TEMPERATURE: 97.8 F | OXYGEN SATURATION: 96 % | DIASTOLIC BLOOD PRESSURE: 59 MMHG | RESPIRATION RATE: 18 BRPM | SYSTOLIC BLOOD PRESSURE: 112 MMHG | HEART RATE: 82 BPM

## 2018-04-12 VITALS — RESPIRATION RATE: 18 BRPM

## 2018-04-12 VITALS
TEMPERATURE: 98 F | HEART RATE: 93 BPM | OXYGEN SATURATION: 98 % | RESPIRATION RATE: 18 BRPM | SYSTOLIC BLOOD PRESSURE: 114 MMHG | DIASTOLIC BLOOD PRESSURE: 55 MMHG

## 2018-04-12 VITALS
TEMPERATURE: 98 F | DIASTOLIC BLOOD PRESSURE: 58 MMHG | HEART RATE: 78 BPM | OXYGEN SATURATION: 99 % | RESPIRATION RATE: 20 BRPM | SYSTOLIC BLOOD PRESSURE: 121 MMHG

## 2018-04-12 VITALS
OXYGEN SATURATION: 97 % | SYSTOLIC BLOOD PRESSURE: 109 MMHG | HEART RATE: 83 BPM | TEMPERATURE: 98.2 F | DIASTOLIC BLOOD PRESSURE: 61 MMHG | RESPIRATION RATE: 16 BRPM

## 2018-04-12 VITALS — OXYGEN SATURATION: 94 %

## 2018-04-12 VITALS
HEART RATE: 84 BPM | RESPIRATION RATE: 18 BRPM | DIASTOLIC BLOOD PRESSURE: 65 MMHG | TEMPERATURE: 97.9 F | SYSTOLIC BLOOD PRESSURE: 131 MMHG

## 2018-04-12 LAB
% SATURATION IRON PROFILE: 10 % (ref 20–50)
BUN SERPL-MCNC: 7 MG/DL (ref 7–18)
CALCIUM SERPL-MCNC: 9.4 MG/DL (ref 8.5–10.1)
CHLORIDE SERPL-SCNC: 110 MEQ/L (ref 98–107)
CREAT SERPL-MCNC: 1.29 MG/DL (ref 0.6–1.3)
ERYTHROCYTE [DISTWIDTH] IN BLOOD BY AUTOMATED COUNT: 14.9 % (ref 11.6–17.2)
GFR SERPLBLD BASED ON 1.73 SQ M-ARVRAT: 55 ML/MIN (ref 89–?)
GLUCOSE SERPL-MCNC: 117 MG/DL (ref 74–106)
HCO3 BLD-SCNC: 28 MEQ/L (ref 21–32)
HCT VFR BLD CALC: 23 % (ref 39–51)
HGB BLD-MCNC: 7.8 GM/DL (ref 13–17)
IRON (FE): 28 MCG/DL (ref 65–175)
MCH RBC QN AUTO: 30.6 PG (ref 27–34)
MCHC RBC AUTO-ENTMCNC: 33.9 % (ref 32–36)
MCV RBC AUTO: 90.3 FL (ref 80–100)
PLATELET # BLD: 315 TH/MM3 (ref 150–450)
PMV BLD AUTO: 7.4 FL (ref 7–11)
RBC # BLD AUTO: 2.55 MIL/MM3 (ref 4.5–5.9)
SODIUM SERPL-SCNC: 144 MEQ/L (ref 136–145)
TIBC SERPL-MCNC: 280 MCG/DL (ref 250–450)
WBC # BLD AUTO: 8.8 TH/MM3 (ref 4–11)

## 2018-04-12 RX ADMIN — TAMSULOSIN HYDROCHLORIDE SCH MG: 0.4 CAPSULE ORAL at 21:07

## 2018-04-12 RX ADMIN — SUCRALFATE SCH GM: 1 TABLET ORAL at 16:00

## 2018-04-12 RX ADMIN — SUCRALFATE SCH GM: 1 TABLET ORAL at 06:37

## 2018-04-12 RX ADMIN — Medication SCH ML: at 09:00

## 2018-04-12 RX ADMIN — ATORVASTATIN CALCIUM SCH MG: 40 TABLET, FILM COATED ORAL at 21:07

## 2018-04-12 RX ADMIN — ACETAMINOPHEN PRN MG: 325 TABLET ORAL at 16:45

## 2018-04-12 RX ADMIN — PANTOPRAZOLE SCH MG: 40 TABLET, DELAYED RELEASE ORAL at 21:08

## 2018-04-12 RX ADMIN — STANDARDIZED SENNA CONCENTRATE AND DOCUSATE SODIUM SCH TAB: 8.6; 5 TABLET, FILM COATED ORAL at 09:29

## 2018-04-12 RX ADMIN — STANDARDIZED SENNA CONCENTRATE AND DOCUSATE SODIUM SCH TAB: 8.6; 5 TABLET, FILM COATED ORAL at 21:07

## 2018-04-12 RX ADMIN — ONDANSETRON PRN MG: 2 INJECTION, SOLUTION INTRAMUSCULAR; INTRAVENOUS at 20:59

## 2018-04-12 RX ADMIN — CARVEDILOL SCH MG: 3.12 TABLET, FILM COATED ORAL at 09:29

## 2018-04-12 RX ADMIN — SODIUM CHLORIDE SCH MLS/HR: 900 INJECTION INTRAVENOUS at 09:25

## 2018-04-12 RX ADMIN — Medication SCH ML: at 20:45

## 2018-04-12 RX ADMIN — CHLORHEXIDINE GLUCONATE SCH PACK: 500 CLOTH TOPICAL at 03:03

## 2018-04-12 RX ADMIN — LITHIUM CARBONATE SCH MG: 300 CAPSULE, GELATIN COATED ORAL at 20:45

## 2018-04-12 RX ADMIN — ONDANSETRON PRN MG: 2 INJECTION, SOLUTION INTRAMUSCULAR; INTRAVENOUS at 15:18

## 2018-04-12 RX ADMIN — PANTOPRAZOLE SCH MG: 40 TABLET, DELAYED RELEASE ORAL at 09:29

## 2018-04-12 NOTE — HHI.GIFU
Subjective


Remarks


Pt resting in bed.  No GI complaints.  Denies bleeding.


 (Mary Carmen Hart)





Objective


Vitals I&O





Vital Signs








  Date Time  Temp Pulse Resp B/P (MAP) Pulse Ox O2 Delivery O2 Flow Rate FiO2


 


4/12/18 09:12 98.2 83 16 109/61 (77) 97   


 


4/12/18 05:07 97.8 82 18 112/59 (76) 96   


 


4/12/18 00:17 97.8 91 18 129/64 (85) 94   


 


4/11/18 22:54     99   21


 


4/11/18 20:00 97.8 85 18 140/76 (97) 99   


 


4/11/18 16:00 98.4 74 17 120/73 (89) 98   














I/O      


 


 4/11/18 4/11/18 4/11/18 4/12/18 4/12/18 4/12/18





 07:00 15:00 23:00 07:00 15:00 23:00


 


Intake Total  700 ml    


 


Balance  700 ml    


 


      


 


Other  700 ml    


 


# Voids 3  1 2  








Laboratory





Laboratory Tests








Test


  4/12/18


07:51


 


White Blood Count 8.8 


 


Red Blood Count 2.55 


 


Hemoglobin 7.8 


 


Hematocrit 23.0 


 


Mean Corpuscular Volume 90.3 


 


Mean Corpuscular Hemoglobin 30.6 


 


Mean Corpuscular Hemoglobin


Concent 33.9 


 


 


Red Cell Distribution Width 14.9 


 


Platelet Count 315 


 


Mean Platelet Volume 7.4 


 


Blood Urea Nitrogen 7 


 


Creatinine 1.29 


 


Random Glucose 117 


 


Calcium Level 9.4 


 


Sodium Level 144 


 


Potassium Level 3.4 


 


Chloride Level 110 


 


Carbon Dioxide Level 28.0 


 


Anion Gap 6 


 


Estimat Glomerular Filtration


Rate 55 


 














 Date/Time


Source Procedure


Growth Status


 


 


 3/29/18 20:40


Urine Clean Catch Urine Culture - Final


Escherichia Coli Complete








Imaging





Last Impressions








GI Bleed Scan Nuclear Medicine 4/5/18 0000 Signed





Impressions: 





 Service Date/Time:  Thursday, April 5, 2018 13:57 - CONCLUSION: Negative GI 





 bleeding scan.     Olman Peck MD 


 


Abdomen Arteriogram 4/4/18 0000 Signed





Impressions: 





 Service Date/Time:  Wednesday, April 4, 2018 12:06 - CONCLUSION:   1. Celiac 

and 





 SMA angiography demonstrates standard anatomy without evidence for active 





 hemorrhage. 2. Uncomplicated prophylactic coil and Gelfoam embolization of the 





 GDA.     Akbar Humphrey MD 


 


Abdomen/Pelvis CT 3/29/18 9257 Signed





Impressions: 





 Service Date/Time:  Thursday, March 29, 2018 20:54 - CONCLUSION:  1. No acute 





 abnormality seen. 2. Scattered colonic diverticula without inflammatory 

change.  





    Ministerio Garcia MD 


 


Chest X-Ray 3/29/18 8198 Signed





Impressions: 





 Service Date/Time:  Thursday, March 29, 2018 17:59 - CONCLUSION:  No acute 





 disease.  No free air is seen.     Ministerio Garcia MD 








Physical Exam


HEENT: Normocephalic; atraumatic


CHEST: Even/unlabored 


CARDIAC:  RRR


ABDOMEN: Soft, nondistended, nontender; bowel sounds active 


EXTREMITIES: No clubbing, cyanosis, or edema.


SKIN:  Pale


CNS:  Alert and oriented x 3 


 (Mary Carmen Hart ARNP)





Assessment and Plan


Plan


 Assessment:


- Anemia with recent GIB             PREVIOUS TRUONG


  Pt previously evaluated by our service- initially for reports of hematemesis 


  EGD (March 30th) --> duodenal ulcer with stigmata showing a visible vessel.  


  CTA (4/4) --> Prophylactic coil and gelfoam embolization of the GDA. Celiac 


  and SMA demonstrated standard anatomy without evidence for active hemorrhage.

  


  Another significant drop in H/H on April 5th. At that time there were no 

reports of 


  hematemesis, hematochezia, or melena.  


  NM bleeding scan (4/5) -->  negative. 


  EGD (April 6th) --> Esophagus appeared normal. Mild gastritis in the gastric 

antrum. 


  Normal duodenal mucosa in the 2nd part of the duodenum. Two ranging between 


  5-9mm in size ulcers were found in the duodenal bulb, argon plasma 

coagulation 


  was applied to site with complete hemostasis.  Initially planning on 

colonoscopy, 


  however pt did not take prep so unable to be done. 





RECONSULT for H/H trending down:


H/H slowly trending down- currently 8.6/25.3- same as H/H yesterday


Pt initially denied emesis- but then states he had emesis after eating yesterday


  Per RN, no report of emesis


[place] [facility] [place] from 4/8-4/9


  Per RN, no reports of blood in stool


GS was also previously following





4/12/18 s/p EGD found antral ulcer with visible vessel.  hgb 7.8 today subtle 

decrease no obvious bleeding  





Plan:


- await Hemoccult stool


- Protonix BID


- If further drop in H/H could consider colonoscopy- unable to do previously 

because pt did not take prep


- outpt capsule endoscopy 


- Consider hematology consult


- supportive care


- EGD 2m


- no NSAIDs


- notify GI of active bleeding





Pt has been seen and examined by myself and Dr. Sloan and this note is 

written on his behalf 


 (Mary Carmen Hart)


Plan


Patient was seen and examined, agree with above-noted, we will monitor H&H and 

give packed RBC as needed, patient not interested in having a colonoscopy at 

this time


 (Arti Sloan MD)











Mary Carmen Hart Apr 12, 2018 13:04


Arti Sloan MD Apr 12, 2018 16:46

## 2018-04-12 NOTE — HHI.PR
Subjective


Remarks


Follow-up GI bleed


04/10/18-patient seen and examined him a alert and oriented 3.  H&H dropping 

however patient denies any current gross GI bleed


04/11/18-patient seen and examined, s/p panendoscopy. stable and denies any 

abdominal pain


04/12/18-patient seen and examined, H&H dropping however the patient denies any 

current bleeding





Objective


Vitals





Vital Signs








  Date Time  Temp Pulse Resp B/P (MAP) Pulse Ox O2 Delivery O2 Flow Rate FiO2


 


4/12/18 09:12 98.2 83 16 109/61 (77) 97   


 


4/12/18 05:07 97.8 82 18 112/59 (76) 96   


 


4/12/18 00:17 97.8 91 18 129/64 (85) 94   


 


4/11/18 22:54     99   21


 


4/11/18 20:00 97.8 85 18 140/76 (97) 99   


 


4/11/18 16:00 98.4 74 17 120/73 (89) 98   


 


4/11/18 12:17 97.0 98 18 160/75 (103) 100   


 


4/11/18 12:00 98.6 81 17 119/65 (83) 98   














I/O      


 


 4/11/18 4/11/18 4/11/18 4/12/18 4/12/18 4/12/18





 07:00 15:00 23:00 07:00 15:00 23:00


 


Intake Total  700 ml    


 


Balance  700 ml    


 


      


 


Other  700 ml    


 


# Voids 3  1 2  








Result Diagram:  


4/12/18 0751                                                                   

             4/12/18 0751





Objective Remarks


GENERAL: NAD


SKIN: Warm and dry.


HEAD: Normocephalic.


EYES: No scleral icterus. No injection or drainage. 


NECK: Supple, trachea midline. No JVD or lymphadenopathy.


CARDIOVASCULAR: Regular rate and rhythm without murmurs, gallops, or rubs. 


RESPIRATORY: Breath sounds equal bilaterally. No accessory muscle use.


GASTROINTESTINAL: Abdomen soft, non-tender, nondistended. 


MUSCULOSKELETAL: No cyanosis, or edema. 


BACK: Nontender without obvious deformity. No CVA tenderness.





Procedures


4/4: S/P angiography and prophylactic coil and embolization of gastroduodenal 

artery





A/P


Problem List:  


(1) Bleeding duodenal ulcer


ICD Code:  K26.4 - Chronic or unspecified duodenal ulcer with hemorrhage


Status:  Acute


Assessment and Plan


72-year-old male with





Hematemesis-resolved


Acute GI blood loss anemia


   On Protonix  PO BID. 


   Status post EGD with injection and clipping of large vessel in duodenal 

ulcer. 


   4/4 S/P prophylactic embolization of gastroduodenal artery


   Repeat EGD done on 4/6/2018 but Colonoscopy was not done due to poor prep. 


   patient is s/p Panendoscopy 4/11/18 pending biopsy report


   Transfuse 1 unit PRBC today 4/12/18 as H/H 7.8/23.0


   Check Iron study and treat accordingly





Acute blood loss anemia


   patient is s/p Panendoscopy 4/11/18 pending biopsy report


   Continue Protonix 40mg BID. 





Diabetes mellitus


   continue metformin 


   Insulin sliding scale





Hypertension


   Continue amlodipine 5 mg twice daily 





Hypokalemia


   Give 60 mEq 1 potassium now





Bipolar disorder


   Continue Prozac and Lithium





DVT GI prophylaxis


-Andre's and SCDs


-No pharmacological DVT prophylaxis due to acute GI bleed











Jose Angel Hernandez MD Apr 12, 2018 11:36

## 2018-04-13 VITALS
SYSTOLIC BLOOD PRESSURE: 127 MMHG | TEMPERATURE: 98 F | RESPIRATION RATE: 20 BRPM | OXYGEN SATURATION: 97 % | DIASTOLIC BLOOD PRESSURE: 87 MMHG | HEART RATE: 104 BPM

## 2018-04-13 VITALS
RESPIRATION RATE: 18 BRPM | HEART RATE: 92 BPM | OXYGEN SATURATION: 98 % | SYSTOLIC BLOOD PRESSURE: 122 MMHG | TEMPERATURE: 97.3 F | DIASTOLIC BLOOD PRESSURE: 66 MMHG

## 2018-04-13 VITALS
RESPIRATION RATE: 18 BRPM | TEMPERATURE: 97.8 F | DIASTOLIC BLOOD PRESSURE: 51 MMHG | SYSTOLIC BLOOD PRESSURE: 104 MMHG | HEART RATE: 91 BPM | OXYGEN SATURATION: 99 %

## 2018-04-13 VITALS
HEART RATE: 100 BPM | SYSTOLIC BLOOD PRESSURE: 82 MMHG | TEMPERATURE: 97.2 F | OXYGEN SATURATION: 96 % | RESPIRATION RATE: 20 BRPM

## 2018-04-13 VITALS
OXYGEN SATURATION: 99 % | RESPIRATION RATE: 18 BRPM | SYSTOLIC BLOOD PRESSURE: 135 MMHG | HEART RATE: 82 BPM | DIASTOLIC BLOOD PRESSURE: 68 MMHG | TEMPERATURE: 98 F

## 2018-04-13 LAB
BASOPHILS # BLD AUTO: 0.1 TH/MM3 (ref 0–0.2)
BASOPHILS NFR BLD: 1 % (ref 0–2)
BUN SERPL-MCNC: 12 MG/DL (ref 7–18)
CALCIUM SERPL-MCNC: 9.6 MG/DL (ref 8.5–10.1)
CHLORIDE SERPL-SCNC: 109 MEQ/L (ref 98–107)
CREAT SERPL-MCNC: 1.31 MG/DL (ref 0.6–1.3)
EOSINOPHIL # BLD: 0.2 TH/MM3 (ref 0–0.4)
EOSINOPHIL NFR BLD: 1.9 % (ref 0–4)
ERYTHROCYTE [DISTWIDTH] IN BLOOD BY AUTOMATED COUNT: 15.1 % (ref 11.6–17.2)
GFR SERPLBLD BASED ON 1.73 SQ M-ARVRAT: 54 ML/MIN (ref 89–?)
GLUCOSE SERPL-MCNC: 94 MG/DL (ref 74–106)
HCO3 BLD-SCNC: 28.9 MEQ/L (ref 21–32)
HCT VFR BLD CALC: 23.6 % (ref 39–51)
HCT VFR BLD CALC: 25.5 % (ref 39–51)
HGB BLD-MCNC: 7.8 GM/DL (ref 13–17)
HGB BLD-MCNC: 8.6 GM/DL (ref 13–17)
LYMPHOCYTES # BLD AUTO: 2.5 TH/MM3 (ref 1–4.8)
LYMPHOCYTES NFR BLD AUTO: 26.2 % (ref 9–44)
MCH RBC QN AUTO: 30.3 PG (ref 27–34)
MCHC RBC AUTO-ENTMCNC: 33.8 % (ref 32–36)
MCV RBC AUTO: 89.6 FL (ref 80–100)
MONOCYTE #: 0.7 TH/MM3 (ref 0–0.9)
MONOCYTES NFR BLD: 7.2 % (ref 0–8)
NEUTROPHILS # BLD AUTO: 6 TH/MM3 (ref 1.8–7.7)
NEUTROPHILS NFR BLD AUTO: 63.7 % (ref 16–70)
PLATELET # BLD: 350 TH/MM3 (ref 150–450)
PMV BLD AUTO: 7.2 FL (ref 7–11)
RBC # BLD AUTO: 2.84 MIL/MM3 (ref 4.5–5.9)
SODIUM SERPL-SCNC: 143 MEQ/L (ref 136–145)
WBC # BLD AUTO: 9.4 TH/MM3 (ref 4–11)

## 2018-04-13 RX ADMIN — CARVEDILOL SCH MG: 3.12 TABLET, FILM COATED ORAL at 09:05

## 2018-04-13 RX ADMIN — CHLORHEXIDINE GLUCONATE SCH PACK: 500 CLOTH TOPICAL at 03:48

## 2018-04-13 RX ADMIN — SUCRALFATE SCH GM: 1 TABLET ORAL at 15:23

## 2018-04-13 RX ADMIN — SODIUM CHLORIDE SCH MLS/HR: 900 INJECTION INTRAVENOUS at 08:59

## 2018-04-13 RX ADMIN — SUCRALFATE SCH GM: 1 TABLET ORAL at 05:42

## 2018-04-13 RX ADMIN — PANTOPRAZOLE SCH MG: 40 TABLET, DELAYED RELEASE ORAL at 09:06

## 2018-04-13 RX ADMIN — Medication SCH ML: at 09:00

## 2018-04-13 RX ADMIN — Medication SCH ML: at 21:00

## 2018-04-13 RX ADMIN — LITHIUM CARBONATE SCH MG: 300 CAPSULE, GELATIN COATED ORAL at 21:00

## 2018-04-13 RX ADMIN — STANDARDIZED SENNA CONCENTRATE AND DOCUSATE SODIUM SCH TAB: 8.6; 5 TABLET, FILM COATED ORAL at 09:06

## 2018-04-13 RX ADMIN — PANTOPRAZOLE SCH MG: 40 TABLET, DELAYED RELEASE ORAL at 21:00

## 2018-04-13 RX ADMIN — TAMSULOSIN HYDROCHLORIDE SCH MG: 0.4 CAPSULE ORAL at 21:00

## 2018-04-13 RX ADMIN — ACETAMINOPHEN PRN MG: 325 TABLET ORAL at 23:34

## 2018-04-13 RX ADMIN — STANDARDIZED SENNA CONCENTRATE AND DOCUSATE SODIUM SCH TAB: 8.6; 5 TABLET, FILM COATED ORAL at 21:00

## 2018-04-13 RX ADMIN — ONDANSETRON PRN MG: 2 INJECTION, SOLUTION INTRAMUSCULAR; INTRAVENOUS at 18:01

## 2018-04-13 RX ADMIN — ATORVASTATIN CALCIUM SCH MG: 40 TABLET, FILM COATED ORAL at 21:00

## 2018-04-13 RX ADMIN — OCTREOTIDE ACETATE SCH MLS/HR: 1000 INJECTION, SOLUTION INTRAVENOUS; SUBCUTANEOUS at 23:25

## 2018-04-13 NOTE — HHI.PR
Subjective


Remarks


Follow-up GI bleed


04/10/18-patient seen and examined him a alert and oriented 3.  H&H dropping 

however patient denies any current gross GI bleed


04/11/18-patient seen and examined, s/p panendoscopy. stable and denies any 

abdominal pain


04/12/18-patient seen and examined, H&H dropping however the patient denies any 

current bleeding


04/13/18-patient seen and examined, transfused 1 unit PRBC yesterday





Objective


Vitals





Vital Signs








  Date Time  Temp Pulse Resp B/P (MAP) Pulse Ox O2 Delivery O2 Flow Rate FiO2


 


4/13/18 07:54 97.3 92 18 122/66 (84) 98   


 


4/13/18 04:00 98.0 104 20 127/87 (100) 97   


 


4/13/18 00:00 97.2 100 20 82/ 96   


 


4/12/18 20:00 98.0 93 18 114/55 (74) 98   


 


4/12/18 16:52 97.9 84 18 131/65    


 


4/12/18 16:00 97.9 84 18 131/65 (87) 96   


 


4/12/18 13:16     94   21


 


4/12/18 12:00 98.0 78 20 121/58 (79) 99   














I/O      


 


 4/12/18 4/12/18 4/12/18 4/13/18 4/13/18 4/13/18





 07:00 15:00 23:00 07:00 15:00 23:00


 


Intake Total   1160 ml   


 


Output Total    1250 ml  


 


Balance   1160 ml -1250 ml  


 


      


 


Intake Oral   720 ml   


 


Packed Cells   400 ml   


 


Blood Product IV Normal Saline Flush   40 ml   


 


Output Urine Total    1250 ml  


 


# Voids 2     








Result Diagram:  


4/13/18 0738                                                                   

             4/13/18 0738





Objective Remarks


GENERAL: NAD


SKIN: Warm and dry.


HEAD: Normocephalic.


EYES: No scleral icterus. No injection or drainage. 


NECK: Supple, trachea midline. No JVD or lymphadenopathy.


CARDIOVASCULAR: Regular rate and rhythm without murmurs, gallops, or rubs. 


RESPIRATORY: Breath sounds equal bilaterally. No accessory muscle use.


GASTROINTESTINAL: Abdomen soft, non-tender, nondistended. 


MUSCULOSKELETAL: No cyanosis, or edema. 


BACK: Nontender without obvious deformity. No CVA tenderness.





Procedures


4/4: S/P angiography and prophylactic coil and embolization of gastroduodenal 

artery





A/P


Problem List:  


(1) Bleeding duodenal ulcer


ICD Code:  K26.4 - Chronic or unspecified duodenal ulcer with hemorrhage


Status:  Acute


Assessment and Plan


72-year-old male with





Hematemesis-resolved


Acute GI blood loss anemia


   On Protonix  PO BID. 


   Status post EGD with injection and clipping of large vessel in duodenal 

ulcer. 


   4/4 S/P prophylactic embolization of gastroduodenal artery


   Repeat EGD done on 4/6/2018 but Colonoscopy was not done due to poor prep. 


   patient is s/p Panendoscopy 4/11/18 pending biopsy report


   Transfused 1 unit PRBC 4/12/18 and H/H stable





Acute blood loss anemia


   patient is s/p Panendoscopy 4/11/18 pending biopsy report


   Continue Protonix 40mg BID. 





Diabetes mellitus


   continue metformin 


   Insulin sliding scale





Hypertension


   Continue amlodipine 5 mg twice daily 





Hypokalemia


   Resolved s/p replacement 





Bipolar disorder


   Continue Prozac and Lithium





DVT GI prophylaxis


-Andre's and SCDs


-No pharmacological DVT prophylaxis due to acute GI bleed











Jose Angel Hernandez MD Apr 13, 2018 10:29

## 2018-04-13 NOTE — HHI.DS
__________________________________________________





Discharge Summary


Admission Date


Mar 29, 2018 at 19:44


Discharge Date:  Apr 17, 2018


Admitting Diagnosis


GI bleeding





(1) Bleeding duodenal ulcer


ICD Code:  K26.4 - Chronic or unspecified duodenal ulcer with hemorrhage


Status:  Acute


Procedures


4/4: S/P angiography and prophylactic coil and embolization of gastroduodenal 

artery


Brief History - From Admission


72-year-old male patient from the nursing home with previous history of gastric 

ulcers and GI bleeding and the last endoscopy 2 weeks ago, presents today 

because he had hematemesis and was nauseous and throwing up at the nursing home 

facility.  He denies any chest pains, shortness of breath, abdominal pains, 

diarrhea, or any other symptoms.


CBC/BMP:  


4/13/18 0738                                                                   

             4/13/18 0738





Significant Findings





Laboratory Tests








Test


  4/10/18


18:35 4/12/18


07:51 4/12/18


13:28 4/13/18


07:38


 


Hemoglobin


  8.4 GM/DL


(13.0-17.0) 7.8 GM/DL


(13.0-17.0) 


  8.6 GM/DL


(13.0-17.0)


 


Hematocrit


  24.6 %


(39.0-51.0) 23.0 %


(39.0-51.0) 


  25.5 %


(39.0-51.0)


 


Red Blood Count


  


  2.55 MIL/MM3


(4.50-5.90) 


  2.84 MIL/MM3


(4.50-5.90)


 


Random Glucose


  


  117 MG/DL


() 


  


 


 


Potassium Level


  


  3.4 MEQ/L


(3.5-5.1) 


  


 


 


Chloride Level


  


  110 MEQ/L


() 


  109 MEQ/L


()


 


Estimat Glomerular Filtration


Rate 


  55 ML/MIN


(>89) 


  54 ML/MIN


(>89)


 


Iron Level


  


  


  28 MCG/DL


() 


 


 


Percent Iron Saturation   10.0 % (20-50)  


 


Creatinine


  


  


  


  1.31 MG/DL


(0.60-1.30)








Imaging





Last Impressions








GI Bleed Scan Nuclear Medicine 4/5/18 0000 Signed





Impressions: 





 Service Date/Time:  Thursday, April 5, 2018 13:57 - CONCLUSION: Negative GI 





 bleeding scan.     Olman Peck MD 


 


Abdomen Arteriogram 4/4/18 0000 Signed





Impressions: 





 Service Date/Time:  Wednesday, April 4, 2018 12:06 - CONCLUSION:   1. Celiac 

and 





 SMA angiography demonstrates standard anatomy without evidence for active 





 hemorrhage. 2. Uncomplicated prophylactic coil and Gelfoam embolization of the 





 GDA.     Akbar Humphrey MD 


 


Abdomen/Pelvis CT 3/29/18 1851 Signed





Impressions: 





 Service Date/Time:  Thursday, March 29, 2018 20:54 - CONCLUSION:  1. No acute 





 abnormality seen. 2. Scattered colonic diverticula without inflammatory 

change.  





    Ministerio Garcia MD 


 


Chest X-Ray 3/29/18 3629 Signed





Impressions: 





 Service Date/Time:  Thursday, March 29, 2018 17:59 - CONCLUSION:  No acute 





 disease.  No free air is seen.     Ministerio Garcia MD 








PE at Discharge


GENERAL: NAD


SKIN: Warm and dry.


HEAD: Normocephalic.


EYES: No scleral icterus. No injection or drainage. 


NECK: Supple, trachea midline. No JVD or lymphadenopathy.


CARDIOVASCULAR: Regular rate and rhythm without murmurs, gallops, or rubs. 


RESPIRATORY: Breath sounds equal bilaterally. No accessory muscle use.


GASTROINTESTINAL: Abdomen soft, non-tender, nondistended. 


MUSCULOSKELETAL: No cyanosis, or edema. 


BACK: Nontender without obvious deformity. No CVA tenderness.





Hospital Course


While in the hospital, patient was treated for:





Hematemesis-resolved


Acute GI blood loss anemia


   Treated with PPI drip then switched to Protonix  PO BID. 


   Status post EGD with injection and clipping of large vessel in duodenal 

ulcer. 


   4/4 S/P prophylactic embolization of gastroduodenal artery


   patient had multiple EGDs as well as Panendoscopy since admission


   Transfused couple units PRBC


   Plavix on hold





Acute blood loss anemia


   s/p multiple panendoscopies since admission


   Treated with PPI drip then switched to Protonix  PO BID. 





Iron deficient anemia


   Treatment with Ferrous sulfate


   Consider Venofer infusion @[facility]





Diabetes mellitus


   Treated with metformin ,Insulin sliding scale





Hypertension


   Treated with amlodipine 5 mg twice daily 





Hypokalemia


   Resolved s/p replacement 





Bipolar disorder


   Treated with Prozac and Lithium





DVT GI prophylaxis


-Andre's and SCDs


-No pharmacological DVT prophylaxis due to acute GI bleed


Pt Condition on Discharge:  Stable


Discharge Disposition:  Rehab Inpatient


Discharge Time:  > 30 minutes


Discharge Instructions


DIET: Follow Instructions for:  Diabetic Diet


Activities you can perform:  Regular-No Restrictions


Other Activity Instructions:  


Follow PT recommendations at SNF


Follow up Referrals:  


Gastroenterology


PCP Follow-up - 2-3 Days





New Medications:  


Ferrous Sulfate (Ferrous Sulfate) 325 Mg (65 Mg Iron) Tablet


325 MG PO BIDPC for Nutritional Supplement, #60 TAB 0 Refills





 


Continued Medications:  


Albuterol 8.5 GM Inh (Proair Hfa 8.5 GM Inh) 90 Mcg/Act Aer


2 PUFF INH Q4-6H PRN for SHORTNESS OF BREATH, #1 INHALER 0 Refills


108 mcg/actuation


Amlodipine (Norvasc) 5 Mg Tab


5 MG PO BID for Blood Pressure Management, #60 TAB





Atorvastatin (Atorvastatin) 40 Mg Tab


40 MG PO HS for Cholesterol Management, #90 TAB 3 Refills





Carvedilol (Carvedilol) 3.125 Mg Tab


3.125 MG PO DAILY, #60 TAB 0 Refills





Fluoxetine (Fluoxetine) 40 Mg Cap


40 CAP PO HS, #30 CAP 0 Refills





Lithium Carbonate (Lithium Carbonate) 600 Mg Cap


600 MG PO HS, CAP 0 Refills





Metformin (Metformin) 500 Mg Tab


500 MG PO BIDPC for Blood Sugar Management, #60 TAB 0 Refills





Pantoprazole (Protonix) 40 Mg Tab


40 MG PO BID for Ulcer Prevention, #60 TAB 3 Refills





Sucralfate (Carafate) 1 Gram Tab


1 GM PO BIDAC for GIB, #60 TAB





Tamsulosin (Flomax) 0.4 Mg Cap


0.4 MG PO HS for Manage Prostate Problems, #30 CAP 0 Refills

















Jose Angel Hernandez MD Apr 13, 2018 10:33

## 2018-04-13 NOTE — HHI.GIFU
Subjective


Remarks


Pt resting in bed.  Denies bleeding.  No GI complaints.


 (Mary Carmen Hart)





Objective


Vitals I&O





Vital Signs








  Date Time  Temp Pulse Resp B/P (MAP) Pulse Ox O2 Delivery O2 Flow Rate FiO2


 


4/13/18 12:10 98.0 82 18 135/68 (90) 99   


 


4/13/18 07:54 97.3 92 18 122/66 (84) 98   


 


4/13/18 04:00 98.0 104 20 127/87 (100) 97   


 


4/13/18 00:00 97.2 100 20 82/ 96   


 


4/12/18 20:00 98.0 93 18 114/55 (74) 98   


 


4/12/18 16:52 97.9 84 18 131/65    


 


4/12/18 16:00 97.9 84 18 131/65 (87) 96   


 


4/12/18 13:16     94   21














I/O      


 


 4/12/18 4/12/18 4/12/18 4/13/18 4/13/18 4/13/18





 07:00 15:00 23:00 07:00 15:00 23:00


 


Intake Total   1160 ml   


 


Output Total    1250 ml  


 


Balance   1160 ml -1250 ml  


 


      


 


Intake Oral   720 ml   


 


Packed Cells   400 ml   


 


Blood Product IV Normal Saline Flush   40 ml   


 


Output Urine Total    1250 ml  


 


# Voids 2     








Laboratory





Laboratory Tests








Test


  4/12/18


13:28 4/13/18


07:38


 


Iron Level 28  


 


Total Iron Binding Capacity 280  


 


Percent Iron Saturation 10.0  


 


White Blood Count  9.4 


 


Red Blood Count  2.84 


 


Hemoglobin  8.6 


 


Hematocrit  25.5 


 


Mean Corpuscular Volume  89.6 


 


Mean Corpuscular Hemoglobin  30.3 


 


Mean Corpuscular Hemoglobin


Concent 


  33.8 


 


 


Red Cell Distribution Width  15.1 


 


Platelet Count  350 


 


Mean Platelet Volume  7.2 


 


Neutrophils (%) (Auto)  63.7 


 


Lymphocytes (%) (Auto)  26.2 


 


Monocytes (%) (Auto)  7.2 


 


Eosinophils (%) (Auto)  1.9 


 


Basophils (%) (Auto)  1.0 


 


Neutrophils # (Auto)  6.0 


 


Lymphocytes # (Auto)  2.5 


 


Monocytes # (Auto)  0.7 


 


Eosinophils # (Auto)  0.2 


 


Basophils # (Auto)  0.1 


 


CBC Comment  DIFF FINAL 


 


Differential Comment   


 


Blood Urea Nitrogen  12 


 


Creatinine  1.31 


 


Random Glucose  94 


 


Calcium Level  9.6 


 


Sodium Level  143 


 


Potassium Level  3.8 


 


Chloride Level  109 


 


Carbon Dioxide Level  28.9 


 


Anion Gap  5 


 


Estimat Glomerular Filtration


Rate 


  54 


 














 Date/Time


Source Procedure


Growth Status


 


 


 3/29/18 20:40


Urine Clean Catch Urine Culture - Final


Escherichia Coli Complete








Imaging





Last Impressions








GI Bleed Scan Nuclear Medicine 4/5/18 0000 Signed





Impressions: 





 Service Date/Time:  Thursday, April 5, 2018 13:57 - CONCLUSION: Negative GI 





 bleeding scan.     Olman Peck MD 


 


Abdomen Arteriogram 4/4/18 0000 Signed





Impressions: 





 Service Date/Time:  Wednesday, April 4, 2018 12:06 - CONCLUSION:   1. Celiac 

and 





 SMA angiography demonstrates standard anatomy without evidence for active 





 hemorrhage. 2. Uncomplicated prophylactic coil and Gelfoam embolization of the 





 GDA.     Akbar Humphrey MD 


 


Abdomen/Pelvis CT 3/29/18 1851 Signed





Impressions: 





 Service Date/Time:  Thursday, March 29, 2018 20:54 - CONCLUSION:  1. No acute 





 abnormality seen. 2. Scattered colonic diverticula without inflammatory 

change.  





    Ministerio Garcia MD 


 


Chest X-Ray 3/29/18 4389 Signed





Impressions: 





 Service Date/Time:  Thursday, March 29, 2018 17:59 - CONCLUSION:  No acute 





 disease.  No free air is seen.     Ministerio Garcia MD 








Physical Exam


HEENT: Normocephalic; atraumatic


CHEST: Even/unlabored 


CARDIAC:  RRR


ABDOMEN: Soft, nondistended, nontender; bowel sounds active 


EXTREMITIES: No clubbing, cyanosis, or edema.


SKIN:  Pale


CNS:  Alert and oriented x 3 


 (Mary Carmen Hart ARNP)





Assessment and Plan


Plan


 Assessment:


- Anemia with recent GIB             PREVIOUS TRUONG


  Pt previously evaluated by our service- initially for reports of hematemesis 


  EGD (March 30th) --> duodenal ulcer with stigmata showing a visible vessel.  


  CTA (4/4) --> Prophylactic coil and gelfoam embolization of the GDA. Celiac 


  and SMA demonstrated standard anatomy without evidence for active hemorrhage.

  


  Another significant drop in H/H on April 5th. At that time there were no 

reports of 


  hematemesis, hematochezia, or melena.  


  NM bleeding scan (4/5) -->  negative. 


  EGD (April 6th) --> Esophagus appeared normal. Mild gastritis in the gastric 

antrum. 


  Normal duodenal mucosa in the 2nd part of the duodenum. Two ranging between 


  5-9mm in size ulcers were found in the duodenal bulb, argon plasma 

coagulation 


  was applied to site with complete hemostasis.  Initially planning on 

colonoscopy, 


  however pt did not take prep so unable to be done. 





RECONSULT for H/H trending down:


H/H slowly trending down- currently 8.6/25.3- same as H/H yesterday


Pt initially denied emesis- but then states he had emesis after eating yesterday


  Per RN, no report of emesis


No BM since transfer from [facility] [place]- 7 BMs documented from 4/8-4/9


  Per RN, no reports of blood in stool


GS was also previously following





4/12/18 s/p EGD found antral ulcer with visible vessel.  hgb 7.8 today subtle 

decrease no obvious bleeding  


4/13/18   declining colonoscopy.  received 1 unit prbc yesterday, hgb 8.6 

today.  no obvious GIB.





PLAN


- SARA


- monitor HH


- transfuse as needed


- supportive care


- GI will sign off.  please reconsult if needed


pt seen by myself and Dr Sloan and this note is on his behalf


 (Mary Carmen Hart)


Plan


Patient was seen and examined, agree with above note, hemoglobin stable, 

patient denies and refused colonoscopy, he is waiting for placement since he is 

too weak to, we will follow-up as needed


 (Arti Sloan MD)











Mary Carmen Hart Apr 13, 2018 12:42


Arti Sloan MD Apr 13, 2018 16:55

## 2018-04-13 NOTE — RADRPT
EXAM DATE/TIME:  2018 21:05 

 

[facility] COMPARISON:     

No previous studies available for comparison.

 

 

INDICATIONS :     

*** Blood in vomit.

                       

 

DOSE:      

20.2 mCi Tc99m Ultratag labeled red blood cells IV 

                       

 

IMAGIN Minutes.

                       

 

MEDICAL HISTORY :     

Diabetes mellitus type 2.   

 

SURGICAL HISTORY :          

Left hand surgery.

 

ENCOUNTER:     

Initial

 

ACUITY:     

2 weeks

 

PAIN SCALE:     

3/10

 

LOCATION:        

Abdomen.

 

TECHNIQUE:     

Following the modified in vitro labeling of autologous red cells, dynamic continuous images were acqu
ired for the specified interval.

 

FINDINGS:     

 

BIODISTRIBUTION:     

There is a very good labeling of red cells without significant uptake in the gastric wall.  There is 
good delineation of the blood pool of the spleen and abdominal vessels.

 

BLEEDING:     

No episodes of active GI bleeding are observed during specified interval of continuous observation.

 

CONCLUSION:     Normal examination.  

 

 

 

 Sb Rai MD on 2018 at 22:59           

Board Certified Radiologist.

 This report was verified electronically.

## 2018-04-14 VITALS
DIASTOLIC BLOOD PRESSURE: 71 MMHG | TEMPERATURE: 98.2 F | SYSTOLIC BLOOD PRESSURE: 137 MMHG | RESPIRATION RATE: 18 BRPM | HEART RATE: 86 BPM | OXYGEN SATURATION: 95 %

## 2018-04-14 VITALS
RESPIRATION RATE: 18 BRPM | SYSTOLIC BLOOD PRESSURE: 103 MMHG | OXYGEN SATURATION: 96 % | DIASTOLIC BLOOD PRESSURE: 65 MMHG | HEART RATE: 91 BPM | TEMPERATURE: 97.9 F

## 2018-04-14 VITALS
SYSTOLIC BLOOD PRESSURE: 115 MMHG | DIASTOLIC BLOOD PRESSURE: 65 MMHG | OXYGEN SATURATION: 96 % | TEMPERATURE: 97.9 F | HEART RATE: 89 BPM | RESPIRATION RATE: 16 BRPM

## 2018-04-14 VITALS
OXYGEN SATURATION: 99 % | TEMPERATURE: 98.1 F | DIASTOLIC BLOOD PRESSURE: 91 MMHG | RESPIRATION RATE: 18 BRPM | SYSTOLIC BLOOD PRESSURE: 128 MMHG | HEART RATE: 91 BPM

## 2018-04-14 VITALS
OXYGEN SATURATION: 99 % | TEMPERATURE: 97.3 F | SYSTOLIC BLOOD PRESSURE: 129 MMHG | HEART RATE: 83 BPM | DIASTOLIC BLOOD PRESSURE: 74 MMHG | RESPIRATION RATE: 18 BRPM

## 2018-04-14 VITALS
SYSTOLIC BLOOD PRESSURE: 137 MMHG | HEART RATE: 94 BPM | DIASTOLIC BLOOD PRESSURE: 75 MMHG | OXYGEN SATURATION: 99 % | TEMPERATURE: 97.1 F | RESPIRATION RATE: 18 BRPM

## 2018-04-14 VITALS
OXYGEN SATURATION: 95 % | SYSTOLIC BLOOD PRESSURE: 93 MMHG | TEMPERATURE: 97.5 F | DIASTOLIC BLOOD PRESSURE: 62 MMHG | RESPIRATION RATE: 18 BRPM | HEART RATE: 107 BPM

## 2018-04-14 VITALS — HEART RATE: 83 BPM | RESPIRATION RATE: 18 BRPM | OXYGEN SATURATION: 97 % | TEMPERATURE: 98 F

## 2018-04-14 VITALS
DIASTOLIC BLOOD PRESSURE: 70 MMHG | HEART RATE: 80 BPM | OXYGEN SATURATION: 96 % | RESPIRATION RATE: 16 BRPM | TEMPERATURE: 97.1 F | SYSTOLIC BLOOD PRESSURE: 122 MMHG

## 2018-04-14 VITALS
OXYGEN SATURATION: 96 % | DIASTOLIC BLOOD PRESSURE: 68 MMHG | HEART RATE: 82 BPM | RESPIRATION RATE: 16 BRPM | SYSTOLIC BLOOD PRESSURE: 103 MMHG

## 2018-04-14 VITALS
TEMPERATURE: 97.8 F | HEART RATE: 110 BPM | DIASTOLIC BLOOD PRESSURE: 74 MMHG | OXYGEN SATURATION: 97 % | SYSTOLIC BLOOD PRESSURE: 131 MMHG | RESPIRATION RATE: 21 BRPM

## 2018-04-14 LAB
BASOPHILS # BLD AUTO: 0.1 TH/MM3 (ref 0–0.2)
BASOPHILS NFR BLD: 0.9 % (ref 0–2)
EOSINOPHIL # BLD: 0.2 TH/MM3 (ref 0–0.4)
EOSINOPHIL NFR BLD: 2 % (ref 0–4)
ERYTHROCYTE [DISTWIDTH] IN BLOOD BY AUTOMATED COUNT: 14.2 % (ref 11.6–17.2)
HCT VFR BLD CALC: 26.9 % (ref 39–51)
HGB BLD-MCNC: 9.1 GM/DL (ref 13–17)
LYMPHOCYTES # BLD AUTO: 2.9 TH/MM3 (ref 1–4.8)
LYMPHOCYTES NFR BLD AUTO: 28.1 % (ref 9–44)
MCH RBC QN AUTO: 30.2 PG (ref 27–34)
MCHC RBC AUTO-ENTMCNC: 33.9 % (ref 32–36)
MCV RBC AUTO: 89.1 FL (ref 80–100)
MONOCYTE #: 0.9 TH/MM3 (ref 0–0.9)
MONOCYTES NFR BLD: 8.4 % (ref 0–8)
NEUTROPHILS # BLD AUTO: 6.3 TH/MM3 (ref 1.8–7.7)
NEUTROPHILS NFR BLD AUTO: 60.6 % (ref 16–70)
PLATELET # BLD: 329 TH/MM3 (ref 150–450)
PMV BLD AUTO: 7.4 FL (ref 7–11)
RBC # BLD AUTO: 3.02 MIL/MM3 (ref 4.5–5.9)
WBC # BLD AUTO: 10.5 TH/MM3 (ref 4–11)

## 2018-04-14 PROCEDURE — 0D578ZZ DESTRUCTION OF STOMACH, PYLORUS, VIA NATURAL OR ARTIFICIAL OPENING ENDOSCOPIC: ICD-10-PCS | Performed by: INTERNAL MEDICINE

## 2018-04-14 PROCEDURE — 3E0G8GC INTRODUCTION OF OTHER THERAPEUTIC SUBSTANCE INTO UPPER GI, VIA NATURAL OR ARTIFICIAL OPENING ENDOSCOPIC: ICD-10-PCS | Performed by: INTERNAL MEDICINE

## 2018-04-14 RX ADMIN — PANTOPRAZOLE SCH MG: 40 TABLET, DELAYED RELEASE ORAL at 08:08

## 2018-04-14 RX ADMIN — SUCRALFATE SCH GM: 1 TABLET ORAL at 17:36

## 2018-04-14 RX ADMIN — Medication SCH ML: at 08:05

## 2018-04-14 RX ADMIN — LITHIUM CARBONATE SCH MG: 300 CAPSULE, GELATIN COATED ORAL at 21:02

## 2018-04-14 RX ADMIN — STANDARDIZED SENNA CONCENTRATE AND DOCUSATE SODIUM SCH TAB: 8.6; 5 TABLET, FILM COATED ORAL at 08:06

## 2018-04-14 RX ADMIN — TAMSULOSIN HYDROCHLORIDE SCH MG: 0.4 CAPSULE ORAL at 21:00

## 2018-04-14 RX ADMIN — Medication SCH ML: at 21:03

## 2018-04-14 RX ADMIN — PANTOPRAZOLE SCH MG: 40 TABLET, DELAYED RELEASE ORAL at 21:03

## 2018-04-14 RX ADMIN — OCTREOTIDE ACETATE SCH MLS/HR: 1000 INJECTION, SOLUTION INTRAVENOUS; SUBCUTANEOUS at 05:25

## 2018-04-14 RX ADMIN — CHLORHEXIDINE GLUCONATE SCH PACK: 500 CLOTH TOPICAL at 04:00

## 2018-04-14 RX ADMIN — CARVEDILOL SCH MG: 3.12 TABLET, FILM COATED ORAL at 08:06

## 2018-04-14 RX ADMIN — PANTOPRAZOLE SODIUM SCH MLS/HR: 40 INJECTION, POWDER, FOR SOLUTION INTRAVENOUS at 06:07

## 2018-04-14 RX ADMIN — ATORVASTATIN CALCIUM SCH MG: 40 TABLET, FILM COATED ORAL at 21:03

## 2018-04-14 RX ADMIN — OCTREOTIDE ACETATE SCH MLS/HR: 1000 INJECTION, SOLUTION INTRAVENOUS; SUBCUTANEOUS at 17:37

## 2018-04-14 RX ADMIN — STANDARDIZED SENNA CONCENTRATE AND DOCUSATE SODIUM SCH TAB: 8.6; 5 TABLET, FILM COATED ORAL at 21:03

## 2018-04-14 RX ADMIN — SUCRALFATE SCH GM: 1 TABLET ORAL at 08:08

## 2018-04-14 NOTE — HHI.PR
Subjective


Remarks


Follow-up GI bleed


04/10/18-patient seen and examined him a alert and oriented 3.  H&H dropping 

however patient denies any current gross GI bleed


04/11/18-patient seen and examined, s/p panendoscopy. stable and denies any 

abdominal pain


04/12/18-patient seen and examined, H&H dropping however the patient denies any 

current bleeding


04/13/18-patient seen and examined, transfused 1 unit PRBC yesterday


04/14/18-patient seen and examined; patient had 1 episode of active GI bleed 

yesterday for which discharge was put on hold. None since then. Currently NPO 

pending EGD this AM





Objective


Vitals





Vital Signs








  Date Time  Temp Pulse Resp B/P (MAP) Pulse Ox O2 Delivery O2 Flow Rate FiO2


 


4/14/18 08:37 97.3 83 18 129/74 (92) 99   


 


4/14/18 05:58 97.1 80 16 122/70 96   


 


4/14/18 04:00 98.0 83 18  97   


 


4/14/18 03:30 97.9 89 16 115/65 96   


 


4/14/18 02:58  82 16 103/68 96   


 


4/14/18 02:00 97.9 91 18 103/65 (78) 96   


 


4/14/18 00:25 97.5 107 18 93/62 95   


 


4/14/18 00:00 97.8 110 21 131/74 (93) 97   


 


4/14/18 00:00 97.8 110 18 131/74 97   


 


4/13/18 15:56 97.8 91 18 104/51 (68) 99   














I/O      


 


 4/13/18 4/13/18 4/13/18 4/14/18 4/14/18 4/14/18





 07:00 15:00 23:00 07:00 15:00 23:00


 


Intake Total  480 ml  920 ml  


 


Output Total 1250 ml  450 ml   


 


Balance -1250 ml 480 ml -450 ml 920 ml  


 


      


 


Intake Oral  480 ml    


 


Packed Cells    800 ml  


 


Blood Product IV Normal Saline Flush    120 ml  


 


Output Urine Total 1250 ml  450 ml   


 


# Voids  2    


 


# Bowel Movements   0   








Result Diagram:  


4/14/18 0626                                                                   

             4/13/18 0738





Objective Remarks


GENERAL: NAD


SKIN: Warm and dry.


HEAD: Normocephalic.


EYES: No scleral icterus. No injection or drainage. 


NECK: Supple, trachea midline. No JVD or lymphadenopathy.


CARDIOVASCULAR: Regular rate and rhythm without murmurs, gallops, or rubs. 


RESPIRATORY: Breath sounds equal bilaterally. No accessory muscle use.


GASTROINTESTINAL: Abdomen soft, non-tender, nondistended. 


MUSCULOSKELETAL: No cyanosis, or edema. 


BACK: Nontender without obvious deformity. No CVA tenderness.





Procedures


4/4: S/P angiography and prophylactic coil and embolization of gastroduodenal 

artery





A/P


Problem List:  


(1) Bleeding duodenal ulcer


ICD Code:  K26.4 - Chronic or unspecified duodenal ulcer with hemorrhage


Status:  Acute


Assessment and Plan


72-year-old male with





Hematemesis-resolved


Acute GI blood loss anemia


   On Protonix  Drip


   Status post EGD with injection and clipping of large vessel in duodenal 

ulcer. 


   4/4 S/P prophylactic embolization of gastroduodenal artery


   Repeat EGD done on 4/6/2018 but Colonoscopy was not done due to poor prep. 


   patient is s/p Panendoscopy 4/11/18 pending biopsy report


   Transfused  PRBC 4/12/18 and H/H stable


   Plan for EGD today 4/14/18





Acute blood loss anemia


   patient is s/p Panendoscopy 4/11/18 pending biopsy report


   Continue Protonix 40mg BID. 





Diabetes mellitus


   continue metformin 


   Insulin sliding scale





Hypertension


   Continue amlodipine 5 mg twice daily 





Hypokalemia


   Resolved s/p replacement 





Bipolar disorder


   Continue Prozac and Lithium





DVT GI prophylaxis


-Andre's and SCDs


-No pharmacological DVT prophylaxis due to acute GI bleed











Jose Angel Hernandez MD Apr 14, 2018 12:19

## 2018-04-14 NOTE — HHI.GIFU
Subjective


Remarks


Pt resting in bed.  denies bleeding.  Per RN no bleeding over night.


 (Mary Carmen Hart)





Objective


Vitals I&O





Vital Signs








  Date Time  Temp Pulse Resp B/P (MAP) Pulse Ox O2 Delivery O2 Flow Rate FiO2


 


4/14/18 08:37 97.3 83 18 129/74 (92) 99   


 


4/14/18 05:58 97.1 80 16 122/70 96   


 


4/14/18 04:00 98.0 83 18  97   


 


4/14/18 03:30 97.9 89 16 115/65 96   


 


4/14/18 02:58  82 16 103/68 96   


 


4/14/18 02:00 97.9 91 18 103/65 (78) 96   


 


4/14/18 00:25 97.5 107 18 93/62 95   


 


4/14/18 00:00 97.8 110 21 131/74 (93) 97   


 


4/14/18 00:00 97.8 110 18 131/74 97   


 


4/13/18 15:56 97.8 91 18 104/51 (68) 99   


 


4/13/18 12:10 98.0 82 18 135/68 (90) 99   














I/O      


 


 4/13/18 4/13/18 4/13/18 4/14/18 4/14/18 4/14/18





 07:00 15:00 23:00 07:00 15:00 23:00


 


Intake Total  480 ml  920 ml  


 


Output Total 1250 ml  450 ml   


 


Balance -1250 ml 480 ml -450 ml 920 ml  


 


      


 


Intake Oral  480 ml    


 


Packed Cells    800 ml  


 


Blood Product IV Normal Saline Flush    120 ml  


 


Output Urine Total 1250 ml  450 ml   


 


# Voids  2    


 


# Bowel Movements   0   








Laboratory





Laboratory Tests








Test


  4/13/18


19:31 4/14/18


06:26


 


Hemoglobin 7.8  9.1 


 


Hematocrit 23.6  26.9 


 


White Blood Count  10.5 


 


Red Blood Count  3.02 


 


Mean Corpuscular Volume  89.1 


 


Mean Corpuscular Hemoglobin  30.2 


 


Mean Corpuscular Hemoglobin


Concent 


  33.9 


 


 


Red Cell Distribution Width  14.2 


 


Platelet Count  329 


 


Mean Platelet Volume  7.4 


 


Neutrophils (%) (Auto)  60.6 


 


Lymphocytes (%) (Auto)  28.1 


 


Monocytes (%) (Auto)  8.4 


 


Eosinophils (%) (Auto)  2.0 


 


Basophils (%) (Auto)  0.9 


 


Neutrophils # (Auto)  6.3 


 


Lymphocytes # (Auto)  2.9 


 


Monocytes # (Auto)  0.9 


 


Eosinophils # (Auto)  0.2 


 


Basophils # (Auto)  0.1 


 


CBC Comment  DIFF FINAL 


 


Differential Comment   














 Date/Time


Source Procedure


Growth Status


 


 


 3/29/18 20:40


Urine Clean Catch Urine Culture - Final


Escherichia Coli Complete








Imaging





Last Impressions








GI Bleed Scan Nuclear Medicine 4/13/18 0000 Signed





Impressions: 





 Service Date/Time:  Friday, April 13, 2018 21:05 - CONCLUSION: Normal 





 examination.       Sb Rai MD 


 


Abdomen Arteriogram 4/4/18 0000 Signed





Impressions: 





 Service Date/Time:  Wednesday, April 4, 2018 12:06 - CONCLUSION:   1. Celiac 

and 





 SMA angiography demonstrates standard anatomy without evidence for active 





 hemorrhage. 2. Uncomplicated prophylactic coil and Gelfoam embolization of the 





 GDA.     Akbar Humphrey MD 


 


Abdomen/Pelvis CT 3/29/18 1851 Signed





Impressions: 





 Service Date/Time:  Thursday, March 29, 2018 20:54 - CONCLUSION:  1. No acute 





 abnormality seen. 2. Scattered colonic diverticula without inflammatory 

change.  





    Ministerio Garcia MD 


 


Chest X-Ray 3/29/18 1749 Signed





Impressions: 





 Service Date/Time:  Thursday, March 29, 2018 17:59 - CONCLUSION:  No acute 





 disease.  No free air is seen.     Ministerio Garcia MD 








Physical Exam


HEENT: Normocephalic; atraumatic


CHEST: Even/unlabored 


CARDIAC:  RRR


ABDOMEN: Soft, nondistended, nontender; bowel sounds active 


EXTREMITIES: No clubbing, cyanosis, or edema.


SKIN:  Pale


CNS:  drowsy


 (Mary Carmen Hart ARNP)





Assessment and Plan


Plan


 Assessment:


- Anemia with recent GIB             PREVIOUS TRUONG


  Pt previously evaluated by our service- initially for reports of hematemesis 


  EGD (March 30th) --> duodenal ulcer with stigmata showing a visible vessel.  


  CTA (4/4) --> Prophylactic coil and gelfoam embolization of the GDA. Celiac 


  and SMA demonstrated standard anatomy without evidence for active hemorrhage.

  


  Another significant drop in H/H on April 5th. At that time there were no 

reports of 


  hematemesis, hematochezia, or melena.  


  NM bleeding scan (4/5) -->  negative. 


  EGD (April 6th) --> Esophagus appeared normal. Mild gastritis in the gastric 

antrum. 


  Normal duodenal mucosa in the 2nd part of the duodenum. Two ranging between 


  5-9mm in size ulcers were found in the duodenal bulb, argon plasma 

coagulation 


  was applied to site with complete hemostasis.  Initially planning on 

colonoscopy, 


  however pt did not take prep so unable to be done. 





RECONSULT for H/H trending down:


H/H slowly trending down- currently 8.6/25.3- same as H/H yesterday


Pt initially denied emesis- but then states he had emesis after eating yesterday


  Per RN, no report of emesis


No BM [place] [facility] [place] 7 BMs documented from 4/8-4/9


  Per RN, no reports of blood in stool


GS was also previously following





4/12/18 s/p EGD found antral ulcer with visible vessel.  hgb 7.8 today subtle 

decrease no obvious bleeding  


4/13/18   declining colonoscopy.  received 1 unit prbc yesterday, hgb 8.6 

today.  no obvious GIB.


4/14/18  hgb dropped to 7.8, s/p 2 x prbc now 9.1  BLEED scan normal.  no 

obvious bleeding





PLAN


- EGD 1100 today


- obtain consent


- NPO


- monitor labs


- transfuse as needed


- further recs to follow


pt seen by myself and Dr sloan and this note is on his behalf


 (Mary Carmen Hart)


Plan


Patient was seen and examined, agree with the above note, got consent from 

daughter, we will plan upper endoscopy stat today for possible active bleeding


 (Arti Sloan MD)











Mary Carmen Hart Apr 14, 2018 10:06


Arti Sloan MD Apr 14, 2018 12:08

## 2018-04-14 NOTE — PD.PROCEDR
GI Procedure


PROCEDURE PERFORMED


EGD with bleeding control by injection and cautery





INDICATION FOR PROCEDURE


Bleeding gastric ulcer





PROCEDURE:


The procedure, risks and benefits were discussed with Mr. Luevano and informed


consent was obtained.  Anesthesia sedated him with Diprivan.  He was placed in 

the left lateral decubitus position.





EGD:


The Pentax videoscope was introduced through the oropharynx and advanced to the 

second portion of the duodenum under direct visualization. Retroflexion was 

performed in the stomach.  There was a large ulcer in the antrum with a clip 

but also adherent clot on it most likely consistent with a recent active bleed, 

this area was cauterized with gold probe multiple application and injected with 

3 cc of epinephrine, no active bleeding at the end of the case





ESTIMATED BLOOD LOSS:


None





SPECIMENS REMOVED:


None





COMPLICATIONS:


None





IMPRESSION:


There was a large ulcer in the antrum with a clip but also adherent clot on it 

most likely consistent with a recent active bleed, this area was cauterized 

with gold probe multiple application and injected with 3 cc of epinephrine, no 

active bleeding at the end of the case


Otherwise normal exam except severe gastritis





PLAN:


No NSAIDs


Continue PPI


Monitor H&H with packed RBC as needed


If hemoglobin stable can start on clear liquids tonight and advance slowly











Arti Sloan MD Apr 14, 2018 12:18

## 2018-04-15 VITALS
OXYGEN SATURATION: 95 % | TEMPERATURE: 98.3 F | RESPIRATION RATE: 18 BRPM | SYSTOLIC BLOOD PRESSURE: 116 MMHG | HEART RATE: 82 BPM | DIASTOLIC BLOOD PRESSURE: 70 MMHG

## 2018-04-15 VITALS
OXYGEN SATURATION: 99 % | SYSTOLIC BLOOD PRESSURE: 129 MMHG | RESPIRATION RATE: 18 BRPM | DIASTOLIC BLOOD PRESSURE: 60 MMHG | HEART RATE: 80 BPM | TEMPERATURE: 97.8 F

## 2018-04-15 VITALS
RESPIRATION RATE: 18 BRPM | TEMPERATURE: 97.8 F | SYSTOLIC BLOOD PRESSURE: 160 MMHG | OXYGEN SATURATION: 100 % | DIASTOLIC BLOOD PRESSURE: 90 MMHG | HEART RATE: 97 BPM

## 2018-04-15 VITALS
SYSTOLIC BLOOD PRESSURE: 137 MMHG | OXYGEN SATURATION: 98 % | RESPIRATION RATE: 18 BRPM | TEMPERATURE: 97.7 F | HEART RATE: 78 BPM | DIASTOLIC BLOOD PRESSURE: 74 MMHG

## 2018-04-15 VITALS
OXYGEN SATURATION: 92 % | HEART RATE: 86 BPM | DIASTOLIC BLOOD PRESSURE: 87 MMHG | RESPIRATION RATE: 18 BRPM | SYSTOLIC BLOOD PRESSURE: 130 MMHG | TEMPERATURE: 97.7 F

## 2018-04-15 VITALS
RESPIRATION RATE: 18 BRPM | OXYGEN SATURATION: 95 % | SYSTOLIC BLOOD PRESSURE: 147 MMHG | DIASTOLIC BLOOD PRESSURE: 79 MMHG | TEMPERATURE: 97.5 F

## 2018-04-15 RX ADMIN — TAMSULOSIN HYDROCHLORIDE SCH MG: 0.4 CAPSULE ORAL at 22:25

## 2018-04-15 RX ADMIN — PANTOPRAZOLE SCH MG: 40 TABLET, DELAYED RELEASE ORAL at 08:58

## 2018-04-15 RX ADMIN — ATORVASTATIN CALCIUM SCH MG: 40 TABLET, FILM COATED ORAL at 22:24

## 2018-04-15 RX ADMIN — Medication SCH ML: at 21:00

## 2018-04-15 RX ADMIN — OCTREOTIDE ACETATE SCH MLS/HR: 1000 INJECTION, SOLUTION INTRAVENOUS; SUBCUTANEOUS at 16:08

## 2018-04-15 RX ADMIN — LITHIUM CARBONATE SCH MG: 300 CAPSULE, GELATIN COATED ORAL at 22:24

## 2018-04-15 RX ADMIN — CARVEDILOL SCH MG: 3.12 TABLET, FILM COATED ORAL at 08:58

## 2018-04-15 RX ADMIN — STANDARDIZED SENNA CONCENTRATE AND DOCUSATE SODIUM SCH TAB: 8.6; 5 TABLET, FILM COATED ORAL at 08:58

## 2018-04-15 RX ADMIN — PANTOPRAZOLE SODIUM SCH MLS/HR: 40 INJECTION, POWDER, FOR SOLUTION INTRAVENOUS at 05:14

## 2018-04-15 RX ADMIN — SUCRALFATE SCH GM: 1 TABLET ORAL at 06:34

## 2018-04-15 RX ADMIN — PANTOPRAZOLE SODIUM SCH MLS/HR: 40 INJECTION, POWDER, FOR SOLUTION INTRAVENOUS at 17:29

## 2018-04-15 RX ADMIN — STANDARDIZED SENNA CONCENTRATE AND DOCUSATE SODIUM SCH TAB: 8.6; 5 TABLET, FILM COATED ORAL at 22:25

## 2018-04-15 RX ADMIN — OCTREOTIDE ACETATE SCH MLS/HR: 1000 INJECTION, SOLUTION INTRAVENOUS; SUBCUTANEOUS at 21:25

## 2018-04-15 RX ADMIN — Medication SCH ML: at 09:00

## 2018-04-15 RX ADMIN — OCTREOTIDE ACETATE SCH MLS/HR: 1000 INJECTION, SOLUTION INTRAVENOUS; SUBCUTANEOUS at 03:24

## 2018-04-15 RX ADMIN — SUCRALFATE SCH GM: 1 TABLET ORAL at 16:08

## 2018-04-15 RX ADMIN — CHLORHEXIDINE GLUCONATE SCH PACK: 500 CLOTH TOPICAL at 03:24

## 2018-04-15 RX ADMIN — ONDANSETRON PRN MG: 2 INJECTION, SOLUTION INTRAMUSCULAR; INTRAVENOUS at 16:09

## 2018-04-15 NOTE — HHI.PR
Subjective


Remarks


Follow-up GI bleed


04/10/18-patient seen and examined him a alert and oriented 3.  H&H dropping 

however patient denies any current gross GI bleed


04/11/18-patient seen and examined, s/p panendoscopy. stable and denies any 

abdominal pain


04/12/18-patient seen and examined, H&H dropping however the patient denies any 

current bleeding


04/13/18-patient seen and examined, transfused 1 unit PRBC yesterday


04/14/18-patient seen and examined; patient had 1 episode of active GI bleed 

yesterday for which discharge was put on hold. None since then. Currently NPO 

pending EGD this AM


04/15/18-patient seen and examined; s/p EGD on 4/14; stable this AM and no GI 

bleed





Objective


Vitals





Vital Signs








  Date Time  Temp Pulse Resp B/P (MAP) Pulse Ox O2 Delivery O2 Flow Rate FiO2


 


4/15/18 08:30 97.7 85 18 130/70 (90) 99   


 


4/15/18 04:45 98.3 82 18 116/70 (85) 95   


 


4/15/18 00:00 97.8 97 18 160/90 (113) 100   


 


4/14/18 21:00 98.1 91 18 128/91 (103) 99   


 


4/14/18 16:16 97.1 94 18 137/75 (95) 99   


 


4/14/18 12:58 97.3 83 22 145/88 (107) 99 Nasal Cannula 2 


 


4/14/18 12:45  73 17 111/55 (73) 100 Nasal Cannula 2 


 


4/14/18 12:30  69 18 109/56 (73) 100 Nasal Cannula 2 


 


4/14/18 12:16 97.1 73 18 94/52 (66) 100 Nasal Cannula 2 


 


4/14/18 12:00 98.2 86 18 137/71 (93) 95   














I/O      


 


 4/14/18 4/14/18 4/14/18 4/15/18 4/15/18 4/15/18





 06:59 14:59 22:59 06:59 14:59 22:59


 


Intake Total 920 ml 30 ml  500 ml  


 


Balance 920 ml 30 ml  500 ml  


 


      


 


Intake Oral  30 ml    


 


IV Total    500 ml  


 


Packed Cells 800 ml     


 


Blood Product IV Normal Saline Flush 120 ml     


 


# Voids  5  3  


 


# Bowel Movements  1 2   








Result Diagram:  


4/14/18 1738                                                                   

             4/13/18 0738





Objective Remarks


GENERAL: NAD


SKIN: Warm and dry.


HEAD: Normocephalic.


EYES: No scleral icterus. No injection or drainage. 


NECK: Supple, trachea midline. No JVD or lymphadenopathy.


CARDIOVASCULAR: Regular rate and rhythm without murmurs, gallops, or rubs. 


RESPIRATORY: Breath sounds equal bilaterally. No accessory muscle use.


GASTROINTESTINAL: Abdomen soft, non-tender, nondistended. 


MUSCULOSKELETAL: No cyanosis, or edema. 


BACK: Nontender without obvious deformity. No CVA tenderness.





Procedures


4/4: S/P angiography and prophylactic coil and embolization of gastroduodenal 

artery





A/P


Problem List:  


(1) Bleeding duodenal ulcer


ICD Code:  K26.4 - Chronic or unspecified duodenal ulcer with hemorrhage


Status:  Acute


Assessment and Plan


72-year-old male with





Hematemesis-resolved


Acute GI blood loss anemia


   On Protonix  Drip


   Status post EGD with injection and clipping of large vessel in duodenal 

ulcer. 


   4/4 S/P prophylactic embolization of gastroduodenal artery


   Repeat EGD done on 4/6/2018 but Colonoscopy was not done due to poor prep. 


   patient is s/p Panendoscopy 4/11/18 pending biopsy report


   Transfused  PRBC 4/12/18 and H/H stable


   s/p EGD 4/14/18----->There was a large ulcer in the antrum with a clip but 

also adherent clot on it most likely consistent with a recent active bleed, 

this area was cauterized with gold probe multiple application 





Acute blood loss anemia


   patient is s/p Panendoscopy 4/11/18 pending biopsy report


   Continue Protonix drip. 





Diabetes mellitus


   continue metformin 


   Insulin sliding scale





Hypertension


   Continue amlodipine 5 mg twice daily 





Hypokalemia


   Resolved s/p replacement 





Bipolar disorder


   Continue Prozac and Lithium





DVT GI prophylaxis


-Andre's and SCDs


-No pharmacological DVT prophylaxis due to acute GI bleed











Jose Angel Hernandez MD Apr 15, 2018 11:03

## 2018-04-15 NOTE — HHI.GIFU
Subjective


Remarks


PT is resting in bed having some epigastric pain, but no nausea, vomiting or 

bleeding. 


 (Luisa Germain ARNELYSE)





Objective


Vitals I&O





Vital Signs








  Date Time  Temp Pulse Resp B/P (MAP) Pulse Ox O2 Delivery O2 Flow Rate FiO2


 


4/15/18 08:30 97.7 85 18 130/70 (90) 99   


 


4/15/18 04:45 98.3 82 18 116/70 (85) 95   


 


4/15/18 00:00 97.8 97 18 160/90 (113) 100   


 


4/14/18 21:00 98.1 91 18 128/91 (103) 99   


 


4/14/18 16:16 97.1 94 18 137/75 (95) 99   


 


4/14/18 12:58 97.3 83 22 145/88 (107) 99 Nasal Cannula 2 


 


4/14/18 12:45  73 17 111/55 (73) 100 Nasal Cannula 2 


 


4/14/18 12:30  69 18 109/56 (73) 100 Nasal Cannula 2 


 


4/14/18 12:16 97.1 73 18 94/52 (66) 100 Nasal Cannula 2 


 


4/14/18 12:00 98.2 86 18 137/71 (93) 95   














I/O      


 


 4/14/18 4/14/18 4/14/18 4/15/18 4/15/18 4/15/18





 07:00 15:00 23:00 07:00 15:00 23:00


 


Intake Total 920 ml 30 ml  500 ml  


 


Balance 920 ml 30 ml  500 ml  


 


      


 


Intake Oral  30 ml    


 


IV Total    500 ml  


 


Packed Cells 800 ml     


 


Blood Product IV Normal Saline Flush 120 ml     


 


# Voids  5  3  


 


# Bowel Movements  1 2   








Laboratory





Laboratory Tests








Test


  4/14/18


17:38


 


Hemoglobin 9.1 














 Date/Time


Source Procedure


Growth Status


 


 


 3/29/18 20:40


Urine Clean Catch Urine Culture - Final


Escherichia Coli Complete








Imaging





Last Impressions








GI Bleed Scan Nuclear Medicine 4/13/18 0000 Signed





Impressions: 





 Service Date/Time:  Friday, April 13, 2018 21:05 - CONCLUSION: Normal 





 examination.       Sb Rai MD 


 


Abdomen Arteriogram 4/4/18 0000 Signed





Impressions: 





 Service Date/Time:  Wednesday, April 4, 2018 12:06 - CONCLUSION:   1. Celiac 

and 





 SMA angiography demonstrates standard anatomy without evidence for active 





 hemorrhage. 2. Uncomplicated prophylactic coil and Gelfoam embolization of the 





 GDA.     Akbar Humphrey MD 


 


Abdomen/Pelvis CT 3/29/18 1851 Signed





Impressions: 





 Service Date/Time:  Thursday, March 29, 2018 20:54 - CONCLUSION:  1. No acute 





 abnormality seen. 2. Scattered colonic diverticula without inflammatory 

change.  





    Ministerio Garcia MD 


 


Chest X-Ray 3/29/18 1749 Signed





Impressions: 





 Service Date/Time:  Thursday, March 29, 2018 17:59 - CONCLUSION:  No acute 





 disease.  No free air is seen.     Ministerio Garcia MD 








Physical Exam


HEENT: Normocephalic; atraumatic


CHEST: Even/unlabored 


CARDIAC:  RRR


ABDOMEN: Soft, nondistended, nontender; bowel sounds active 


EXTREMITIES: No clubbing, cyanosis, or edema.


SKIN:  Pale


CNS:  alert,  and oriented 


 (Luisa Germain)





Assessment and Plan


Plan


Assessment:


- Anemia with recent GIB             PREVIOUS TRUONG


  Pt previously evaluated by our service- initially for reports of hematemesis 


  EGD (March 30th) --> duodenal ulcer with stigmata showing a visible vessel.  


  CTA (4/4) --> Prophylactic coil and gelfoam embolization of the GDA. Celiac 


  and SMA demonstrated standard anatomy without evidence for active hemorrhage.

  


  Another significant drop in H/H on April 5th. At that time there were no 

reports of 


  hematemesis, hematochezia, or melena.  


  NM bleeding scan (4/5) -->  negative. 


  EGD (April 6th) --> Esophagus appeared normal. Mild gastritis in the gastric 

antrum. 


  Normal duodenal mucosa in the 2nd part of the duodenum. Two ranging between 


  5-9mm in size ulcers were found in the duodenal bulb, argon plasma 

coagulation 


  was applied to site with complete hemostasis.  Initially planning on 

colonoscopy, 


  however pt did not take prep so unable to be done. 





RECONSULT for H/H trending down:


H/H slowly trending down- currently 8.6/25.3- same as H/H yesterday


Pt initially denied emesis- but then states he had emesis after eating yesterday


  Per RN, no report of emesis


No BM since transfer from [facility] [place]- 7 BMs documented from 4/8-4/9


  Per RN, no reports of blood in stool


GS was also previously following





4/12/18 s/p EGD found antral ulcer with visible vessel.  hgb 7.8 today subtle 

decrease no obvious bleeding  


4/13/18   declining colonoscopy.  received 1 unit prbc yesterday, hgb 8.6 

today.  no obvious GIB.


4/15/18  hh is 9.1 today, no bleeding, pt is doing good, appetite is good 


S/P EGD on 4/14/18 a large ulcer in the antrum  this area was cauterized with 

gold probe 


 multiple application and injected with 3 cc of epinephrine, no active bleeding 

at the end of the case





PLAN


- SARA


- GI will sign off.  please reconsult if needed


- F/u with GI as an OP


- EGD in 2 months


- Pt will need colonoscopy as well if agreeing 


pt seen by myself and Dr Sloan and this note is on his behalf


 (Luisa Germain)


Plan


Patient was seen and examined, agree with above note no sign of active bleeding

, patient need to make sure that hemoglobin is stable, then can be discharged 

for rehab placement, follow-up as an outpatient as indicated above


 (Arti Sloan MD)











Luisa Germain Apr 15, 2018 11:59


Arti Sloan MD Apr 15, 2018 15:22

## 2018-04-16 VITALS
RESPIRATION RATE: 18 BRPM | DIASTOLIC BLOOD PRESSURE: 71 MMHG | SYSTOLIC BLOOD PRESSURE: 141 MMHG | OXYGEN SATURATION: 97 % | TEMPERATURE: 97.6 F | HEART RATE: 95 BPM

## 2018-04-16 VITALS
RESPIRATION RATE: 18 BRPM | OXYGEN SATURATION: 99 % | HEART RATE: 86 BPM | SYSTOLIC BLOOD PRESSURE: 130 MMHG | DIASTOLIC BLOOD PRESSURE: 74 MMHG | TEMPERATURE: 98.4 F

## 2018-04-16 VITALS
SYSTOLIC BLOOD PRESSURE: 140 MMHG | TEMPERATURE: 98.2 F | DIASTOLIC BLOOD PRESSURE: 66 MMHG | OXYGEN SATURATION: 96 % | HEART RATE: 83 BPM | RESPIRATION RATE: 18 BRPM

## 2018-04-16 VITALS
RESPIRATION RATE: 18 BRPM | SYSTOLIC BLOOD PRESSURE: 137 MMHG | TEMPERATURE: 99 F | OXYGEN SATURATION: 96 % | DIASTOLIC BLOOD PRESSURE: 88 MMHG | HEART RATE: 72 BPM

## 2018-04-16 VITALS
DIASTOLIC BLOOD PRESSURE: 66 MMHG | TEMPERATURE: 97.7 F | OXYGEN SATURATION: 96 % | HEART RATE: 70 BPM | RESPIRATION RATE: 18 BRPM | SYSTOLIC BLOOD PRESSURE: 138 MMHG

## 2018-04-16 VITALS
HEART RATE: 89 BPM | TEMPERATURE: 97.7 F | DIASTOLIC BLOOD PRESSURE: 78 MMHG | RESPIRATION RATE: 18 BRPM | SYSTOLIC BLOOD PRESSURE: 153 MMHG | OXYGEN SATURATION: 97 %

## 2018-04-16 LAB
BASOPHILS # BLD AUTO: 0.1 TH/MM3 (ref 0–0.2)
BASOPHILS NFR BLD: 0.5 % (ref 0–2)
EOSINOPHIL # BLD: 0.2 TH/MM3 (ref 0–0.4)
EOSINOPHIL NFR BLD: 1.9 % (ref 0–4)
ERYTHROCYTE [DISTWIDTH] IN BLOOD BY AUTOMATED COUNT: 14.7 % (ref 11.6–17.2)
HCT VFR BLD CALC: 29.7 % (ref 39–51)
HGB BLD-MCNC: 10 GM/DL (ref 13–17)
LYMPHOCYTES # BLD AUTO: 1.6 TH/MM3 (ref 1–4.8)
LYMPHOCYTES NFR BLD AUTO: 15 % (ref 9–44)
MCH RBC QN AUTO: 30.6 PG (ref 27–34)
MCHC RBC AUTO-ENTMCNC: 33.8 % (ref 32–36)
MCV RBC AUTO: 90.6 FL (ref 80–100)
MONOCYTE #: 0.6 TH/MM3 (ref 0–0.9)
MONOCYTES NFR BLD: 6.2 % (ref 0–8)
NEUTROPHILS # BLD AUTO: 7.9 TH/MM3 (ref 1.8–7.7)
NEUTROPHILS NFR BLD AUTO: 76.4 % (ref 16–70)
PLATELET # BLD: 462 TH/MM3 (ref 150–450)
PMV BLD AUTO: 7.1 FL (ref 7–11)
RBC # BLD AUTO: 3.28 MIL/MM3 (ref 4.5–5.9)
WBC # BLD AUTO: 10.3 TH/MM3 (ref 4–11)

## 2018-04-16 RX ADMIN — CHLORHEXIDINE GLUCONATE SCH PACK: 500 CLOTH TOPICAL at 03:54

## 2018-04-16 RX ADMIN — PANTOPRAZOLE SCH MG: 40 TABLET, DELAYED RELEASE ORAL at 22:37

## 2018-04-16 RX ADMIN — STANDARDIZED SENNA CONCENTRATE AND DOCUSATE SODIUM SCH TAB: 8.6; 5 TABLET, FILM COATED ORAL at 10:13

## 2018-04-16 RX ADMIN — LITHIUM CARBONATE SCH MG: 300 CAPSULE, GELATIN COATED ORAL at 22:37

## 2018-04-16 RX ADMIN — STANDARDIZED SENNA CONCENTRATE AND DOCUSATE SODIUM SCH TAB: 8.6; 5 TABLET, FILM COATED ORAL at 22:36

## 2018-04-16 RX ADMIN — PANTOPRAZOLE SODIUM SCH MLS/HR: 40 INJECTION, POWDER, FOR SOLUTION INTRAVENOUS at 02:47

## 2018-04-16 RX ADMIN — CARVEDILOL SCH MG: 3.12 TABLET, FILM COATED ORAL at 10:13

## 2018-04-16 RX ADMIN — Medication SCH ML: at 09:00

## 2018-04-16 RX ADMIN — SUCRALFATE SCH GM: 1 TABLET ORAL at 18:32

## 2018-04-16 RX ADMIN — ATORVASTATIN CALCIUM SCH MG: 40 TABLET, FILM COATED ORAL at 22:36

## 2018-04-16 RX ADMIN — OCTREOTIDE ACETATE SCH MLS/HR: 1000 INJECTION, SOLUTION INTRAVENOUS; SUBCUTANEOUS at 10:19

## 2018-04-16 RX ADMIN — TAMSULOSIN HYDROCHLORIDE SCH MG: 0.4 CAPSULE ORAL at 22:36

## 2018-04-16 RX ADMIN — SUCRALFATE SCH GM: 1 TABLET ORAL at 06:11

## 2018-04-16 RX ADMIN — Medication SCH ML: at 21:00

## 2018-04-16 NOTE — HHI.PR
Subjective


Remarks


Follow-up GI bleed


04/10/18-patient seen and examined him a alert and oriented 3.  H&H dropping 

however patient denies any current gross GI bleed


04/11/18-patient seen and examined, s/p panendoscopy. stable and denies any 

abdominal pain


04/12/18-patient seen and examined, H&H dropping however the patient denies any 

current bleeding


04/13/18-patient seen and examined, transfused 1 unit PRBC yesterday


04/14/18-patient seen and examined; patient had 1 episode of active GI bleed 

yesterday for which discharge was put on hold. None since then. Currently NPO 

pending EGD this AM


04/15/18-patient seen and examined; s/p EGD on 4/14; stable this AM and no GI 

bleed


04/16/18-patient seen and examined, no bleeding episodes reported over the past 

48 hours and no acute event overnight.  H&H pending this morning





Objective


Vitals





Vital Signs








  Date Time  Temp Pulse Resp B/P (MAP) Pulse Ox O2 Delivery O2 Flow Rate FiO2


 


4/16/18 05:59 98.4 86 18 130/74 (92) 99   


 


4/16/18 00:00 97.6 95 18 141/71 (94) 97   


 


4/15/18 21:00 97.5  18 147/79 (101) 95   


 


4/15/18 16:46 97.7 78 18 137/74 (95) 98   


 


4/15/18 12:00 97.8 80 18 129/60 (83) 99   














I/O      


 


 4/15/18 4/15/18 4/15/18 4/16/18 4/16/18 4/16/18





 07:00 15:00 23:00 07:00 15:00 23:00


 


Intake Total 500 ml 900 ml  220 ml  


 


Output Total  200 ml  100 ml  


 


Balance 500 ml 700 ml  120 ml  


 


      


 


Intake Oral  900 ml    


 


IV Total 500 ml   220 ml  


 


Output Urine Total  200 ml  100 ml  


 


# Voids 3 4 2 2  


 


# Bowel Movements   1   








Result Diagram:  


4/14/18 1738                                                                   

             4/13/18 0738





Objective Remarks


GENERAL: NAD


SKIN: Warm and dry.


HEAD: Normocephalic.


EYES: No scleral icterus. No injection or drainage. 


NECK: Supple, trachea midline. No JVD or lymphadenopathy.


CARDIOVASCULAR: Regular rate and rhythm without murmurs, gallops, or rubs. 


RESPIRATORY: Breath sounds equal bilaterally. No accessory muscle use.


GASTROINTESTINAL: Abdomen soft, non-tender, nondistended. 


MUSCULOSKELETAL: No cyanosis, or edema. 


BACK: Nontender without obvious deformity. No CVA tenderness.





Procedures


4/4: S/P angiography and prophylactic coil and embolization of gastroduodenal 

artery





A/P


Problem List:  


(1) Bleeding duodenal ulcer


ICD Code:  K26.4 - Chronic or unspecified duodenal ulcer with hemorrhage


Status:  Acute


Assessment and Plan


72-year-old male with





Hematemesis-resolved


Acute GI blood loss anemia


   On Protonix  Drip


   Status post EGD with injection and clipping of large vessel in duodenal 

ulcer. 


   4/4 S/P prophylactic embolization of gastroduodenal artery


   Repeat EGD done on 4/6/2018 but Colonoscopy was not done due to poor prep. 


   patient is s/p Panendoscopy 4/11/18 pending biopsy report


   Transfused  PRBC 4/12/18 and H/H stable


   s/p EGD 4/14/18----->There was a large ulcer in the antrum with a clip but 

also adherent clot on it most likely consistent with a recent active bleed, 

this area was cauterized with gold probe multiple application 


   Currently on clear liquid diet and advance as tolerated





Acute blood loss anemia


   patient is s/p Panendoscopy 4/11/18 pending biopsy report


   Continue Protonix drip. 





Diabetes mellitus


   continue metformin 


   Insulin sliding scale





Hypertension


   Continue amlodipine 5 mg twice daily 





Hypokalemia


   Resolved s/p replacement 





Bipolar disorder


   Continue Prozac and Lithium





DVT GI prophylaxis


-Andre's and SCDs


-No pharmacological DVT prophylaxis due to acute GI bleed











Jose Angel Hernandez MD Apr 16, 2018 10:12

## 2018-04-17 VITALS
SYSTOLIC BLOOD PRESSURE: 138 MMHG | DIASTOLIC BLOOD PRESSURE: 82 MMHG | RESPIRATION RATE: 18 BRPM | TEMPERATURE: 97.6 F | HEART RATE: 101 BPM | OXYGEN SATURATION: 97 %

## 2018-04-17 VITALS
OXYGEN SATURATION: 98 % | RESPIRATION RATE: 18 BRPM | HEART RATE: 74 BPM | DIASTOLIC BLOOD PRESSURE: 80 MMHG | TEMPERATURE: 98 F | SYSTOLIC BLOOD PRESSURE: 146 MMHG

## 2018-04-17 VITALS
SYSTOLIC BLOOD PRESSURE: 127 MMHG | TEMPERATURE: 98.4 F | OXYGEN SATURATION: 98 % | DIASTOLIC BLOOD PRESSURE: 73 MMHG | RESPIRATION RATE: 18 BRPM | HEART RATE: 86 BPM

## 2018-04-17 LAB
HCT VFR BLD CALC: 27.7 % (ref 39–51)
HGB BLD-MCNC: 9.5 GM/DL (ref 13–17)

## 2018-04-17 RX ADMIN — CHLORHEXIDINE GLUCONATE SCH PACK: 500 CLOTH TOPICAL at 04:00

## 2018-04-17 RX ADMIN — STANDARDIZED SENNA CONCENTRATE AND DOCUSATE SODIUM SCH TAB: 8.6; 5 TABLET, FILM COATED ORAL at 09:40

## 2018-04-17 RX ADMIN — PANTOPRAZOLE SCH MG: 40 TABLET, DELAYED RELEASE ORAL at 09:40

## 2018-04-17 RX ADMIN — SUCRALFATE SCH GM: 1 TABLET ORAL at 06:00

## 2018-04-17 RX ADMIN — CARVEDILOL SCH MG: 3.12 TABLET, FILM COATED ORAL at 09:38

## 2018-04-17 RX ADMIN — Medication SCH ML: at 09:41

## 2018-04-17 RX ADMIN — ONDANSETRON PRN MG: 2 INJECTION, SOLUTION INTRAMUSCULAR; INTRAVENOUS at 01:11

## 2018-04-17 RX ADMIN — SUCRALFATE SCH GM: 1 TABLET ORAL at 09:38

## 2018-04-17 NOTE — HHI.PR
Subjective


Remarks


Follow-up GI bleed


04/10/18-patient seen and examined him a alert and oriented 3.  H&H dropping 

however patient denies any current gross GI bleed


04/11/18-patient seen and examined, s/p panendoscopy. stable and denies any 

abdominal pain


04/12/18-patient seen and examined, H&H dropping however the patient denies any 

current bleeding


04/13/18-patient seen and examined, transfused 1 unit PRBC yesterday


04/14/18-patient seen and examined; patient had 1 episode of active GI bleed 

yesterday for which discharge was put on hold. None since then. Currently NPO 

pending EGD this AM


04/15/18-patient seen and examined; s/p EGD on 4/14; stable this AM and no GI 

bleed


04/16/18-patient seen and examined, no bleeding episodes reported over the past 

48 hours and no acute event overnight.  H&H pending this morning


April 17, 2018-patient seen and examined, H&H stable this morning.  No bleeding 

over the past 72 hours.





Objective


Vitals





Vital Signs








  Date Time  Temp Pulse Resp B/P (MAP) Pulse Ox O2 Delivery O2 Flow Rate FiO2


 


4/17/18 08:00 97.6 101 18 138/82 (100) 97   


 


4/17/18 04:00 98.0 74 18 146/80 (102) 98   


 


4/17/18 00:00 98.4 86 18 127/73 (91) 98   


 


4/16/18 20:00 98.2 83 18 140/66 (90) 96   


 


4/16/18 16:00 99.0 72 18 137/88 (104) 96   


 


4/16/18 12:00 97.7 70 18 138/66 (90) 96   














I/O      


 


 4/16/18 4/16/18 4/16/18 4/17/18 4/17/18 4/17/18





 06:59 14:59 22:59 06:59 14:59 22:59


 


Intake Total  220 ml    


 


Output Total 100 ml     


 


Balance -100 ml 220 ml    


 


      


 


IV Total  220 ml    


 


Output Urine Total 100 ml     


 


# Voids 2    5 








Result Diagram:  


4/17/18 0918                                                                   

             4/13/18 0738





Imaging





Last Impressions








GI Bleed Scan Nuclear Medicine 4/13/18 0000 Signed





Impressions: 





 Service Date/Time:  Friday, April 13, 2018 21:05 - CONCLUSION: Normal 





 examination.       Sb Rai MD 


 


Abdomen Arteriogram 4/4/18 0000 Signed





Impressions: 





 Service Date/Time:  Wednesday, April 4, 2018 12:06 - CONCLUSION:   1. Celiac 

and 





 SMA angiography demonstrates standard anatomy without evidence for active 





 hemorrhage. 2. Uncomplicated prophylactic coil and Gelfoam embolization of the 





 GDA.     Akbar Humphrey MD 


 


Abdomen/Pelvis CT 3/29/18 1851 Signed





Impressions: 





 Service Date/Time:  Thursday, March 29, 2018 20:54 - CONCLUSION:  1. No acute 





 abnormality seen. 2. Scattered colonic diverticula without inflammatory 

change.  





    Ministerio Garcia MD 


 


Chest X-Ray 3/29/18 8669 Signed





Impressions: 





 Service Date/Time:  Thursday, March 29, 2018 17:59 - CONCLUSION:  No acute 





 disease.  No free air is seen.     Ministerio Garcia MD 








Objective Remarks


GENERAL: NAD


SKIN: Warm and dry.


HEAD: Normocephalic.


EYES: No scleral icterus. No injection or drainage. 


NECK: Supple, trachea midline. No JVD or lymphadenopathy.


CARDIOVASCULAR: Regular rate and rhythm without murmurs, gallops, or rubs. 


RESPIRATORY: Breath sounds equal bilaterally. No accessory muscle use.


GASTROINTESTINAL: Abdomen soft, non-tender, nondistended. 


MUSCULOSKELETAL: No cyanosis, or edema. 


BACK: Nontender without obvious deformity. No CVA tenderness.





Procedures


4/4: S/P angiography and prophylactic coil and embolization of gastroduodenal 

artery





A/P


Problem List:  


(1) Bleeding duodenal ulcer


ICD Code:  K26.4 - Chronic or unspecified duodenal ulcer with hemorrhage


Status:  Acute


Assessment and Plan


72-year-old male with





Hematemesis-resolved


Acute GI blood loss anemia


   s/p Protonix  Drip, and now on PPI BID


   Status post EGD with injection and clipping of large vessel in duodenal 

ulcer. 


   4/4 S/P prophylactic embolization of gastroduodenal artery


   Repeat EGD done on 4/6/2018 but Colonoscopy was not done due to poor prep. 


   patient is s/p Panendoscopy 4/11/18 pending biopsy report


   Transfused  PRBC 4/12/18 and H/H stable


   s/p EGD 4/14/18----->There was a large ulcer in the antrum with a clip but 

also adherent clot on it most likely consistent with a recent active bleed, 

this area was cauterized with gold probe multiple application 


   





Acute blood loss anemia


   patient is s/p Panendoscopy 4/11/18 pending biopsy report


   s/p Protonix drip. On PPI 40mg BID





Diabetes mellitus


   continue metformin 


   Insulin sliding scale





Hypertension


   Continue amlodipine 5 mg twice daily 





Hypokalemia


   Resolved s/p replacement 





Bipolar disorder


   Continue Prozac and Lithium





DVT GI prophylaxis


-Andre's and SCDs


-No pharmacological DVT prophylaxis due to acute GI bleed











Jose Angel Hernandez MD Apr 17, 2018 11:42

## 2018-04-18 ENCOUNTER — HOSPITAL ENCOUNTER (INPATIENT)
Dept: HOSPITAL 17 - N04B | Age: 73
LOS: 7 days | Discharge: HOME | DRG: 378 | End: 2018-04-25
Attending: HOSPITALIST | Admitting: HOSPITALIST
Payer: COMMERCIAL

## 2018-04-18 VITALS — WEIGHT: 180.56 LBS | HEIGHT: 67 IN | BODY MASS INDEX: 28.34 KG/M2

## 2018-04-18 VITALS
HEART RATE: 83 BPM | TEMPERATURE: 98 F | RESPIRATION RATE: 16 BRPM | SYSTOLIC BLOOD PRESSURE: 136 MMHG | OXYGEN SATURATION: 94 % | DIASTOLIC BLOOD PRESSURE: 74 MMHG

## 2018-04-18 VITALS
SYSTOLIC BLOOD PRESSURE: 145 MMHG | HEART RATE: 90 BPM | DIASTOLIC BLOOD PRESSURE: 71 MMHG | TEMPERATURE: 97.9 F | OXYGEN SATURATION: 99 %

## 2018-04-18 DIAGNOSIS — N17.9: ICD-10-CM

## 2018-04-18 DIAGNOSIS — D50.0: ICD-10-CM

## 2018-04-18 DIAGNOSIS — I12.9: ICD-10-CM

## 2018-04-18 DIAGNOSIS — E78.5: ICD-10-CM

## 2018-04-18 DIAGNOSIS — N18.3: ICD-10-CM

## 2018-04-18 DIAGNOSIS — K44.9: ICD-10-CM

## 2018-04-18 DIAGNOSIS — E87.6: ICD-10-CM

## 2018-04-18 DIAGNOSIS — F31.9: ICD-10-CM

## 2018-04-18 DIAGNOSIS — E11.22: ICD-10-CM

## 2018-04-18 DIAGNOSIS — I69.354: ICD-10-CM

## 2018-04-18 DIAGNOSIS — K92.2: Primary | ICD-10-CM

## 2018-04-18 DIAGNOSIS — N40.0: ICD-10-CM

## 2018-04-18 DIAGNOSIS — Z79.02: ICD-10-CM

## 2018-04-18 DIAGNOSIS — K25.9: ICD-10-CM

## 2018-04-18 DIAGNOSIS — Z79.84: ICD-10-CM

## 2018-04-18 PROCEDURE — 83735 ASSAY OF MAGNESIUM: CPT

## 2018-04-18 PROCEDURE — 93005 ELECTROCARDIOGRAM TRACING: CPT

## 2018-04-18 PROCEDURE — 82941 ASSAY OF GASTRIN: CPT

## 2018-04-18 PROCEDURE — 82784 ASSAY IGA/IGD/IGG/IGM EACH: CPT

## 2018-04-18 PROCEDURE — C9113 INJ PANTOPRAZOLE SODIUM, VIA: HCPCS

## 2018-04-18 PROCEDURE — 76937 US GUIDE VASCULAR ACCESS: CPT

## 2018-04-18 PROCEDURE — 85025 COMPLETE CBC W/AUTO DIFF WBC: CPT

## 2018-04-18 PROCEDURE — 80048 BASIC METABOLIC PNL TOTAL CA: CPT

## 2018-04-18 PROCEDURE — 83516 IMMUNOASSAY NONANTIBODY: CPT

## 2018-04-18 PROCEDURE — 87338 HPYLORI STOOL AG IA: CPT

## 2018-04-18 PROCEDURE — 82948 REAGENT STRIP/BLOOD GLUCOSE: CPT

## 2018-04-18 RX ADMIN — TAMSULOSIN HYDROCHLORIDE SCH MG: 0.4 CAPSULE ORAL at 21:04

## 2018-04-18 RX ADMIN — FERROUS SULFATE TAB 325 MG (65 MG ELEMENTAL FE) SCH MG: 325 (65 FE) TAB at 18:00

## 2018-04-18 RX ADMIN — Medication SCH ML: at 21:05

## 2018-04-18 RX ADMIN — ATORVASTATIN CALCIUM SCH MG: 40 TABLET, FILM COATED ORAL at 21:04

## 2018-04-18 RX ADMIN — ONDANSETRON PRN MG: 2 INJECTION, SOLUTION INTRAMUSCULAR; INTRAVENOUS at 17:42

## 2018-04-18 RX ADMIN — LITHIUM CARBONATE SCH MG: 300 CAPSULE, GELATIN COATED ORAL at 21:04

## 2018-04-18 RX ADMIN — STANDARDIZED SENNA CONCENTRATE AND DOCUSATE SODIUM SCH TAB: 8.6; 5 TABLET, FILM COATED ORAL at 21:00

## 2018-04-18 NOTE — HHI.HP
__________________________________________________





HPI


Service


[facility]ists


Primary Care Physician


Unknown


Admission Diagnosis


GIB


Diagnoses:  


(1) GI bleed


Diagnosis:  Principal





(2) Hypotension


Chief Complaint:  


"I was told there was bleeding"


Travel History


International Travel<30 Days:  No


Contact w/Intl Traveler <30 Da:  No


History of Present Illness


Written by Fer Mishra, acting as scribe for Dr. Hernandez on 18 at 16:24. 





72-year-old male with PMH with of HTN, DM on metformin, GI bleed, CVA, BPH, 

left hand injury, and bipolar disorder on lithium and Prozac who was admitted 

to [place] inpatient rehab on  and today transferred to inpatient medical 

unit due to black tarry stools as well as hypotension. Patient was very 

recently admitted to inpatient medical unit here at [facility] on 3/29 from 

nursing home due to nausea, vomiting, and hematemesis.  He was seen by 

gastroenterology and on 3/30 underwent EGD which found large duodenal ulcers 

with visible vessel for which he received epinephrine injections as well as 

clippings.  He was also transfused 2 units of PRBCs. On 4/3 patient underwent 

prophylactic embolization of the gastroduodenal artery in IR department.  On  he was once again found to be anemic with hemoglobin of 6.9 and once again 

transfused with 2 units of PRBCs.  Repeat EGD on  showed mild gastritis in 

the gastric atrium, normal duodenal leak mucosa, 2 duodenal ulcers found in the 

duodenal bulb, argon plasma coagulation was applied to the sites and hemostasis 

achieved.  Colonoscopy was also planned however this was not done as patient 

refused.  He underwent EGD again on  an antral ulcer was identified with 

visible vessel. On  EGD repeat revealed large ulcer in the atrium, this was 

cauterized with gold probe, and epinephrine.  He was also treated with IV 

Protonix and Sandostatin.  Patient was also treated for E. coli UTI and DANE 

during his admission.  He was cleared for discharge and transfer to [facility] rehab center on . Today patient had dark tarry stools and was 

also found to be hypotensive with lowest BP 88/60.  He was symptomatic while 

sitting up in wheelchair with dizziness and nausea.  Consult was placed for GI 

[facility]  Plans for upper GI 

study today, general surgery service is consulted.  GI services recommended 

patient be transferred to the inpatient setting. Patient was transported to GI 

lab and apparently refused upper endoscopy per nursing as well as patient.  He 

is now in the medical floor where he is seen and examined resting comfortably 

in bed. He is awake, alert, and oriented and answering questions appropriately. 

When questioned as to why he did not have procedure done patient reports "I 

just want to go home".  He reports that his daughter is a nurse practitioner 

and he can go home. He reports nausea, no vomiting or abdominal pain/

discomfort.  He denies any dizziness, lightheadedness, shortness of breath, 

chest pain, heart palpitations, headache, fevers or chills.  He does report 

that while he was in rehab center he did have bloody stools, denies any 

hematemesis.





Review of Systems


Constitutional:  DENIES: Dizziness


Respiratory:  DENIES: Hemoptysis, Shortness of breath


Cardiovascular:  DENIES: Chest pain, Palpitations, Syncope


Gastrointestinal:  COMPLAINS OF: Black stools, Bloody stools, Vomiting, DENIES: 

Abdominal pain, Nausea


Except as stated in HPI:  all other systems reviewed are Neg





Past Family Social History


Past Medical History


Hypertension


CVA


GI bleed


Diabetes mellitus


Bipolar disorder


BPH


Past Surgical History


Left hand surgery due to crush injury


Multiple EGD procedures with clippings, and epinephrine injection


Reported Medications





Reported Meds & Active Scripts


Active


Protonix Inj (Pantoprazole Sodium) 40 Mg Inj 40 Mg IV PUSH Q12H 30 Days


Ondansetron Odt 4 Mg Tab 4 Mg PO Q6H PRN 30 Days


Glucophage (Metformin HCl) 500 Mg Tab 500 Mg PO BIDPC 30 Days


Carafate (Sucralfate) 1 Gram Tab 1 Gm PO BIDAC 30 Days


Lithium Carbonate 300 Mg Cap 600 Mg PO HS 30 Days


Fluoxetine (Fluoxetine HCl) 20 Mg Capsule 40 Mg PO HS 30 Days


Coreg (Carvedilol) 3.125 Mg Tab 3.125 Mg PO Q12HR 30 Days


Atorvastatin (Atorvastatin Calcium) 40 Mg Tab 40 Mg PO HS 30 Days


Ferosul (Ferrous Sulfate) 325 Mg (65 Mg Iron) Tablet 325 Mg PO BIDPC 30 Days


Flomax (Tamsulosin HCl) 0.4 Mg Cap 0.4 Mg PO HS 30 Days


Ferrous Sulfate 325 Mg (65 Mg Iron) Tablet 325 Mg PO BIDPC


Carafate (Sucralfate) 1 Gram Tab 1 Gm PO BIDAC


Norvasc (Amlodipine Besylate) 5 Mg Tab 5 Mg PO BID


Protonix (Pantoprazole Sodium) 40 Mg Tab 40 Mg PO BID


Atorvastatin (Atorvastatin Calcium) 40 Mg Tab 40 Mg PO HS


Plavix (Clopidogrel Bisulfate) 75 Mg Tab 75 Mg PO DAILY


Reported


Carvedilol 3.125 Mg Tab 3.125 Mg PO DAILY


Metformin (Metformin HCl) 500 Mg Tab 500 Mg PO BIDPC


Fluoxetine (Fluoxetine HCl) 40 Mg Cap 40 Cap PO HS


Flomax (Tamsulosin HCl) 0.4 Mg Cap 0.4 Mg PO HS


Lithium Carbonate 600 Mg Cap 600 Mg PO HS


Allergies:  


Coded Allergies:  


     codeine (Verified  Allergy, Severe, MUSCULOSKELETAL ISSUES, 18)


     insulin,pork (Verified  Allergy, Severe, Anaphylaxis, 18)


     insulin aspart (Verified  Allergy, Intermediate, 18)


     lactose (Verified  Allergy, Intermediate, 18)


 LACTOSE INTOLERANT


Family History


Denies past family history


Social History


Denies any tobacco, alcohol, or illicit drug use.





Physical Exam


Physical Exam


GENERAL: This is a well-nourished, well-developed patient, in no apparent 

distress.


SKIN: No rashes, ecchymoses or lesions. Cool and dry.


HEAD: Atraumatic. Normocephalic. 


EYES: Pupils equal round and reactive. Extraocular motions intact. No scleral 

icterus. No injection or drainage. 


ENT: Nose without bleeding, purulent drainage. Throat without erythema. Airway 

patent.


NECK: Trachea midline. No JVD. Supple.


CARDIOVASCULAR: Regular rate and rhythm without murmurs, gallops, or rubs. 


RESPIRATORY: Clear to auscultation. Breath sounds equal bilaterally. No wheezes

, rales, or rhonchi.  


GASTROINTESTINAL: Abdomen soft, non-tender, nondistended. No hepato-splenomegaly

, or palpable masses. No guarding.  Active bowel sounds.


MUSCULOSKELETAL: Extremities without clubbing, cyanosis, or edema. No joint 

tenderness, effusion, or edema noted. No calf tenderness. 


NEUROLOGICAL: Awake and alert, oriented 3. Cranial nerves II through XII 

grossly intact.  Motor and sensory grossly within normal limits. 4/5 muscle 

strength in all muscle groups.  Normal speech.





Septic Shock Reassessment


Septic shock perfusion:  reassessment completed





Caprini VTE Risk Assessment


Caprini VTE Risk Assessment:  Mod/High Risk (score >= 2)


VTE Pharm Contraindication:  High risk for bleeding


Caprini Risk Assessment Model











 Point Value = 1          Point Value = 2  Point Value = 3  Point Value = 5


 


Age 41-60


Minor surgery


BMI > 25 kg/m2


Swollen legs


Varicose veins


Pregnancy or postpartum


History of unexplained or recurrent


   spontaneous 


Oral contraceptives or hormone


   replacement


Sepsis (< 1 month)


Serious lung disease, including


   pneumonia (< 1 month)


Abnormal pulmonary function


Acute myocardial infarction


Congestive heart failure (< 1 month)


History of inflammatory bowel disease


Medical patient at bed rest Age 61-74


Arthroscopic surgery


Major open surgery (> 45 min)


Laparoscopic surgery (> 45 min)


Malignancy


Confined to bed (> 72 hours)


Immobilizing plaster cast


Central venous access Age >= 75


History of VTE


Family history of VTE


Factor V Leiden


Prothrombin [id number]


Lupus anticoagulant


Anticardiolipin antibodies


Elevated serum homocysteine


Heparin-induced thrombocytopenia


Other congenital or acquired


   thrombophilia Stroke (< 1 month)


Elective arthroplasty


Hip, pelvis, or leg fracture


Acute spinal cord injury (< 1 month)








Prophylaxis Regimen











   Total Risk


Factor Score Risk Level Prophylaxis Regimen


 


0-1      Low Early ambulation


 


2 Moderate Order ONE of the following:


*Sequential Compression Device (SCD)


*Heparin 5000 units SQ BID


 


3-4 Higher Order ONE of the following medications:


*Heparin 5000 units SQ TID


*Enoxaparin/Lovenox 40 mg SQ daily (WT < 150 kg, CrCl > 30 mL/min)


*Enoxaparin/Lovenox 30 mg SQ daily (WT < 150 kg, CrCl > 10-29 mL/min)


*Enoxaparin/Lovenox 30 mg SQ BID (WT < 150 kg, CrCl > 30 mL/min)


AND/OR


*Sequential Compression Device (SCD)


 


5 or more Highest Order ONE of the following medications:


*Heparin 5000 units SQ TID (Preferred with Epidurals)


*Enoxaparin/Lovenox 40 mg SQ daily (WT < 150 kg, CrCl > 30 mL/min)


*Enoxaparin/Lovenox 30 mg SQ daily (WT < 150 kg, CrCl > 10-29 mL/min)


*Enoxaparin/Lovenox 30 mg SQ BID (WT < 150 kg, CrCl > 30 mL/min)


AND


*Sequential Compression Device (SCD)











Assessment and Plan


Problem List:  


(1) GI bleed


ICD Code:  K92.2 - Gastrointestinal hemorrhage, unspecified


Status:  Acute


Assessment and Plan


72-year-old male with PMH with of HTN, DM on metformin, GI bleed, CVA, BPH, 

left hand injury, and bipolar disorder on lithium and Prozac. Originally 

presented to [facility] from nursing home on 3/29 due with nausea, vomiting and 

hematemesis. Underwent multiple EGD's with cauterization, and epi. injections. 

He also also underwent prophylactic embolization of the gastroduodenal artery. 

Was discharged to [place] rehab on , however returns back to inpatient side 

due to black stool, and symptomatic hypotension.





Upper GIB, acute


Hematemesis


   [Bellow procedures done on prior admission]


     3/9/18 - EGD found antrum ulcer with visible vessel s/p epi inj, cautery, 

clips


     3/30/18 EGD found large duodenal ulcer


     18 IR did GDA embolization


     18 normal bleed scan


     18 EGD found 2 duodenal ulcers s/p APC


     18 EGD found antral ulcer with visible vessel s/p cautery, epi inj


     18 normal bleed scan


   - Today's H/H 9.5/28.7, symptomatic hypotension with BP 88/60.  Provided 

with 1 L of IV fluids


   -No admitted to medical unit, GI had plans for upper endoscopy today however 

patient had breakfast this morning and this was postponed per anesthesia. 

Discussed with , patient did not refuse.


   -GI consult placed, general surgery consult also placed.


   -N.p.o. after midnight, plans for upper scope tomorrow.  GI okay for patient 

to have clear liquid diet for dinner.


   -Continue IV Protonix drip, p.o. Carafate, monitor vital signs and H&H


   -Will hold antihypertensives for the moment


   -IV Zofran for nausea





Anemia, secondary to GIB


   -Continue oral ferrous sulfate replacement





UTI, E. coli, resolved


   -Previously treated during hospital stay


   -Repeat UA while at [place] positive, culture pending.


   -CBC from today with no leukocytosis, patient has been afebrile.





DANE on CKD


   -Looking back since 2014 patient's baseline creatinine is around 

1.3


   -This morning's creatinine 1.44, BUN 9, GFR 48


   -Patient was provided with 1 L of IV fluids


   -Continue monitoring renal function closely, avoid nephrotoxins





Hypokalemia, acute


   -Potassium level this morning level 3.3, p.o. replacement with 20 meq's of 

KCl


   -Recheck BMP tomorrow





BPH, chronic


   -Continue Flomax





Bipolar disorder


   -Continue Prozac 40 mg at bedtime and lithium carbonate 600 mg nightly





Diabetes mellitus type 2, controlled


   -Hemoglobin A1c on 3/8/18 was 6.0


   -Patient's random blood sugars during his hospitalization stay were never 

greater than 200


   -Will hold off on metformin in light of DANE if needed start Accu-Cheks with 

ISS





DVT prophylaxis-SCDs, anticoagulation contraindicated due to GIB





Discussed with , patient and nurse.





This note was transcribed by citlali Mishra.  I, Dr. Jose Angel Hernandez 

personally performed the history, physical exam, and medical decision making; 

and confirmed the accuracy of the information in the transcribed note.





Authenticated by Dr. Jose Angel Hernandez on 18 at 16:24.


Code Status


Full code


Discussed Condition With


Patient





Physician Certification


2 Midnight Certification Type:  Admission for Inpatient Services


Order for Inpatient Services


The services are ordered in accordance with Medicare regulations or non-

Medicare payer requirements, as applicable.  In the case of services not 

specified as inpatient-only, they are appropriately provided as inpatient 

services in accordance with the 2-midnight benchmark.


Estimated LOS (days):  3


 days is the estimated time the patient will need to remain in the hospital, 

assuming treatment plan goals are met and no additional complications.


Post-Hospital Plan:  Not yet determined











Fer Mishra 2018 16:24


Jose Angel Hernandez MD 2018 16:25

## 2018-04-19 VITALS
TEMPERATURE: 98.1 F | OXYGEN SATURATION: 98 % | DIASTOLIC BLOOD PRESSURE: 71 MMHG | SYSTOLIC BLOOD PRESSURE: 151 MMHG | HEART RATE: 100 BPM | RESPIRATION RATE: 17 BRPM

## 2018-04-19 VITALS
TEMPERATURE: 97.9 F | OXYGEN SATURATION: 97 % | RESPIRATION RATE: 16 BRPM | HEART RATE: 100 BPM | DIASTOLIC BLOOD PRESSURE: 78 MMHG | SYSTOLIC BLOOD PRESSURE: 153 MMHG

## 2018-04-19 VITALS
RESPIRATION RATE: 18 BRPM | OXYGEN SATURATION: 100 % | TEMPERATURE: 98 F | HEART RATE: 91 BPM | DIASTOLIC BLOOD PRESSURE: 75 MMHG | SYSTOLIC BLOOD PRESSURE: 142 MMHG

## 2018-04-19 VITALS
SYSTOLIC BLOOD PRESSURE: 111 MMHG | RESPIRATION RATE: 18 BRPM | OXYGEN SATURATION: 94 % | HEART RATE: 82 BPM | DIASTOLIC BLOOD PRESSURE: 76 MMHG | TEMPERATURE: 97.8 F

## 2018-04-19 VITALS
OXYGEN SATURATION: 99 % | RESPIRATION RATE: 17 BRPM | SYSTOLIC BLOOD PRESSURE: 137 MMHG | DIASTOLIC BLOOD PRESSURE: 74 MMHG | HEART RATE: 97 BPM | TEMPERATURE: 98.3 F

## 2018-04-19 VITALS
DIASTOLIC BLOOD PRESSURE: 76 MMHG | SYSTOLIC BLOOD PRESSURE: 133 MMHG | HEART RATE: 100 BPM | RESPIRATION RATE: 16 BRPM | TEMPERATURE: 97.9 F | OXYGEN SATURATION: 97 %

## 2018-04-19 LAB
BASOPHILS # BLD AUTO: 0 TH/MM3 (ref 0–0.2)
BASOPHILS NFR BLD: 0.6 % (ref 0–2)
BUN SERPL-MCNC: 6 MG/DL (ref 7–18)
CALCIUM SERPL-MCNC: 9.2 MG/DL (ref 8.5–10.1)
CHLORIDE SERPL-SCNC: 111 MEQ/L (ref 98–107)
CREAT SERPL-MCNC: 1.38 MG/DL (ref 0.6–1.3)
EOSINOPHIL # BLD: 0.2 TH/MM3 (ref 0–0.4)
EOSINOPHIL NFR BLD: 2.3 % (ref 0–4)
ERYTHROCYTE [DISTWIDTH] IN BLOOD BY AUTOMATED COUNT: 14.5 % (ref 11.6–17.2)
GFR SERPLBLD BASED ON 1.73 SQ M-ARVRAT: 51 ML/MIN (ref 89–?)
GLUCOSE SERPL-MCNC: 95 MG/DL (ref 74–106)
HCO3 BLD-SCNC: 26.7 MEQ/L (ref 21–32)
HCT VFR BLD CALC: 26.4 % (ref 39–51)
HGB BLD-MCNC: 8.8 GM/DL (ref 13–17)
LYMPHOCYTES # BLD AUTO: 1.9 TH/MM3 (ref 1–4.8)
LYMPHOCYTES NFR BLD AUTO: 21.6 % (ref 9–44)
MCH RBC QN AUTO: 30.2 PG (ref 27–34)
MCHC RBC AUTO-ENTMCNC: 33.5 % (ref 32–36)
MCV RBC AUTO: 90.1 FL (ref 80–100)
MONOCYTE #: 0.7 TH/MM3 (ref 0–0.9)
MONOCYTES NFR BLD: 8.4 % (ref 0–8)
NEUTROPHILS # BLD AUTO: 5.8 TH/MM3 (ref 1.8–7.7)
NEUTROPHILS NFR BLD AUTO: 67.1 % (ref 16–70)
PLATELET # BLD: 515 TH/MM3 (ref 150–450)
PMV BLD AUTO: 6.8 FL (ref 7–11)
RBC # BLD AUTO: 2.93 MIL/MM3 (ref 4.5–5.9)
SODIUM SERPL-SCNC: 144 MEQ/L (ref 136–145)
WBC # BLD AUTO: 8.6 TH/MM3 (ref 4–11)

## 2018-04-19 PROCEDURE — 0DJ08ZZ INSPECTION OF UPPER INTESTINAL TRACT, VIA NATURAL OR ARTIFICIAL OPENING ENDOSCOPIC: ICD-10-PCS | Performed by: INTERNAL MEDICINE

## 2018-04-19 RX ADMIN — STANDARDIZED SENNA CONCENTRATE AND DOCUSATE SODIUM SCH TAB: 8.6; 5 TABLET, FILM COATED ORAL at 20:59

## 2018-04-19 RX ADMIN — FERROUS SULFATE TAB 325 MG (65 MG ELEMENTAL FE) SCH MG: 325 (65 FE) TAB at 09:00

## 2018-04-19 RX ADMIN — SUCRALFATE SCH GM: 1 TABLET ORAL at 05:25

## 2018-04-19 RX ADMIN — Medication SCH ML: at 20:58

## 2018-04-19 RX ADMIN — PANTOPRAZOLE SODIUM SCH MLS/HR: 40 INJECTION, POWDER, FOR SOLUTION INTRAVENOUS at 21:02

## 2018-04-19 RX ADMIN — ONDANSETRON PRN MG: 2 INJECTION, SOLUTION INTRAMUSCULAR; INTRAVENOUS at 20:59

## 2018-04-19 RX ADMIN — STANDARDIZED SENNA CONCENTRATE AND DOCUSATE SODIUM SCH TAB: 8.6; 5 TABLET, FILM COATED ORAL at 09:00

## 2018-04-19 RX ADMIN — ONDANSETRON PRN MG: 2 INJECTION, SOLUTION INTRAMUSCULAR; INTRAVENOUS at 14:08

## 2018-04-19 RX ADMIN — LITHIUM CARBONATE SCH MG: 300 CAPSULE, GELATIN COATED ORAL at 20:58

## 2018-04-19 RX ADMIN — SUCRALFATE SCH GM: 1 TABLET ORAL at 16:28

## 2018-04-19 RX ADMIN — FERROUS SULFATE TAB 325 MG (65 MG ELEMENTAL FE) SCH MG: 325 (65 FE) TAB at 19:22

## 2018-04-19 RX ADMIN — TAMSULOSIN HYDROCHLORIDE SCH MG: 0.4 CAPSULE ORAL at 20:59

## 2018-04-19 RX ADMIN — ATORVASTATIN CALCIUM SCH MG: 40 TABLET, FILM COATED ORAL at 20:58

## 2018-04-19 RX ADMIN — Medication SCH ML: at 09:00

## 2018-04-19 NOTE — HHI.PR
Subjective


Remarks


In bed appears chronically ill. No n/v/d/c. 


Denies chest pain, With sob and  feels tired.





Objective


Vitals





Vital Signs








  Date Time  Temp Pulse Resp B/P (MAP) Pulse Ox O2 Delivery O2 Flow Rate FiO2


 


4/19/18 09:57      Room Air  


 


4/19/18 08:26 97.8 82 18 111/76 (88) 94   


 


4/19/18 04:00 98.3 97 17 137/74 (95) 99   


 


4/19/18 03:53      Room Air  


 


4/19/18 00:00 98.1 100 17 151/71 (97) 98   


 


4/19/18 00:00      Room Air  


 


4/18/18 21:22 97.9 90  145/71 (95) 99   


 


4/18/18 20:00      Room Air  


 


4/18/18 16:00 98.0 83 16 136/74 (94) 94   














I/O      


 


 4/18/18 4/18/18 4/18/18 4/19/18 4/19/18 4/19/18





 07:00 15:00 23:00 07:00 15:00 23:00


 


Intake Total    120 ml  


 


Output Total    100 ml  


 


Balance    20 ml  


 


      


 


Intake Oral    120 ml  


 


Output Urine Total    100 ml  








Result Diagram:  


4/19/18 0655                                                                   

             4/19/18 0655





Objective Remarks


GENERAL: This is a well-nourished, well-developed patient, in no apparent 

distress.


CARDIOVASCULAR: Regular rate and rhythm without murmurs, gallops, or rubs. 


RESPIRATORY: Clear to auscultation. Breath sounds equal bilaterally. No wheezes

, rales, or rhonchi.  


GASTROINTESTINAL: Abdomen soft, non-tender, nondistended. No hepato-splenomegaly

, or palpable masses. No guarding.  Active bowel sounds.


MUSCULOSKELETAL: Extremities without clubbing, cyanosis, or edema. No joint 

tenderness, effusion, or edema noted. No calf tenderness. 


NEUROLOGICAL: Awake and alert, oriented 3. Cranial nerves II through XII 

grossly intact.  Motor and sensory grossly within normal limits. 4/5 muscle 

strength in all muscle groups.  Normal speech.











A/P


Problem List:  


(1) GI bleed


ICD Code:  K92.2 - Gastrointestinal hemorrhage, unspecified


Status:  Acute


Assessment and Plan


72-year-old male with PMH with of HTN, DM on metformin, GI bleed, CVA, BPH, 

left hand injury, and bipolar disorder on lithium and Prozac. Originally 

presented to [facility] from nursing home on 3/29 due with nausea, vomiting and 

hematemesis. Underwent multiple EGD's with cauterization, and epi. injections. 

He also also underwent prophylactic embolization of the gastroduodenal artery. 

Was discharged to [place] rehab on 4/17, however returns back to inpatient side 

due to black stool, and symptomatic hypotension.





Upper GIB, acute


Hematemesis


   [Bellow procedures done on prior admission]


     3/9/18 - EGD found antrum ulcer with visible vessel s/p epi inj, cautery, 

clips


     3/30/18 EGD found large duodenal ulcer


     4/4/18 IR did GDA embolization


     4/5/18 normal bleed scan


     4/6/18 EGD found 2 duodenal ulcers s/p APC


     4/11/18 EGD found antral ulcer with visible vessel s/p cautery, epi inj


     4/13/18 normal bleed scan


   - H/H stable, symptomatic hypotension with BP 88/60.  Provided with 1 L of 

IV fluids


   -GI had plans for upper endoscopy however patient had breakfast this morning 

and this was postponed per anesthesia. Discussed with , patient did not 

refuse.


   -GI consult placed, general surgery consult also placed.


   -N.p.o. after midnight, plans for upper scope tomorrow.  GI okay for patient 

to have clear liquid diet for dinner.


   -Continue IV Protonix drip, p.o. Carafate, monitor vital signs and H&H


   -Will hold antihypertensives for the moment


   -IV Zofran for nausea





Anemia, secondary to GIB


   -Continue oral ferrous sulfate replacement





UTI, E. coli, resolved


   -Previously treated during hospital stay


   -Repeat UA while at [place] positive, culture pending.


   -CBC from today with no leukocytosis, patient has been afebrile.





DANE on CKD


   -Looking back since September 2014 patient's baseline creatinine is around 

1.3


   -This morning's creatinine 1.44, BUN 9, GFR 48


   -Patient was provided with 1 L of IV fluids


   -Continue monitoring renal function closely, avoid nephrotoxins





Hypokalemia, acute


   -Potassium level this morning level 3.3, p.o. replacement with 20 meq's of 

KCl


   -Recheck BMP tomorrow





BPH, chronic


   -Continue Flomax





Bipolar disorder


   -Continue Prozac 40 mg at bedtime and lithium carbonate 600 mg nightly





Diabetes mellitus type 2, controlled


   -Hemoglobin A1c on 3/8/18 was 6.0


   -Patient's random blood sugars during his hospitalization stay were never 

greater than 200


   -Will hold off on metformin in light of DANE if needed start Accu-Cheks with 

ISS





DVT prophylaxis-SCDs, anticoagulation contraindicated due to GIB








Code Status


Full code


Discussed Condition With


Patient, nurse





DC pending improvement and clearance from constants.











Celsa Pulliam MD Apr 19, 2018 11:18

## 2018-04-19 NOTE — EKG
Date Performed: 2018       Time Performed: 00:12:32

 

PTAGE:      72 years

 

EKG:      Sinus rhythm 

 

 with borderline 1st degree A-V block. Left axis deviation Left bundle branch block Possible inferior
 infarct - age undetermined Abnormal ECG Since the 

 

 PREVIOUS TRACING            , no significant change noted PREVIOUS TRACIN2018 19.26

 

DOCTOR:   Lb Greenwood  Interpretating Date/Time  2018 13:33:58

## 2018-04-19 NOTE — HHI.PR
cc:   John Roberts MD


__________________________________________________





Subjective


Subjective Notes











--------------------------------------------------------------------------------

--------------------------------------------


DAILY PROGRESS NOTE FOR SURGICAL ATTENDING, DR. JOHN ROBERTS


--------------------------------------------------------------------------------

-------------------------------------------


No complaints 


No further bleeding





Objective


Vitals/I&O





Vital Signs








  Date Time  Temp Pulse Resp B/P (MAP) Pulse Ox O2 Delivery O2 Flow Rate FiO2


 


4/19/18 15:11 97.8 100 19 143/76 (98) 94 Nasal Cannula 2 








Labs





Laboratory Tests








Test


  4/19/18


06:55 4/19/18


17:10


 


White Blood Count 8.6  


 


Red Blood Count 2.93  


 


Hemoglobin 8.8  


 


Hematocrit 26.4  


 


Mean Corpuscular Volume 90.1  


 


Mean Corpuscular Hemoglobin 30.2  


 


Mean Corpuscular Hemoglobin


Concent 33.5 


  


 


 


Red Cell Distribution Width 14.5  


 


Platelet Count 515  


 


Mean Platelet Volume 6.8  


 


Neutrophils (%) (Auto) 67.1  


 


Lymphocytes (%) (Auto) 21.6  


 


Monocytes (%) (Auto) 8.4  


 


Eosinophils (%) (Auto) 2.3  


 


Basophils (%) (Auto) 0.6  


 


Neutrophils # (Auto) 5.8  


 


Lymphocytes # (Auto) 1.9  


 


Monocytes # (Auto) 0.7  


 


Eosinophils # (Auto) 0.2  


 


Basophils # (Auto) 0.0  


 


CBC Comment DIFF FINAL  


 


Differential Comment   


 


Blood Urea Nitrogen 6  


 


Creatinine 1.38  


 


Random Glucose 95  


 


Calcium Level 9.2  


 


Sodium Level 144  


 


Potassium Level 3.4  


 


Chloride Level 111  


 


Carbon Dioxide Level 26.7  


 


Anion Gap 6  


 


Estimat Glomerular Filtration


Rate 51 


  


 








Cardiovascular:  Regular


Lungs:  Clear


Abdomen:  Non-distended, Non-tender


Extremities:  No edema





A/P


Assessment and Plan


72 year old male with recurrent GI bleeds





-S/p EGD today---no active bleeding


-Diet as tolerated


-No acute General Surgery needs at this time





Attending Statement








--------------------------------------------------------------------------------

------------------------------------------------


 NOTE FOR SURGICAL ATTENDING, DR. JOHN ROBERTS


--------------------------------------------------------------------------------

------------------------------------------------


I agree with above assessment and plan.





The exam, history, and the medical decision-making described in the above note 

were completed with the assistance of the mid-level provider. I reviewed and 

agree with the findings presented. 





I attest that I had a face-to-face encounter with the patient on the same day, 

and personally performed and documented my assessment and findings in the 

medical record.





The following services were provided during this hospital visit:





   Chart data review, vital sign assessments/reviewing monitor data


   Review of consultations notes if present.


   Medication orders/review and/or management


   Ordering and/or reviewing lab tests


   Ordering and/or interpreting/reviewing x-rays and/or diagnostic studies 


   Care of the patient and discussion of the patient with the care team 


   Documentation time


   To help prompt me to consider important information that might be impacting 

today's encounter and     assessment,


   Information from prior notes written by myself or my colleagues may have 

been "brought forward/copy and     pasted" into today's note.











Rosetta Castaneda/First Assist RENATA Apr 19, 2018 17:55


John Roberts MD Apr 19, 2018 18:32

## 2018-04-19 NOTE — PD.CONS
HPI


History of Present Illness


This is a 72 year old male with hx CVAm GIB, gastric ulcer who originally 

presented from his NH for hematemesis.  He is well known to our service having 

had numerous EGDs for UGIB and gastric and duodenal ulcers, and a GDA 

embolization.  GI consulted for GIB while pt at [facility].  Per report he 

had black tarry stool and has been complaining of nausea and dizziness, has 

been hypotensive.  No velia blood observed in stool. Pt was transferred back to 

[facility] and is to have EGD today.  He says he is feeling better today, less dizzy.  

He denies any black tarry stool today but acknowledges he was having some 

yesterday.





PFSH


Past Medical History


Hypertension


CVA


GI bleed


Diabetes mellitus


Bipolar disorder


BPH


Past Surgical History


Left hand surgery due to crush injury


Multiple EGD procedures with clippings, and epinephrine injection


Coded Allergies:  


     codeine (Verified  Allergy, Severe, MUSCULOSKELETAL ISSUES, 4/17/18)


     insulin,pork (Verified  Allergy, Severe, Anaphylaxis, 4/17/18)


     insulin aspart (Verified  Allergy, Intermediate, 4/17/18)


     lactose (Verified  Allergy, Intermediate, 4/17/18)


 LACTOSE INTOLERANT


Family History


Denies past family history


Social History


Denies any tobacco, alcohol, or illicit drug use.





GI Exam


Vitals I&O





Vital Signs








  Date Time  Temp Pulse Resp B/P (MAP) Pulse Ox O2 Delivery O2 Flow Rate FiO2


 


4/19/18 11:23 98.0 91 18 142/75 (97) 100   


 


4/19/18 09:57      Room Air  


 


4/19/18 08:26 97.8 82 18 111/76 (88) 94   


 


4/19/18 04:00 98.3 97 17 137/74 (95) 99   


 


4/19/18 03:53      Room Air  


 


4/19/18 00:00 98.1 100 17 151/71 (97) 98   


 


4/19/18 00:00      Room Air  


 


4/18/18 21:22 97.9 90  145/71 (95) 99   


 


4/18/18 20:00      Room Air  


 


4/18/18 16:00 98.0 83 16 136/74 (94) 94   














I/O      


 


 4/18/18 4/18/18 4/18/18 4/19/18 4/19/18 4/19/18





 07:00 15:00 23:00 07:00 15:00 23:00


 


Intake Total    120 ml  


 


Output Total    100 ml  


 


Balance    20 ml  


 


      


 


Intake Oral    120 ml  


 


Output Urine Total    100 ml  








Laboratory











Test


  4/19/18


06:55


 


White Blood Count 8.6 TH/MM3 


 


Red Blood Count 2.93 MIL/MM3 


 


Hemoglobin 8.8 GM/DL 


 


Hematocrit 26.4 % 


 


Mean Corpuscular Volume 90.1 FL 


 


Mean Corpuscular Hemoglobin 30.2 PG 


 


Mean Corpuscular Hemoglobin


Concent 33.5 % 


 


 


Red Cell Distribution Width 14.5 % 


 


Platelet Count 515 TH/MM3 


 


Mean Platelet Volume 6.8 FL 


 


Neutrophils (%) (Auto) 67.1 % 


 


Lymphocytes (%) (Auto) 21.6 % 


 


Monocytes (%) (Auto) 8.4 % 


 


Eosinophils (%) (Auto) 2.3 % 


 


Basophils (%) (Auto) 0.6 % 


 


Neutrophils # (Auto) 5.8 TH/MM3 


 


Lymphocytes # (Auto) 1.9 TH/MM3 


 


Monocytes # (Auto) 0.7 TH/MM3 


 


Eosinophils # (Auto) 0.2 TH/MM3 


 


Basophils # (Auto) 0.0 TH/MM3 


 


CBC Comment DIFF FINAL 


 


Differential Comment  


 


Blood Urea Nitrogen 6 MG/DL 


 


Creatinine 1.38 MG/DL 


 


Random Glucose 95 MG/DL 


 


Calcium Level 9.2 MG/DL 


 


Sodium Level 144 MEQ/L 


 


Potassium Level 3.4 MEQ/L 


 


Chloride Level 111 MEQ/L 


 


Carbon Dioxide Level 26.7 MEQ/L 


 


Anion Gap 6 MEQ/L 


 


Estimat Glomerular Filtration


Rate 51 ML/MIN 


 








Physical Examination


HEENT: PERRL; normocephalic; atraumatic; no jaundice.   


CHEST:  CTA


CARDIAC:  RRR


ABDOMEN:  Soft, nondistended, nontender; no hepatosplenomegaly; bowel sounds 

are present in all four quadrants.


EXTREMITIES: No clubbing, cyanosis, or edema.


SKIN:  Normal; no rash; no jaundice.


CNS:  alert





Assessment and Plan


Plan





ASSESSMENT


- UGIB - 2/2 gastric and dudoenal ulcers.  GI reconsulted for black tarry stool

, nausea, dizziness, hypotension.


  He is on BID protonix and carafate.  GS consult appreciated with rec to 

minimize blood draws


 His procedures are as follows:


     3/9/18 - EGD found antrum ulcer with visible vessel s/p epi inj, cautery, 

clips


     3/30/18 EGD found large duodenal ulcer


     4/4/18 IR did GDA embolization


     4/5/18 normal bleed scan


     4/6/18 EGD found 2 duodenal ulcers s/p APC


     4/11/18 EGD found antral ulcer with visible vessel s/p cautery, epi inj


     4/13/18 normal bleed scan


- anemia - 2/2 above,  symptomatic yesterday. Mild drop in HH overnight.





PLAN


- EGD today


- monitor labs


- further recs to follow depending on results EGD





pt seen by myself and Dr Matta and this note is on her behalf











Mary Carmen Hart Apr 19, 2018 11:36

## 2018-04-20 VITALS
DIASTOLIC BLOOD PRESSURE: 87 MMHG | SYSTOLIC BLOOD PRESSURE: 135 MMHG | TEMPERATURE: 99.3 F | RESPIRATION RATE: 18 BRPM | HEART RATE: 96 BPM | OXYGEN SATURATION: 97 %

## 2018-04-20 VITALS
TEMPERATURE: 97.4 F | HEART RATE: 86 BPM | SYSTOLIC BLOOD PRESSURE: 146 MMHG | RESPIRATION RATE: 18 BRPM | DIASTOLIC BLOOD PRESSURE: 81 MMHG | OXYGEN SATURATION: 97 %

## 2018-04-20 VITALS
RESPIRATION RATE: 18 BRPM | HEART RATE: 101 BPM | OXYGEN SATURATION: 97 % | SYSTOLIC BLOOD PRESSURE: 131 MMHG | TEMPERATURE: 97.5 F | DIASTOLIC BLOOD PRESSURE: 75 MMHG

## 2018-04-20 VITALS
DIASTOLIC BLOOD PRESSURE: 66 MMHG | OXYGEN SATURATION: 96 % | SYSTOLIC BLOOD PRESSURE: 172 MMHG | HEART RATE: 102 BPM | RESPIRATION RATE: 18 BRPM | TEMPERATURE: 97.4 F

## 2018-04-20 VITALS
HEART RATE: 88 BPM | TEMPERATURE: 98.7 F | SYSTOLIC BLOOD PRESSURE: 127 MMHG | OXYGEN SATURATION: 96 % | RESPIRATION RATE: 16 BRPM | DIASTOLIC BLOOD PRESSURE: 81 MMHG

## 2018-04-20 VITALS
SYSTOLIC BLOOD PRESSURE: 143 MMHG | DIASTOLIC BLOOD PRESSURE: 75 MMHG | HEART RATE: 80 BPM | OXYGEN SATURATION: 91 % | RESPIRATION RATE: 20 BRPM | TEMPERATURE: 97.6 F

## 2018-04-20 VITALS
SYSTOLIC BLOOD PRESSURE: 145 MMHG | TEMPERATURE: 98.4 F | OXYGEN SATURATION: 97 % | HEART RATE: 88 BPM | RESPIRATION RATE: 18 BRPM | DIASTOLIC BLOOD PRESSURE: 76 MMHG

## 2018-04-20 RX ADMIN — FERROUS SULFATE TAB 325 MG (65 MG ELEMENTAL FE) SCH MG: 325 (65 FE) TAB at 09:00

## 2018-04-20 RX ADMIN — ATORVASTATIN CALCIUM SCH MG: 40 TABLET, FILM COATED ORAL at 22:25

## 2018-04-20 RX ADMIN — Medication SCH ML: at 09:00

## 2018-04-20 RX ADMIN — SUCRALFATE SCH GM: 1 TABLET ORAL at 05:20

## 2018-04-20 RX ADMIN — Medication SCH ML: at 21:00

## 2018-04-20 RX ADMIN — PANTOPRAZOLE SODIUM SCH MLS/HR: 40 INJECTION, POWDER, FOR SOLUTION INTRAVENOUS at 05:20

## 2018-04-20 RX ADMIN — STANDARDIZED SENNA CONCENTRATE AND DOCUSATE SODIUM SCH TAB: 8.6; 5 TABLET, FILM COATED ORAL at 09:00

## 2018-04-20 RX ADMIN — TAMSULOSIN HYDROCHLORIDE SCH MG: 0.4 CAPSULE ORAL at 22:25

## 2018-04-20 RX ADMIN — FERROUS SULFATE TAB 325 MG (65 MG ELEMENTAL FE) SCH MG: 325 (65 FE) TAB at 16:53

## 2018-04-20 RX ADMIN — SUCRALFATE SCH GM: 1 TABLET ORAL at 16:00

## 2018-04-20 RX ADMIN — STANDARDIZED SENNA CONCENTRATE AND DOCUSATE SODIUM SCH TAB: 8.6; 5 TABLET, FILM COATED ORAL at 22:25

## 2018-04-20 RX ADMIN — LITHIUM CARBONATE SCH MG: 300 CAPSULE, GELATIN COATED ORAL at 22:25

## 2018-04-20 RX ADMIN — ONDANSETRON PRN MG: 2 INJECTION, SOLUTION INTRAMUSCULAR; INTRAVENOUS at 19:48

## 2018-04-20 RX ADMIN — ONDANSETRON PRN MG: 2 INJECTION, SOLUTION INTRAMUSCULAR; INTRAVENOUS at 13:11

## 2018-04-20 NOTE — HHI.PR
Subjective


Remarks





The patient was seen earlier today 


He is complaining of nausea. Not able to eat in the morning. No vomiting. No 

diarrhea 


Some mild epigastric pain . 


No fever  or chills. 


Denies chest pain or sob. 


Appears  chronically ill.





Objective


Vitals





Vital Signs








  Date Time  Temp Pulse Resp B/P (MAP) Pulse Ox O2 Delivery O2 Flow Rate FiO2


 


4/20/18 15:35 99.3 96 18 135/87 (103) 97   


 


4/20/18 11:20 97.4 102 18 172/66 (101) 96   


 


4/20/18 08:00     96 Room Air 2.00 


 


4/20/18 07:29 97.4 86 18 146/81 (102) 97   


 


4/20/18 07:20 97.6 80 20 143/75 (97) 91   


 


4/20/18 04:40 98.7 88 16 127/81 (96) 96   


 


4/20/18 04:00      Room Air  


 


4/20/18 00:00      Room Air  


 


4/19/18 23:45 97.9 100 16 133/76 (95) 97   


 


4/19/18 21:30 97.9 100 16 153/78 (103) 97   


 


4/19/18 20:00      Room Air  














I/O      


 


 4/19/18 4/19/18 4/19/18 4/20/18 4/20/18 4/20/18





 07:00 15:00 23:00 07:00 15:00 23:00


 


Intake Total 120 ml 400 ml 240 ml 340 ml  480 ml


 


Output Total 100 ml  1100 ml 650 ml  650 ml


 


Balance 20 ml 400 ml -860 ml -310 ml  -170 ml


 


      


 


Intake Oral 120 ml  240 ml 240 ml  480 ml


 


IV Total   0 ml 100 ml  


 


Other  400 ml    


 


Output Urine Total 100 ml  1100 ml 650 ml  650 ml


 


# Bowel Movements   0 0  








Result Diagram:  


4/19/18 0655                                                                   

             4/19/18 0655





Objective Remarks


GENERAL: This is a well-nourished, well-developed patient, in no apparent 

distress.


CARDIOVASCULAR: Regular rate and rhythm without murmurs, gallops, or rubs. 


RESPIRATORY: Clear to auscultation. Breath sounds equal bilaterally. No wheezes

, rales, or rhonchi.  


GASTROINTESTINAL: Abdomen soft, non-tender, nondistended. No hepato-splenomegaly

, or palpable masses. No guarding.  Active bowel sounds.


MUSCULOSKELETAL: Extremities without clubbing, cyanosis, or edema. No joint 

tenderness, effusion, or edema noted. No calf tenderness. 


NEUROLOGICAL: Awake and alert, oriented 3. Cranial nerves II through XII 

grossly intact.  Motor and sensory grossly within normal limits. 4/5 muscle 

strength in all muscle groups.  Normal speech.











A/P


Problem List:  


(1) GI bleed


ICD Code:  K92.2 - Gastrointestinal hemorrhage, unspecified


Status:  Acute


Assessment and Plan


72-year-old male with PMH with of HTN, DM on metformin, GI bleed, CVA, BPH, 

left hand injury, and bipolar disorder on lithium and Prozac. Originally 

presented to [place] from nursing home on 3/29 due with nausea, vomiting and 

hematemesis. Underwent multiple EGD's with cauterization, and epi. injections. 

He also also underwent prophylactic embolization of the gastroduodenal artery. 

Was discharged to [facility] on 4/17, however returns back to inpatient side 

due to black stool, and symptomatic hypotension.





Upper GIB, acute


Hematemesis


Nausea


   Bellow procedures done on prior admission]


     3/9/18 - EGD found antrum ulcer with visible vessel s/p epi inj, cautery, 

clips


     3/30/18 EGD found large duodenal ulcer


     4/4/18 IR did GDA embolization


     4/5/18 normal bleed scan


     4/6/18 EGD found 2 duodenal ulcers s/p APC


     4/11/18 EGD found antral ulcer with visible vessel s/p cautery, epi inj


     4/13/18 normal bleed scan


   - H/H stable, symptomatic hypotension with BP 88/60.  Provided with 1 L of 

IV fluids


   -GI had plans for upper endoscopy however patient had breakfast this morning 

and this was postponed per anesthesia. Discussed with , patient did not 

refuse.


   -GI consult placed, general surgery consult also placed.


   -N.p.o. after midnight, plans for upper scope tomorrow.  GI okay for patient 

to have clear liquid diet for dinner.


   -Continue IV Protonix drip, p.o. Carafate, monitor vital signs and H&H


   -Will hold antihypertensives for the moment


   -IV Zofran for nausea





Anemia, secondary to GIB


   -Continue oral ferrous sulfate replacement





UTI, E. coli, resolved


   -Previously treated during hospital stay


   -Repeat UA while at [facility] positive, culture pending.


   -CBC from today with no leukocytosis, patient has been afebrile.





DANE on CKD


   -Looking back since September 2014 patient's baseline creatinine is around 

1.3


   -This morning's creatinine 1.44, BUN 9, GFR 48


   -Patient was provided with 1 L of IV fluids


   -Continue monitoring renal function closely, avoid nephrotoxins





Hypokalemia, acute


   -Potassium level this morning level 3.3, p.o. replacement with 20 meq's of 

KCl


   -Recheck BMP tomorrow





BPH, chronic


   -Continue Flomax





Bipolar disorder


   -Continue Prozac 40 mg at bedtime and lithium carbonate 600 mg nightly





Diabetes mellitus type 2, controlled


   -Hemoglobin A1c on 3/8/18 was 6.0


   -Patient's random blood sugars during his hospitalization stay were never 

greater than 200


   -Will hold off on metformin in light of DANE if needed start Accu-Cheks with 

ISS





DVT prophylaxis-SCDs, anticoagulation contraindicated due to GIB








Code Status


Full code


Discussed Condition With


Patient, nurse





DC pending improvement and clearance from constants. 


With persistent nausea, GI ff











eClsa Pulliam MD Apr 20, 2018 19:49

## 2018-04-20 NOTE — HHI.PR
cc:   John Roberts MD


__________________________________________________





Subjective


Subjective Notes











--------------------------------------------------------------------------------

--------------------------------------------


DAILY PROGRESS NOTE FOR SURGICAL ATTENDING, DR. JOHN ROBERTS


--------------------------------------------------------------------------------

-------------------------------------------


"They keep putting my pills in applesauce!"





Objective


Vitals/I&O





Vital Signs








  Date Time  Temp Pulse Resp B/P (MAP) Pulse Ox O2 Delivery O2 Flow Rate FiO2


 


4/20/18 11:20 97.4 102 18 172/66 (101) 96   


 


4/20/18 08:00      Room Air 2.00 








Labs





Laboratory Tests








Test


  4/19/18


17:10








Cardiovascular:  Regular


Lungs:  Clear


Abdomen:  Non-distended, Non-tender


Extremities:  No edema





A/P


Assessment and Plan


72 year old male with recurrent GI bleeds





-S/p EGD yesterday--no active bleeding


-Diet as tolerated


-No further reports of any bleeding 


-No acute General Surgery needs at this time 


-General Surgery will sign off; please call if needed





Attending Statement








--------------------------------------------------------------------------------

------------------------------------------------


 NOTE FOR SURGICAL ATTENDING, DR. JOHN ROBERTS


--------------------------------------------------------------------------------

------------------------------------------------





Patient seen


Abdomen soft


Tolerating diet


Reviewed EGD


Please call if needed





I agree with above assessment and plan.





The exam, history, and the medical decision-making described in the above note 

were completed with the assistance of the mid-level provider. I reviewed and 

agree with the findings presented. 





I attest that I had a face-to-face encounter with the patient on the same day, 

and personally performed and documented my assessment and findings in the 

medical record.





The following services were provided during this hospital visit:





   Chart data review, vital sign assessments/reviewing monitor data


   Review of consultations notes if present.


   Medication orders/review and/or management


   Ordering and/or reviewing lab tests


   Ordering and/or interpreting/reviewing x-rays and/or diagnostic studies 


   Care of the patient and discussion of the patient with the care team 


   Documentation time


   To help prompt me to consider important information that might be impacting 

today's encounter and     assessment,


   Information from prior notes written by myself or my colleagues may have 

been "brought forward/copy and     pasted" into today's note.











Rosetta Castaneda/First Assist [facility] Apr 20, 2018 15:46


John Roberts MD Apr 20, 2018 16:21

## 2018-04-20 NOTE — HHI.GIFU
Subjective


Remarks


Patient is resting in the bed elevated elevated 30-35


Patient is eating lunch denies any dysphasia.  Appetite fair


Mild nausea noted, taking anti-emetics


Denies any vomiting


Mild gastric discomfort with light palpation


 (Beti Null)





Objective


Vitals I&O





Vital Signs








  Date Time  Temp Pulse Resp B/P (MAP) Pulse Ox O2 Delivery O2 Flow Rate FiO2


 


4/20/18 11:20 97.4 102 18 172/66 (101) 96   


 


4/20/18 07:29 97.4 86 18 146/81 (102) 97   


 


4/20/18 07:20 97.6 80 20 143/75 (97) 91   


 


4/20/18 04:40 98.7 88 16 127/81 (96) 96   


 


4/20/18 04:00      Room Air  


 


4/20/18 00:00      Room Air  


 


4/19/18 23:45 97.9 100 16 133/76 (95) 97   


 


4/19/18 21:30 97.9 100 16 153/78 (103) 97   


 


4/19/18 20:00      Room Air  


 


4/19/18 18:00      Room Air  


 


4/19/18 15:11 97.8 100 19 143/76 (98) 94 Nasal Cannula 2 


 


4/19/18 15:00  98 18 150/80 (103) 94 Nasal Cannula 2 


 


4/19/18 14:49 97.8 95 20 157/83 (107) 93 Nasal Cannula 3 














I/O      


 


 4/19/18 4/19/18 4/19/18 4/20/18 4/20/18 4/20/18





 07:00 15:00 23:00 07:00 15:00 23:00


 


Intake Total 120 ml 400 ml 240 ml 340 ml  


 


Output Total 100 ml  1100 ml 650 ml  


 


Balance 20 ml 400 ml -860 ml -310 ml  


 


      


 


Intake Oral 120 ml  240 ml 240 ml  


 


IV Total   0 ml 100 ml  


 


Other  400 ml    


 


Output Urine Total 100 ml  1100 ml 650 ml  


 


# Bowel Movements   0 0  








Laboratory





Laboratory Tests








Test


  4/19/18


17:10








Physical Exam


HEENT: Normocephalic; atraumatic; no jaundice.  Facial color pale, speech is 

clear and understandable, oral cavity clean without food particles


NECK: Neck is supple,


CHEST:  Chest mild diminished breath sounds in his bases


CARDIAC: Heart rate varies between 86 and 102


ABDOMEN:  Soft, mild minimal distention, gastric discomfort with light palpation

, bowel sounds are present in all four quadrants.


EXTREMITIES: No clubbing, cyanosis, or edema.


SKIN: Pale, and turgor; no rash; no jaundice.


CNS: Speech understandable, clear, oriented 3 for the most part


 (Beti Null)





Assessment and Plan


Plan





ASSESSMENT


- UGIB - 2/2 gastric and dudoenal ulcers.  GI reconsulted for black tarry stool

, nausea, dizziness, hypotension.


  He is on BID protonix and carafate.  GS consult appreciated with rec to 

minimize blood draws


 His procedures are as follows:


     3/9/18 - EGD found antrum ulcer with visible vessel s/p epi inj, cautery, 

clips


     3/30/18 EGD found large duodenal ulcer


     4/4/18 IR did GDA embolization


     4/5/18 normal bleed scan


     4/6/18 EGD found 2 duodenal ulcers s/p APC


     4/11/18 EGD found antral ulcer with visible vessel s/p cautery, epi inj


     4/13/18 normal bleed scan


- anemia - 2/2 above,  symptomatic yesterday. Mild drop in HH overnight.





4/20/2018, EGD report pending, patient currently has some mild gastric 

discomfort with light palpation.  Still notes occasional nausea but no 

vomiting.  No dysphasia.  Appetite fair tolerating solid food and liquids 

without cough


Anemia, no obvious bleed at this time hemoglobin 8.8.  Noted some mild 

tachycardia with increased activity





PLAN


-Await biopsies and report


- monitor labs special attention to hemoglobin


-PPI


-Carafate


-Iron supplements by mouth


-Anti-emetics


-Transfuse as necessary


- further recs to follow depending on results EGD





pt seen by myself and Dr Matta and this note is on her behalf


 (Beti Null)


Physician Comments


agree with above 


 (Ciara Matta MD)











Beti Null Apr 20, 2018 12:56


Ciara Matta MD Apr 20, 2018 16:36

## 2018-04-20 NOTE — HHI.PR
Subjective


Remarks


Late entry the patient was seen earlier today.


Patient denies any chest pain or shortness of breath at this time.


She is pleasantly confused.  Says she does not have appetite.  Denies any 

nausea or vomiting. 


Patient is however telling me she does not want any procedures or other care .  

Will consult palliative care for goals of care.





Objective


Vitals





Vital Signs








  Date Time  Temp Pulse Resp B/P (MAP) Pulse Ox O2 Delivery O2 Flow Rate FiO2


 


4/20/18 15:35 99.3 96 18 135/87 (103) 97   


 


4/20/18 11:20 97.4 102 18 172/66 (101) 96   


 


4/20/18 08:00     96 Room Air 2.00 


 


4/20/18 07:29 97.4 86 18 146/81 (102) 97   


 


4/20/18 07:20 97.6 80 20 143/75 (97) 91   


 


4/20/18 04:40 98.7 88 16 127/81 (96) 96   


 


4/20/18 04:00      Room Air  


 


4/20/18 00:00      Room Air  


 


4/19/18 23:45 97.9 100 16 133/76 (95) 97   


 


4/19/18 21:30 97.9 100 16 153/78 (103) 97   


 


4/19/18 20:00      Room Air  














I/O      


 


 4/19/18 4/19/18 4/19/18 4/20/18 4/20/18 4/20/18





 07:00 15:00 23:00 07:00 15:00 23:00


 


Intake Total 120 ml 400 ml 240 ml 340 ml  480 ml


 


Output Total 100 ml  1100 ml 650 ml  650 ml


 


Balance 20 ml 400 ml -860 ml -310 ml  -170 ml


 


      


 


Intake Oral 120 ml  240 ml 240 ml  480 ml


 


IV Total   0 ml 100 ml  


 


Other  400 ml    


 


Output Urine Total 100 ml  1100 ml 650 ml  650 ml


 


# Bowel Movements   0 0  








Result Diagram:  


4/19/18 0655                                                                   

             4/19/18 0655





Objective Remarks


GENERAL: This is a well-nourished, well-developed patient, in no apparent 

distress.


CARDIOVASCULAR: Regular rate and rhythm without murmurs, gallops, or rubs. 


RESPIRATORY: Clear to auscultation. Breath sounds equal bilaterally. No wheezes

, rales, or rhonchi.  


GASTROINTESTINAL: Abdomen soft, non-tender, nondistended. No hepato-splenomegaly

, or palpable masses. No guarding.  Active bowel sounds.


MUSCULOSKELETAL: Extremities without clubbing, cyanosis, or edema. No joint 

tenderness, effusion, or edema noted. No calf tenderness. 


NEUROLOGICAL: Awake and alert, oriented 3. Cranial nerves II through XII 

grossly intact.  Motor and sensory grossly within normal limits. 4/5 muscle 

strength in all muscle groups.  Normal speech.











A/P


Problem List:  


(1) GI bleed


ICD Code:  K92.2 - Gastrointestinal hemorrhage, unspecified


Status:  Acute


Assessment and Plan


72-year-old male with PMH with of HTN, DM on metformin, GI bleed, CVA, BPH, 

left hand injury, and bipolar disorder on lithium and Prozac. Originally 

presented to [facility] from nursing home on 3/29 due with nausea, vomiting and 

hematemesis. Underwent multiple EGD's with cauterization, and epi. injections. 

He also also underwent prophylactic embolization of the gastroduodenal artery. 

Was discharged to [facility] on 4/17, however returns back to inpatient side 

due to black stool, and symptomatic hypotension.





Upper GIB, acute


Hematemesis


   [Bellow procedures done on prior admission]


     3/9/18 - EGD found antrum ulcer with visible vessel s/p epi inj, cautery, 

clips


     3/30/18 EGD found large duodenal ulcer


     4/4/18 IR did GDA embolization


     4/5/18 normal bleed scan


     4/6/18 EGD found 2 duodenal ulcers s/p APC


     4/11/18 EGD found antral ulcer with visible vessel s/p cautery, epi inj


     4/13/18 normal bleed scan


   - H/H stable, symptomatic hypotension with BP 88/60.  Provided with 1 L of 

IV fluids


   -GI had plans for upper endoscopy however patient had breakfast this morning 

and this was postponed per anesthesia. Discussed with , patient did not 

refuse.


   -GI consult placed, general surgery consult also placed.


   -N.p.o. after midnight, plans for upper scope tomorrow.  GI okay for patient 

to have clear liquid diet for dinner.


   -Continue IV Protonix drip, p.o. Carafate, monitor vital signs and H&H


   -Will hold antihypertensives for the moment


   -IV Zofran for nausea





Anemia, secondary to GIB


   -Continue oral ferrous sulfate replacement





UTI, E. coli, resolved


   -Previously treated during hospital stay


   -Repeat UA while at [facility] positive, culture pending.


   -CBC from today with no leukocytosis, patient has been afebrile.





DANE on CKD


   -Looking back since September 2014 patient's baseline creatinine is around 

1.3


   -This morning's creatinine 1.44, BUN 9, GFR 48


   -Patient was provided with 1 L of IV fluids


   -Continue monitoring renal function closely, avoid nephrotoxins





Hypokalemia, acute


   -Potassium level this morning level 3.3, p.o. replacement with 20 meq's of 

KCl


   -Recheck BMP tomorrow





BPH, chronic


   -Continue Flomax





Bipolar disorder


   -Continue Prozac 40 mg at bedtime and lithium carbonate 600 mg nightly





Diabetes mellitus type 2, controlled


   -Hemoglobin A1c on 3/8/18 was 6.0


   -Patient's random blood sugars during his hospitalization stay were never 

greater than 200


   -Will hold off on metformin in light of DANE if needed start Accu-Cheks with 

ISS





DVT prophylaxis-SCDs, anticoagulation contraindicated due to GIB








Code Status


Full code


Discussed Condition With


Patient, nurse





DC pending improvement and clearance from constants. 





Here for goals of care.  Patient is me she does not want any more procedures or 

care.











Celsa Pulliam MD Apr 20, 2018 19:36

## 2018-04-21 VITALS
OXYGEN SATURATION: 98 % | RESPIRATION RATE: 16 BRPM | HEART RATE: 86 BPM | SYSTOLIC BLOOD PRESSURE: 157 MMHG | TEMPERATURE: 97.8 F | DIASTOLIC BLOOD PRESSURE: 78 MMHG

## 2018-04-21 VITALS
HEART RATE: 100 BPM | RESPIRATION RATE: 18 BRPM | OXYGEN SATURATION: 100 % | TEMPERATURE: 97.7 F | SYSTOLIC BLOOD PRESSURE: 132 MMHG | DIASTOLIC BLOOD PRESSURE: 78 MMHG

## 2018-04-21 VITALS
SYSTOLIC BLOOD PRESSURE: 145 MMHG | TEMPERATURE: 98 F | HEART RATE: 88 BPM | OXYGEN SATURATION: 98 % | DIASTOLIC BLOOD PRESSURE: 77 MMHG | RESPIRATION RATE: 16 BRPM

## 2018-04-21 VITALS
DIASTOLIC BLOOD PRESSURE: 84 MMHG | OXYGEN SATURATION: 98 % | HEART RATE: 95 BPM | TEMPERATURE: 98.3 F | SYSTOLIC BLOOD PRESSURE: 152 MMHG | RESPIRATION RATE: 18 BRPM

## 2018-04-21 VITALS
OXYGEN SATURATION: 98 % | RESPIRATION RATE: 18 BRPM | DIASTOLIC BLOOD PRESSURE: 86 MMHG | TEMPERATURE: 98 F | SYSTOLIC BLOOD PRESSURE: 141 MMHG | HEART RATE: 102 BPM

## 2018-04-21 LAB
BASOPHILS # BLD AUTO: 0.1 TH/MM3 (ref 0–0.2)
BASOPHILS NFR BLD: 0.4 % (ref 0–2)
BUN SERPL-MCNC: 5 MG/DL (ref 7–18)
CALCIUM SERPL-MCNC: 9.7 MG/DL (ref 8.5–10.1)
CHLORIDE SERPL-SCNC: 109 MEQ/L (ref 98–107)
CREAT SERPL-MCNC: 1.33 MG/DL (ref 0.6–1.3)
EOSINOPHIL # BLD: 0.1 TH/MM3 (ref 0–0.4)
EOSINOPHIL NFR BLD: 1.1 % (ref 0–4)
ERYTHROCYTE [DISTWIDTH] IN BLOOD BY AUTOMATED COUNT: 14.8 % (ref 11.6–17.2)
GFR SERPLBLD BASED ON 1.73 SQ M-ARVRAT: 53 ML/MIN (ref 89–?)
GLUCOSE SERPL-MCNC: 96 MG/DL (ref 74–106)
HCO3 BLD-SCNC: 26 MEQ/L (ref 21–32)
HCT VFR BLD CALC: 30 % (ref 39–51)
HGB BLD-MCNC: 9.9 GM/DL (ref 13–17)
LYMPHOCYTES # BLD AUTO: 2.1 TH/MM3 (ref 1–4.8)
LYMPHOCYTES NFR BLD AUTO: 18.2 % (ref 9–44)
MAGNESIUM SERPL-MCNC: 1.8 MG/DL (ref 1.5–2.5)
MCH RBC QN AUTO: 29.7 PG (ref 27–34)
MCHC RBC AUTO-ENTMCNC: 32.9 % (ref 32–36)
MCV RBC AUTO: 90.4 FL (ref 80–100)
MONOCYTE #: 1 TH/MM3 (ref 0–0.9)
MONOCYTES NFR BLD: 8.7 % (ref 0–8)
NEUTROPHILS # BLD AUTO: 8.4 TH/MM3 (ref 1.8–7.7)
NEUTROPHILS NFR BLD AUTO: 71.6 % (ref 16–70)
PLATELET # BLD: 621 TH/MM3 (ref 150–450)
PMV BLD AUTO: 7.2 FL (ref 7–11)
RBC # BLD AUTO: 3.32 MIL/MM3 (ref 4.5–5.9)
SODIUM SERPL-SCNC: 143 MEQ/L (ref 136–145)
WBC # BLD AUTO: 11.7 TH/MM3 (ref 4–11)

## 2018-04-21 RX ADMIN — STANDARDIZED SENNA CONCENTRATE AND DOCUSATE SODIUM SCH TAB: 8.6; 5 TABLET, FILM COATED ORAL at 21:12

## 2018-04-21 RX ADMIN — FERROUS SULFATE TAB 325 MG (65 MG ELEMENTAL FE) SCH MG: 325 (65 FE) TAB at 16:35

## 2018-04-21 RX ADMIN — STANDARDIZED SENNA CONCENTRATE AND DOCUSATE SODIUM SCH TAB: 8.6; 5 TABLET, FILM COATED ORAL at 08:02

## 2018-04-21 RX ADMIN — PANTOPRAZOLE SODIUM SCH MLS/HR: 40 INJECTION, POWDER, FOR SOLUTION INTRAVENOUS at 06:16

## 2018-04-21 RX ADMIN — Medication SCH ML: at 21:12

## 2018-04-21 RX ADMIN — ATORVASTATIN CALCIUM SCH MG: 40 TABLET, FILM COATED ORAL at 21:11

## 2018-04-21 RX ADMIN — FERROUS SULFATE TAB 325 MG (65 MG ELEMENTAL FE) SCH MG: 325 (65 FE) TAB at 08:02

## 2018-04-21 RX ADMIN — Medication SCH ML: at 08:02

## 2018-04-21 RX ADMIN — LITHIUM CARBONATE SCH MG: 300 CAPSULE, GELATIN COATED ORAL at 21:11

## 2018-04-21 RX ADMIN — SUCRALFATE SCH GM: 1 TABLET ORAL at 06:14

## 2018-04-21 RX ADMIN — PANTOPRAZOLE SCH MG: 40 TABLET, DELAYED RELEASE ORAL at 21:10

## 2018-04-21 RX ADMIN — TAMSULOSIN HYDROCHLORIDE SCH MG: 0.4 CAPSULE ORAL at 21:11

## 2018-04-21 NOTE — HHI.GIFU
Subjective


Remarks


Pt resting in bed


Answers yes/no questions


States yes to some nausea, denies emesis


Complaining of some mild epigastric pain


States no BM today





Objective


Vitals I&O





Vital Signs








  Date Time  Temp Pulse Resp B/P (MAP) Pulse Ox O2 Delivery O2 Flow Rate FiO2


 


4/21/18 08:00 98.0 88 16 145/77 (99) 98   


 


4/21/18 07:55      Room Air  


 


4/21/18 05:24 97.8 86 16 157/78 (104) 98   


 


4/20/18 23:55 97.5 101 18 131/75 (93) 97   


 


4/20/18 20:00 98.4 88 18 145/76 (99) 97   


 


4/20/18 15:35 99.3 96 18 135/87 (103) 97   


 


4/20/18 11:20 97.4 102 18 172/66 (101) 96   














I/O      


 


 4/20/18 4/20/18 4/20/18 4/21/18 4/21/18 4/21/18





 07:00 15:00 23:00 07:00 15:00 23:00


 


Intake Total 340 ml  480 ml 338 ml  


 


Output Total 650 ml  650 ml 450 ml  


 


Balance -310 ml  -170 ml -112 ml  


 


      


 


Intake Oral 240 ml  480 ml 240 ml  


 


IV Total 100 ml   98 ml  


 


Output Urine Total 650 ml  650 ml 450 ml  


 


# Bowel Movements 0   0  








Laboratory





Laboratory Tests








Test


  4/21/18


08:16


 


White Blood Count 11.7 


 


Red Blood Count 3.32 


 


Hemoglobin 9.9 


 


Hematocrit 30.0 


 


Mean Corpuscular Volume 90.4 


 


Mean Corpuscular Hemoglobin 29.7 


 


Mean Corpuscular Hemoglobin


Concent 32.9 


 


 


Red Cell Distribution Width 14.8 


 


Platelet Count 621 


 


Mean Platelet Volume 7.2 


 


Neutrophils (%) (Auto) 71.6 


 


Lymphocytes (%) (Auto) 18.2 


 


Monocytes (%) (Auto) 8.7 


 


Eosinophils (%) (Auto) 1.1 


 


Basophils (%) (Auto) 0.4 


 


Neutrophils # (Auto) 8.4 


 


Lymphocytes # (Auto) 2.1 


 


Monocytes # (Auto) 1.0 


 


Eosinophils # (Auto) 0.1 


 


Basophils # (Auto) 0.1 


 


CBC Comment DIFF FINAL 


 


Differential Comment  


 


Blood Urea Nitrogen 5 


 


Creatinine 1.33 


 


Random Glucose 96 


 


Calcium Level 9.7 


 


Magnesium Level 1.8 


 


Sodium Level 143 


 


Potassium Level 3.6 


 


Chloride Level 109 


 


Carbon Dioxide Level 26.0 


 


Anion Gap 8 


 


Estimat Glomerular Filtration


Rate 53 


 








Physical Exam


HEENT: Normocephalic; atraumatic


CHEST:  Even/unlabored 


CARDIAC: RRR


ABDOMEN:  Soft, mild epigastric TTP, bowel sounds active 


SKIN: Pale


CNS: Awake, answers yes and no questions





Assessment and Plan


Plan


ASSESSMENT


- Anemia with UGI bleeding 


  Well known to our service:


  Previous work up:


  EGD (March 30th) --> duodenal ulcer with stigmata showing a visible vessel.  


  CTA (4/4) --> Prophylactic coil and gelfoam embolization of the GDA. Celiac 


  and SMA demonstrated standard anatomy without evidence for active hemorrhage.

  


  Another significant drop in H/H on April 5th. At that time there were no 

reports of 


  hematemesis, hematochezia, or melena.  


  NM bleeding scan (4/5) -->  negative. 


  EGD (April 6th) --> Esophagus appeared normal. Mild gastritis in the gastric 

antrum. 


  Normal duodenal mucosa in the 2nd part of the duodenum. Two ranging between 


  5-9mm in size ulcers were found in the duodenal bulb, argon plasma 

coagulation 


  was applied to site with complete hemostasis.  Initially planning on 

colonoscopy, 


  however pt did not take prep so unable to be done. 


  EGD (4/11) --> Antral ulcer with visible vessel 








(4/21) --> Pt with no continued obvious GIB.  H/H improved, currently 9.9/30.


  Pt reports some nausea, denies emesis.  Mild epigastric pain. 


  EGD (4/19) --> Gastritis and Antral ulcer, non-bleeding.


  DC Protonix gtt, switch to Protonix PO BID


  DC Carafate 





PLAN


- DC Protonix gtt


- Protonix PO BID


- DC Carafate 


- SARA


- GI will sign off, have pt follow up with GI after DC


- Colonoscopy outpatient 


- If rebleeding, then will need surgical consult 





Patient has been seen and examined by myself and Dr. Day and this note is 

written on his behalf











Yue Bradshaw Apr 21, 2018 10:43

## 2018-04-21 NOTE — HHI.PR
Subjective


Remarks


Pt seen and examined. VS reviewed. States aside from mild epigastric pain and 

nausea he is otherwise feeling well. States he is nauseous because he doesn't 

like the hospital food. Denies CP, SOB, or further episodes of melena. Wants to 

go home and live with his daughter.





Objective





Vital Signs








  Date Time  Temp Pulse Resp B/P (MAP) Pulse Ox O2 Delivery O2 Flow Rate FiO2


 


4/21/18 12:00 98.3 95 18 152/84 (106) 98   


 


4/21/18 08:00 98.0 88 16 145/77 (99) 98   


 


4/21/18 07:55      Room Air  


 


4/21/18 05:24 97.8 86 16 157/78 (104) 98   


 


4/20/18 23:55 97.5 101 18 131/75 (93) 97   


 


4/20/18 20:00 98.4 88 18 145/76 (99) 97   


 


4/20/18 15:35 99.3 96 18 135/87 (103) 97   














I/O      


 


 4/20/18 4/20/18 4/20/18 4/21/18 4/21/18 4/21/18





 07:00 15:00 23:00 07:00 15:00 23:00


 


Intake Total 340 ml  480 ml 338 ml 40 ml 


 


Output Total 650 ml  650 ml 450 ml  


 


Balance -310 ml  -170 ml -112 ml 40 ml 


 


      


 


Intake Oral 240 ml  480 ml 240 ml  


 


IV Total 100 ml   98 ml 40 ml 


 


Output Urine Total 650 ml  650 ml 450 ml  


 


# Bowel Movements 0   0  








Result Diagram:  


4/21/18 0816                                                                   

             4/21/18 0816





Procedures


EGD 4/19: Gastric and antral ulcer nonbleeding


Objective Remarks


GENERAL: Pale  male sitting up in bed in NAD.


SKIN: Warm and dry.


HEENT: AT/NC. Pupils equal and round. MMM.


NECK: Supple no tender LAD or JVD.


HEART: RRR with 1/6 JENNIFER.


LUNGS: CTAB without wheezes or crackles.


ABDOMEN: +BS, soft, mild epigastric TTP.


EXTREMITIES: No LE edema. 


NEURO: Awake and alert. Nonfocal.


PSYCH: Appropriate mood and affect.





A/P


Assessment and Plan


72-year-old male with PMH with of HTN, DM, GI bleed, CVA, BPH, and bipolar 

disorder originally presented to [facility] from nursing home on 3/29 due with 

nausea, vomiting and hematemesis. He underwent multiple EGD's with cauterization

, and epinephrine injections. He also also underwent prophylactic embolization 

of the gastroduodenal artery. He was discharged to [facility] on 4/17, 

however he returned back to inpatient on 4/19 due to melena and symptomatic 

hypotension.





1. Upper GI bleed


- Recently admitted and on 3/30 underwent EGD showing large duodenal ulcers 

that were clipped and injected with epi. Also went prophylaci embolization of 

gastroduodenal artery on 4/3


- Repeat EGD on 4/6 after Hb further dropped showed gastritis and duodenal bulb 

ulcers that were coagulated


- Repeat EGD on 4/12 showed antral ulcer


- Bleeding scan on 4/13 was normal


- Repeat EGD on 4/14 showed larger ulcer in the atrium that was cauterized and 

injected with epi


- Transferred to [facility] on 4/17 and went back to the medical floor on 4/18 for 

hypotension and melena


- GI consulted and patient underwent EGD again on 4/19 showing gastritis and 

antral ulcer


- Protonix gtt discontinued toward and switched to PO BID


- Carafate discontinued


- GI signed off and recommend colonoscopy as outpatient; also states if pt has 

rebleeding will need surgical consent


- Hemoglobin stable this morning at 9.9


- Monitor CBC


- Zofran PRN





2. Anemia, secondary to GIB


- Continue oral ferrous sulfate replacement





3. CKD


- Avoid nephrotoxic agents and renally dose medications





4. Bipolar disorder


- Continue home lithium and prozac





5. BPH


- Continue home Flomax 





6. HLD


- Continue home statin 


   


DVT prophylxis: chemical anticoagulation C/I in setting of GI bleed. SCDs





Will monitor another day or so to ensure no further bleeding and if patient 

remains stable he can go back to Cindy Caban MD Apr 21, 2018 14:13

## 2018-04-22 VITALS
SYSTOLIC BLOOD PRESSURE: 118 MMHG | TEMPERATURE: 98.1 F | HEART RATE: 92 BPM | OXYGEN SATURATION: 94 % | DIASTOLIC BLOOD PRESSURE: 80 MMHG | RESPIRATION RATE: 18 BRPM

## 2018-04-22 VITALS
HEART RATE: 92 BPM | RESPIRATION RATE: 18 BRPM | DIASTOLIC BLOOD PRESSURE: 78 MMHG | OXYGEN SATURATION: 94 % | TEMPERATURE: 97.8 F | SYSTOLIC BLOOD PRESSURE: 145 MMHG

## 2018-04-22 VITALS
DIASTOLIC BLOOD PRESSURE: 85 MMHG | OXYGEN SATURATION: 97 % | RESPIRATION RATE: 18 BRPM | TEMPERATURE: 97.1 F | SYSTOLIC BLOOD PRESSURE: 158 MMHG | HEART RATE: 99 BPM

## 2018-04-22 VITALS
TEMPERATURE: 98 F | OXYGEN SATURATION: 95 % | SYSTOLIC BLOOD PRESSURE: 145 MMHG | HEART RATE: 84 BPM | DIASTOLIC BLOOD PRESSURE: 91 MMHG | RESPIRATION RATE: 20 BRPM

## 2018-04-22 VITALS
OXYGEN SATURATION: 97 % | TEMPERATURE: 99.7 F | HEART RATE: 98 BPM | SYSTOLIC BLOOD PRESSURE: 124 MMHG | RESPIRATION RATE: 20 BRPM | DIASTOLIC BLOOD PRESSURE: 83 MMHG

## 2018-04-22 VITALS
DIASTOLIC BLOOD PRESSURE: 84 MMHG | OXYGEN SATURATION: 96 % | HEART RATE: 96 BPM | TEMPERATURE: 98.4 F | RESPIRATION RATE: 20 BRPM | SYSTOLIC BLOOD PRESSURE: 151 MMHG

## 2018-04-22 LAB
BASOPHILS # BLD AUTO: 0.1 TH/MM3 (ref 0–0.2)
BASOPHILS NFR BLD: 0.5 % (ref 0–2)
BUN SERPL-MCNC: 6 MG/DL (ref 7–18)
CALCIUM SERPL-MCNC: 9.7 MG/DL (ref 8.5–10.1)
CHLORIDE SERPL-SCNC: 105 MEQ/L (ref 98–107)
CREAT SERPL-MCNC: 1.33 MG/DL (ref 0.6–1.3)
EOSINOPHIL # BLD: 0.2 TH/MM3 (ref 0–0.4)
EOSINOPHIL NFR BLD: 1.6 % (ref 0–4)
ERYTHROCYTE [DISTWIDTH] IN BLOOD BY AUTOMATED COUNT: 14.8 % (ref 11.6–17.2)
GFR SERPLBLD BASED ON 1.73 SQ M-ARVRAT: 53 ML/MIN (ref 89–?)
GLUCOSE SERPL-MCNC: 97 MG/DL (ref 74–106)
HCO3 BLD-SCNC: 26.9 MEQ/L (ref 21–32)
HCT VFR BLD CALC: 29.5 % (ref 39–51)
HGB BLD-MCNC: 9.9 GM/DL (ref 13–17)
LYMPHOCYTES # BLD AUTO: 1.8 TH/MM3 (ref 1–4.8)
LYMPHOCYTES NFR BLD AUTO: 16.9 % (ref 9–44)
MCH RBC QN AUTO: 30.2 PG (ref 27–34)
MCHC RBC AUTO-ENTMCNC: 33.6 % (ref 32–36)
MCV RBC AUTO: 89.8 FL (ref 80–100)
MONOCYTE #: 1.1 TH/MM3 (ref 0–0.9)
MONOCYTES NFR BLD: 9.7 % (ref 0–8)
NEUTROPHILS # BLD AUTO: 7.8 TH/MM3 (ref 1.8–7.7)
NEUTROPHILS NFR BLD AUTO: 71.3 % (ref 16–70)
PLATELET # BLD: 583 TH/MM3 (ref 150–450)
PMV BLD AUTO: 7.1 FL (ref 7–11)
RBC # BLD AUTO: 3.29 MIL/MM3 (ref 4.5–5.9)
SODIUM SERPL-SCNC: 140 MEQ/L (ref 136–145)
WBC # BLD AUTO: 10.9 TH/MM3 (ref 4–11)

## 2018-04-22 RX ADMIN — FERROUS SULFATE TAB 325 MG (65 MG ELEMENTAL FE) SCH MG: 325 (65 FE) TAB at 16:27

## 2018-04-22 RX ADMIN — PANTOPRAZOLE SCH MG: 40 TABLET, DELAYED RELEASE ORAL at 20:32

## 2018-04-22 RX ADMIN — TAMSULOSIN HYDROCHLORIDE SCH MG: 0.4 CAPSULE ORAL at 20:31

## 2018-04-22 RX ADMIN — PANTOPRAZOLE SCH MG: 40 TABLET, DELAYED RELEASE ORAL at 08:43

## 2018-04-22 RX ADMIN — Medication SCH ML: at 20:32

## 2018-04-22 RX ADMIN — STANDARDIZED SENNA CONCENTRATE AND DOCUSATE SODIUM SCH TAB: 8.6; 5 TABLET, FILM COATED ORAL at 20:31

## 2018-04-22 RX ADMIN — FERROUS SULFATE TAB 325 MG (65 MG ELEMENTAL FE) SCH MG: 325 (65 FE) TAB at 08:43

## 2018-04-22 RX ADMIN — STANDARDIZED SENNA CONCENTRATE AND DOCUSATE SODIUM SCH TAB: 8.6; 5 TABLET, FILM COATED ORAL at 08:43

## 2018-04-22 RX ADMIN — LITHIUM CARBONATE SCH MG: 300 CAPSULE, GELATIN COATED ORAL at 20:32

## 2018-04-22 RX ADMIN — ONDANSETRON PRN MG: 2 INJECTION, SOLUTION INTRAMUSCULAR; INTRAVENOUS at 09:48

## 2018-04-22 RX ADMIN — ATORVASTATIN CALCIUM SCH MG: 40 TABLET, FILM COATED ORAL at 20:32

## 2018-04-22 RX ADMIN — Medication SCH ML: at 08:44

## 2018-04-22 NOTE — HHI.PR
Subjective


Remarks


Pt seen and examined. VS reviewed.  Only complaint is nausea per the patient 

and he states he is only nauseous when the hospital food is in front of him 

because "it would make a dog nauseous."  He denies abdominal pain, vomiting, 

melena, diarrhea, chest pain, shortness of breath.  He is anxious to go home to 

his daughter's house.  Per centimeter, she is working on obtaining nursing care 

at the home.





Objective





Vital Signs








  Date Time  Temp Pulse Resp B/P (MAP) Pulse Ox O2 Delivery O2 Flow Rate FiO2


 


4/22/18 09:29      Room Air  


 


4/22/18 08:00 98.0 84 20 145/91 (109) 95   


 


4/22/18 04:00 98.1 92 18 118/80 (93) 94   


 


4/22/18 00:00 97.8 92 18 145/78 (100) 94   


 


4/21/18 20:00 98.0 102 18 141/86 (104) 98   


 


4/21/18 20:00      Room Air 2.00 


 


4/21/18 16:00 97.7 100 18 132/78 (96) 100   














I/O      


 


 4/21/18 4/21/18 4/21/18 4/22/18 4/22/18 4/22/18





 06:59 14:59 22:59 06:59 14:59 22:59


 


Intake Total 338 ml 40 ml 480 ml 450 ml  


 


Output Total 450 ml  800 ml 550 ml  


 


Balance -112 ml 40 ml -320 ml -100 ml  


 


      


 


Intake Oral 240 ml  480 ml 450 ml  


 


IV Total 98 ml 40 ml    


 


Output Urine Total 450 ml  800 ml 550 ml  


 


# Bowel Movements 0   0  








Result Diagram:  


4/22/18 0800                                                                   

             4/22/18 0800





Procedures


EGD 4/19: Gastric and antral ulcer nonbleeding


Objective Remarks


GENERAL: Pale  male sitting up in bed in NAD.


SKIN: Warm and dry.


HEENT: AT/NC. Pupils equal and round. MMM.


NECK: Supple no tender LAD or JVD.


HEART: RRR with 1/6 JENNIFER.


LUNGS: CTAB without wheezes or crackles.


ABDOMEN: +BS, soft, mild epigastric TTP.


EXTREMITIES: No LE edema. 


NEURO: Awake and alert. Nonfocal.


PSYCH: Appropriate mood and affect.





A/P


Assessment and Plan


72-year-old male with PMH with of HTN, DM, GI bleed, CVA, BPH, and bipolar 

disorder originally presented to [facility] from nursing home on 3/29 due with 

nausea, vomiting and hematemesis. He underwent multiple EGD's with cauterization

, and epinephrine injections. He also also underwent prophylactic embolization 

of the gastroduodenal artery. He was discharged to [facility] on 4/17, 

however he returned back to inpatient on 4/19 due to melena and symptomatic 

hypotension.





1. Upper GI bleed


- Recently admitted and on 3/30 underwent EGD showing large duodenal ulcers 

that were clipped and injected with epi. Also went prophylactic embolization of 

gastroduodenal artery on 4/3


- Repeat EGD on 4/6 after Hb further dropped showed gastritis and duodenal bulb 

ulcers that were coagulated


- Repeat EGD on 4/12 showed antral ulcer


- Bleeding scan on 4/13 was normal


- Repeat EGD on 4/14 showed larger ulcer in the atrium that was cauterized and 

injected with epi


- Transferred to [facility] on 4/17 and went back to the medical floor on 4/18 for 

hypotension and melena


- GI consulted and patient underwent EGD again on 4/19 showing gastritis and 

antral ulcer


- Protonix PO BID


- GI signed off and recommend colonoscopy as outpatient; also states if pt has 

rebleeding will need surgical consent


- Hemoglobin stable this morning at 9.9


- Monitor CBC


- Zofran PRN





2. Anemia, secondary to GIB


- Hemodynamically stable


- Continue oral ferrous sulfate replacement





3. CKD


- Avoid nephrotoxic agents and renally dose medications





4. Bipolar disorder


- Continue home lithium and Prozac





5. BPH


- Continue home Flomax 





6. HLD


- Continue home statin 


   


DVT prophylaxis: chemical anticoagulation C/I in setting of GI bleed. [facility]


Discharge Planning


Will monitor another day to ensure no more GI bleeding as patient has had 

multiple episodes in just a few weeks. If stable throughout the night, can be 

discharged tomorrow. Cannot return to [facility] or prior SNF due to legal history. 

Patient's daughter working on hiring a care provider











Cindy Lincoln MD Apr 22, 2018 13:57

## 2018-04-23 VITALS
OXYGEN SATURATION: 96 % | RESPIRATION RATE: 19 BRPM | TEMPERATURE: 98 F | HEART RATE: 89 BPM | SYSTOLIC BLOOD PRESSURE: 132 MMHG | DIASTOLIC BLOOD PRESSURE: 80 MMHG

## 2018-04-23 VITALS
HEART RATE: 101 BPM | DIASTOLIC BLOOD PRESSURE: 83 MMHG | TEMPERATURE: 97.4 F | RESPIRATION RATE: 18 BRPM | SYSTOLIC BLOOD PRESSURE: 124 MMHG | OXYGEN SATURATION: 96 %

## 2018-04-23 VITALS
HEART RATE: 102 BPM | OXYGEN SATURATION: 96 % | TEMPERATURE: 97.9 F | RESPIRATION RATE: 18 BRPM | DIASTOLIC BLOOD PRESSURE: 90 MMHG | SYSTOLIC BLOOD PRESSURE: 155 MMHG

## 2018-04-23 VITALS
SYSTOLIC BLOOD PRESSURE: 138 MMHG | OXYGEN SATURATION: 99 % | DIASTOLIC BLOOD PRESSURE: 77 MMHG | TEMPERATURE: 96.5 F | HEART RATE: 90 BPM | RESPIRATION RATE: 18 BRPM

## 2018-04-23 VITALS
DIASTOLIC BLOOD PRESSURE: 89 MMHG | TEMPERATURE: 98.2 F | HEART RATE: 98 BPM | RESPIRATION RATE: 19 BRPM | OXYGEN SATURATION: 97 % | SYSTOLIC BLOOD PRESSURE: 150 MMHG

## 2018-04-23 LAB
BASOPHILS # BLD AUTO: 0 TH/MM3 (ref 0–0.2)
BASOPHILS NFR BLD: 0.3 % (ref 0–2)
EOSINOPHIL # BLD: 0.2 TH/MM3 (ref 0–0.4)
EOSINOPHIL NFR BLD: 1.5 % (ref 0–4)
ERYTHROCYTE [DISTWIDTH] IN BLOOD BY AUTOMATED COUNT: 14.4 % (ref 11.6–17.2)
HCT VFR BLD CALC: 32.1 % (ref 39–51)
HGB BLD-MCNC: 10.5 GM/DL (ref 13–17)
LYMPHOCYTES # BLD AUTO: 2 TH/MM3 (ref 1–4.8)
LYMPHOCYTES NFR BLD AUTO: 17.6 % (ref 9–44)
MCH RBC QN AUTO: 29.3 PG (ref 27–34)
MCHC RBC AUTO-ENTMCNC: 32.7 % (ref 32–36)
MCV RBC AUTO: 89.5 FL (ref 80–100)
MONOCYTE #: 1 TH/MM3 (ref 0–0.9)
MONOCYTES NFR BLD: 8.5 % (ref 0–8)
NEUTROPHILS # BLD AUTO: 8.3 TH/MM3 (ref 1.8–7.7)
NEUTROPHILS NFR BLD AUTO: 72.1 % (ref 16–70)
PLATELET # BLD: 681 TH/MM3 (ref 150–450)
PMV BLD AUTO: 7.2 FL (ref 7–11)
RBC # BLD AUTO: 3.58 MIL/MM3 (ref 4.5–5.9)
WBC # BLD AUTO: 11.5 TH/MM3 (ref 4–11)

## 2018-04-23 RX ADMIN — STANDARDIZED SENNA CONCENTRATE AND DOCUSATE SODIUM SCH TAB: 8.6; 5 TABLET, FILM COATED ORAL at 08:28

## 2018-04-23 RX ADMIN — Medication SCH ML: at 20:28

## 2018-04-23 RX ADMIN — ATORVASTATIN CALCIUM SCH MG: 40 TABLET, FILM COATED ORAL at 20:24

## 2018-04-23 RX ADMIN — TAMSULOSIN HYDROCHLORIDE SCH MG: 0.4 CAPSULE ORAL at 20:24

## 2018-04-23 RX ADMIN — ONDANSETRON PRN MG: 2 INJECTION, SOLUTION INTRAMUSCULAR; INTRAVENOUS at 21:11

## 2018-04-23 RX ADMIN — PANTOPRAZOLE SCH MG: 40 TABLET, DELAYED RELEASE ORAL at 08:28

## 2018-04-23 RX ADMIN — STANDARDIZED SENNA CONCENTRATE AND DOCUSATE SODIUM SCH TAB: 8.6; 5 TABLET, FILM COATED ORAL at 20:24

## 2018-04-23 RX ADMIN — FERROUS SULFATE TAB 325 MG (65 MG ELEMENTAL FE) SCH MG: 325 (65 FE) TAB at 17:11

## 2018-04-23 RX ADMIN — PANTOPRAZOLE SCH MG: 40 TABLET, DELAYED RELEASE ORAL at 20:24

## 2018-04-23 RX ADMIN — LITHIUM CARBONATE SCH MG: 300 CAPSULE, GELATIN COATED ORAL at 20:24

## 2018-04-23 RX ADMIN — Medication SCH ML: at 08:28

## 2018-04-23 RX ADMIN — ONDANSETRON PRN MG: 2 INJECTION, SOLUTION INTRAMUSCULAR; INTRAVENOUS at 08:32

## 2018-04-23 RX ADMIN — FERROUS SULFATE TAB 325 MG (65 MG ELEMENTAL FE) SCH MG: 325 (65 FE) TAB at 08:28

## 2018-04-23 NOTE — HHI.DCPOC
Discharge Care Plan


Diagnosis:  


(1) GI bleed


(2) Bleeding duodenal ulcer


(3) HTN (hypertension)


(4) DANE (acute kidney injury)


(5) Anemia


(6) Bipolar disorder


(7) Diabetes


Goals to Promote Your Health


* To prevent worsening of your condition and complications


* To maintain your health at the optimal level


Directions to Meet Your Goals


*** Take your medications as prescribed


*** Follow your dietary instruction


*** Follow activity as directed








*** Keep your appointments as scheduled


*** Take your immunizations and boosters as scheduled


*** If your symptoms worsen call your PCP, if no PCP go to Urgent Care Center 

or Emergency Room***


*** Smoking is Dangerous to Your Health. Avoid second hand smoke***


***Call the 24-hour hour crisis hotline for domestic abuse at 1-991.393.1747***











Cindy Lincoln MD Apr 23, 2018 11:26

## 2018-04-23 NOTE — HHI.PR
Subjective


Remarks


Not seen





Objective


Vitals





Vital Signs








  Date Time  Temp Pulse Resp B/P (MAP) Pulse Ox O2 Delivery O2 Flow Rate FiO2


 


4/23/18 20:29 98.2 98 19 150/89 (109) 97   


 


4/23/18 12:00 97.4 101 18 124/83 (97) 96   


 


4/23/18 08:00 97.9 102 18 155/90 (111) 96   


 


4/23/18 08:00      Room Air  


 


4/23/18 04:00 98.0 89 19 132/80 (97) 96   


 


4/23/18 00:00      Room Air  


 


4/23/18 00:00 96.5 90 18 138/77 (97) 99   














I/O      


 


 4/22/18 4/22/18 4/22/18 4/23/18 4/23/18 4/23/18





 07:00 15:00 23:00 07:00 15:00 23:00


 


Intake Total 450 ml  360 ml 900 ml 600 ml 240 ml


 


Output Total 550 ml  200 ml 850 ml 450 ml 50 ml


 


Balance -100 ml  160 ml 50 ml 150 ml 190 ml


 


      


 


Intake Oral 450 ml  360 ml 900 ml 600 ml 240 ml


 


Output Urine Total 550 ml  200 ml 850 ml 450 ml 50 ml


 


# Voids     1 


 


# Bowel Movements 0  0 0 1 








Result Diagram:  


4/23/18 0500                                                                   

             4/22/18 0800





Objective Remarks


GENERAL: Pale  male sitting up in bed in NAD.


SKIN: Warm and dry.


HEENT: AT/NC. Pupils equal and round. MMM.


NECK: Supple no tender LAD or JVD.


HEART: RRR with 1/6 JENNIFER.


LUNGS: CTAB without wheezes or crackles.


ABDOMEN: +BS, soft, mild epigastric TTP.


EXTREMITIES: No LE edema. 


NEURO: Awake and alert. Nonfocal.


PSYCH: Appropriate mood and affect.


Procedures


EGD 4/19 showing gastritis and antral ulcer with no active bleeding





A/P


Problem List:  


(1) GI bleed


ICD Code:  K92.2 - Gastrointestinal hemorrhage, unspecified


Status:  Acute


Assessment and Plan


72-year-old male with PMH with of HTN, DM, GI bleed, CVA, BPH, and bipolar 

disorder originally presented to [facility] from nursing home on 3/29 due with 

nausea, vomiting and hematemesis. He underwent multiple EGD's with cauterization

, and epinephrine injections. He also also underwent prophylactic embolization 

of the gastroduodenal artery. He was discharged to [facility] on 4/17, 

however he returned back to inpatient on 4/19 due to melena and symptomatic 

hypotension.





1. Upper GI bleed


- Recently admitted and on 3/30 underwent EGD showing large duodenal ulcers 

that were clipped and injected with epi. Also went prophylactic embolization of 

gastroduodenal artery on 4/3


- Repeat EGD on 4/6 after Hb further dropped showed gastritis and duodenal bulb 

ulcers that were coagulated


- Repeat EGD on 4/12 showed antral ulcer


- Bleeding scan on 4/13 was normal


- Repeat EGD on 4/14 showed larger ulcer in the atrium that was cauterized and 

injected with epi


- Transferred to [facility] on 4/17 and went back to the medical floor on 4/18 for 

hypotension and melena


- GI consulted and patient underwent EGD again on 4/19 showing gastritis and 

antral ulcer


- Protonix PO BID


- GI signed off and recommend colonoscopy as outpatient; also states if pt has 

rebleeding will need surgical consent


- Hemoglobin stable this morning at 9.9


- Monitor CBC


- Zofran PRN





2. Anemia, secondary to GIB


- Hemodynamically stable


- Continue oral ferrous sulfate replacement





3. CKD


- Avoid nephrotoxic agents and renally dose medications





4. Bipolar disorder


- Continue home lithium and Prozac





5. BPH


- Continue home Flomax 





6. HLD


- Continue home statin 


   


DVT prophylaxis: chemical anticoagulation C/I in setting of GI bleed. [facility]


Discharge Planning


Will monitor another day to ensure no more GI bleeding as patient has had 

multiple episodes in just a few weeks. If stable throughout the night, can be 

discharged tomorrow. Cannot return to [facility] or prior SNF due to legal history. 

Patient's daughter working on hiring a care provider





Problem Qualifiers





(1) GI bleed:  


Qualified Codes:  K25.4 - Chronic or unspecified gastric ulcer with hemorrhage








Abdoul Polk MD Apr 23, 2018 22:00

## 2018-04-23 NOTE — HHI.FF
Face to Face Verification


Diagnosis:  


(1) Weakness


(2) GI bleed


(3) HTN (hypertension)


(4) Diabetes


(5) Duodenal ulcer


(6) DANE (acute kidney injury)


(7) Anemia


Physical Therapy


Order:  Evaluate and Treat, Improve ambulation, Strength and gait training











I have seen patient Matt Luevano on 4/23/18. My clinical findings 

support the need for the requested home health care services because:








 Deconditioned w/ increased weakness





 High risk of falls














I certify that my clinical findings support that this patient is homebound 

because:








 Unsteady gait/balance

















Cindy Lincoln MD Apr 23, 2018 16:12

## 2018-04-23 NOTE — HHI.DS
__________________________________________________





Discharge Summary


Admission Date


Apr 18, 2018 at 15:45


Discharge Date:  Apr 23, 2018


Admitting Diagnosis


GIB





(1) GI bleed


ICD Code:  K92.2 - Gastrointestinal hemorrhage, unspecified


Status:  Acute


Procedures


EGD 4/19 showing gastritis and antral ulcer with no active bleeding


Brief History - From Admission


Written by Fer Mishra, acting as scribe for Dr. Hernandez on 4/18/18 at 16:24. 





72-year-old male with PMH with of HTN, DM on metformin, GI bleed, CVA, BPH, 

left hand injury, and bipolar disorder on lithium and Prozac who was admitted 

to [facility] inpatient rehab on 4/17 and today transferred to inpatient medical 

unit due to black tarry stools as well as hypotension. Patient was very 

recently admitted to inpatient medical unit here at [place] on 3/29 from 

nursing home due to nausea, vomiting, and hematemesis.  He was seen by 

gastroenterology and on 3/30 underwent EGD which found large duodenal ulcers 

with visible vessel for which he received epinephrine injections as well as 

clippings.  He was also transfused 2 units of PRBCs. On 4/3 patient underwent 

prophylactic embolization of the gastroduodenal artery in IR department.  On 4/ 5 he was once again found to be anemic with hemoglobin of 6.9 and once again 

transfused with 2 units of PRBCs.  Repeat EGD on 4/6 showed mild gastritis in 

the gastric atrium, normal duodenal leak mucosa, 2 duodenal ulcers found in the 

duodenal bulb, argon plasma coagulation was applied to the sites and hemostasis 

achieved.  Colonoscopy was also planned however this was not done as patient 

refused.  He underwent EGD again on 4/12 an antral ulcer was identified with 

visible vessel. On 4/14 EGD repeat revealed large ulcer in the atrium, this was 

cauterized with gold probe, and epinephrine.  He was also treated with IV 

Protonix and Sandostatin.  Patient was also treated for E. coli UTI and DANE 

during his admission.  He was cleared for discharge and transfer to [facility] rehab center on 4/17. Today patient had dark tarry stools and was 

also found to be hypotensive with lowest BP 88/60.  He was symptomatic while 

sitting up in wheelchair with dizziness and nausea.  Consult was placed for GI 

services while patient was [facility] in [facility].  Plans for upper GI 

study today, general surgery service is consulted.  GI services recommended 

patient be transferred to the inpatient setting. Patient was transported to GI 

lab and apparently refused upper endoscopy per nursing as well as patient.  He 

is now in the medical floor where he is seen and examined resting comfortably 

in bed. He is awake, alert, and oriented and answering questions appropriately. 

When questioned as to why he did not have procedure done patient reports "I 

just want to go home".  He reports that his daughter is a nurse practitioner 

and he can go home. He reports nausea, no vomiting or abdominal pain/

discomfort.  He denies any dizziness, lightheadedness, shortness of breath, 

chest pain, heart palpitations, headache, fevers or chills.  He does report 

that while he was in rehab center he did have bloody stools, denies any 

hematemesis.


CBC/BMP:  


4/23/18 0500                                                                   

             4/22/18 0800





Significant Findings





Laboratory Tests








Test


  4/21/18


08:16 4/22/18


08:00 4/23/18


05:00 4/23/18


12:10


 


White Blood Count


  11.7 TH/MM3


(4.0-11.0) 


  11.5 TH/MM3


(4.0-11.0) 


 


 


Red Blood Count


  3.32 MIL/MM3


(4.50-5.90) 3.29 MIL/MM3


(4.50-5.90) 3.58 MIL/MM3


(4.50-5.90) 


 


 


Hemoglobin


  9.9 GM/DL


(13.0-17.0) 9.9 GM/DL


(13.0-17.0) 10.5 GM/DL


(13.0-17.0) 


 


 


Hematocrit


  30.0 %


(39.0-51.0) 29.5 %


(39.0-51.0) 32.1 %


(39.0-51.0) 


 


 


Platelet Count


  621 TH/MM3


(150-450) 583 TH/MM3


(150-450) 681 TH/MM3


(150-450) 


 


 


Neutrophils (%) (Auto)


  71.6 %


(16.0-70.0) 71.3 %


(16.0-70.0) 72.1 %


(16.0-70.0) 


 


 


Monocytes (%) (Auto)


  8.7 %


(0.0-8.0) 9.7 %


(0.0-8.0) 8.5 %


(0.0-8.0) 


 


 


Neutrophils # (Auto)


  8.4 TH/MM3


(1.8-7.7) 7.8 TH/MM3


(1.8-7.7) 8.3 TH/MM3


(1.8-7.7) 


 


 


Monocytes # (Auto)


  1.0 TH/MM3


(0-0.9) 1.1 TH/MM3


(0-0.9) 1.0 TH/MM3


(0-0.9) 


 


 


Blood Urea Nitrogen 5 MG/DL (7-18)  6 MG/DL (7-18)   


 


Creatinine


  1.33 MG/DL


(0.60-1.30) 1.33 MG/DL


(0.60-1.30) 


  


 


 


Chloride Level


  109 MEQ/L


() 


  


  


 


 


Estimat Glomerular Filtration


Rate 53 ML/MIN


(>89) 53 ML/MIN


(>89) 


  


 








PE at Discharge


GENERAL: This is a well-nourished, well-developed patient, in no apparent 

distress.


CARDIOVASCULAR: Regular rate and rhythm without murmurs, gallops, or rubs. 


RESPIRATORY: Clear to auscultation. Breath sounds equal bilaterally. No wheezes

, rales, or rhonchi.  


GASTROINTESTINAL: Abdomen soft, non-tender, nondistended. No hepato-splenomegaly

, or palpable masses. No guarding.  Active bowel sounds.


MUSCULOSKELETAL: Extremities without clubbing, cyanosis, or edema. No joint 

tenderness, effusion, or edema noted. No calf tenderness. 


NEUROLOGICAL: Awake and alert, oriented 3. Cranial nerves II through XII 

grossly intact.  Motor and sensory grossly within normal limits. 4/5 muscle 

strength in all muscle groups.  Normal speech.








Pt update on day of discharge


Wants to go home. Voices no complaints. Denies CP, SOB, abdominal pain, N/V. No 

further episodes of melena.


Hospital Course


72-year-old male with PMH with of HTN, DM, GI bleed, CVA, BPH, and bipolar 

disorder originally presented to [place] from nursing home on 3/29 due with 

nausea, vomiting and hematemesis. He underwent multiple EGD's with cauterization

, and epinephrine injections. He also also underwent prophylactic embolization 

of the gastroduodenal artery. He was discharged to [facility] on 4/17, 

however he returned back to inpatient on 4/19 due to melena and symptomatic 

hypotension.








- Recently admitted and on 3/30 underwent EGD showing large duodenal ulcers 

that were clipped and injected with epi. Also went prophylactic embolization of 

gastroduodenal artery on 4/3


- Repeat EGD on 4/6 after Hb further dropped showed gastritis and duodenal bulb 

ulcers that were coagulated


- Repeat EGD on 4/12 showed antral ulcer


- Bleeding scan on 4/13 was normal


- Repeat EGD on 4/14 showed larger ulcer in the atrium that was cauterized and 

injected with epi


- Transferred to [facility] on 4/17 and went back to the medical floor on 4/18 for 

hypotension and melena


- GI consulted and patient underwent EGD again on 4/19 showing gastritis and 

antral ulcer


- Protonix PO BID


- GI signed off and recommend colonoscopy as outpatient; also states if pt has 

rebleeding will need surgical consult


- H&H stabilized


- Plavix was held given recurrent GI bleeds





Unfortunately, he was denied from returning to [facility] and denied from multiple 

SNFs due to prior legal background. He was discharged in stable condition on 4/ 23 with [facility].


Pt Condition on Discharge:  Stable


Discharge Disposition:  Discharge Home


Discharge Time:  > 30 minutes


Discharge Instructions


DIET: Follow Instructions for:  Diabetic Diet


Activities you can perform:  Regular-No Restrictions


Follow up Referrals:  


Appointment for Follow Up


Gastroenterology - 1 Month


PCP Follow-up - 1 Week


PCP Follow-up - 1 Week @ [facility]





New Medications:  


Hospital Bed - Electric ([facility] Bed - Electric) 1 Ea Ea


EA .XX AS DIRECTED, #1





 


Continued Medications:  


Amlodipine (Norvasc) 5 Mg Tab


5 MG PO BID for Blood Pressure Management, #60 TAB





Atorvastatin (Atorvastatin) 40 Mg Tab


40 MG PO HS for Cholesterol Management, #90 TAB 3 Refills





Carvedilol (Coreg) 3.125 Mg Tab


3.125 MG PO Q12HR for 30 Days, TAB





Ferrous Sulfate (Ferrous Sulfate) 325 Mg (65 Mg Iron) Tablet


325 MG PO BIDPC for Nutritional Supplement, #60 TAB 0 Refills





Fluoxetine (Fluoxetine) 40 Mg Cap


40 CAP PO HS, #30 CAP 0 Refills





Lithium Carbonate (Lithium Carbonate) 600 Mg Cap


600 MG PO HS, CAP 0 Refills





Metformin (Metformin) 500 Mg Tab


500 MG PO BIDPC for Blood Sugar Management, #60 TAB 0 Refills





Ondansetron Odt (Ondansetron Odt) 4 Mg Tab


4 MG PO Q6H PRN for NAUSEA for 30 Days, #120 TAB





Pantoprazole (Protonix) 40 Mg Tab


40 MG PO BID for Ulcer Prevention, #60 TAB 1 Refill (This prescription has been 

renewed)





Tamsulosin (Flomax) 0.4 Mg Cap


0.4 MG PO HS for Manage Prostate Problems, #30 CAP 0 Refills





 


Discontinued Medications:  


Clopidogrel (Plavix) 75 Mg Tab


75 MG PO DAILY for Blood Clot Prevention, #30 TAB 11 Refills





Sucralfate (Carafate) 1 Gram Tab


1 GM PO BIDAC for 30 Days, TAB

















Cindy Lincoln MD Apr 23, 2018 16:18

## 2018-04-24 VITALS
HEART RATE: 105 BPM | RESPIRATION RATE: 20 BRPM | OXYGEN SATURATION: 95 % | SYSTOLIC BLOOD PRESSURE: 133 MMHG | TEMPERATURE: 98.9 F | DIASTOLIC BLOOD PRESSURE: 76 MMHG

## 2018-04-24 VITALS
SYSTOLIC BLOOD PRESSURE: 144 MMHG | DIASTOLIC BLOOD PRESSURE: 80 MMHG | TEMPERATURE: 97.7 F | HEART RATE: 92 BPM | RESPIRATION RATE: 20 BRPM | OXYGEN SATURATION: 96 %

## 2018-04-24 VITALS
SYSTOLIC BLOOD PRESSURE: 154 MMHG | OXYGEN SATURATION: 97 % | RESPIRATION RATE: 20 BRPM | HEART RATE: 88 BPM | DIASTOLIC BLOOD PRESSURE: 82 MMHG | TEMPERATURE: 99 F

## 2018-04-24 VITALS
RESPIRATION RATE: 18 BRPM | SYSTOLIC BLOOD PRESSURE: 113 MMHG | HEART RATE: 107 BPM | DIASTOLIC BLOOD PRESSURE: 63 MMHG | TEMPERATURE: 97.4 F | OXYGEN SATURATION: 94 %

## 2018-04-24 VITALS
SYSTOLIC BLOOD PRESSURE: 144 MMHG | HEART RATE: 92 BPM | TEMPERATURE: 97.7 F | OXYGEN SATURATION: 96 % | RESPIRATION RATE: 20 BRPM | DIASTOLIC BLOOD PRESSURE: 80 MMHG

## 2018-04-24 VITALS
RESPIRATION RATE: 17 BRPM | OXYGEN SATURATION: 95 % | TEMPERATURE: 97.9 F | HEART RATE: 97 BPM | SYSTOLIC BLOOD PRESSURE: 142 MMHG | DIASTOLIC BLOOD PRESSURE: 76 MMHG

## 2018-04-24 VITALS
SYSTOLIC BLOOD PRESSURE: 133 MMHG | TEMPERATURE: 97.9 F | OXYGEN SATURATION: 97 % | DIASTOLIC BLOOD PRESSURE: 67 MMHG | HEART RATE: 109 BPM | RESPIRATION RATE: 20 BRPM

## 2018-04-24 LAB
ENDOMYSIAL AB SCREEN: (no result)
ENDOMYSIUM AB TITR SER: (no result) {TITER}

## 2018-04-24 RX ADMIN — FERROUS SULFATE TAB 325 MG (65 MG ELEMENTAL FE) SCH MG: 325 (65 FE) TAB at 17:01

## 2018-04-24 RX ADMIN — Medication SCH ML: at 07:47

## 2018-04-24 RX ADMIN — PANTOPRAZOLE SCH MG: 40 TABLET, DELAYED RELEASE ORAL at 20:17

## 2018-04-24 RX ADMIN — ONDANSETRON PRN MG: 2 INJECTION, SOLUTION INTRAMUSCULAR; INTRAVENOUS at 20:15

## 2018-04-24 RX ADMIN — Medication SCH ML: at 20:17

## 2018-04-24 RX ADMIN — STANDARDIZED SENNA CONCENTRATE AND DOCUSATE SODIUM SCH TAB: 8.6; 5 TABLET, FILM COATED ORAL at 20:17

## 2018-04-24 RX ADMIN — LITHIUM CARBONATE SCH MG: 300 CAPSULE, GELATIN COATED ORAL at 20:17

## 2018-04-24 RX ADMIN — FERROUS SULFATE TAB 325 MG (65 MG ELEMENTAL FE) SCH MG: 325 (65 FE) TAB at 07:47

## 2018-04-24 RX ADMIN — PANTOPRAZOLE SCH MG: 40 TABLET, DELAYED RELEASE ORAL at 07:47

## 2018-04-24 RX ADMIN — STANDARDIZED SENNA CONCENTRATE AND DOCUSATE SODIUM SCH TAB: 8.6; 5 TABLET, FILM COATED ORAL at 07:47

## 2018-04-24 RX ADMIN — TAMSULOSIN HYDROCHLORIDE SCH MG: 0.4 CAPSULE ORAL at 20:17

## 2018-04-24 RX ADMIN — ATORVASTATIN CALCIUM SCH MG: 40 TABLET, FILM COATED ORAL at 20:17

## 2018-04-24 NOTE — HHI.DS
__________________________________________________





Discharge Summary


Admission Date


Apr 18, 2018 at 15:45


Discharge Date:  Apr 25, 2018


Admitting Diagnosis


GIB





(1) GI bleed


ICD Code:  K92.2 - Gastrointestinal hemorrhage, unspecified


Diagnosis:  Principal


Status:  Acute


Procedures


EGD 4/19 showing gastritis and antral ulcer with no active bleeding


Brief History - From Admission


Written by Fer Mishra, acting as scribe for Dr. Hernandez on 4/18/18 at 16:24. 





72-year-old male with PMH with of HTN, DM on metformin, GI bleed, CVA, BPH, 

left hand injury, and bipolar disorder on lithium and Prozac who was admitted 

to [place] inpatient rehab on 4/17 and today transferred to inpatient medical 

unit due to black tarry stools as well as hypotension. Patient was very 

recently admitted to inpatient medical unit here at [place] on 3/29 from 

nursing home due to nausea, vomiting, and hematemesis.  He was seen by 

gastroenterology and on 3/30 underwent EGD which found large duodenal ulcers 

with visible vessel for which he received epinephrine injections as well as 

clippings.  He was also transfused 2 units of PRBCs. On 4/3 patient underwent 

prophylactic embolization of the gastroduodenal artery in IR department.  On 4/ 5 he was once again found to be anemic with hemoglobin of 6.9 and once again 

transfused with 2 units of PRBCs.  Repeat EGD on 4/6 showed mild gastritis in 

the gastric atrium, normal duodenal leak mucosa, 2 duodenal ulcers found in the 

duodenal bulb, argon plasma coagulation was applied to the sites and hemostasis 

achieved.  Colonoscopy was also planned however this was not done as patient 

refused.  He underwent EGD again on 4/12 an antral ulcer was identified with 

visible vessel. On 4/14 EGD repeat revealed large ulcer in the atrium, this was 

cauterized with gold probe, and epinephrine.  He was also treated with IV 

Protonix and Sandostatin.  Patient was also treated for E. coli UTI and DANE 

during his admission.  He was cleared for discharge and transfer to [facility] rehab center on 4/17. Today patient had dark tarry stools and was 

also found to be hypotensive with lowest BP 88/60.  He was symptomatic while 

sitting up in wheelchair with dizziness and nausea.  Consult was placed for GI 

services while [facility] was [facility].  Plans for upper GI 

study today, general surgery service is consulted.  GI services recommended 

patient be transferred to the inpatient setting. Patient was transported to GI 

lab and apparently refused upper endoscopy per nursing as well as patient.  He 

is now in the medical floor where he is seen and examined resting comfortably 

in bed. He is awake, alert, and oriented and answering questions appropriately. 

When questioned as to why he did not have procedure done patient reports "I 

just want to go home".  He reports that his daughter is a nurse practitioner 

and he can go home. He reports nausea, no vomiting or abdominal pain/

discomfort.  He denies any dizziness, lightheadedness, shortness of breath, 

chest pain, heart palpitations, headache, fevers or chills.  He does report 

that while he was in rehab center he did have bloody stools, denies any 

hematemesis.


CBC/BMP:  


4/23/18 0500                                                                   

             4/22/18 0800





Significant Findings





Laboratory Tests








Test


  4/22/18


08:00 4/23/18


05:00 4/23/18


12:10


 


Red Blood Count


  3.29 MIL/MM3


(4.50-5.90) 3.58 MIL/MM3


(4.50-5.90) 


 


 


Hemoglobin


  9.9 GM/DL


(13.0-17.0) 10.5 GM/DL


(13.0-17.0) 


 


 


Hematocrit


  29.5 %


(39.0-51.0) 32.1 %


(39.0-51.0) 


 


 


Platelet Count


  583 TH/MM3


(150-450) 681 TH/MM3


(150-450) 


 


 


Neutrophils (%) (Auto)


  71.3 %


(16.0-70.0) 72.1 %


(16.0-70.0) 


 


 


Monocytes (%) (Auto)


  9.7 %


(0.0-8.0) 8.5 %


(0.0-8.0) 


 


 


Neutrophils # (Auto)


  7.8 TH/MM3


(1.8-7.7) 8.3 TH/MM3


(1.8-7.7) 


 


 


Monocytes # (Auto)


  1.1 TH/MM3


(0-0.9) 1.0 TH/MM3


(0-0.9) 


 


 


Blood Urea Nitrogen 6 MG/DL (7-18)   


 


Creatinine


  1.33 MG/DL


(0.60-1.30) 


  


 


 


Estimat Glomerular Filtration


Rate 53 ML/MIN


(>89) 


  


 


 


White Blood Count


  


  11.5 TH/MM3


(4.0-11.0) 


 








PE at Discharge


GENERAL:  male sitting up in bed in NAD.


SKIN: Warm and dry.


HEENT: AT/NC. Pupils equal and round. MMM.


NECK: Supple no tender LAD or JVD.


HEART: RRR with 1/6 JENNIFER.


LUNGS: CTAB without wheezes or crackles.


ABDOMEN: +BS, soft, nontender.


EXTREMITIES: No LE edema. 


NEURO: Awake and alert.  Generalized weakness


PSYCH: Appropriate mood and affect.


Hospital Course


72-year-old male with PMH with of HTN, DM, GI bleed, CVA, BPH, and bipolar 

disorder originally presented to [facility] from nursing home on 3/29 due with 

nausea, vomiting and hematemesis. He underwent multiple EGD's with cauterization

, and epinephrine injections. He also also underwent prophylactic embolization 

of the gastroduodenal artery. He was discharged to [place] rehab on 4/17, 

however he returned back to inpatient on 4/19 due to melena and symptomatic 

hypotension.





1. Upper GI bleed


- Recently admitted and on 3/30 underwent EGD showing large duodenal ulcers 

that were clipped and injected with epi. Also went prophylactic embolization of 

gastroduodenal artery on 4/3


- Repeat EGD on 4/6 after Hb further dropped showed gastritis and duodenal bulb 

ulcers that were coagulated


- Repeat EGD on 4/12 showed antral ulcer


- Bleeding scan on 4/13 was normal


- Repeat EGD on 4/14 showed larger ulcer in the atrium that was cauterized and 

injected with epi


- Transferred to [place] on 4/17 and went back to the medical floor on 4/18 for 

hypotension and melena


- GI consulted and patient underwent EGD again on 4/19 showing gastritis and 

antral ulcer


- Protonix PO BID


- GI signed off and recommend colonoscopy as outpatient; also states if pt has 

rebleeding will need surgical consult


- Hemoglobin stable


- Monitor CBC. Stool H pylori ab pending


- Zofran PRN





2. Anemia, secondary to GIB.  No further bleeding


- Hemodynamically stable


- Continue oral ferrous sulfate replacement





3. CKD stage 3.  Stable


- Avoid nephrotoxic agents and renally dose medications





4. Bipolar disorder


- Continue home lithium and Prozac





5. BPH


- Continue home Flomax 





6. HLD


- Continue home statin 


   


DVT prophylaxis: chemical anticoagulation C/I in setting of GI bleed. SCDs


Discharge Planning


He is stable throughout the night, can be discharged. Cannot return to [place] 

or prior SNF due to legal history. Patient's daughter will be hiring a care 

provider. Wont take him home unless DMe delivered


Pt Condition on Discharge:  Stable


Discharge Disposition:  Discharge Home


Discharge Time:  > 30 minutes


Discharge Instructions


DIET: Follow Instructions for:  Diabetic Diet


Activities you can perform:  Regular-No Restrictions


Follow up Referrals:  


Appointment for Follow Up


Gastroenterology - 1 Month


PCP Follow-up - 1 Week


PCP Follow-up - 1 Week @ [facility]





New Medications:  


Hospital Bed - Electric (Hospital Bed - Electric) 1 Ea Ea


EA .XX AS DIRECTED, #1





 


Continued Medications:  


Amlodipine (Norvasc) 5 Mg Tab


5 MG PO BID for Blood Pressure Management, #60 TAB





Atorvastatin (Atorvastatin) 40 Mg Tab


40 MG PO HS for Cholesterol Management, #90 TAB 3 Refills





Carvedilol (Coreg) 3.125 Mg Tab


3.125 MG PO Q12HR for 30 Days, TAB





Ferrous Sulfate (Ferrous Sulfate) 325 Mg (65 Mg Iron) Tablet


325 MG PO BIDPC for Nutritional Supplement, #60 TAB 0 Refills





Fluoxetine (Fluoxetine) 40 Mg Cap


40 CAP PO HS, #30 CAP 0 Refills





Lithium Carbonate (Lithium Carbonate) 600 Mg Cap


600 MG PO HS, CAP 0 Refills





Metformin (Metformin) 500 Mg Tab


500 MG PO BIDPC for Blood Sugar Management, #60 TAB 0 Refills





Ondansetron Odt (Ondansetron Odt) 4 Mg Tab


4 MG PO Q6H PRN for NAUSEA for 30 Days, #120 TAB





Pantoprazole (Protonix) 40 Mg Tab


40 MG PO BID for Ulcer Prevention, #60 TAB 1 Refill (This prescription has been 

renewed)





Tamsulosin (Flomax) 0.4 Mg Cap


0.4 MG PO HS for Manage Prostate Problems, #30 CAP 0 Refills





 


Discontinued Medications:  


Clopidogrel (Plavix) 75 Mg Tab


75 MG PO DAILY for Blood Clot Prevention, #30 TAB 11 Refills





Sucralfate (Carafate) 1 Gram Tab


1 GM PO BIDAC for 30 Days, TAB

















Abdoul Polk MD Apr 24, 2018 17:00

## 2018-04-24 NOTE — HHI.PR
Subjective


Remarks


F/u GIB. No further bleeding. Food not good ow no complaints dw RN





Objective


Vitals





Vital Signs








  Date Time  Temp Pulse Resp B/P (MAP) Pulse Ox O2 Delivery O2 Flow Rate FiO2


 


4/24/18 08:06 97.7 92 20 144/80 (101) 96   


 


4/24/18 08:00      Room Air  


 


4/24/18 04:00 97.9 97 17 142/76 (98) 95   


 


4/24/18 04:00      Room Air  


 


4/24/18 00:00      Room Air  


 


4/24/18 00:00 97.4 107 18 113/63 (80) 94   


 


4/23/18 20:29 98.2 98 19 150/89 (109) 97   


 


4/23/18 20:25      Room Air  


 


4/23/18 12:00 97.4 101 18 124/83 (97) 96   














I/O      


 


 4/23/18 4/23/18 4/23/18 4/24/18 4/24/18 4/24/18





 06:59 14:59 22:59 06:59 14:59 22:59


 


Intake Total 900 ml 600 ml 240 ml 120 ml  


 


Output Total 850 ml 450 ml 50 ml 300 ml  


 


Balance 50 ml 150 ml 190 ml -180 ml  


 


      


 


Intake Oral 900 ml 600 ml 240 ml 120 ml  


 


Output Urine Total 850 ml 450 ml 50 ml 300 ml  


 


# Voids  1  2  


 


# Bowel Movements 0 1  1  








Result Diagram:  


4/23/18 0500                                                                   

             4/22/18 0800





Objective Remarks


GENERAL: Pale  male sitting up in bed in NAD.


SKIN: Warm and dry.


HEENT: AT/NC. Pupils equal and round. MMM.


NECK: Supple no tender LAD or JVD.


HEART: RRR with 1/6 JENNIFER.


LUNGS: CTAB without wheezes or crackles.


ABDOMEN: +BS, soft, nontender.


EXTREMITIES: No LE edema. 


NEURO: Awake and alert.  Generalized weakness


PSYCH: Appropriate mood and affect.


Procedures


EGD 4/19 showing gastritis and antral ulcer with no active bleeding





A/P


Problem List:  


(1) GI bleed


ICD Code:  K92.2 - Gastrointestinal hemorrhage, unspecified


Status:  Acute


Assessment and Plan


72-year-old male with PMH with of HTN, DM, GI bleed, CVA, BPH, and bipolar 

disorder originally presented to [place] from nursing home on 3/29 due with 

nausea, vomiting and hematemesis. He underwent multiple EGD's with cauterization

, and epinephrine injections. He also also underwent prophylactic embolization 

of the gastroduodenal artery. He was discharged to [facility] rehab on 4/17, 

however he returned back to inpatient on 4/19 due to melena and symptomatic 

hypotension.





1. Upper GI bleed


- Recently admitted and on 3/30 underwent EGD showing large duodenal ulcers 

that were clipped and injected with epi. Also went prophylactic embolization of 

gastroduodenal artery on 4/3


- Repeat EGD on 4/6 after Hb further dropped showed gastritis and duodenal bulb 

ulcers that were coagulated


- Repeat EGD on 4/12 showed antral ulcer


- Bleeding scan on 4/13 was normal


- Repeat EGD on 4/14 showed larger ulcer in the atrium that was cauterized and 

injected with epi


- Transferred to [facility] on 4/17 and went back to the medical floor on 4/18 for 

hypotension and melena


- GI consulted and patient underwent EGD again on 4/19 showing gastritis and 

antral ulcer


- Protonix PO BID


- GI signed off and recommend colonoscopy as outpatient; also states if pt has 

rebleeding will need surgical consult


- Hemoglobin stable


- Monitor CBC. Stool H pylori ab pending


- Zofran PRN





2. Anemia, secondary to GIB


- Hemodynamically stable


- Continue oral ferrous sulfate replacement





3. CKD stage 3


- Avoid nephrotoxic agents and renally dose medications





4. Bipolar disorder


- Continue home lithium and Prozac





5. BPH


- Continue home Flomax 





6. HLD


- Continue home statin 


   


DVT prophylaxis: chemical anticoagulation C/I in setting of GI bleed. SCDs


Discharge Planning


He is stable throughout the night, can be discharged today. Cannot return to 

[facility] or prior SNF due to legal history. Patient's daughter will be hiring a 

care provider. Wont take him home unless DMe delivered





Problem Qualifiers





(1) GI bleed:  


Qualified Codes:  K25.4 - Chronic or unspecified gastric ulcer with hemorrhage








Abdoul Polk MD Apr 24, 2018 09:49

## 2018-04-25 VITALS
SYSTOLIC BLOOD PRESSURE: 119 MMHG | TEMPERATURE: 97.7 F | HEART RATE: 95 BPM | OXYGEN SATURATION: 94 % | DIASTOLIC BLOOD PRESSURE: 67 MMHG | RESPIRATION RATE: 18 BRPM

## 2018-04-25 VITALS
SYSTOLIC BLOOD PRESSURE: 138 MMHG | HEART RATE: 85 BPM | DIASTOLIC BLOOD PRESSURE: 72 MMHG | OXYGEN SATURATION: 96 % | TEMPERATURE: 98.2 F | RESPIRATION RATE: 18 BRPM

## 2018-04-25 VITALS
SYSTOLIC BLOOD PRESSURE: 130 MMHG | TEMPERATURE: 98.7 F | DIASTOLIC BLOOD PRESSURE: 69 MMHG | OXYGEN SATURATION: 97 % | HEART RATE: 110 BPM | RESPIRATION RATE: 18 BRPM

## 2018-04-25 VITALS
OXYGEN SATURATION: 95 % | RESPIRATION RATE: 18 BRPM | DIASTOLIC BLOOD PRESSURE: 76 MMHG | SYSTOLIC BLOOD PRESSURE: 124 MMHG | HEART RATE: 102 BPM | TEMPERATURE: 98.4 F

## 2018-04-25 VITALS
SYSTOLIC BLOOD PRESSURE: 162 MMHG | TEMPERATURE: 98.1 F | OXYGEN SATURATION: 97 % | RESPIRATION RATE: 18 BRPM | HEART RATE: 103 BPM | DIASTOLIC BLOOD PRESSURE: 82 MMHG

## 2018-04-25 RX ADMIN — PANTOPRAZOLE SCH MG: 40 TABLET, DELAYED RELEASE ORAL at 09:38

## 2018-04-25 RX ADMIN — FERROUS SULFATE TAB 325 MG (65 MG ELEMENTAL FE) SCH MG: 325 (65 FE) TAB at 17:54

## 2018-04-25 RX ADMIN — FERROUS SULFATE TAB 325 MG (65 MG ELEMENTAL FE) SCH MG: 325 (65 FE) TAB at 09:38

## 2018-04-25 RX ADMIN — ONDANSETRON PRN MG: 2 INJECTION, SOLUTION INTRAMUSCULAR; INTRAVENOUS at 16:17

## 2018-04-25 RX ADMIN — STANDARDIZED SENNA CONCENTRATE AND DOCUSATE SODIUM SCH TAB: 8.6; 5 TABLET, FILM COATED ORAL at 09:38

## 2018-04-25 RX ADMIN — Medication SCH ML: at 09:00

## 2018-04-25 NOTE — HHI.PR
Subjective


Remarks


Follow-up CVA.  Patient has residual left-sided weakness.  Stable for discharge 

awaiting DME.  Discussed with nursing and case management





Objective


Vitals





Vital Signs








  Date Time  Temp Pulse Resp B/P (MAP) Pulse Ox O2 Delivery O2 Flow Rate FiO2


 


4/25/18 11:55 98.4 102 18 124/76 (92) 95   


 


4/25/18 08:10 98.2 85 18 138/72 (94) 96   


 


4/25/18 07:00      Room Air  


 


4/25/18 04:26 97.7 95 18 119/67 (84) 94   


 


4/25/18 04:00      Room Air  


 


4/25/18 01:00 98.7 110 18 130/69 (89) 97   


 


4/25/18 00:00      Room Air  


 


4/24/18 20:16 98.9 105 20 133/76 (95) 95   


 


4/24/18 20:15      Room Air  


 


4/24/18 16:30 97.7 92 20 144/80 (101) 96   


 


4/24/18 16:06 97.9 109 20 133/67 (89) 97   














I/O      


 


 4/24/18 4/24/18 4/24/18 4/25/18 4/25/18 4/25/18





 07:00 15:00 23:00 07:00 15:00 23:00


 


Intake Total 120 ml  380 ml   


 


Output Total 300 ml   700 ml  


 


Balance -180 ml  380 ml -700 ml  


 


      


 


Intake Oral 120 ml  380 ml   


 


Output Urine Total 300 ml   700 ml  


 


# Voids 2  3   


 


# Bowel Movements 1  0   








Result Diagram:  


4/23/18 0500                                                                   

             4/22/18 0800





Objective Remarks


GENERAL:  male sitting up in bed in NAD.


SKIN: Warm and dry.


HEENT: AT/NC. Pupils equal and round. MMM.


NECK: Supple no tender LAD or JVD.


HEART: RRR with 1/6 JENNIFER.


LUNGS: CTAB without wheezes or crackles.


ABDOMEN: +BS, soft, nontender.


EXTREMITIES: No LE edema. 


NEURO: Awake and alert.  Generalized weakness worse on the left upper and lower 

extremities


PSYCH: Appropriate mood and affect.


Procedures


EGD 4/19 showing gastritis and antral ulcer with no active bleeding





A/P


Problem List:  


(1) GI bleed


ICD Code:  K92.2 - Gastrointestinal hemorrhage, unspecified


Status:  Acute


Assessment and Plan


72-year-old male with PMH with of HTN, DM, GI bleed, CVA, BPH, and bipolar 

disorder originally presented to [facility] from nursing home on 3/29 due with 

nausea, vomiting and hematemesis. He underwent multiple EGD's with cauterization

, and epinephrine injections. He also also underwent prophylactic embolization 

of the gastroduodenal artery. He was discharged to [facility] rehab on 4/17, 

however he returned back to inpatient on 4/19 due to melena and symptomatic 

hypotension.





1. Upper GI bleed


- Recently admitted and on 3/30 underwent EGD showing large duodenal ulcers 

that were clipped and injected with epi. Also went prophylactic embolization of 

gastroduodenal artery on 4/3


- Repeat EGD on 4/6 after Hb further dropped showed gastritis and duodenal bulb 

ulcers that were coagulated


- Repeat EGD on 4/12 showed antral ulcer


- Bleeding scan on 4/13 was normal


- Repeat EGD on 4/14 showed larger ulcer in the atrium that was cauterized and 

injected with epi


- Transferred to [facility] on 4/17 and went back to the medical floor on 4/18 for 

hypotension and melena


- GI consulted and patient underwent EGD again on 4/19 showing gastritis and 

antral ulcer


- Protonix PO BID


- GI signed off and recommend colonoscopy as outpatient; also states if pt has 

rebleeding will need surgical consult


- Hemoglobin stable


- Monitor CBC. Stool H pylori ab pending


- Zofran PRN





2. Anemia, secondary to GIB.  No further bleeding


- Hemodynamically stable


- Continue oral ferrous sulfate replacement





3. CKD stage 3.  Stable


- Avoid nephrotoxic agents and renally dose medications





4. Bipolar disorder


- Continue home lithium and Prozac





5. BPH


- Continue home Flomax 





6. HLD


- Continue home statin 


   


DVT prophylaxis: chemical anticoagulation C/I in setting of GI bleed. SCDs


Discharge Planning


He is stable throughout the night, can be discharged. Cannot return to [facility] 

or prior SNF due to legal history. Patient's daughter will be hiring a care 

provider. Wont take him home unless DMe delivered





Problem Qualifiers





(1) GI bleed:  


Qualified Codes:  K25.4 - Chronic or unspecified gastric ulcer with hemorrhage








Abdoul Polk MD Apr 25, 2018 15:19

## 2018-06-14 ENCOUNTER — HOSPITAL ENCOUNTER (EMERGENCY)
Dept: HOSPITAL 17 - NEPE | Age: 73
Discharge: HOME | End: 2018-06-14
Payer: COMMERCIAL

## 2018-06-14 VITALS
SYSTOLIC BLOOD PRESSURE: 118 MMHG | DIASTOLIC BLOOD PRESSURE: 71 MMHG | HEART RATE: 110 BPM | RESPIRATION RATE: 16 BRPM | TEMPERATURE: 98 F | OXYGEN SATURATION: 98 %

## 2018-06-14 VITALS
RESPIRATION RATE: 17 BRPM | OXYGEN SATURATION: 100 % | HEART RATE: 77 BPM | SYSTOLIC BLOOD PRESSURE: 125 MMHG | DIASTOLIC BLOOD PRESSURE: 86 MMHG

## 2018-06-14 VITALS — OXYGEN SATURATION: 97 %

## 2018-06-14 DIAGNOSIS — Z86.69: ICD-10-CM

## 2018-06-14 DIAGNOSIS — K31.84: ICD-10-CM

## 2018-06-14 DIAGNOSIS — Z86.79: ICD-10-CM

## 2018-06-14 DIAGNOSIS — R41.82: ICD-10-CM

## 2018-06-14 DIAGNOSIS — Z87.448: ICD-10-CM

## 2018-06-14 DIAGNOSIS — R53.1: ICD-10-CM

## 2018-06-14 DIAGNOSIS — E11.9: ICD-10-CM

## 2018-06-14 DIAGNOSIS — Z79.82: ICD-10-CM

## 2018-06-14 DIAGNOSIS — F31.9: ICD-10-CM

## 2018-06-14 DIAGNOSIS — K21.9: ICD-10-CM

## 2018-06-14 DIAGNOSIS — E86.0: Primary | ICD-10-CM

## 2018-06-14 DIAGNOSIS — I10: ICD-10-CM

## 2018-06-14 DIAGNOSIS — R94.31: ICD-10-CM

## 2018-06-14 DIAGNOSIS — Z79.84: ICD-10-CM

## 2018-06-14 LAB
ALBUMIN SERPL-MCNC: 3 GM/DL (ref 3.4–5)
ALP SERPL-CCNC: 77 U/L (ref 45–117)
ALT SERPL-CCNC: 18 U/L (ref 12–78)
AST SERPL-CCNC: 19 U/L (ref 15–37)
BASOPHILS # BLD AUTO: 0.1 TH/MM3 (ref 0–0.2)
BASOPHILS NFR BLD: 0.8 % (ref 0–2)
BILIRUB SERPL-MCNC: 0.8 MG/DL (ref 0.2–1)
BUN SERPL-MCNC: 8 MG/DL (ref 7–18)
CALCIUM SERPL-MCNC: 10.2 MG/DL (ref 8.5–10.1)
CHLORIDE SERPL-SCNC: 108 MEQ/L (ref 98–107)
CREAT SERPL-MCNC: 1.67 MG/DL (ref 0.6–1.3)
EOSINOPHIL # BLD: 0.1 TH/MM3 (ref 0–0.4)
EOSINOPHIL NFR BLD: 1.5 % (ref 0–4)
ERYTHROCYTE [DISTWIDTH] IN BLOOD BY AUTOMATED COUNT: 15.2 % (ref 11.6–17.2)
GFR SERPLBLD BASED ON 1.73 SQ M-ARVRAT: 41 ML/MIN (ref 89–?)
GLUCOSE SERPL-MCNC: 106 MG/DL (ref 74–106)
GLUCOSE UR STRIP-MCNC: (no result) MG/DL
HCO3 BLD-SCNC: 23.8 MEQ/L (ref 21–32)
HCT VFR BLD CALC: 35.5 % (ref 39–51)
HGB BLD-MCNC: 11.2 GM/DL (ref 13–17)
HGB UR QL STRIP: (no result)
KETONES UR STRIP-MCNC: (no result) MG/DL
LYMPHOCYTES # BLD AUTO: 1.9 TH/MM3 (ref 1–4.8)
LYMPHOCYTES NFR BLD AUTO: 20.1 % (ref 9–44)
MCH RBC QN AUTO: 27.2 PG (ref 27–34)
MCHC RBC AUTO-ENTMCNC: 31.6 % (ref 32–36)
MCV RBC AUTO: 86.3 FL (ref 80–100)
MONOCYTE #: 0.8 TH/MM3 (ref 0–0.9)
MONOCYTES NFR BLD: 8.4 % (ref 0–8)
NEUTROPHILS # BLD AUTO: 6.5 TH/MM3 (ref 1.8–7.7)
NEUTROPHILS NFR BLD AUTO: 69.2 % (ref 16–70)
NITRITE UR QL STRIP: (no result)
PLATELET # BLD: 462 TH/MM3 (ref 150–450)
PMV BLD AUTO: 7.4 FL (ref 7–11)
PROT SERPL-MCNC: 7.1 GM/DL (ref 6.4–8.2)
RBC # BLD AUTO: 4.11 MIL/MM3 (ref 4.5–5.9)
SODIUM SERPL-SCNC: 141 MEQ/L (ref 136–145)
SP GR UR STRIP: 1 (ref 1–1.03)
TROPONIN I SERPL-MCNC: (no result) NG/ML (ref 0.02–0.05)
URINE LEUKOCYTE ESTERASE: (no result)
WBC # BLD AUTO: 9.4 TH/MM3 (ref 4–11)

## 2018-06-14 PROCEDURE — 87040 BLOOD CULTURE FOR BACTERIA: CPT

## 2018-06-14 PROCEDURE — 81001 URINALYSIS AUTO W/SCOPE: CPT

## 2018-06-14 PROCEDURE — 96361 HYDRATE IV INFUSION ADD-ON: CPT

## 2018-06-14 PROCEDURE — 84484 ASSAY OF TROPONIN QUANT: CPT

## 2018-06-14 PROCEDURE — 85025 COMPLETE CBC W/AUTO DIFF WBC: CPT

## 2018-06-14 PROCEDURE — 70450 CT HEAD/BRAIN W/O DYE: CPT

## 2018-06-14 PROCEDURE — 99285 EMERGENCY DEPT VISIT HI MDM: CPT

## 2018-06-14 PROCEDURE — 93005 ELECTROCARDIOGRAM TRACING: CPT

## 2018-06-14 PROCEDURE — 96360 HYDRATION IV INFUSION INIT: CPT

## 2018-06-14 PROCEDURE — 71045 X-RAY EXAM CHEST 1 VIEW: CPT

## 2018-06-14 PROCEDURE — 83605 ASSAY OF LACTIC ACID: CPT

## 2018-06-14 PROCEDURE — 80053 COMPREHEN METABOLIC PANEL: CPT

## 2018-06-14 NOTE — RADRPT
EXAM DATE:  6/14/2018 4:12 PM EDT

AGE/SEX:        73 years / Male



INDICATIONS:  Altered mental status per daughter, and nOt following directions for 3 days



CLINICAL DATA:  This is the patient's initial encounter. Patient reports that signs and symptoms have
 been present for 3 days and indicates a pain score of Nonresponsive. 

                                                                          

MEDICAL/SURGICAL HISTORY:       . stroke a couple of months ago.  . none known



COMPARISON:      [facility] [place], CHEST SINGLE AP, 3/29/2018.  . 





FINDINGS:  

A single AP view of the chest demonstrates the lungs to be symmetrically aerated without evidence of 
mass, infiltrate or effusion.  The cardiomediastinal contours are unremarkable.  Osseous structures a
re intact.  





CONCLUSION: 

No acute cardiopulmonary findings. Similar to previous dated 3/29/2018.



Electronically signed by: Lalo Monique MD  6/14/2018 4:24 PM EDT

## 2018-06-14 NOTE — PD
HPI


.


Altered mental status


Chief Complaint:  Altered Mental Status


Time Seen by Provider:  15:29


Travel History


International Travel<30 days:  No


Contact w/Intl Traveler<30days:  No


Traveled to known affect area:  No





History of Present Illness


HPI


This patient is brought in ambulatory by his daughter with a chief complaint of 

altered mental status.  Onset was a few days ago.  It has been getting 

progressively worse since that time.  Symptoms are mild to moderate.  No 

modifying factors.  The daughter states that he is drinking well but has had 

decreased urinary output.  She has not noticed a fever.  She reports no known 

GI bleeding.





His daughter has later reported that he suffers from gastroparesis.  He has 

occasional emesis associated with gastroparesis.  He also has decreased p.o. 

intake secondary to his gastroparesis.





PFSH


Past Medical History


Hx Anticoagulant Therapy:  Yes (ASA 2X81)


Arthritis:  No


Asthma:  No


Bipolar Disorder:  Yes


Anxiety:  No


Depression:  Yes


Heart Rhythm Problems:  No


Cancer:  No


Cardiovascular Problems:  Yes


High Cholesterol:  No


Chest Pain:  No


Congestive Heart Failure:  No


COPD:  No


Cerebrovascular Accident:  Yes


Diabetes:  Yes


Diminished Hearing:  No


Endocrine:  Yes (DM II)


Gastrointestinal Disorders:  Yes (GERD)


GERD:  Yes


Genitourinary:  Yes


Hiatal Hernia:  No


Hypertension:  Yes


Immune Disorder:  No


Implanted Vascular Access Dvce:  No


Kidney Stones:  No


Musculoskeletal:  Yes (Impaired balance )


Neurologic:  Yes


Psychiatric:  Yes


Reproductive:  No


Respiratory:  Yes


Migraines:  No


Renal Failure:  No


Seizures:  No


Sleep Apnea:  No


Thyroid Disease:  No


Ulcer:  Yes





Past Surgical History


Abdominal Surgery:  No


Cardiac Surgery:  No


Ear Surgery:  No


Endocrine Surgery:  No


Eye Surgery:  No


Genitourinary Surgery:  No


Gynecologic Surgery:  No


Neurologic Surgery:  No


Oral Surgery:  No


Pacemaker:  No


Thoracic Surgery:  No


Tonsillectomy:  Yes





Social History


Alcohol Use:  No


Tobacco Use:  No


Substance Use:  No





Allergies-Medications


(Allergen,Severity, Reaction):  


Coded Allergies:  


     codeine (Verified  Allergy, Severe, MUSCULOSKELETAL ISSUES, 4/17/18)


     insulin,pork (Verified  Allergy, Severe, Anaphylaxis, 4/17/18)


     insulin aspart (Verified  Allergy, Intermediate, 4/17/18)


     lactose (Verified  Allergy, Intermediate, 4/17/18)


 LACTOSE INTOLERANT


Reported Meds & Prescriptions





Reported Meds & Active Scripts


Active


Zofran (Ondansetron HCl) 4 Mg Tab 4 Mg PO Q6HR PRN


Wheelchair (Device) 1 Mis Mis Ea .XX AS DIRECTED


Commode Bedside (Device) 1 Mis Mis Ea .XX AS DIRECTED


Hospital Bed - Electric 1 Ea Ea Ea .XX AS DIRECTED


Protonix (Pantoprazole Sodium) 40 Mg Tab 40 Mg PO BID


Ondansetron Odt 4 Mg Tab 4 Mg PO Q6H PRN 30 Days


Coreg (Carvedilol) 3.125 Mg Tab 3.125 Mg PO Q12HR 30 Days


Atorvastatin (Atorvastatin Calcium) 40 Mg Tab 40 Mg PO HS 30 Days


Norvasc (Amlodipine Besylate) 5 Mg Tab 5 Mg PO BID


Atorvastatin (Atorvastatin Calcium) 40 Mg Tab 40 Mg PO HS


Reported


Scopolamine Patch 72 HR (Scopolamine) 1 Mg Patch 1 Patch T-DERMAL Q72H


Bethanechol 5 Mg Tab 5 Mg PO WITH MEALS


Metformin (Metformin HCl) 500 Mg Tab 500 Mg PO BIDPC


Fluoxetine (Fluoxetine HCl) 40 Mg Cap 40 Cap PO HS


Flomax (Tamsulosin HCl) 0.4 Mg Cap 0.4 Mg PO HS


Lithium Carbonate 600 Mg Cap 600 Mg PO HS








Review of Systems


Except as stated in HPI:  all other systems reviewed are Neg





Physical Exam


Narrative


GENERAL: The patient is awake.


SKIN:  warm/dry.


HEAD: Normocephalic. Atraumatic.


EYES: Pupils equal and round.  Extraocular movements are intact.


ENT:  Mucous membranes pink.  Dry.


NECK: Supple.  Full range of motion without pain.. 


CARDIOVASCULAR: Regular rate and rhythm.  Heart sounds are normal.


RESPIRATORY: No accessory muscle use. Clear to auscultation. Breath sounds 

equal bilaterally. 


GASTROINTESTINAL: Abdomen soft.  Nontender.  Bowel sounds present.  

Nondistended.


MUSCULOSKELETAL: No obvious deformities.  Normal muscle tone.


NEUROLOGICAL: Awake and alert. No obvious cranial nerve deficits.  Motor 

grossly within normal limits. Normal speech.


PSYCHIATRIC: Appropriate mood and affect; insight and judgment normal.





Data


Data


Last Documented VS





Vital Signs








  Date Time  Temp Pulse Resp B/P (MAP) Pulse Ox O2 Delivery O2 Flow Rate FiO2


 


6/14/18 18:41  77 17 125/86 (99) 100 Room Air  


 


6/14/18 15:26 98.0       








Orders





 Orders


Electrocardiogram (6/14/18 15:38)


Complete Blood Count With Diff (6/14/18 15:38)


Comprehensive Metabolic Panel (6/14/18 15:38)


Troponin I (6/14/18 15:38)


Urinalysis - C+S If Indicated (6/14/18 15:38)


Lactic Acid Sepsis Protocol (6/14/18 15:38)


Blood Culture (6/14/18 15:38)


Chest, Single Ap (6/14/18 15:38)


Blood Glucose (6/14/18 15:38)


Ecg Monitoring (6/14/18 15:38)


Iv Access Insert/Monitor (6/14/18 15:38)


Cath For Specimen (6/14/18 15:38)


Oximetry (6/14/18 15:38)


Sodium Chloride 0.9% Flush (Ns Flush) (6/14/18 15:45)


Sodium Chlor 0.9% 1000 Ml Inj (Ns 1000 M (6/14/18 15:38)


Sodium Chlor 0.9% 1000 Ml Inj (Ns 1000 M (6/14/18 15:45)


Ct Brain W/O Iv Contrast(Rout) (6/14/18 17:07)


Sodium Chlor 0.9% 1000 Ml Inj (Ns 1000 M (6/14/18 18:15)


Sodium Chlor 0.9% 1000 Ml Inj (Ns 1000 M (6/14/18 18:15)





Labs





Laboratory Tests








Test


  6/14/18


16:00 6/14/18


17:15


 


White Blood Count 9.4 TH/MM3  


 


Red Blood Count 4.11 MIL/MM3  


 


Hemoglobin 11.2 GM/DL  


 


Hematocrit 35.5 %  


 


Mean Corpuscular Volume 86.3 FL  


 


Mean Corpuscular Hemoglobin 27.2 PG  


 


Mean Corpuscular Hemoglobin


Concent 31.6 % 


  


 


 


Red Cell Distribution Width 15.2 %  


 


Platelet Count 462 TH/MM3  


 


Mean Platelet Volume 7.4 FL  


 


Neutrophils (%) (Auto) 69.2 %  


 


Lymphocytes (%) (Auto) 20.1 %  


 


Monocytes (%) (Auto) 8.4 %  


 


Eosinophils (%) (Auto) 1.5 %  


 


Basophils (%) (Auto) 0.8 %  


 


Neutrophils # (Auto) 6.5 TH/MM3  


 


Lymphocytes # (Auto) 1.9 TH/MM3  


 


Monocytes # (Auto) 0.8 TH/MM3  


 


Eosinophils # (Auto) 0.1 TH/MM3  


 


Basophils # (Auto) 0.1 TH/MM3  


 


CBC Comment DIFF FINAL  


 


Differential Comment   


 


Blood Urea Nitrogen 8 MG/DL  


 


Creatinine 1.67 MG/DL  


 


Random Glucose 106 MG/DL  


 


Total Protein 7.1 GM/DL  


 


Albumin 3.0 GM/DL  


 


Calcium Level 10.2 MG/DL  


 


Alkaline Phosphatase 77 U/L  


 


Aspartate Amino Transf


(AST/SGOT) 19 U/L 


  


 


 


Alanine Aminotransferase


(ALT/SGPT) 18 U/L 


  


 


 


Total Bilirubin 0.8 MG/DL  


 


Sodium Level 141 MEQ/L  


 


Potassium Level 3.9 MEQ/L  


 


Chloride Level 108 MEQ/L  


 


Carbon Dioxide Level 23.8 MEQ/L  


 


Anion Gap 9 MEQ/L  


 


Estimat Glomerular Filtration


Rate 41 ML/MIN 


  


 


 


Lactic Acid Level 1.6 mmol/L  


 


Troponin I


  LESS THAN 0.02


NG/ML 


 


 


Urine Color  Straw 


 


Urine Turbidity  CLEAR 


 


Urine pH  7.0 


 


Urine Specific Gravity  1.004 


 


Urine Protein  NEG mg/dL 


 


Urine Glucose (UA)  NEG mg/dL 


 


Urine Ketones  NEG mg/dL 


 


Urine Occult Blood  NEG 


 


Urine Nitrite  NEG 


 


Urine Bilirubin  NEG 


 


Urine Leukocyte Esterase  NEG 


 


Urine RBC


  


  LESS THAN 1


/hpf


 


Urine WBC  3 /hpf 


 


Microscopic Urinalysis Comment


  


  CATH-CULT NOT


IND











MDM


Medical Decision Making


Medical Screen Exam Complete:  Yes


Emergency Medical Condition:  Yes


Interpretation(s)


EKG shows a sinus rhythm with a left bundle branch block.


Differential Diagnosis


Differential diagnosis of altered mental status includes but is not limited to 

infection, electrolyte abnormality, neurological event, intoxication, 

encephalitis, meningitis


Narrative Course


This patient is brought in by his family with a chief complaint of altered 

mental status and decreased urinary output.  Clinically, he looks dehydrated.  

I have ordered 2 L of fluid.





CBC & BMP Diagram


6/14/18 16:00








Total Protein 7.1, Albumin 3.0 L, Calcium Level 10.2 H, Alkaline Phosphatase 77

, Aspartate Amino Transf (AST/SGOT) 19, Alanine Aminotransferase (ALT/SGPT) 18, 

Total Bilirubin 0.8





LA 1.6





trop < 0.02





UA neg





His mucous membranes are still pretty dry.  He gave us a urine sample but it is 

very small.  I am going to give him 2 more liters of fluid.








Last Impressions








Head CT 6/14/18 1707 Signed





Impressions: 





 CONCLUSION:





 1.  Old right basal ganglia and old right occipital infarct.





 2.  Right parietal infarct of indeterminate age. 





 3.  Cerebral atrophy and chronic ischemic small vessel vasculopathy.





  





 


 


Chest X-Ray 6/14/18 1538 Signed





Impressions: 





 CONCLUSION: 





 No acute cardiopulmonary findings. Similar to previous dated 3/29/2018.





  





 








The CT was done at the daughter's request.  He does not have symptoms of an 

acute stroke.  He has global weakness.  He will be discharged home with a 

prescription for Zofran.





Diagnosis





 Primary Impression:  


 Dehydration


***Med/Other Pt SpecificInfo:  Prescription(s) given


Scripts


Ondansetron (Zofran) 4 Mg Tab


4 MG PO Q6HR Y for NAUSEA OR VOMITING, #30 TAB 0 Refills


   Prov: Yomaira Richmond MD         6/14/18


Disposition:  01 DISCHARGE HOME


Condition:  Stable











Yomaira Richmond MD Jun 14, 2018 15:44

## 2018-06-14 NOTE — RADRPT
EXAM DATE:  6/14/2018 6:36 PM EDT

AGE/SEX:        73 years / Male



INDICATIONS:  Altered mental status.



CLINICAL DATA:  This is the patient's initial encounter. Patient reports that signs and symptoms have
 been present for 1 day and indicates a pain score of 0/10. 

                                                                          

MEDICAL/SURGICAL HISTORY:   Cerebrovascular disease.  Hypertension.  Diabetes. None.



RADIATION DOSE:  64.63 CTDI (mGy)







COMPARISON:      [facility] – [place], [place] BRAIN W/O CONTRAST, 3/16/2018.  . 







TECHNIQUE:  CT of the head without contrast.  Using automated exposure control and adjustment of the 
mA and/or kV according to patient size, radiation dose was kept as low as reasonably achievable to ob
tain optimal diagnostic quality images.



FINDINGS: 

Cerebrum:  Cerebral atrophy. Old right basal ganglia infarct. Old right occipital infarct. Area of lo
w-density in the right parietal lobe parafalcine region. There is low-density throughout the white ma
tter.  No evidence of midline shift, mass lesion or hemorrhage .  No extraaxial fluid collections are
 seen.

Posterior Fossa:  The cerebellum and brainstem are intact.  The 4th ventricle is midline.  The cerebe
llopontine angle is unremarkable.

Extracranial:  The visualized portion of the orbits is intact.

Skull:  The calvaria is intact.  No evidence of skull fracture.



CONCLUSION:

1.  Old right basal ganglia and old right occipital infarct.

2.  Right parietal infarct of indeterminate age. 

3.  Cerebral atrophy and chronic ischemic small vessel vasculopathy.



Electronically signed by: Jose Angel Cano MD  6/14/2018 6:50 PM EDT

## 2018-06-14 NOTE — EKG
Date Performed: 06/14/2018       Time Performed: 14:39:08

 

PTAGE:      73 years

 

EKG:      Sinus rhythm 

 

 LEFT AXIS DEVIATION LEFT BUNDLE BRANCH BLOCK ABNORMAL ECG

 

PREVIOUS TRACING       : 04/19/2018 00.12 No significant change from previous tracing noted.

 

DOCTOR:   Daniel Ellis  Interpretating Date/Time  06/14/2018 20:53:39

## 2024-11-05 NOTE — HHI.HP
__________________________________________________





[facility]


Service


[facility]


Primary Care Physician


Ministerio Rocha MD


Admission Diagnosis





Syncope; Bradycardia; Fall


Diagnoses:  


(1) Syncope


Diagnosis:  Principal





Chief Complaint:  


lightheadedness


Travel History


International Travel<30 Days:  No


Contact w/Intl Traveler <30 Da:  No


Traveled to Known Affected Are:  No


History of Present Illness


patient is a 73 y/o male with history of hypertension, diabetes, BPH,asthma who 

was brought to ER after he passed out at home earlier today. he says that he 

felt lightheaded before he passed out. he denies any chest pain, sob, nausea or 

emesis before the incident. there's no report of tongue-biting, focal weakness, 

headache or urinary incontinence. per the ER , his pulse was reported @ 30's at 

the time. he's on Verapamil and Coreg for hypertension. at the time of my 

evaluation he was resting comfortably with no acute distress, with no reported 

pain.





Review of Systems


Constitutional:  DENIES: Fever, Weight loss, Chills, Night Sweats


Eyes:  DENIES: Blurred vision, Diplopia, Vision loss, Double Vision


Ears, nose, mouth, throat:  DENIES: Tinnitus, Vertigo, Throat pain, Epistaxis


Respiratory:  DENIES: Apneas, Cough, Snoring, Wheezing, Hemoptysis, Sputum 

production, Shortness of breath


Cardiovascular:  DENIES: Chest pain, Palpitations, Syncope, Dyspnea on Exertion

, PND, Lower Extremity Edema, Orthopnea, Claudication


Gastrointestinal:  DENIES: Abdominal pain, Black stools, Bloody stools, 

Constipation, Diarrhea, Nausea, Vomiting, Difficulty Swallowing, Anorexia


Genitourinary:  DENIES: Urinary frequency, Urgency, Hematuria, Dysuria


Musculoskeletal:  DENIES: Joint pain, Muscle aches, Stiffness, Joint Swelling


Integumentary:  DENIES: Rash


Neurologic:  DENIES: Abnormal gait, Headache, Localized weakness, Paresthesias, 

Seizures, Speech Problems, Tremor, Poor Balance


Psychiatric:  DENIES: Anxiety, Confusion, Mood changes, Depression, 

Hallucinations, Agitation, Suicidal Ideation, Homicidal Ideation, Delusions





Past Family Social History


Past Medical History


hypertension diabetes mellitus asthma BPH


Past Surgical History


hand surgery.


Reported Medications


Tamsulosin (Tamsulosin HCl) 0.4 Mg Cap 0.4 Mg PO HS


Prozac (Fluoxetine HCl) 20 Mg Cap 20 Mg PO DAILY


Lithium Carbonate 300 Mg Cap 300 Mg PO BID


Verapamil (Verapamil HCl) 120 Mg Tab 120 Mg PO DAILY


Carvedilol 25 Mg Tab 25 Mg PO DAILY


Metformin (Metformin HCl) 500 Mg Tab 500 Mg PO BIDPC


Aspirin 81 Mg Chew 81 Mg CHEW DAILY


Allergies:  


Coded Allergies:  


     codeine (Verified  Allergy, Intermediate, 18)


Active Ordered Medications





Inpatient Medications


Dextrose (D50w (Vial) Inj) 50 ml UNSCH  PRN IV PUSH HYPOGLYCEMIA-SEE COMMENTS;  

Start 18 at 10:30


Glucagon (Glucagon Inj) 1 mg UNSCH  PRN OTHER HYPOGLYCEMIA-SEE COMMENTS;  Start 

18 at 10:30


Insulin Aspart (NovoLOG SUPPLEMENTAL SCALE) 1 ACHS SLIDING  SCALE SQ ;  Start  at 12:00


Ondansetron HCl (Zofran Inj) 4 mg ONCE  ONCE IVP  Last administered on at 08:21;  Start 18 at 08:00;  Stop 18 at 08:02;  Status DC


Sodium Chloride (NS Flush) 2 ml UNSCH  PRN IVF FLUSH AFTER USING IV ACCESS Last 

administered on 18at 08:21;  Start 18 at 08:00


Family History


not significant.


Social History


quit smoking many years ago- doesn't drink.





Physical Exam


Vital Signs





Vital Signs








  Date Time  Temp Pulse Resp B/P (MAP) Pulse Ox O2 Delivery O2 Flow Rate FiO2


 


18 11:05 97.6       


 


18 08:02   14  95 Nasal Cannula 3.00 


 


18 07:56  55 14 116/72 (87) 96   








Physical Exam


GENERAL: This is a well-nourished, well-developed patient, in no apparent 

distress.


SKIN: No rashes, ecchymoses or lesions. Cool and dry.


HEAD: Atraumatic. Normocephalic. No temporal or scalp tenderness.


EYES: Pupils equal round and reactive. Extraocular motions intact. No scleral 

icterus. No injection or drainage. 


ENT: Nose without bleeding, purulent drainage or septal hematoma. Throat 

without erythema, tonsillar hypertrophy or exudate. Uvula midline. Airway 

patent.


NECK: Trachea midline. No JVD or lymphadenopathy. Supple, nontender, no 

meningeal signs.


CARDIOVASCULAR: Regular rate and rhythm without murmurs, gallops, or rubs. 


RESPIRATORY: Clear to auscultation. Breath sounds equal bilaterally. No wheezes

, rales, or rhonchi.  


GASTROINTESTINAL: Abdomen soft, non-tender, nondistended. No hepato-splenomegaly

, or palpable masses. No guarding.


MUSCULOSKELETAL: Extremities without clubbing, cyanosis, or edema. No joint 

tenderness, effusion, or edema noted. No calf tenderness. Negative Homans sign 

bilaterally.


NEUROLOGICAL: Awake and alert. Cranial nerves II through XII intact.  Motor and 

sensory grossly within normal limits. Five out of 5 muscle strength in all 

muscle groups.  Normal speech.


Laboratory





Laboratory Tests








Test


  18


08:00 18


11:20


 


White Blood Count 11.4  


 


Red Blood Count 4.73  


 


Hemoglobin 15.1  


 


Hematocrit 44.2  


 


Mean Corpuscular Volume 93.5  


 


Mean Corpuscular Hemoglobin 31.9  


 


Mean Corpuscular Hemoglobin


Concent 34.1 


  


 


 


Red Cell Distribution Width 13.8  


 


Platelet Count 356  


 


Mean Platelet Volume 8.2  


 


Neutrophils (%) (Auto) 52.3  


 


Lymphocytes (%) (Auto) 33.6  


 


Monocytes (%) (Auto) 10.3  


 


Eosinophils (%) (Auto) 2.9  


 


Basophils (%) (Auto) 0.9  


 


Neutrophils # (Auto) 6.0  


 


Lymphocytes # (Auto) 3.8  


 


Monocytes # (Auto) 1.2  


 


Eosinophils # (Auto) 0.3  


 


Basophils # (Auto) 0.1  


 


CBC Comment DIFF FINAL  


 


Differential Comment   


 


Prothrombin Time 10.7  


 


Prothromb Time International


Ratio 1.1 


  


 


 


Activated Partial


Thromboplast Time 25.2 


  


 


 


Blood Urea Nitrogen 19  


 


Creatinine 1.51  


 


Random Glucose 218  


 


Total Protein 7.7  


 


Albumin 3.2  


 


Calcium Level 9.5  


 


Magnesium Level 2.1  


 


Alkaline Phosphatase 82  


 


Aspartate Amino Transf


(AST/SGOT) 38 


  


 


 


Alanine Aminotransferase


(ALT/SGPT) 46 


  


 


 


Total Bilirubin 0.7  


 


Sodium Level 140  


 


Potassium Level 4.1  


 


Chloride Level 106  


 


Carbon Dioxide Level 24.4  


 


Anion Gap 10  


 


Estimat Glomerular Filtration


Rate 46 


  


 


 


Total Creatine Kinase 112  


 


Creatine Kinase MB 0.8  


 


Troponin I 0.03  


 


Urine Color  YELLOW 


 


Urine Turbidity  HAZY 


 


Urine pH  5.5 


 


Urine Specific Gravity  1.034 


 


Urine Protein  100 


 


Urine Glucose (UA)  TRACE 


 


Urine Ketones  NEG 


 


Urine Occult Blood  SMALL 


 


Urine Nitrite  NEG 


 


Urine Bilirubin  NEG 


 


Urine Urobilinogen  2.0 


 


Urine Leukocyte Esterase  SMALL 


 


Urine RBC  13 


 


Urine WBC  14 


 


Urine Squamous Epithelial


Cells 


  <1 


 


 


Urine Calcium Oxalate Crystals  OCC 


 


Urine Bacteria  FEW 


 


Urine Hyaline Casts  32 


 


Urine Mucus  MANY 


 


Microscopic Urinalysis Comment


  


  CULTURE


INDICATED














 Date/Time


Source Procedure


Growth Status


 


 


 18 11:20


Urine Clean Catch Urine Culture


Pending Received








Result Diagram:  


18 0800                                                                   

             18 0800





Imaging





Last Impressions








Head CT 18 Signed





Impressions: 





 Service Date/Time:   08:35 - CONCLUSION:  Normal 





 examination.       Brendan Weldon MD 


 


Chest X-Ray 18 Signed





Impressions: 





 Service Date/Time:   08:13 - CONCLUSION: Normal 





 examination.       Brendan Weldon MD 





EKG; sinus bradycardia with LBBB( old)





Capmarcus VTE Risk Assessment


Caprini VTE Risk Assessment:  Mod/High Risk (score >= 2)


Caprini Risk Assessment Model











 Point Value = 1          Point Value = 2  Point Value = 3  Point Value = 5


 


Age 41-60


Minor surgery


BMI > 25 kg/m2


Swollen legs


Varicose veins


Pregnancy or postpartum


History of unexplained or recurrent


   spontaneous 


Oral contraceptives or hormone


   replacement


Sepsis (< 1 month)


Serious lung disease, including


   pneumonia (< 1 month)


Abnormal pulmonary function


Acute myocardial infarction


Congestive heart failure (< 1 month)


History of inflammatory bowel disease


Medical patient at bed rest Age 61-74


Arthroscopic surgery


Major open surgery (> 45 min)


Laparoscopic surgery (> 45 min)


Malignancy


Confined to bed (> 72 hours)


Immobilizing plaster cast


Central venous access Age >= 75


History of VTE


Family history of VTE


Factor V Leiden


Prothrombin 61178C


Lupus anticoagulant


Anticardiolipin antibodies


Elevated serum homocysteine


Heparin-induced thrombocytopenia


Other congenital or acquired


   thrombophilia Stroke (< 1 month)


Elective arthroplasty


Hip, pelvis, or leg fracture


Acute spinal cord injury (< 1 month)








Prophylaxis Regimen











   Total Risk


Factor Score Risk Level Prophylaxis Regimen


 


0-1      Low Early ambulation


 


2 Moderate Order ONE of the following:


*Sequential Compression Device (SCD)


*Heparin 5000 units SQ BID


 


3-4 Higher Order ONE of the following medications:


*Heparin 5000 units SQ TID


*Enoxaparin/Lovenox 40 mg SQ daily (WT < 150 kg, CrCl > 30 mL/min)


*Enoxaparin/Lovenox 30 mg SQ daily (WT < 150 kg, CrCl > 10-29 mL/min)


*Enoxaparin/Lovenox 30 mg SQ BID (WT < 150 kg, CrCl > 30 mL/min)


AND/OR


*Sequential Compression Device (SCD)


 


5 or more Highest Order ONE of the following medications:


*Heparin 5000 units SQ TID (Preferred with Epidurals)


*Enoxaparin/Lovenox 40 mg SQ daily (WT < 150 kg, CrCl > 30 mL/min)


*Enoxaparin/Lovenox 30 mg SQ daily (WT < 150 kg, CrCl > 10-29 mL/min)


*Enoxaparin/Lovenox 30 mg SQ BID (WT < 150 kg, CrCl > 30 mL/min)


AND


*Sequential Compression Device (SCD)











Assessment and Plan


Assessment and Plan


A/P  





- syncope with reported bradycardia


 continue to monitor on telemetry- hold Verapamil and Coreg- check echo and 

carotid doppler.


-hypertension- check orthostatic BP- hold home meds as noted above- vasotec prn.


-diabetes mellitus; accu-check with SSI


-chronic renal insufficiency; seems to be at his baseline- will monitor


-DVT prophylaxis with subq Lovenox


-consult PT.


Discussed Condition With


patient and his daughter.


ER physician.





Problem Qualifiers





(1) Syncope:  


Qualified Codes:  R55 - Syncope and collapse








Tasha Carrillo MD 2018 13:11 weakness